# Patient Record
Sex: FEMALE | Race: WHITE | HISPANIC OR LATINO | Employment: UNEMPLOYED | URBAN - METROPOLITAN AREA
[De-identification: names, ages, dates, MRNs, and addresses within clinical notes are randomized per-mention and may not be internally consistent; named-entity substitution may affect disease eponyms.]

---
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
## 2019-01-17 ENCOUNTER — TRANSCRIBE ORDERS (OUTPATIENT)
Dept: URGENT CARE | Facility: MEDICAL CENTER | Age: 19
End: 2019-01-17

## 2019-01-17 ENCOUNTER — APPOINTMENT (OUTPATIENT)
Dept: URGENT CARE | Facility: MEDICAL CENTER | Age: 19
End: 2019-01-17
Payer: OTHER MISCELLANEOUS

## 2019-01-17 ENCOUNTER — APPOINTMENT (OUTPATIENT)
Dept: RADIOLOGY | Facility: MEDICAL CENTER | Age: 19
End: 2019-01-17
Payer: OTHER MISCELLANEOUS

## 2019-01-17 DIAGNOSIS — S69.92XA INJURY OF LEFT WRIST, INITIAL ENCOUNTER: ICD-10-CM

## 2019-01-17 DIAGNOSIS — S69.92XA INJURY OF FINGER OF LEFT HAND, INITIAL ENCOUNTER: ICD-10-CM

## 2019-01-17 DIAGNOSIS — S69.92XA INJURY OF LEFT WRIST, INITIAL ENCOUNTER: Primary | ICD-10-CM

## 2019-01-17 DIAGNOSIS — S69.92XA INJURY OF FINGER OF LEFT HAND, INITIAL ENCOUNTER: Primary | ICD-10-CM

## 2019-01-17 PROCEDURE — 99284 EMERGENCY DEPT VISIT MOD MDM: CPT | Performed by: NURSE PRACTITIONER

## 2019-01-17 PROCEDURE — 73110 X-RAY EXAM OF WRIST: CPT

## 2019-01-17 PROCEDURE — G0383 LEV 4 HOSP TYPE B ED VISIT: HCPCS | Performed by: NURSE PRACTITIONER

## 2019-01-17 PROCEDURE — 73130 X-RAY EXAM OF HAND: CPT

## 2019-01-21 ENCOUNTER — OCCMED (OUTPATIENT)
Dept: URGENT CARE | Facility: MEDICAL CENTER | Age: 19
End: 2019-01-21
Payer: OTHER MISCELLANEOUS

## 2019-01-21 DIAGNOSIS — M79.642 PAIN OF LEFT HAND: Primary | ICD-10-CM

## 2019-01-21 PROCEDURE — 99213 OFFICE O/P EST LOW 20 MIN: CPT | Performed by: PHYSICIAN ASSISTANT

## 2019-01-28 ENCOUNTER — APPOINTMENT (OUTPATIENT)
Dept: URGENT CARE | Facility: MEDICAL CENTER | Age: 19
End: 2019-01-28
Payer: OTHER MISCELLANEOUS

## 2019-01-28 PROCEDURE — 99213 OFFICE O/P EST LOW 20 MIN: CPT | Performed by: PHYSICIAN ASSISTANT

## 2019-02-02 ENCOUNTER — OFFICE VISIT (OUTPATIENT)
Dept: URGENT CARE | Facility: MEDICAL CENTER | Age: 19
End: 2019-02-02
Payer: COMMERCIAL

## 2019-02-02 VITALS
SYSTOLIC BLOOD PRESSURE: 102 MMHG | RESPIRATION RATE: 20 BRPM | TEMPERATURE: 98.5 F | HEART RATE: 108 BPM | OXYGEN SATURATION: 98 % | DIASTOLIC BLOOD PRESSURE: 64 MMHG

## 2019-02-02 DIAGNOSIS — J02.9 ACUTE PHARYNGITIS, UNSPECIFIED ETIOLOGY: ICD-10-CM

## 2019-02-02 DIAGNOSIS — J02.9 SORE THROAT: Primary | ICD-10-CM

## 2019-02-02 LAB — S PYO AG THROAT QL: NEGATIVE

## 2019-02-02 PROCEDURE — 87070 CULTURE OTHR SPECIMN AEROBIC: CPT | Performed by: FAMILY MEDICINE

## 2019-02-02 PROCEDURE — 87430 STREP A AG IA: CPT | Performed by: FAMILY MEDICINE

## 2019-02-02 PROCEDURE — G0383 LEV 4 HOSP TYPE B ED VISIT: HCPCS | Performed by: FAMILY MEDICINE

## 2019-02-02 PROCEDURE — 99214 OFFICE O/P EST MOD 30 MIN: CPT | Performed by: FAMILY MEDICINE

## 2019-02-02 PROCEDURE — 99284 EMERGENCY DEPT VISIT MOD MDM: CPT | Performed by: FAMILY MEDICINE

## 2019-02-02 RX ORDER — AZITHROMYCIN 250 MG/1
TABLET, FILM COATED ORAL
Qty: 6 TABLET | Refills: 0 | Status: SHIPPED | OUTPATIENT
Start: 2019-02-02 | End: 2019-02-06

## 2019-02-02 RX ORDER — BENZONATATE 100 MG/1
100 CAPSULE ORAL 3 TIMES DAILY PRN
Qty: 20 CAPSULE | Refills: 0 | Status: SHIPPED | OUTPATIENT
Start: 2019-02-02 | End: 2019-03-12

## 2019-02-02 NOTE — ADDENDUM NOTE
Addended by: Jon Montaño on: 2/2/2019 03:17 PM     Modules accepted: Orders Advised pt's dtr Yarely that pt has appt w/ Dr. Cristobal on 11/30/18 and Dr. Cristobal will review results w/ pt at that visit, dtr voiced understanding and to call back PRN.

## 2019-02-02 NOTE — LETTER
February 2, 2019     Patient: Krunal Greco   YOB: 2000   Date of Visit: 2/2/2019       To Whom It May Concern: It is my medical opinion that Krunal Greco may return to work on 2/4/2019  If you have any questions or concerns, please don't hesitate to call           Sincerely,        Lashon Gonzalez MD    CC: No Recipients

## 2019-02-02 NOTE — PROGRESS NOTES
[facility]        NAME: Marya Brooks is a 20 y o  female  : 2000    MRN: [id number]  DATE: 2019  TIME: 2:51 PM    Assessment and Plan   Sore throat [J02 9]  1  Sore throat  POCT rapid strepA   2  Acute pharyngitis, unspecified etiology  lidocaine viscous (XYLOCAINE) 2 % mucosal solution    azithromycin (ZITHROMAX) 250 mg tablet    benzonatate (TESSALON PERLES) 100 mg capsule         Patient Instructions       Follow up with PCP in 3-5 days  Proceed to  ER if symptoms worsen  Chief Complaint     Chief Complaint   Patient presents with    Sore Throat     sore throat for 2 days   Cough     Cough and fever since this am  States cough productive for thick yellow phlegm   Fever         History of Present Illness       Patient complaining of sore throat for the last 2 days  Describes that scratchy and burning in nature  Taking Advil with no noticeable improvement  Also describes cough which is developed today  Nonproductive and at times productive of yellowish sputum  Fever 102 today while taking Tylenol which seems to help  Denies shortness of breath although she has a history of asthma  Denies wheezing  Refers to nasal congestion and postnasal drip  Review of Systems   Review of Systems   Constitutional: Positive for fever  HENT: Positive for congestion and sore throat  Respiratory: Positive for cough            Current Medications       Current Outpatient Prescriptions:     albuterol (2 5 mg/3 mL) 0 083 % nebulizer solution, Take 1 vial (2 5 mg total) by nebulization every 6 (six) hours as needed for wheezing or shortness of breath, Disp: 75 mL, Rfl: 0    gabapentin (NEURONTIN) 100 mg capsule, TAKE 1-3 CAPSULES BY MOUTH EVERY 8 HOURS as needed for mood or migraine, Disp: 180 capsule, Rfl: 0    loratadine (CLARITIN) 10 mg tablet, Take 1 tablet (10 mg total) by mouth daily, Disp: 20 tablet, Rfl: 0    venlafaxine (EFFEXOR) 75 mg tablet, Take 1 tablet (75 mg total) by mouth daily, Disp: 30 tablet, Rfl: 1    azithromycin (ZITHROMAX) 250 mg tablet, Take 2 tablets today then 1 tablet daily x 4 days, Disp: 6 tablet, Rfl: 0    benzonatate (TESSALON PERLES) 100 mg capsule, Take 1 capsule (100 mg total) by mouth 3 (three) times a day as needed for cough, Disp: 20 capsule, Rfl: 0    diphenhydrAMINE (BENADRYL) 25 mg tablet, Take 1 tablet (25 mg total) by mouth every 6 (six) hours as needed for itching (Patient not taking: Reported on 2/2/2019 ), Disp: 30 tablet, Rfl: 0    lidocaine viscous (XYLOCAINE) 2 % mucosal solution, Swish and spit 15 mL 4 (four) times a day as needed for moderate pain, Disp: 100 mL, Rfl: 0    ondansetron (ZOFRAN-ODT) 4 mg disintegrating tablet, Take 1 tablet (4 mg total) by mouth every 6 (six) hours as needed for nausea or vomiting (Patient not taking: Reported on 12/31/2018 ), Disp: 20 tablet, Rfl: 0    ziprasidone (GEODON) 20 mg capsule, Take 20 mg by mouth 2 (two) times a day with meals, Disp: , Rfl:     Current Allergies     Allergies as of 02/02/2019 - Reviewed 02/02/2019   Allergen Reaction Noted    Penicillins  07/15/2016            The following portions of the patient's history were reviewed and updated as appropriate: allergies, current medications, past family history, past medical history, past social history, past surgical history and problem list      Past Medical History:   Diagnosis Date    Allergic rhinitis due to pollen     Last assessed 10/16/13    Asthma     Bipolar disorder with psychotic features ([address] )     Long term use of drug     Last assessed 12/20/13    Lump of skin     Last assessed 10/04/13    Lymphadenitis     Last assessed 12/30/14    Lymphadenopathy     Last assessed 10/16/13    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[address] )     Migraine     Psychiatric disorder     Schizophrenia ([address]     Urine malodor     Last assessed 09/18/14       History reviewed  No pertinent surgical history      Family History   Problem Relation Age of Onset    Migraines Mother    [facility] Other Mother         Neck pain    No Known Problems Father          Medications have been verified  Objective   /64 (BP Location: Right arm, Patient Position: Sitting, Cuff Size: Standard)   Pulse (!) 108   Temp 98 5 °F (36 9 °C) (Tympanic)   Resp 20   SpO2 98%        Physical Exam     Physical Exam   HENT:   Right Ear: External ear normal    Left Ear: External ear normal    Erythematous posterior pharynx  Cobblestoning observed  Markedly swollen  Pulmonary/Chest: Effort normal and breath sounds normal    Lymphadenopathy:     She has cervical adenopathy  Nursing note and vitals reviewed

## 2019-02-02 NOTE — PATIENT INSTRUCTIONS
Rapid strep test negative  Throat culture performed  Patient has markedly enlarged lymph nodes  Patient treated empirically with Zithromax Z-Oscar  Xylocaine viscous for sore throat and Tessalon Perles 100 mg q 8 hours  Pharyngitis   WHAT YOU NEED TO KNOW:   Pharyngitis, or sore throat, is inflammation of the tissues and structures in your pharynx (throat)  Pharyngitis is most often caused by bacteria  It may also be caused by a cold or flu virus  Other causes include smoking, allergies, or acid reflux  DISCHARGE INSTRUCTIONS:   Call 911 for any of the following:   · You have trouble breathing or swallowing because your throat is swollen or sore  Return to the emergency department if:   · You are drooling because it hurts too much to swallow  · Your fever is higher than 102? F (39?C) or lasts longer than 3 days  · You are confused  · You taste blood in your throat  Contact your healthcare provider if:   · Your throat pain gets worse  · You have a painful lump in your throat that does not go away after 5 days  · Your symptoms do not improve after 5 days  · You have questions or concerns about your condition or care  Medicines:  Viral pharyngitis will go away on its own without treatment  Your sore throat should start to feel better in 3 to 5 days for both viral and bacterial infections  You may need any of the following:  · Antibiotics  treat a bacterial infection  · NSAIDs , such as ibuprofen, help decrease swelling, pain, and fever  NSAIDs can cause stomach bleeding or kidney problems in certain people  If you take blood thinner medicine, always ask your healthcare provider if NSAIDs are safe for you  Always read the medicine label and follow directions  · Acetaminophen  decreases pain and fever  It is available without a doctor's order  Ask how much to take and how often to take it  Follow directions  Acetaminophen can cause liver damage if not taken correctly      · Take your medicine as directed  Contact your healthcare provider if you think your medicine is not helping or if you have side effects  Tell him or her if you are allergic to any medicine  Keep a list of the medicines, vitamins, and herbs you take  Include the amounts, and when and why you take them  Bring the list or the pill bottles to follow-up visits  Carry your medicine list with you in case of an emergency  Manage your symptoms:   · Gargle salt water  Mix ¼ teaspoon salt in an 8 ounce glass of warm water and gargle  This may help decrease swelling in your throat  · Drink liquids as directed  You may need to drink more liquids than usual  Liquids may help soothe your throat and prevent dehydration  Ask how much liquid to drink each day and which liquids are best for you  · Use a cool-steam humidifier  to help moisten the air in your room and calm your cough  · Soothe your throat  with cough drops, ice, soft foods, or popsicles  Prevent the spread of pharyngitis:  Cover your mouth and nose when you cough or sneeze  Do not share food or drinks  Wash your hands often  Use soap and water  If soap and water are unavailable, use an alcohol based hand   Follow up with your healthcare provider as directed:  Write down your questions so you remember to ask them during your visits  © 2017 [employer] Information is for End User's use only and may not be sold, redistributed or otherwise used for commercial purposes  All illustrations and images included in CareNotes® are the copyrighted property of [employer]  or [employer] Gusman  The above information is an  only  It is not intended as medical advice for individual conditions or treatments  Talk to your doctor, nurse or pharmacist before following any medical regimen to see if it is safe and effective for you

## 2019-02-04 LAB — BACTERIA THROAT CULT: NORMAL

## 2019-02-06 ENCOUNTER — APPOINTMENT (OUTPATIENT)
Dept: URGENT CARE | Facility: MEDICAL CENTER | Age: 19
End: 2019-02-06
Payer: OTHER MISCELLANEOUS

## 2019-02-06 PROCEDURE — 99212 OFFICE O/P EST SF 10 MIN: CPT | Performed by: PHYSICIAN ASSISTANT

## 2019-02-13 ENCOUNTER — HOSPITAL ENCOUNTER (EMERGENCY)
Facility: HOSPITAL | Age: 19
Discharge: HOME | End: 2019-02-13
Attending: EMERGENCY MEDICINE | Admitting: EMERGENCY MEDICINE
Payer: COMMERCIAL

## 2019-02-13 VITALS
OXYGEN SATURATION: 98 % | DIASTOLIC BLOOD PRESSURE: 53 MMHG | HEART RATE: 72 BPM | RESPIRATION RATE: 16 BRPM | TEMPERATURE: 98.6 F | WEIGHT: 135.3 LBS | BODY MASS INDEX: 23.97 KG/M2 | SYSTOLIC BLOOD PRESSURE: 92 MMHG

## 2019-02-13 DIAGNOSIS — R10.9 ACUTE ABDOMINAL PAIN: Primary | ICD-10-CM

## 2019-02-13 DIAGNOSIS — R11.2 NAUSEA AND VOMITING: ICD-10-CM

## 2019-02-13 DIAGNOSIS — R19.7 DIARRHEA: ICD-10-CM

## 2019-02-13 DIAGNOSIS — E86.0 DEHYDRATION: ICD-10-CM

## 2019-02-13 LAB
ALBUMIN SERPL BCP-MCNC: 3.4 G/DL (ref 3.5–5)
ALP SERPL-CCNC: 72 U/L (ref 46–116)
ALT SERPL W P-5'-P-CCNC: 19 U/L (ref 12–78)
ANION GAP SERPL CALCULATED.3IONS-SCNC: 11 MMOL/L (ref 4–13)
AST SERPL W P-5'-P-CCNC: 22 U/L (ref 5–45)
BASOPHILS # BLD AUTO: 0.06 THOUSANDS/ΜL (ref 0–0.1)
BASOPHILS NFR BLD AUTO: 1 % (ref 0–1)
BILIRUB SERPL-MCNC: 0.49 MG/DL (ref 0.2–1)
BILIRUB UR QL STRIP: NEGATIVE
BUN SERPL-MCNC: 8 MG/DL (ref 5–25)
CALCIUM SERPL-MCNC: 8 MG/DL (ref 8.3–10.1)
CHLORIDE SERPL-SCNC: 108 MMOL/L (ref 100–108)
CLARITY UR: CLEAR
CO2 SERPL-SCNC: 22 MMOL/L (ref 21–32)
COLOR UR: YELLOW
COLOR, POC: YELLOW
CREAT SERPL-MCNC: 0.63 MG/DL (ref 0.6–1.3)
EOSINOPHIL # BLD AUTO: 0.22 THOUSAND/ΜL (ref 0–0.61)
EOSINOPHIL NFR BLD AUTO: 3 % (ref 0–6)
ERYTHROCYTE [DISTWIDTH] IN BLOOD BY AUTOMATED COUNT: 13.6 % (ref 11.6–15.1)
EXT PREG TEST URINE: NEGATIVE
GFR SERPL CREATININE-BSD FRML MDRD: 130 ML/MIN/1.73SQ M
GLUCOSE SERPL-MCNC: 80 MG/DL (ref 65–140)
GLUCOSE UR STRIP-MCNC: NEGATIVE MG/DL
HCT VFR BLD AUTO: 40.8 % (ref 34.8–46.1)
HGB BLD-MCNC: 13.5 G/DL (ref 11.5–15.4)
HGB UR QL STRIP.AUTO: NEGATIVE
IMM GRANULOCYTES # BLD AUTO: 0.03 THOUSAND/UL (ref 0–0.2)
IMM GRANULOCYTES NFR BLD AUTO: 0 % (ref 0–2)
KETONES UR STRIP-MCNC: NEGATIVE MG/DL
LEUKOCYTE ESTERASE UR QL STRIP: NEGATIVE
LIPASE SERPL-CCNC: 79 U/L (ref 73–393)
LYMPHOCYTES # BLD AUTO: 2.38 THOUSANDS/ΜL (ref 0.6–4.47)
LYMPHOCYTES NFR BLD AUTO: 33 % (ref 14–44)
MCH RBC QN AUTO: 29 PG (ref 26.8–34.3)
MCHC RBC AUTO-ENTMCNC: 33.1 G/DL (ref 31.4–37.4)
MCV RBC AUTO: 88 FL (ref 82–98)
MONOCYTES # BLD AUTO: 0.51 THOUSAND/ΜL (ref 0.17–1.22)
MONOCYTES NFR BLD AUTO: 7 % (ref 4–12)
NEUTROPHILS # BLD AUTO: 4.07 THOUSANDS/ΜL (ref 1.85–7.62)
NEUTS SEG NFR BLD AUTO: 56 % (ref 43–75)
NITRITE UR QL STRIP: NEGATIVE
NRBC BLD AUTO-RTO: 0 /100 WBCS
PH UR STRIP.AUTO: 6 [PH] (ref 4.5–8)
PLATELET # BLD AUTO: 258 THOUSANDS/UL (ref 149–390)
PMV BLD AUTO: 9.5 FL (ref 8.9–12.7)
POTASSIUM SERPL-SCNC: 3.6 MMOL/L (ref 3.5–5.3)
PROT SERPL-MCNC: 6.5 G/DL (ref 6.4–8.2)
PROT UR STRIP-MCNC: NEGATIVE MG/DL
RBC # BLD AUTO: 4.65 MILLION/UL (ref 3.81–5.12)
SODIUM SERPL-SCNC: 141 MMOL/L (ref 136–145)
SP GR UR STRIP.AUTO: >=1.03 (ref 1–1.03)
UROBILINOGEN UR QL STRIP.AUTO: 1 E.U./DL
WBC # BLD AUTO: 7.27 THOUSAND/UL (ref 4.31–10.16)

## 2019-02-13 PROCEDURE — 96374 THER/PROPH/DIAG INJ IV PUSH: CPT

## 2019-02-13 PROCEDURE — 81025 URINE PREGNANCY TEST: CPT | Performed by: EMERGENCY MEDICINE

## 2019-02-13 PROCEDURE — 36415 COLL VENOUS BLD VENIPUNCTURE: CPT | Performed by: EMERGENCY MEDICINE

## 2019-02-13 PROCEDURE — 99284 EMERGENCY DEPT VISIT MOD MDM: CPT

## 2019-02-13 PROCEDURE — 85025 COMPLETE CBC W/AUTO DIFF WBC: CPT | Performed by: EMERGENCY MEDICINE

## 2019-02-13 PROCEDURE — 96361 HYDRATE IV INFUSION ADD-ON: CPT

## 2019-02-13 PROCEDURE — 81003 URINALYSIS AUTO W/O SCOPE: CPT

## 2019-02-13 PROCEDURE — 96375 TX/PRO/DX INJ NEW DRUG ADDON: CPT

## 2019-02-13 PROCEDURE — 83690 ASSAY OF LIPASE: CPT | Performed by: EMERGENCY MEDICINE

## 2019-02-13 PROCEDURE — 80053 COMPREHEN METABOLIC PANEL: CPT | Performed by: EMERGENCY MEDICINE

## 2019-02-13 RX ORDER — KETOROLAC TROMETHAMINE 30 MG/ML
15 INJECTION, SOLUTION INTRAMUSCULAR; INTRAVENOUS ONCE
Status: COMPLETED | OUTPATIENT
Start: 2019-02-13 | End: 2019-02-13

## 2019-02-13 RX ORDER — DICYCLOMINE HCL 20 MG
20 TABLET ORAL 2 TIMES DAILY
Qty: 20 TABLET | Refills: 0 | Status: SHIPPED | OUTPATIENT
Start: 2019-02-13 | End: 2019-03-12

## 2019-02-13 RX ORDER — ONDANSETRON 2 MG/ML
4 INJECTION INTRAMUSCULAR; INTRAVENOUS ONCE
Status: COMPLETED | OUTPATIENT
Start: 2019-02-13 | End: 2019-02-13

## 2019-02-13 RX ORDER — METOCLOPRAMIDE HYDROCHLORIDE 5 MG/ML
5 INJECTION INTRAMUSCULAR; INTRAVENOUS ONCE
Status: COMPLETED | OUTPATIENT
Start: 2019-02-13 | End: 2019-02-13

## 2019-02-13 RX ORDER — ONDANSETRON 4 MG/1
4 TABLET, FILM COATED ORAL EVERY 6 HOURS
Qty: 7 TABLET | Refills: 0 | Status: SHIPPED | OUTPATIENT
Start: 2019-02-13 | End: 2019-03-12

## 2019-02-13 RX ADMIN — ONDANSETRON 4 MG: 2 INJECTION INTRAMUSCULAR; INTRAVENOUS at 15:50

## 2019-02-13 RX ADMIN — SODIUM CHLORIDE 1000 ML: 0.9 INJECTION, SOLUTION INTRAVENOUS at 15:50

## 2019-02-13 RX ADMIN — KETOROLAC TROMETHAMINE 15 MG: 30 INJECTION, SOLUTION INTRAMUSCULAR at 16:44

## 2019-02-13 RX ADMIN — METOCLOPRAMIDE 5 MG: 5 INJECTION, SOLUTION INTRAMUSCULAR; INTRAVENOUS at 16:45

## 2019-02-13 NOTE — ED PROVIDER NOTES
History  Chief Complaint   Patient presents with    Abdominal Pain     patient c/o abdominal pain N/V/D for the past few days  Reports pain is LLQ  States, "I shitted 12 times today"  History provided by:  Patient  Abdominal Pain   Pain location: generalized worse in LLQ  Pain quality: sharp    Pain radiates to:  Does not radiate  Pain severity:  Moderate  Onset quality:  Gradual  Duration:  3 days  Timing:  Constant  Progression:  Worsening  Chronicity:  New  Relieved by:  Nothing  Worsened by:  Nothing  Ineffective treatments:  None tried  Associated symptoms: chills, diarrhea (x several days w/o blood/mucus), nausea and vomiting (nbnb x several days)    Associated symptoms: no anorexia, no belching, no chest pain, no constipation, no cough, no dysuria, no fatigue, no fever, no hematemesis, no hematochezia, no hematuria, no shortness of breath, no sore throat, no vaginal bleeding and no vaginal discharge        Prior to Admission Medications   Prescriptions Last Dose Informant Patient Reported? Taking?    albuterol (2 5 mg/3 mL) 0 083 % nebulizer solution   No No   Sig: Take 1 vial (2 5 mg total) by nebulization every 6 (six) hours as needed for wheezing or shortness of breath   benzonatate (TESSALON PERLES) 100 mg capsule   No No   Sig: Take 1 capsule (100 mg total) by mouth 3 (three) times a day as needed for cough   diphenhydrAMINE (BENADRYL) 25 mg tablet   No No   Sig: Take 1 tablet (25 mg total) by mouth every 6 (six) hours as needed for itching   Patient not taking: Reported on 2/2/2019    gabapentin (NEURONTIN) 100 mg capsule   No No   Sig: TAKE 1-3 CAPSULES BY MOUTH EVERY 8 HOURS as needed for mood or migraine   lidocaine viscous (XYLOCAINE) 2 % mucosal solution   No No   Sig: Swish and spit 15 mL 4 (four) times a day as needed for moderate pain   loratadine (CLARITIN) 10 mg tablet   No No   Sig: Take 1 tablet (10 mg total) by mouth daily   ondansetron (ZOFRAN-ODT) 4 mg disintegrating tablet   No No   Sig: Take 1 tablet (4 mg total) by mouth every 6 (six) hours as needed for nausea or vomiting   Patient not taking: Reported on 12/31/2018    venlafaxine (EFFEXOR) 75 mg tablet   No No   Sig: Take 1 tablet (75 mg total) by mouth daily   ziprasidone (GEODON) 20 mg capsule   Yes Yes   Sig: Take 20 mg by mouth 2 (two) times a day with meals      Facility-Administered Medications: None       Past Medical History:   Diagnosis Date    Allergic rhinitis due to pollen     Last assessed 10/16/13    Asthma     [facility] with psychotic features ([facility] )     Long term use of drug     Last assessed 12/20/13    Lump of skin     Last assessed 10/04/13    Lymphadenitis     Last assessed 12/30/14    Lymphadenopathy     Last assessed 10/16/13    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[facility] )     Migraine     Psychiatric disorder     Schizophrenia ([facility] )     Urine malodor     Last assessed 09/18/14       History reviewed  No pertinent surgical history  Family History   Problem Relation Age of Onset   [facility] Migraines Mother     Other Mother         Neck pain    No Known Problems Father      I have reviewed and agree with the history as documented  Social History     Tobacco Use    Smoking status: Current Every Day Smoker     Packs/day: 0 50     Types: Cigarettes    Smokeless tobacco: Never Used   Substance Use Topics    Alcohol use: No    Drug use: Yes     Types: Marijuana        Review of Systems   Constitutional: Positive for chills  Negative for activity change, appetite change, fatigue and fever  HENT: Negative for congestion, dental problem, ear pain, rhinorrhea and sore throat  Eyes: Negative for pain and redness  Respiratory: Negative for cough, chest tightness, shortness of breath and wheezing  Cardiovascular: Negative for chest pain and palpitations  Gastrointestinal: Positive for abdominal pain, diarrhea (x several days w/o blood/mucus), nausea and vomiting (nbnb x several days)   Negative for anorexia, blood in stool, constipation, hematemesis and hematochezia  Endocrine: Negative for cold intolerance and heat intolerance  Genitourinary: Negative for dysuria, frequency, hematuria, vaginal bleeding and vaginal discharge  Musculoskeletal: Negative for arthralgias and myalgias  Skin: Negative for color change, pallor and rash  Neurological: Negative for weakness and numbness  Hematological: Does not bruise/bleed easily  Psychiatric/Behavioral: Negative for agitation, hallucinations and suicidal ideas  Physical Exam  Physical Exam   Constitutional: She is oriented to person, place, and time  She appears well-developed and well-nourished  HENT:   Mouth/Throat: No oropharyngeal exudate  TMs normal bilaterally no pharyngeal erythema no rhinorrhea nontender palpation of sinuses, normal looking turbinates   Eyes: Conjunctivae and EOM are normal    Neck: Normal range of motion  Neck supple  No meningeal signs   Cardiovascular: Normal rate, regular rhythm, normal heart sounds and intact distal pulses  Pulmonary/Chest: Effort normal and breath sounds normal  No respiratory distress  She has no wheezes  She has no rales  She exhibits no tenderness  Abdominal: Soft  Bowel sounds are normal  She exhibits no distension and no mass  There is no tenderness  No hernia  No cvat   Musculoskeletal: Normal range of motion  She exhibits no edema  Lymphadenopathy:     She has no cervical adenopathy  Neurological: She is alert and oriented to person, place, and time  No cranial nerve deficit  Skin: No rash noted  No erythema  No edema   Psychiatric: She has a normal mood and affect  Her behavior is normal    Nursing note and vitals reviewed        Vital Signs  ED Triage Vitals [02/13/19 1443]   Temperature Pulse Respirations Blood Pressure SpO2   98 6 °F (37 °C) 100 18 107/60 99 %      Temp Source Heart Rate Source Patient Position - Orthostatic VS BP Location FiO2 (%)   Temporal Monitor Sitting Right arm --      Pain Score       8           Vitals:    02/13/19 1443 02/13/19 1550 02/13/19 1646   BP: 107/60 91/53 92/53   Pulse: 100 73 72   Patient Position - Orthostatic VS: Sitting Lying Lying       Visual Acuity      ED Medications  Medications   sodium chloride 0 9 % bolus 1,000 mL (1,000 mL Intravenous New Bag 2/13/19 1550)   ondansetron (ZOFRAN) injection 4 mg (4 mg Intravenous Given 2/13/19 1550)   metoclopramide (REGLAN) injection 5 mg (5 mg Intravenous Given 2/13/19 1645)   ketorolac (TORADOL) injection 15 mg (15 mg Intravenous Given 2/13/19 1644)       Diagnostic Studies  Results Reviewed     Procedure Component Value Units Date/Time    Comprehensive metabolic panel [[id number]]  (Abnormal) Collected:  02/13/19 1642    Lab Status:  Final result Specimen:  Blood from Arm, Right Updated:  02/13/19 1710     Sodium 141 mmol/L      Potassium 3 6 mmol/L      Chloride 108 mmol/L      CO2 22 mmol/L      ANION GAP 11 mmol/L      BUN 8 mg/dL      Creatinine 0 63 mg/dL      Glucose 80 mg/dL      Calcium 8 0 mg/dL      AST 22 U/L      ALT 19 U/L      Alkaline Phosphatase 72 U/L      Total Protein 6 5 g/dL      Albumin 3 4 g/dL      Total Bilirubin 0 49 mg/dL      eGFR 130 ml/min/1 73sq m     Narrative:       National Kidney Disease Education Program recommendations are as follows:  GFR calculation is accurate only with a steady state creatinine  Chronic Kidney disease less than 60 ml/min/1 73 sq  meters  Kidney failure less than 15 ml/min/1 73 sq  meters      Lipase [[id number]]  (Normal) Collected:  02/13/19 1550    Lab Status:  Final result Specimen:  Blood from Arm, Right Updated:  02/13/19 1642     Lipase 79 u/L     POCT pregnancy, urine [[id number]]  (Normal) Resulted:  02/13/19 1554    Lab Status:  Final result Updated:  02/13/19 1608     EXT PREG TEST UR (Ref: Negative) NEGATIVE    POCT urinalysis dipstick [[id number]]  (Normal) Resulted:  02/13/19 1554    Lab Status:  Final result Specimen:  Urine Updated:  02/13/19 1608     Color, UA yellow    CBC and differential [[id number]] Collected:  02/13/19 1550    Lab Status:  Final result Specimen:  Blood from Arm, Right Updated:  02/13/19 1600     WBC 7 27 Thousand/uL      RBC 4 65 Million/uL      Hemoglobin 13 5 g/dL      Hematocrit 40 8 %      MCV 88 fL      MCH 29 0 pg      MCHC 33 1 g/dL      RDW 13 6 %      MPV 9 5 fL      Platelets 106 Thousands/uL      nRBC 0 /100 WBCs      Neutrophils Relative 56 %      Immat GRANS % 0 %      Lymphocytes Relative 33 %      Monocytes Relative 7 %      Eosinophils Relative 3 %      Basophils Relative 1 %      Neutrophils Absolute 4 07 Thousands/µL      Immature Grans Absolute 0 03 Thousand/uL      Lymphocytes Absolute 2 38 Thousands/µL      Monocytes Absolute 0 51 Thousand/µL      Eosinophils Absolute 0 22 Thousand/µL      Basophils Absolute 0 06 Thousands/µL     ED Urine Macroscopic [[id number]] Collected:  02/13/19 1555    Lab Status:  Final result Specimen:  Urine Updated:  02/13/19 1555     Color, UA Yellow     Clarity, UA Clear     pH, UA 6 0     Leukocytes, UA Negative     Nitrite, UA Negative     Protein, UA Negative mg/dl      Glucose, UA Negative mg/dl      Ketones, UA Negative mg/dl      Urobilinogen, UA 1 0 E U /dl      Bilirubin, UA Negative     Blood, UA Negative     Specific Gravity, UA >=1 030    Narrative:       CLINITEK RESULT                 No orders to display              Procedures  Procedures       Phone Contacts  ED Phone Contact    ED Course  ED Course as of Feb 13 1728 Wed Feb 13, 2019   1710 Work up reviewed and benign  Symptoms improved  Will dc to [facility], with reassurance, counseling, pcp f/u                                  MDM  Number of Diagnoses or Management Options  Diagnosis management comments: Abdominal pain, nausea, vomiting, diarrhea with benign exam-will check abdominal labs, urine dip, urine pregnancy, treat symptoms reassess        Disposition  Final diagnoses:   Nausea and vomiting   Diarrhea   Acute abdominal pain   Dehydration     Time reflects when diagnosis was documented in both MDM as applicable and the Disposition within this note     Time User Action Codes Description Comment    2/13/2019  5:27 PM Gabriele Santos Add [R11 2] Nausea and vomiting     2/13/2019  5:27 PM Gabriele Santos Add [R19 7] Diarrhea     2/13/2019  5:27 PM Med Alpesh NAJERA Add [R10 9] Acute abdominal pain     2/13/2019  5:27 PM Gabriele Santos Add [E86 0] Dehydration     2/13/2019  5:27 PM Gabriele Santos Modify [R11 2] Nausea and vomiting     2/13/2019  5:27 PM Gabriele Santos Modify [R10 9] Acute abdominal pain       ED Disposition     ED Disposition Condition Date/Time Comment    Discharge Good Wed Feb 13, 2019  5:27 PM [address] discharge to home/self care  Follow-up Information     Follow up With Specialties Details Why Contact Info    Alexandria Coronado PA-C Family Medicine, Physician Assistant Schedule an appointment as soon as possible for a visit in 1 day  [address] [place], [place] [id number] [phone]            Patient's Medications   Discharge Prescriptions    DICYCLOMINE (BENTYL) 20 MG TABLET    Take 1 tablet (20 mg total) by mouth 2 (two) times a day for 7 days       Start Date: 2/13/2019 End Date: 2/20/2019       Order Dose: 20 mg       Quantity: 20 tablet    Refills: 0    ONDANSETRON (ZOFRAN) 4 MG TABLET    Take 1 tablet (4 mg total) by mouth every 6 (six) hours for 7 doses       Start Date: 2/13/2019 End Date: 2/15/2019       Order Dose: 4 mg       Quantity: 7 tablet    Refills: 0     No discharge procedures on file      ED Provider  Electronically Signed by           Khris Everett MD  02/13/19 6466

## 2019-03-12 ENCOUNTER — HOSPITAL ENCOUNTER (EMERGENCY)
Facility: HOSPITAL | Age: 19
Discharge: HOME | End: 2019-03-12
Attending: EMERGENCY MEDICINE
Payer: COMMERCIAL

## 2019-03-12 VITALS
TEMPERATURE: 97.2 F | HEART RATE: 74 BPM | BODY MASS INDEX: 24.21 KG/M2 | DIASTOLIC BLOOD PRESSURE: 67 MMHG | WEIGHT: 136.69 LBS | OXYGEN SATURATION: 100 % | RESPIRATION RATE: 16 BRPM | SYSTOLIC BLOOD PRESSURE: 119 MMHG

## 2019-03-12 DIAGNOSIS — K52.9 GASTROENTERITIS: Primary | ICD-10-CM

## 2019-03-12 LAB
ALBUMIN SERPL BCP-MCNC: 4.4 G/DL (ref 3–5.2)
ALP SERPL-CCNC: 76 U/L (ref 43–122)
ALT SERPL W P-5'-P-CCNC: 27 U/L (ref 9–52)
ANION GAP SERPL CALCULATED.3IONS-SCNC: 8 MMOL/L (ref 5–14)
AST SERPL W P-5'-P-CCNC: 29 U/L (ref 14–36)
BASOPHILS # BLD AUTO: 0 THOUSANDS/ΜL (ref 0–0.1)
BASOPHILS NFR BLD AUTO: 1 % (ref 0–1)
BILIRUB SERPL-MCNC: 0.4 MG/DL
BILIRUB UR QL STRIP: NEGATIVE
BUN SERPL-MCNC: 10 MG/DL (ref 5–25)
CALCIUM SERPL-MCNC: 9.3 MG/DL (ref 8.4–10.2)
CHLORIDE SERPL-SCNC: 105 MMOL/L (ref 97–108)
CLARITY UR: CLEAR
CO2 SERPL-SCNC: 23 MMOL/L (ref 22–30)
COLOR UR: YELLOW
CREAT SERPL-MCNC: 0.53 MG/DL (ref 0.6–1.2)
EOSINOPHIL # BLD AUTO: 0.3 THOUSAND/ΜL (ref 0–0.4)
EOSINOPHIL NFR BLD AUTO: 4 % (ref 0–6)
ERYTHROCYTE [DISTWIDTH] IN BLOOD BY AUTOMATED COUNT: 15 %
EXT PREG TEST URINE: NEGATIVE
GFR SERPL CREATININE-BSD FRML MDRD: 137 ML/MIN/1.73SQ M
GLUCOSE SERPL-MCNC: 82 MG/DL (ref 70–99)
GLUCOSE UR STRIP-MCNC: NEGATIVE MG/DL
HCT VFR BLD AUTO: 41.8 % (ref 36–46)
HGB BLD-MCNC: 13.7 G/DL (ref 12–16)
HGB UR QL STRIP.AUTO: NEGATIVE
KETONES UR STRIP-MCNC: NEGATIVE MG/DL
LEUKOCYTE ESTERASE UR QL STRIP: NEGATIVE
LIPASE SERPL-CCNC: 50 U/L (ref 23–300)
LYMPHOCYTES # BLD AUTO: 2.2 THOUSANDS/ΜL (ref 0.5–4)
LYMPHOCYTES NFR BLD AUTO: 26 % (ref 25–45)
MCH RBC QN AUTO: 29.2 PG (ref 26–34)
MCHC RBC AUTO-ENTMCNC: 32.7 G/DL (ref 31–36)
MCV RBC AUTO: 89 FL (ref 80–100)
MONOCYTES # BLD AUTO: 0.6 THOUSAND/ΜL (ref 0.2–0.9)
MONOCYTES NFR BLD AUTO: 7 % (ref 1–10)
NEUTROPHILS # BLD AUTO: 5.3 THOUSANDS/ΜL (ref 1.8–7.8)
NEUTS SEG NFR BLD AUTO: 63 % (ref 45–65)
NITRITE UR QL STRIP: NEGATIVE
PH UR STRIP.AUTO: 5 [PH]
PLATELET # BLD AUTO: 246 THOUSANDS/UL (ref 150–450)
PMV BLD AUTO: 7.8 FL (ref 8.9–12.7)
POTASSIUM SERPL-SCNC: 3.8 MMOL/L (ref 3.6–5)
PROT SERPL-MCNC: 7 G/DL (ref 5.9–8.4)
PROT UR STRIP-MCNC: NEGATIVE MG/DL
RBC # BLD AUTO: 4.68 MILLION/UL (ref 4–5.2)
SODIUM SERPL-SCNC: 136 MMOL/L (ref 137–147)
SP GR UR STRIP.AUTO: 1.02 (ref 1–1.04)
UROBILINOGEN UA: NEGATIVE MG/DL
WBC # BLD AUTO: 8.4 THOUSAND/UL (ref 4.5–11)

## 2019-03-12 PROCEDURE — 96375 TX/PRO/DX INJ NEW DRUG ADDON: CPT

## 2019-03-12 PROCEDURE — 81003 URINALYSIS AUTO W/O SCOPE: CPT | Performed by: PHYSICIAN ASSISTANT

## 2019-03-12 PROCEDURE — 96361 HYDRATE IV INFUSION ADD-ON: CPT

## 2019-03-12 PROCEDURE — 36415 COLL VENOUS BLD VENIPUNCTURE: CPT | Performed by: PHYSICIAN ASSISTANT

## 2019-03-12 PROCEDURE — 83690 ASSAY OF LIPASE: CPT | Performed by: PHYSICIAN ASSISTANT

## 2019-03-12 PROCEDURE — 85025 COMPLETE CBC W/AUTO DIFF WBC: CPT | Performed by: PHYSICIAN ASSISTANT

## 2019-03-12 PROCEDURE — 99283 EMERGENCY DEPT VISIT LOW MDM: CPT

## 2019-03-12 PROCEDURE — 80053 COMPREHEN METABOLIC PANEL: CPT | Performed by: PHYSICIAN ASSISTANT

## 2019-03-12 PROCEDURE — 96374 THER/PROPH/DIAG INJ IV PUSH: CPT

## 2019-03-12 PROCEDURE — 81025 URINE PREGNANCY TEST: CPT | Performed by: PHYSICIAN ASSISTANT

## 2019-03-12 RX ORDER — SUCRALFATE 1 G/1
1 TABLET ORAL 4 TIMES DAILY
Qty: 40 TABLET | Refills: 0 | Status: SHIPPED | OUTPATIENT
Start: 2019-03-12 | End: 2019-04-01

## 2019-03-12 RX ORDER — SODIUM CHLORIDE 9 MG/ML
250 INJECTION, SOLUTION INTRAVENOUS CONTINUOUS
Status: DISCONTINUED | OUTPATIENT
Start: 2019-03-12 | End: 2019-03-12 | Stop reason: HOSPADM

## 2019-03-12 RX ORDER — ONDANSETRON 2 MG/ML
4 INJECTION INTRAMUSCULAR; INTRAVENOUS ONCE
Status: COMPLETED | OUTPATIENT
Start: 2019-03-12 | End: 2019-03-12

## 2019-03-12 RX ORDER — MAGNESIUM HYDROXIDE/ALUMINUM HYDROXICE/SIMETHICONE 120; 1200; 1200 MG/30ML; MG/30ML; MG/30ML
30 SUSPENSION ORAL ONCE
Status: COMPLETED | OUTPATIENT
Start: 2019-03-12 | End: 2019-03-12

## 2019-03-12 RX ORDER — ONDANSETRON 4 MG/1
4 TABLET, FILM COATED ORAL EVERY 12 HOURS PRN
Qty: 12 TABLET | Refills: 0 | Status: SHIPPED | OUTPATIENT
Start: 2019-03-12 | End: 2019-04-01

## 2019-03-12 RX ORDER — SUCRALFATE ORAL 1 G/10ML
1000 SUSPENSION ORAL ONCE
Status: COMPLETED | OUTPATIENT
Start: 2019-03-12 | End: 2019-03-12

## 2019-03-12 RX ORDER — FAMOTIDINE 20 MG/1
20 TABLET, FILM COATED ORAL 2 TIMES DAILY
Qty: 30 TABLET | Refills: 0 | Status: SHIPPED | OUTPATIENT
Start: 2019-03-12 | End: 2019-04-01

## 2019-03-12 RX ADMIN — ALUMINUM HYDROXIDE, MAGNESIUM HYDROXIDE, AND SIMETHICONE 30 ML: 200; 200; 20 SUSPENSION ORAL at 12:02

## 2019-03-12 RX ADMIN — SODIUM CHLORIDE 250 ML/HR: 9 INJECTION, SOLUTION INTRAVENOUS at 12:02

## 2019-03-12 RX ADMIN — FAMOTIDINE 20 MG: 10 INJECTION, SOLUTION INTRAVENOUS at 12:02

## 2019-03-12 RX ADMIN — SUCRALFATE 1000 MG: 1 SUSPENSION ORAL at 12:02

## 2019-03-12 RX ADMIN — ONDANSETRON HYDROCHLORIDE 4 MG: 2 INJECTION, SOLUTION INTRAMUSCULAR; INTRAVENOUS at 12:02

## 2019-03-12 NOTE — ED PROVIDER NOTES
History  Chief Complaint   Patient presents with    Vomiting     " for like 3 or 4 days I have been vomiting some blood clots and I thought it was from straining to vomit but it is still there and I have diarrhea and fevers too and after I vomit I have some confusion"       History provided by:  Patient   used: No    Medical Problem   Location:  Pt with mid epigastric pain nausea and vomiting and diarrhea  for about 3 days  pt noticed blood streaks in vomit   Severity:  Moderate  Duration:  3 days  Timing:  Constant  Progression:  Unchanged  Chronicity:  New  Associated symptoms: abdominal pain, diarrhea, nausea and vomiting    Associated symptoms: no chest pain, no congestion, no cough, no ear pain, no fatigue, no fever, no headaches, no loss of consciousness, no myalgias, no rash, no rhinorrhea, no shortness of breath, no sore throat and no wheezing        Prior to Admission Medications   Prescriptions Last Dose Informant Patient Reported? Taking? albuterol (2 5 mg/3 mL) 0 083 % nebulizer solution   No No   Sig: Take 1 vial (2 5 mg total) by nebulization every 6 (six) hours as needed for wheezing or shortness of breath      Facility-Administered Medications: None       Past Medical History:   Diagnosis Date    Allergic rhinitis due to pollen     Last assessed 10/16/13    Asthma     [facility] with psychotic features [facility]     Long term use of drug     Last assessed 12/20/13    Lump of skin     Last assessed 10/04/13    Lymphadenitis     Last assessed 12/30/14    Lymphadenopathy     Last assessed 10/16/13    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     Migraine     Psychiatric disorder     Schizophrenia ([facility] )     Urine malodor     Last assessed 09/18/14       History reviewed  No pertinent surgical history      Family History   Problem Relation Age of Onset   [de-identified] Migraines Mother     Other Mother         Neck pain    No Known Problems Father      I have reviewed and agree with the history as documented  Social History     Tobacco Use    Smoking status: Current Every Day Smoker     Packs/day: 0 50     Types: Cigarettes    Smokeless tobacco: Never Used   Substance Use Topics    Alcohol use: No    Drug use: Yes     Types: Marijuana        Review of Systems   Constitutional: Negative  Negative for fatigue and fever  HENT: Negative  Negative for congestion, ear pain, rhinorrhea and sore throat  Eyes: Negative  Respiratory: Negative  Negative for cough, shortness of breath and wheezing  Cardiovascular: Negative  Negative for chest pain  Gastrointestinal: Positive for abdominal pain, diarrhea, nausea and vomiting  Endocrine: Negative  Genitourinary: Negative  Musculoskeletal: Negative  Negative for myalgias  Skin: Negative for rash  Allergic/Immunologic: Negative  Neurological: Negative  Negative for loss of consciousness and headaches  Hematological: Negative  Psychiatric/Behavioral: Negative  All other systems reviewed and are negative  Physical Exam  Physical Exam   Constitutional: She is oriented to person, place, and time  She appears well-developed and well-nourished  110pm pt feels much better nausea and pain free   HENT:   Head: Normocephalic and atraumatic  Right Ear: External ear normal    Left Ear: External ear normal    Nose: Nose normal    Mouth/Throat: Oropharynx is clear and moist    Eyes: Pupils are equal, round, and reactive to light  Conjunctivae and EOM are normal    Neck: Normal range of motion  Neck supple  Cardiovascular: Normal rate, regular rhythm and normal heart sounds  Pulmonary/Chest: Effort normal and breath sounds normal    Abdominal: Soft    midepigastric tender    Musculoskeletal: Normal range of motion  Neurological: She is alert and oriented to person, place, and time  Skin: Skin is warm  Psychiatric: She has a normal mood and affect  Nursing note and vitals reviewed        Vital Signs  ED Triage Vitals [03/12/19 1107]   Temperature Pulse Respirations Blood Pressure SpO2   (!) 97 2 °F (36 2 °C) 71 18 114/72 99 %      Temp Source Heart Rate Source Patient Position - Orthostatic VS BP Location FiO2 (%)   Tympanic Monitor Sitting Left arm --      Pain Score       --           Vitals:    03/12/19 1107 03/12/19 1324   BP: 114/72 119/67   Pulse: 71 74   Patient Position - Orthostatic VS: Sitting Lying       qSOFA     Row Name 03/12/19 1107                Altered mental status GCS < 15  --        Respiratory Rate > / =00  0        Systolic BP < / =226  0        Q Sofa Score  0              Visual Acuity      ED Medications  Medications   ondansetron (ZOFRAN) injection 4 mg (4 mg Intravenous Given 3/12/19 1202)   famotidine (PEPCID) injection 20 mg (20 mg Intravenous Given 3/12/19 1202)   aluminum-magnesium hydroxide-simethicone (MYLANTA) 200-200-20 mg/5 mL oral suspension 30 mL (30 mL Oral Given 3/12/19 1202)   sucralfate (CARAFATE) oral suspension 1,000 mg (1,000 mg Oral Given 3/12/19 1202)       Diagnostic Studies  Results Reviewed     Procedure Component Value Units Date/Time    Lipase [[id number]]  (Normal) Collected:  03/12/19 1148    Lab Status:  Final result Specimen:  Blood from Arm, Right Updated:  03/12/19 1210     Lipase 50 u/L     Comprehensive metabolic panel [[id number]]  (Abnormal) Collected:  03/12/19 1148    Lab Status:  Final result Specimen:  Blood from Arm, Right Updated:  03/12/19 1210     Sodium 136 mmol/L      Potassium 3 8 mmol/L      Chloride 105 mmol/L      CO2 23 mmol/L      ANION GAP 8 mmol/L      BUN 10 mg/dL      Creatinine 0 53 mg/dL      Glucose 82 mg/dL      Calcium 9 3 mg/dL      AST 29 U/L      ALT 27 U/L      Alkaline Phosphatase 76 U/L      Total Protein 7 0 g/dL      Albumin 4 4 g/dL      Total Bilirubin 0 40 mg/dL      eGFR 137 ml/min/1 73sq m     Narrative:       National Kidney Disease Education Program recommendations are as follows:  GFR calculation is accurate only with a steady state creatinine  Chronic Kidney disease less than 60 ml/min/1 73 sq  meters  Kidney failure less than 15 ml/min/1 73 sq  meters      UA w Reflex to Microscopic w Reflex to Culture [[id number]]  (Normal) Collected:  03/12/19 1149    Lab Status:  Final result Specimen:  Urine, Clean Catch Updated:  03/12/19 1203     Color, UA Yellow     Clarity, UA Clear     Specific [place], UA 1 020     pH, UA 5 0     Leukocytes, UA Negative     Nitrite, UA Negative     Protein, UA Negative mg/dl      Glucose, UA Negative mg/dl      Ketones, UA Negative mg/dl      Bilirubin, UA Negative     Blood, UA Negative     UROBILINOGEN UA Negative mg/dL     CBC and differential [[id number]]  (Abnormal) Collected:  03/12/19 1148    Lab Status:  Final result Specimen:  Blood from Arm, Right Updated:  03/12/19 1202     WBC 8 40 Thousand/uL      RBC 4 68 Million/uL      Hemoglobin 13 7 g/dL      Hematocrit 41 8 %      MCV 89 fL      MCH 29 2 pg      MCHC 32 7 g/dL      RDW 15 0 %      MPV 7 8 fL      Platelets 282 Thousands/uL      Neutrophils Relative 63 %      Lymphocytes Relative 26 %      Monocytes Relative 7 %      Eosinophils Relative 4 %      Basophils Relative 1 %      Neutrophils Absolute 5 30 Thousands/µL      Lymphocytes Absolute 2 20 Thousands/µL      Monocytes Absolute 0 60 Thousand/µL      Eosinophils Absolute 0 30 Thousand/µL      Basophils Absolute 0 00 Thousands/µL     POCT pregnancy, urine [[id number]]  (Normal) Resulted:  03/12/19 1151    Lab Status:  Final result Updated:  03/12/19 1151     EXT PREG TEST UR (Ref: Negative) negative                 No orders to display              Procedures  Procedures       Phone Contacts  ED Phone Contact    ED Course                               MDM    Disposition  Final diagnoses:   Gastroenteritis     Time reflects when diagnosis was documented in both MDM as applicable and the Disposition within this note     Time User Action Codes Description Comment    3/12/2019  1:15 PM Sheila Fail Betsy Abrams Add [K52 9] Gastroenteritis       ED Disposition     ED Disposition Condition Date/Time Comment    Discharge Stable Tue Mar 12, 2019  1:15 PM Apurva Rueda Norris discharge to home/self care  Follow-up Information    None         Discharge Medication List as of 3/12/2019  1:16 PM      START taking these medications    Details   famotidine (PEPCID) 20 mg tablet Take 1 tablet (20 mg total) by mouth 2 (two) times a day, Starting Tue 3/12/2019, Print      ondansetron (ZOFRAN) 4 mg tablet Take 1 tablet (4 mg total) by mouth every 12 (twelve) hours as needed for nausea or vomiting, Starting Tue 3/12/2019, Print      sucralfate (CARAFATE) 1 g tablet Take 1 tablet (1 g total) by mouth 4 (four) times a day, Starting Tue 3/12/2019, Print         CONTINUE these medications which have NOT CHANGED    Details   albuterol (2 5 mg/3 mL) 0 083 % nebulizer solution Take 1 vial (2 5 mg total) by nebulization every 6 (six) hours as needed for wheezing or shortness of breath, Starting Tue 10/30/2018, Print           No discharge procedures on file      ED Provider  Electronically Signed by           Gracie Bah PA-C  03/13/19 2956

## 2019-03-12 NOTE — ED NOTES
Nausea and pain are still gone   Pt also not having epigastric tenderness any longer     [employer] RACHEL Edward  03/12/19 4436

## 2019-04-01 ENCOUNTER — OFFICE VISIT (OUTPATIENT)
Dept: FAMILY MEDICINE CLINIC | Facility: CLINIC | Age: 19
End: 2019-04-01

## 2019-04-01 VITALS
HEART RATE: 96 BPM | BODY MASS INDEX: 24.27 KG/M2 | HEIGHT: 63 IN | RESPIRATION RATE: 18 BRPM | SYSTOLIC BLOOD PRESSURE: 102 MMHG | WEIGHT: 137 LBS | TEMPERATURE: 98.8 F | DIASTOLIC BLOOD PRESSURE: 60 MMHG | OXYGEN SATURATION: 99 %

## 2019-04-01 DIAGNOSIS — I88.9 LYMPHADENITIS: ICD-10-CM

## 2019-04-01 DIAGNOSIS — K21.9 GASTROESOPHAGEAL REFLUX DISEASE, ESOPHAGITIS PRESENCE NOT SPECIFIED: ICD-10-CM

## 2019-04-01 DIAGNOSIS — K92.0 HEMATEMESIS WITH NAUSEA: ICD-10-CM

## 2019-04-01 DIAGNOSIS — R19.7 DIARRHEA, UNSPECIFIED TYPE: Primary | ICD-10-CM

## 2019-04-01 DIAGNOSIS — R59.0 CERVICAL ADENOPATHY: ICD-10-CM

## 2019-04-01 DIAGNOSIS — R10.13 EPIGASTRIC PAIN: ICD-10-CM

## 2019-04-01 DIAGNOSIS — R25.1 SHAKING: ICD-10-CM

## 2019-04-01 PROCEDURE — 99214 OFFICE O/P EST MOD 30 MIN: CPT | Performed by: PHYSICIAN ASSISTANT

## 2019-04-01 RX ORDER — OMEPRAZOLE 40 MG/1
40 CAPSULE, DELAYED RELEASE ORAL DAILY
Qty: 30 CAPSULE | Refills: 1 | Status: SHIPPED | OUTPATIENT
Start: 2019-04-01 | End: 2020-03-09

## 2019-04-02 PROBLEM — R19.7 DIARRHEA: Status: ACTIVE | Noted: 2019-04-02

## 2019-04-05 ENCOUNTER — HOSPITAL ENCOUNTER (OUTPATIENT)
Dept: NEUROLOGY | Facility: HOSPITAL | Age: 19
Discharge: HOME | End: 2019-04-05
Payer: COMMERCIAL

## 2019-04-05 DIAGNOSIS — R25.1 SHAKING: ICD-10-CM

## 2019-04-05 PROCEDURE — 95819 EEG AWAKE AND ASLEEP: CPT | Performed by: PSYCHIATRY & NEUROLOGY

## 2019-04-05 PROCEDURE — 95819 EEG AWAKE AND ASLEEP: CPT

## 2019-04-30 ENCOUNTER — HOSPITAL ENCOUNTER (OUTPATIENT)
Dept: ULTRASOUND IMAGING | Facility: HOSPITAL | Age: 19
Discharge: HOME | End: 2019-04-30
Payer: COMMERCIAL

## 2019-04-30 DIAGNOSIS — R19.7 DIARRHEA, UNSPECIFIED TYPE: ICD-10-CM

## 2019-04-30 PROCEDURE — 76705 ECHO EXAM OF ABDOMEN: CPT

## 2019-05-01 ENCOUNTER — OFFICE VISIT (OUTPATIENT)
Dept: GASTROENTEROLOGY | Facility: MEDICAL CENTER | Age: 19
End: 2019-05-01
Payer: COMMERCIAL

## 2019-05-01 VITALS
HEIGHT: 63 IN | TEMPERATURE: 98.7 F | HEART RATE: 69 BPM | DIASTOLIC BLOOD PRESSURE: 60 MMHG | BODY MASS INDEX: 23.85 KG/M2 | SYSTOLIC BLOOD PRESSURE: 92 MMHG | WEIGHT: 134.6 LBS

## 2019-05-01 DIAGNOSIS — R11.2 NAUSEA AND VOMITING, INTRACTABILITY OF VOMITING NOT SPECIFIED, UNSPECIFIED VOMITING TYPE: ICD-10-CM

## 2019-05-01 DIAGNOSIS — R19.4 CHANGE IN BOWEL HABITS: ICD-10-CM

## 2019-05-01 DIAGNOSIS — R19.5 LOOSE STOOLS: Primary | ICD-10-CM

## 2019-05-01 DIAGNOSIS — R10.13 EPIGASTRIC PAIN: ICD-10-CM

## 2019-05-01 DIAGNOSIS — K92.0 HEMATEMESIS WITH NAUSEA: ICD-10-CM

## 2019-05-01 PROCEDURE — 99244 OFF/OP CNSLTJ NEW/EST MOD 40: CPT | Performed by: INTERNAL MEDICINE

## 2019-05-03 ENCOUNTER — ANESTHESIA (OUTPATIENT)
Dept: GASTROENTEROLOGY | Facility: HOSPITAL | Age: 19
End: 2019-05-03
Payer: COMMERCIAL

## 2019-05-03 ENCOUNTER — ANESTHESIA EVENT (OUTPATIENT)
Dept: GASTROENTEROLOGY | Facility: HOSPITAL | Age: 19
End: 2019-05-03
Payer: COMMERCIAL

## 2019-05-03 ENCOUNTER — HOSPITAL ENCOUNTER (OUTPATIENT)
Facility: HOSPITAL | Age: 19
Setting detail: OUTPATIENT SURGERY
Discharge: HOME | End: 2019-05-03
Attending: INTERNAL MEDICINE | Admitting: INTERNAL MEDICINE
Payer: COMMERCIAL

## 2019-05-03 VITALS
RESPIRATION RATE: 16 BRPM | OXYGEN SATURATION: 99 % | TEMPERATURE: 98.6 F | WEIGHT: 134.38 LBS | BODY MASS INDEX: 23.81 KG/M2 | HEART RATE: 75 BPM | HEIGHT: 63 IN | DIASTOLIC BLOOD PRESSURE: 58 MMHG | SYSTOLIC BLOOD PRESSURE: 95 MMHG

## 2019-05-03 DIAGNOSIS — R19.4 CHANGE IN BOWEL HABITS: ICD-10-CM

## 2019-05-03 DIAGNOSIS — R19.5 LOOSE STOOLS: ICD-10-CM

## 2019-05-03 DIAGNOSIS — R10.13 EPIGASTRIC PAIN: ICD-10-CM

## 2019-05-03 DIAGNOSIS — R11.2 NAUSEA AND VOMITING, INTRACTABILITY OF VOMITING NOT SPECIFIED, UNSPECIFIED VOMITING TYPE: ICD-10-CM

## 2019-05-03 PROCEDURE — 88305 TISSUE EXAM BY PATHOLOGIST: CPT | Performed by: PATHOLOGY

## 2019-05-03 PROCEDURE — 88342 IMHCHEM/IMCYTCHM 1ST ANTB: CPT | Performed by: PATHOLOGY

## 2019-05-03 PROCEDURE — 43239 EGD BIOPSY SINGLE/MULTIPLE: CPT | Performed by: INTERNAL MEDICINE

## 2019-05-03 PROCEDURE — 45380 COLONOSCOPY AND BIOPSY: CPT | Performed by: INTERNAL MEDICINE

## 2019-05-03 RX ORDER — PROPOFOL 10 MG/ML
INJECTION, EMULSION INTRAVENOUS CONTINUOUS PRN
Status: DISCONTINUED | OUTPATIENT
Start: 2019-05-03 | End: 2019-05-03 | Stop reason: SURG

## 2019-05-03 RX ORDER — SODIUM CHLORIDE 9 MG/ML
INJECTION, SOLUTION INTRAVENOUS CONTINUOUS PRN
Status: DISCONTINUED | OUTPATIENT
Start: 2019-05-03 | End: 2019-05-03 | Stop reason: SURG

## 2019-05-03 RX ORDER — SODIUM CHLORIDE 9 MG/ML
100 INJECTION, SOLUTION INTRAVENOUS CONTINUOUS
Status: DISCONTINUED | OUTPATIENT
Start: 2019-05-03 | End: 2019-05-03 | Stop reason: HOSPADM

## 2019-05-03 RX ORDER — ONDANSETRON 2 MG/ML
4 INJECTION INTRAMUSCULAR; INTRAVENOUS ONCE
Status: COMPLETED | OUTPATIENT
Start: 2019-05-03 | End: 2019-05-03

## 2019-05-03 RX ORDER — METOCLOPRAMIDE HYDROCHLORIDE 5 MG/ML
INJECTION INTRAMUSCULAR; INTRAVENOUS AS NEEDED
Status: DISCONTINUED | OUTPATIENT
Start: 2019-05-03 | End: 2019-05-03 | Stop reason: SURG

## 2019-05-03 RX ORDER — PROPOFOL 10 MG/ML
INJECTION, EMULSION INTRAVENOUS AS NEEDED
Status: DISCONTINUED | OUTPATIENT
Start: 2019-05-03 | End: 2019-05-03 | Stop reason: SURG

## 2019-05-03 RX ORDER — LIDOCAINE HYDROCHLORIDE 10 MG/ML
INJECTION, SOLUTION INFILTRATION; PERINEURAL AS NEEDED
Status: DISCONTINUED | OUTPATIENT
Start: 2019-05-03 | End: 2019-05-03 | Stop reason: SURG

## 2019-05-03 RX ADMIN — SODIUM CHLORIDE 100 ML/HR: 0.9 INJECTION, SOLUTION INTRAVENOUS at 12:34

## 2019-05-03 RX ADMIN — METOCLOPRAMIDE 10 MG: 5 INJECTION, SOLUTION INTRAMUSCULAR; INTRAVENOUS at 14:22

## 2019-05-03 RX ADMIN — ONDANSETRON 4 MG: 2 INJECTION INTRAMUSCULAR; INTRAVENOUS at 13:09

## 2019-05-03 RX ADMIN — PROPOFOL 200 MG: 10 INJECTION, EMULSION INTRAVENOUS at 14:26

## 2019-05-03 RX ADMIN — LIDOCAINE HYDROCHLORIDE 100 MG: 10 INJECTION, SOLUTION INFILTRATION; PERINEURAL at 14:26

## 2019-05-03 RX ADMIN — SODIUM CHLORIDE: 0.9 INJECTION, SOLUTION INTRAVENOUS at 14:14

## 2019-05-03 RX ADMIN — PROPOFOL 200 MCG/KG/MIN: 10 INJECTION, EMULSION INTRAVENOUS at 14:26

## 2019-05-14 ENCOUNTER — TELEPHONE (OUTPATIENT)
Dept: GASTROENTEROLOGY | Facility: CLINIC | Age: 19
End: 2019-05-14

## 2019-05-30 ENCOUNTER — APPOINTMENT (OUTPATIENT)
Dept: LAB | Facility: HOSPITAL | Age: 19
End: 2019-05-30
Attending: INTERNAL MEDICINE
Payer: COMMERCIAL

## 2019-05-30 ENCOUNTER — TRANSCRIBE ORDERS (OUTPATIENT)
Dept: ADMINISTRATIVE | Facility: HOSPITAL | Age: 19
End: 2019-05-30

## 2019-05-30 DIAGNOSIS — R19.4 CHANGE IN BOWEL HABITS: ICD-10-CM

## 2019-05-30 DIAGNOSIS — R10.13 EPIGASTRIC PAIN: ICD-10-CM

## 2019-05-30 DIAGNOSIS — R19.5 LOOSE STOOLS: ICD-10-CM

## 2019-05-30 LAB
CRP SERPL QL: <3 MG/L
RPR SER QL: NORMAL
TSH SERPL DL<=0.05 MIU/L-ACNC: 2.99 UIU/ML (ref 0.47–4.68)

## 2019-05-30 PROCEDURE — 86255 FLUORESCENT ANTIBODY SCREEN: CPT

## 2019-05-30 PROCEDURE — 83516 IMMUNOASSAY NONANTIBODY: CPT

## 2019-05-30 PROCEDURE — 82784 ASSAY IGA/IGD/IGG/IGM EACH: CPT

## 2019-05-30 PROCEDURE — 86140 C-REACTIVE PROTEIN: CPT

## 2019-05-30 PROCEDURE — 86592 SYPHILIS TEST NON-TREP QUAL: CPT | Performed by: NURSE PRACTITIONER

## 2019-05-30 PROCEDURE — 84443 ASSAY THYROID STIM HORMONE: CPT

## 2019-05-30 PROCEDURE — 36415 COLL VENOUS BLD VENIPUNCTURE: CPT

## 2019-05-31 LAB
ENDOMYSIUM IGA SER QL: NEGATIVE
GLIADIN PEPTIDE IGA SER-ACNC: 3 UNITS (ref 0–19)
GLIADIN PEPTIDE IGG SER-ACNC: 2 UNITS (ref 0–19)
IGA SERPL-MCNC: 229 MG/DL (ref 87–352)
TTG IGA SER-ACNC: <2 U/ML (ref 0–3)
TTG IGG SER-ACNC: <2 U/ML (ref 0–5)

## 2019-06-05 ENCOUNTER — APPOINTMENT (OUTPATIENT)
Dept: LAB | Facility: HOSPITAL | Age: 19
End: 2019-06-05
Payer: COMMERCIAL

## 2019-06-05 ENCOUNTER — OFFICE VISIT (OUTPATIENT)
Dept: FAMILY MEDICINE CLINIC | Facility: CLINIC | Age: 19
End: 2019-06-05

## 2019-06-05 VITALS
OXYGEN SATURATION: 99 % | RESPIRATION RATE: 18 BRPM | TEMPERATURE: 98.5 F | BODY MASS INDEX: 23.92 KG/M2 | HEART RATE: 86 BPM | DIASTOLIC BLOOD PRESSURE: 60 MMHG | HEIGHT: 63 IN | SYSTOLIC BLOOD PRESSURE: 100 MMHG | WEIGHT: 135 LBS

## 2019-06-05 DIAGNOSIS — R51.9 CHRONIC DAILY HEADACHE: ICD-10-CM

## 2019-06-05 DIAGNOSIS — M54.9 MILD BACK PAIN: ICD-10-CM

## 2019-06-05 DIAGNOSIS — J01.00 ACUTE NON-RECURRENT MAXILLARY SINUSITIS: Primary | ICD-10-CM

## 2019-06-05 DIAGNOSIS — I88.9 LYMPHADENITIS: ICD-10-CM

## 2019-06-05 DIAGNOSIS — M54.2 CERVICALGIA: ICD-10-CM

## 2019-06-05 DIAGNOSIS — E55.9 VITAMIN D DEFICIENCY: ICD-10-CM

## 2019-06-05 DIAGNOSIS — R59.0 CERVICAL ADENOPATHY: ICD-10-CM

## 2019-06-05 DIAGNOSIS — M54.16 LUMBAR RADICULOPATHY: ICD-10-CM

## 2019-06-05 DIAGNOSIS — R19.5 LOOSE STOOLS: ICD-10-CM

## 2019-06-05 DIAGNOSIS — A64 STD (SEXUALLY TRANSMITTED DISEASE): ICD-10-CM

## 2019-06-05 DIAGNOSIS — R56.9 SEIZURE-LIKE ACTIVITY (HCC): ICD-10-CM

## 2019-06-05 DIAGNOSIS — R10.13 EPIGASTRIC PAIN: Primary | ICD-10-CM

## 2019-06-05 DIAGNOSIS — R59.1 LYMPHADENOPATHY: ICD-10-CM

## 2019-06-05 DIAGNOSIS — Z72.51 HIGH RISK HETEROSEXUAL BEHAVIOR: ICD-10-CM

## 2019-06-05 DIAGNOSIS — R19.4 CHANGE IN BOWEL HABITS: ICD-10-CM

## 2019-06-05 DIAGNOSIS — G43.009 MIGRAINE WITHOUT AURA AND WITHOUT STATUS MIGRAINOSUS, NOT INTRACTABLE: ICD-10-CM

## 2019-06-05 DIAGNOSIS — R19.7 DIARRHEA, UNSPECIFIED TYPE: ICD-10-CM

## 2019-06-05 LAB
25(OH)D3 SERPL-MCNC: 9.8 NG/ML (ref 30–100)
RV AG STL QL: NEGATIVE
TSH SERPL DL<=0.05 MIU/L-ACNC: 2.06 UIU/ML (ref 0.47–4.68)
VIT B12 SERPL-MCNC: 590 PG/ML (ref 100–900)

## 2019-06-05 PROCEDURE — 87177 OVA AND PARASITES SMEARS: CPT

## 2019-06-05 PROCEDURE — 87425 ROTAVIRUS AG IA: CPT

## 2019-06-05 PROCEDURE — 84443 ASSAY THYROID STIM HORMONE: CPT

## 2019-06-05 PROCEDURE — 87209 SMEAR COMPLEX STAIN: CPT

## 2019-06-05 PROCEDURE — 82656 EL-1 FECAL QUAL/SEMIQ: CPT

## 2019-06-05 PROCEDURE — 86618 LYME DISEASE ANTIBODY: CPT

## 2019-06-05 PROCEDURE — 82705 FATS/LIPIDS FECES QUAL: CPT

## 2019-06-05 PROCEDURE — 36415 COLL VENOUS BLD VENIPUNCTURE: CPT

## 2019-06-05 PROCEDURE — 99214 OFFICE O/P EST MOD 30 MIN: CPT | Performed by: PHYSICIAN ASSISTANT

## 2019-06-05 PROCEDURE — 87505 NFCT AGENT DETECTION GI: CPT

## 2019-06-05 PROCEDURE — 82607 VITAMIN B-12: CPT

## 2019-06-05 PROCEDURE — 83993 ASSAY FOR CALPROTECTIN FECAL: CPT

## 2019-06-05 PROCEDURE — 80074 ACUTE HEPATITIS PANEL: CPT

## 2019-06-05 PROCEDURE — 87389 HIV-1 AG W/HIV-1&-2 AB AG IA: CPT

## 2019-06-05 PROCEDURE — 82306 VITAMIN D 25 HYDROXY: CPT

## 2019-06-05 RX ORDER — AZITHROMYCIN 250 MG/1
500 TABLET, FILM COATED ORAL EVERY 24 HOURS
Qty: 10 TABLET | Refills: 0 | Status: SHIPPED | OUTPATIENT
Start: 2019-06-05 | End: 2019-06-10

## 2019-06-06 DIAGNOSIS — E55.9 VITAMIN D DEFICIENCY: Primary | ICD-10-CM

## 2019-06-06 DIAGNOSIS — R56.9 SEIZURE-LIKE ACTIVITY (HCC): Primary | ICD-10-CM

## 2019-06-06 PROBLEM — R59.1 LYMPHADENOPATHY: Status: ACTIVE | Noted: 2018-04-26

## 2019-06-06 PROBLEM — M54.16 LUMBAR RADICULOPATHY: Status: ACTIVE | Noted: 2019-06-06

## 2019-06-06 LAB
B BURGDOR IGG SER IA-ACNC: 0.04
B BURGDOR IGM SER IA-ACNC: 0.21
C DIFF TOX GENS STL QL NAA+PROBE: NORMAL
CAMPYLOBACTER DNA SPEC NAA+PROBE: NORMAL
HAV IGM SER QL: NORMAL
HBV CORE IGM SER QL: NORMAL
HBV SURFACE AG SER QL: NORMAL
HCV AB SER QL: NORMAL
SALMONELLA DNA SPEC QL NAA+PROBE: NORMAL
SHIGA TOXIN STX GENE SPEC NAA+PROBE: NORMAL
SHIGELLA DNA SPEC QL NAA+PROBE: NORMAL

## 2019-06-06 RX ORDER — ERGOCALCIFEROL 1.25 MG/1
50000 CAPSULE ORAL WEEKLY
Qty: 12 CAPSULE | Refills: 0 | Status: SHIPPED | OUTPATIENT
Start: 2019-06-06 | End: 2019-08-26

## 2019-06-07 ENCOUNTER — TELEPHONE (OUTPATIENT)
Dept: NEUROLOGY | Facility: CLINIC | Age: 19
End: 2019-06-07

## 2019-06-07 ENCOUNTER — TELEPHONE (OUTPATIENT)
Dept: FAMILY MEDICINE CLINIC | Facility: CLINIC | Age: 19
End: 2019-06-07

## 2019-06-07 LAB
G LAMBLIA AG STL QL IA: NEGATIVE
HIV 1+2 AB+HIV1 P24 AG SERPL QL IA: NORMAL

## 2019-06-08 LAB — O+P STL CONC: NORMAL

## 2019-06-10 ENCOUNTER — HOSPITAL ENCOUNTER (OUTPATIENT)
Dept: ULTRASOUND IMAGING | Facility: HOSPITAL | Age: 19
Discharge: HOME | End: 2019-06-10
Payer: COMMERCIAL

## 2019-06-10 DIAGNOSIS — R59.1 LYMPHADENOPATHY: ICD-10-CM

## 2019-06-10 LAB — CALPROTECTIN STL-MCNT: <16 UG/G (ref 0–120)

## 2019-06-10 PROCEDURE — 76536 US EXAM OF HEAD AND NECK: CPT

## 2019-06-11 ENCOUNTER — TELEPHONE (OUTPATIENT)
Dept: FAMILY MEDICINE CLINIC | Facility: CLINIC | Age: 19
End: 2019-06-11

## 2019-06-11 LAB
ELASTASE PANC STL-MCNT: 196 UG ELAST./G
FAT STL QL: NORMAL
NEUTRAL FAT STL QL: NORMAL

## 2019-06-19 ENCOUNTER — EVALUATION (OUTPATIENT)
Dept: PHYSICAL THERAPY | Facility: CLINIC | Age: 19
End: 2019-06-19
Payer: COMMERCIAL

## 2019-06-19 DIAGNOSIS — M54.16 LUMBAR RADICULOPATHY: Primary | ICD-10-CM

## 2019-06-19 DIAGNOSIS — M54.9 MILD BACK PAIN: ICD-10-CM

## 2019-06-19 PROCEDURE — 97162 PT EVAL MOD COMPLEX 30 MIN: CPT | Performed by: PHYSICAL THERAPIST

## 2019-06-21 ENCOUNTER — HOSPITAL ENCOUNTER (OUTPATIENT)
Dept: MRI IMAGING | Facility: HOSPITAL | Age: 19
Discharge: HOME | End: 2019-06-21
Payer: COMMERCIAL

## 2019-06-21 DIAGNOSIS — R56.9 SEIZURE-LIKE ACTIVITY (HCC): ICD-10-CM

## 2019-06-21 PROCEDURE — A9585 GADOBUTROL INJECTION: HCPCS | Performed by: PHYSICIAN ASSISTANT

## 2019-06-21 PROCEDURE — 70553 MRI BRAIN STEM W/O & W/DYE: CPT

## 2019-06-21 RX ADMIN — GADOBUTROL 6 ML: 604.72 INJECTION INTRAVENOUS at 11:17

## 2019-06-24 ENCOUNTER — HOSPITAL ENCOUNTER (EMERGENCY)
Facility: HOSPITAL | Age: 19
Discharge: HOME | End: 2019-06-24
Attending: EMERGENCY MEDICINE | Admitting: EMERGENCY MEDICINE
Payer: COMMERCIAL

## 2019-06-24 VITALS
HEART RATE: 85 BPM | RESPIRATION RATE: 16 BRPM | TEMPERATURE: 97.4 F | DIASTOLIC BLOOD PRESSURE: 60 MMHG | OXYGEN SATURATION: 100 % | BODY MASS INDEX: 25.07 KG/M2 | SYSTOLIC BLOOD PRESSURE: 89 MMHG | WEIGHT: 141.54 LBS

## 2019-06-24 DIAGNOSIS — R56.9 SEIZURE-LIKE ACTIVITY (HCC): Primary | ICD-10-CM

## 2019-06-24 PROCEDURE — 99284 EMERGENCY DEPT VISIT MOD MDM: CPT | Performed by: EMERGENCY MEDICINE

## 2019-06-24 PROCEDURE — 99284 EMERGENCY DEPT VISIT MOD MDM: CPT

## 2019-06-24 RX ORDER — ACETAMINOPHEN 325 MG/1
650 TABLET ORAL ONCE
Status: COMPLETED | OUTPATIENT
Start: 2019-06-24 | End: 2019-06-24

## 2019-06-24 RX ADMIN — ACETAMINOPHEN 650 MG: 325 TABLET ORAL at 23:09

## 2019-06-25 ENCOUNTER — TELEPHONE (OUTPATIENT)
Dept: FAMILY MEDICINE CLINIC | Facility: CLINIC | Age: 19
End: 2019-06-25

## 2019-06-26 ENCOUNTER — OFFICE VISIT (OUTPATIENT)
Dept: PHYSICAL THERAPY | Facility: CLINIC | Age: 19
End: 2019-06-26
Payer: COMMERCIAL

## 2019-06-26 DIAGNOSIS — M54.9 MILD BACK PAIN: ICD-10-CM

## 2019-06-26 DIAGNOSIS — M54.16 LUMBAR RADICULOPATHY: Primary | ICD-10-CM

## 2019-06-26 PROCEDURE — 97110 THERAPEUTIC EXERCISES: CPT

## 2019-06-27 NOTE — ED PROVIDER NOTES
History  Chief Complaint   Patient presents with    Seizure - Prior Hx Of     witnessed, approx 15 seconds  pt fell and hit left face  reportedly acting postictal afterwards  occured 30 mins PTA  pt states she has an appointment with neuro for same  History provided by:  Patient  Seizure - Prior Hx Of   Seizure activity on arrival: no    Seizure type:  Grand mal  Initial focality:  None  Episode characteristics: generalized shaking    Postictal symptoms: confusion    Return to baseline: yes    Severity:  Mild  Progression:  Resolved  Context comment:  Patient leak current currently waiting a workup from neurology for seizure she had an MRI and EEG done that was essentially normal in follows up with her family doctor put on no seizure medication at this point time  Recent head injury:  No recent head injuries  PTA treatment:  None      Prior to Admission Medications   Prescriptions Last Dose Informant Patient Reported? Taking? albuterol (2 5 mg/3 mL) 0 083 % nebulizer solution  Self No No   Sig: Take 1 vial (2 5 mg total) by nebulization every 6 (six) hours as needed for wheezing or shortness of breath   ergocalciferol (VITAMIN D2) 50,000 units   No No   Sig: Take 1 capsule (50,000 Units total) by mouth once a week For 12 weeks   omeprazole (PriLOSEC) 40 MG capsule  Self No No   Sig: Take 1 capsule (40 mg total) by mouth daily      Facility-Administered Medications: None       Past Medical History:   Diagnosis Date    Anxiety     Asthma     Bipolar disorder with psychotic features ([facility] )     Lymphadenitis     Last assessed 12/30/14    Lymphadenopathy     Last assessed 10/16/13    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[facility] )     Psychiatric disorder     Schizophrenia ([facility] )     Seizures ([facility] )        Past Surgical History:   Procedure Laterality Date    COLONOSCOPY N/A 5/3/2019    Procedure: COLONOSCOPY;  Surgeon: Shilo Oneill MD;  [facility];   Service: Gastroenterology    ESOPHAGOGASTRODUODENOSCOPY N/A 5/3/2019    Procedure: ESOPHAGOGASTRODUODENOSCOPY (EGD); Surgeon: Berenice Núñez MD;  Location: [facility]; Service: Gastroenterology       Family History   Problem Relation Age of Onset   [facility]oma Carr[facility] Migraines Mother    Homa Carrillo Other Mother         Neck pain    No Known Problems Father      I have reviewed and agree with the history as documented  Social History     Tobacco Use    Smoking status: Current Every Day Smoker     Packs/day: 1 00     Types: Cigarettes    Smokeless tobacco: Never Used   Substance Use Topics    Alcohol use: Yes     Comment: rarely    Drug use: Yes     Types: Marijuana        Review of Systems   Constitutional: Negative for chills and fever  HENT: Negative for rhinorrhea, sore throat and trouble swallowing  Eyes: Negative for pain  Respiratory: Negative for cough, shortness of breath, wheezing and stridor  Cardiovascular: Negative for chest pain and leg swelling  Gastrointestinal: Negative for abdominal pain, diarrhea and nausea  Endocrine: Negative for polyuria  Genitourinary: Negative for dysuria, flank pain and urgency  Musculoskeletal: Negative for joint swelling, myalgias and neck stiffness  Skin: Negative for rash  Allergic/Immunologic: Negative for immunocompromised state  Neurological: Positive for seizures  Negative for dizziness, syncope, weakness, numbness and headaches  Psychiatric/Behavioral: Negative for confusion and suicidal ideas  All other systems reviewed and are negative  Physical Exam  Physical Exam   Constitutional: She is oriented to person, place, and time  She appears well-developed and well-nourished  HENT:   Head: Normocephalic and atraumatic  Eyes: Pupils are equal, round, and reactive to light  EOM are normal    Neck: Normal range of motion  Neck supple  Cardiovascular: Normal rate and regular rhythm  Exam reveals no friction rub  No murmur heard  Pulmonary/Chest: Breath sounds normal  No respiratory distress   She has no wheezes  She has no rales  Abdominal: Soft  Bowel sounds are normal  She exhibits no distension  There is no tenderness  Musculoskeletal: Normal range of motion  She exhibits no edema or tenderness  Neurological: She is alert and oriented to person, place, and time  GCS 15  AAOx4  No focal neuro deficits  CN II-XII intact  PERRL  EOMI  Woodward Schanz No pronator drift   strength 5/5 bilaterally  B/L UE strength 5/5 throughout  Finger to nose normal  Cerebellar function normal  Ambulates without difficulty  B/L LE strength 5/5 throughout  Gross sensation to b/l upper and lower extremities intact  Skin: Skin is warm  No rash noted  Psychiatric: She has a normal mood and affect  Nursing note and vitals reviewed  Vital Signs  ED Triage Vitals   Temperature Pulse Respirations Blood Pressure SpO2   06/24/19 2251 06/24/19 2251 06/24/19 2251 06/24/19 2251 06/24/19 2251   (!) 97 4 °F (36 3 °C) 85 16 (!) 89/60 100 %      Temp Source Heart Rate Source Patient Position - Orthostatic VS BP Location FiO2 (%)   06/24/19 2251 06/24/19 2251 06/24/19 2251 06/24/19 2251 --   Tympanic Monitor Lying Left arm       Pain Score       06/24/19 2309       7           Vitals:    06/24/19 2251   BP: (!) 89/60   Pulse: 85   Patient Position - Orthostatic VS: Lying         Visual Acuity  Visual Acuity      Most Recent Value   L Pupil Size (mm)  3   R Pupil Size (mm)  3          ED Medications  Medications   acetaminophen (TYLENOL) tablet 650 mg (650 mg Oral Given 6/24/19 2309)       Diagnostic Studies  Results Reviewed     None                 No orders to display              Procedures  Procedures       ED Course                               MDM  Number of Diagnoses or Management Options  Seizure-like activity [facility]): new and requires workup  Diagnosis management comments: 24-year-old female presents emergency department symptoms of history of possible seizures    The patient has been seen by the family doctor in the past not on any seizure medication at this point time  Had an MRI as well as a EEG which was unremarkable    Pt re-examined and evaluated after testing and treatment  Spoke with the patient and feeling improved and sxs have resolved  Will discharge home with close f/u with pcp and instructed to return to the ED if sxs worsen or continue  Pt agrees with the plan for discharge and feels comfortable to go home with proper f/u  Advised to return for worsening or additional problems  Diagnostic tests were reviewed and questions answered  Diagnosis, care plan and treatment options were discussed  The patient understand instructions and will follow up as directed  Disposition  Final diagnoses:   Seizure-like activity ([address] )     Time reflects when diagnosis was documented in both MDM as applicable and the Disposition within this note     Time User Action Codes Description Comment    6/24/2019 11:45 PM Graciela Fore Add [R56 9] Seizure-like activity [facility])       ED Disposition     ED Disposition Condition Date/Time Comment    Discharge Stable Mon Jun 24, 2019 11:45 PM [address] discharge to home/self care              Follow-up Information     Follow up With Specialties Details Why Contact Info    Alexandria Coronado PA-C Family Medicine, Physician Assistant   [address]  [address] [facility] [facility] [address]  [phone]            Discharge Medication List as of 6/24/2019 11:45 PM      CONTINUE these medications which have NOT CHANGED    Details   albuterol (2 5 mg/3 mL) 0 083 % nebulizer solution Take 1 vial (2 5 mg total) by nebulization every 6 (six) hours as needed for wheezing or shortness of breath, Starting Tue 10/30/2018, Print      ergocalciferol (VITAMIN D2) 50,000 units Take 1 capsule (50,000 Units total) by mouth once a week For 12 weeks, Starting Thu 6/6/2019, Normal      omeprazole (PriLOSEC) 40 MG capsule Take 1 capsule (40 mg total) by mouth daily, Starting Mon 4/1/2019, Normal           No discharge procedures on file     ED Provider  Electronically Signed by           Job Ramirez DO  06/27/19 0865

## 2019-06-28 ENCOUNTER — OFFICE VISIT (OUTPATIENT)
Dept: PHYSICAL THERAPY | Facility: CLINIC | Age: 19
End: 2019-06-28
Payer: COMMERCIAL

## 2019-06-28 DIAGNOSIS — M54.16 LUMBAR RADICULOPATHY: Primary | ICD-10-CM

## 2019-06-28 DIAGNOSIS — R56.9 SEIZURE-LIKE ACTIVITY (HCC): Primary | ICD-10-CM

## 2019-06-28 DIAGNOSIS — M54.9 MILD BACK PAIN: ICD-10-CM

## 2019-06-28 PROCEDURE — 97110 THERAPEUTIC EXERCISES: CPT | Performed by: PHYSICAL THERAPIST

## 2019-06-28 PROCEDURE — 97112 NEUROMUSCULAR REEDUCATION: CPT | Performed by: PHYSICAL THERAPIST

## 2019-06-28 RX ORDER — LEVETIRACETAM 500 MG/1
TABLET ORAL
Qty: 30 TABLET | Refills: 1 | Status: SHIPPED | OUTPATIENT
Start: 2019-06-28 | End: 2019-08-26

## 2019-07-07 ENCOUNTER — APPOINTMENT (EMERGENCY)
Dept: RADIOLOGY | Facility: HOSPITAL | Age: 19
End: 2019-07-07
Payer: COMMERCIAL

## 2019-07-07 ENCOUNTER — HOSPITAL ENCOUNTER (EMERGENCY)
Facility: HOSPITAL | Age: 19
Discharge: HOME | End: 2019-07-07
Attending: EMERGENCY MEDICINE | Admitting: EMERGENCY MEDICINE
Payer: COMMERCIAL

## 2019-07-07 VITALS
HEART RATE: 106 BPM | RESPIRATION RATE: 18 BRPM | BODY MASS INDEX: 23.92 KG/M2 | DIASTOLIC BLOOD PRESSURE: 55 MMHG | TEMPERATURE: 99 F | SYSTOLIC BLOOD PRESSURE: 98 MMHG | HEIGHT: 63 IN | WEIGHT: 135 LBS | OXYGEN SATURATION: 100 %

## 2019-07-07 DIAGNOSIS — S80.10XA CONTUSION OF LOWER LEG: Primary | ICD-10-CM

## 2019-07-07 PROCEDURE — 73590 X-RAY EXAM OF LOWER LEG: CPT

## 2019-07-07 PROCEDURE — 99282 EMERGENCY DEPT VISIT SF MDM: CPT | Performed by: PHYSICIAN ASSISTANT

## 2019-07-07 PROCEDURE — 99283 EMERGENCY DEPT VISIT LOW MDM: CPT

## 2019-07-07 PROCEDURE — 73630 X-RAY EXAM OF FOOT: CPT

## 2019-07-07 RX ORDER — IBUPROFEN 600 MG/1
600 TABLET ORAL EVERY 6 HOURS PRN
Qty: 30 TABLET | Refills: 0 | Status: SHIPPED | OUTPATIENT
Start: 2019-07-07 | End: 2019-08-26

## 2019-07-07 RX ORDER — ACETAMINOPHEN 325 MG/1
650 TABLET ORAL ONCE
Status: COMPLETED | OUTPATIENT
Start: 2019-07-07 | End: 2019-07-07

## 2019-07-07 RX ADMIN — ACETAMINOPHEN 650 MG: 325 TABLET ORAL at 20:01

## 2019-07-07 NOTE — ED PROVIDER NOTES
crutHistory  Chief Complaint   Patient presents with    Leg Injury     Yesterday I bumped by left leg  History provided by:  Patient   used: No    Medical Problem   Location:  Pt with hitting anterior tibia on chair  pt still with pain   Severity:  Mild  Onset quality:  Sudden  Duration:  2 days  Timing:  Constant  Progression:  Unchanged  Chronicity:  New  Associated symptoms: no abdominal pain, no chest pain, no congestion, no cough, no diarrhea, no ear pain, no fatigue, no fever, no headaches, no loss of consciousness, no myalgias, no nausea, no rash, no rhinorrhea, no shortness of breath, no sore throat, no vomiting and no wheezing        Prior to Admission Medications   Prescriptions Last Dose Informant Patient Reported? Taking? albuterol (2 5 mg/3 mL) 0 083 % nebulizer solution  Self No No   Sig: Take 1 vial (2 5 mg total) by nebulization every 6 (six) hours as needed for wheezing or shortness of breath   ergocalciferol (VITAMIN D2) 50,000 units   No No   Sig: Take 1 capsule (50,000 Units total) by mouth once a week For 12 weeks   levETIRAcetam (KEPPRA) 500 mg tablet   No No   Sig: Take 1/2 tablet at night for 1 week  Then take 1 tablet at night   omeprazole (PriLOSEC) 40 MG capsule  Self No No   Sig: Take 1 capsule (40 mg total) by mouth daily      Facility-Administered Medications: None       Past Medical History:   Diagnosis Date    Anxiety     Asthma     [facility] psychotic features [facility] )     Lymphadenitis     Last assessed 12/30/14    Lymphadenopathy     Last assessed 10/16/13    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[facility] )     Psychiatric disorder     Schizophrenia ([facility] )     Seizures ([facility] )        Past Surgical History:   Procedure Laterality Date    COLONOSCOPY N/A 5/3/2019    Procedure: COLONOSCOPY;  Surgeon: Brandon Johnson MD;  [facility];   Service: Gastroenterology    ESOPHAGOGASTRODUODENOSCOPY N/A 5/3/2019    Procedure: ESOPHAGOGASTRODUODENOSCOPY (EGD); Surgeon: Aaron Centeno MD;  [facility]; Service: Gastroenterology       Family History   Problem Relation Age of Onset   [facility] Migraines Mother    [facility] Other Mother         Neck pain    No Known Problems Father      I have reviewed and agree with the history as documented  Social History     Tobacco Use    Smoking status: Current Every Day Smoker     Packs/day: 1 00     Types: Cigarettes    Smokeless tobacco: Never Used   Substance Use Topics    Alcohol use: Yes     Comment: rarely    Drug use: Yes     Types: Marijuana        Review of Systems   Constitutional: Negative  Negative for fatigue and fever  HENT: Negative  Negative for congestion, ear pain, rhinorrhea and sore throat  Eyes: Negative  Respiratory: Negative  Negative for cough, shortness of breath and wheezing  Cardiovascular: Negative  Negative for chest pain  Gastrointestinal: Negative  Negative for abdominal pain, diarrhea, nausea and vomiting  Endocrine: Negative  Genitourinary: Negative  Musculoskeletal: Negative  Negative for myalgias  Skin: Negative  Negative for rash  Allergic/Immunologic: Negative  Neurological: Negative  Negative for loss of consciousness and headaches  Hematological: Negative  Psychiatric/Behavioral: Negative  All other systems reviewed and are negative  Physical Exam  Physical Exam   Constitutional: She is oriented to person, place, and time  She appears well-developed and well-nourished  HENT:   Head: Normocephalic and atraumatic  Right Ear: External ear normal    Left Ear: External ear normal    Nose: Nose normal    Mouth/Throat: Oropharynx is clear and moist    Eyes: Pupils are equal, round, and reactive to light  Conjunctivae and EOM are normal    Neck: Normal range of motion  Neck supple  Cardiovascular: Normal rate, regular rhythm, normal heart sounds and intact distal pulses     Pulmonary/Chest: Effort normal and breath sounds normal    Abdominal: Soft  Bowel sounds are normal    Musculoskeletal: Normal range of motion  Anterior tibia and dorsum of foot pain  dp pulses strong  Toes from with pain    Neurological: She is alert and oriented to person, place, and time  Skin: Skin is warm  Psychiatric: She has a normal mood and affect  Her behavior is normal  Thought content normal    Nursing note and vitals reviewed  Vital Signs  ED Triage Vitals [07/07/19 1946]   Temperature Pulse Respirations Blood Pressure SpO2   99 °F (37 2 °C) (!) 106 18 98/55 100 %      Temp Source Heart Rate Source Patient Position - Orthostatic VS BP Location FiO2 (%)   Tympanic Monitor Sitting Left arm --      Pain Score       9           Vitals:    07/07/19 1946   BP: 98/55   Pulse: (!) 106   Patient Position - Orthostatic VS: Sitting         Visual Acuity      ED Medications  Medications   acetaminophen (TYLENOL) tablet 650 mg (650 mg Oral Given 7/7/19 2001)       Diagnostic Studies  Results Reviewed     None                 XR tibia fibula 2 views LEFT    (Results Pending)   XR foot 3+ views LEFT    (Results Pending)              Procedures  Procedures       ED Course                               MDM    Disposition  Final diagnoses:   Contusion of lower leg     Time reflects when diagnosis was documented in both MDM as applicable and the Disposition within this note     Time User Action Codes Description Comment    7/7/2019  8:20 PM Yoko Merino  Add [S80 10XA] Contusion of lower leg       ED Disposition     ED Disposition Condition Date/Time Comment    Discharge Stable Sun Jul 7, 2019  8:20 PM Itz Thomson discharge to home/self care              Follow-up Information     Follow up With Specialties Details Why [address], MD Orthopedic Surgery   [place] [address] [address] [facility]            Patient's Medications   Discharge Prescriptions    IBUPROFEN (MOTRIN) 600 MG TABLET    Take 1 tablet (600 mg total) by mouth every 6 (six) hours as needed for mild pain       Start Date: 7/7/2019  End Date: --       Order Dose: 600 mg       Quantity: 30 tablet    Refills: 0     No discharge procedures on file      ED Provider  Electronically Signed by           Jaden Quezada PA-C  07/07/19 2025       Jaden Quezada PA-C  07/07/19 2026

## 2019-07-09 ENCOUNTER — APPOINTMENT (OUTPATIENT)
Dept: PHYSICAL THERAPY | Facility: CLINIC | Age: 19
End: 2019-07-09
Payer: COMMERCIAL

## 2019-07-11 ENCOUNTER — APPOINTMENT (OUTPATIENT)
Dept: PHYSICAL THERAPY | Facility: CLINIC | Age: 19
End: 2019-07-11
Payer: COMMERCIAL

## 2019-07-11 NOTE — PROGRESS NOTES
[facility] Consult - Follow up   PATIENT:  Kell Umanzor  MRN:  [id number]  :  2000  DATE OF SERVICE:  2019  REFERRED BY: Alexandria Coronado PA-C  PMD: Alexandria Coronado PA-C    Assessment/Plan:     Kell Umanzor is a 20 y o  female  with a past medical history includes bipolar disorder with psychotic features, schizophrenia, anxiety, depression, asthma GERD, GI symptoms, lumbar radiculopathy, lymphadenopathy, epilepsy referred here for evaluation of headache  My initial evaluation 2018  Patient was a no-show to her appointment on 2018  Follow-up 2019    Migraine without aura and without status migrainosus, not intractable  Chronic daily headache  Cervicalgia  Apurva has a history of migraines since she was 14years old  She likely has a combination of chronic migraine without aura, tension headache related to cervicalgia and medication overuse headache    - as of 2018:  she has been having headache almost every other day, over 15 days per month  - as of 19: daily headaches and stopped all her meds    Workup:  - [facility] 18: No acute intracranial abnormality    - MRI brain with without contrast 2019:  No mass effect, acute intracranial hemorrhage or evidence of recent infarction  No abnormal parenchymal or leptomeningeal enhancement identified    Left-sided choroidal fissure cyst is noted    - she was referred to physical therapy the did not follow-up, currently is seen physical therapy for back and will add on these exercises - referring    Abortive:  -discussed not taking abortive medications more than 3 days per week to prevent medication overuse headache - also discussed the possible severe consequences of the amount of ibuprofen she is taking   - As an abortive we discussed that she can take her gabapentin 100 mg that she had previously been prescribed for her back pain and see if this works for her headaches and anxiety  - past: Depakote side effect, olanzapine ineffective, she has not tried dexamethasone    Preventive:  -discussed headache hygiene and lifestyle factors that could improve her headaches  - as 11/2018 and again 7/12/19:   She has been taking her venlafaxine for her psychiatric disorder on and off as she has not had time to follow up with her psychiatrist   We discussed resuming venlafaxine 75 mg p o  Daily as a headache preventative since she did feel like that she had decreased frequency of headaches while on this  - We also discussed over-the-counter supplements she could take including magnesium, riboflavin and melatonin  - We discussed importance of keeping up with her psychiatry appointments for medication adjustments and therapy  She reports she was not able to see her psychiatrist due to work lately and was weaned herself off her medications (again as of 7/12/19) We discussed the influence of her mood on her headaches and encouraged her to discuss with her psychiatrist ways to continue treatment  - past: for her psychiatric diagnoses including Depakote, Seroquel, gabapentin, venlafaxine and topiramate  Vitamin-D deficiency  -vitamin-D was low 06/2019 at 9, status post 12315 units for 12 weeks  - daily OTC - not taking     Direct will migraine without aura  -will give Toradol 60 mg IM which she has tolerated well without side effects in the past   Discussed possible side effects and risks  She reports despite problem list listing hematemesis she has not had this in many months and as at discussed with her this is most more likely due her excess ibuprofen use  She is aware of possible risks of repeat bleeding        Patient instructions     [employer] - Download this free Madi on your phone (they will offer subscription, but you do not have to do this)  - I recommend listening to the first approximately 5 or so lectures (more if you want) that are about 10-20 minutes long on the neuroscience of pain  - They discuss how pain works in the brain and steps to try and cure chronic pain      Follow-up with physical therapy as previously recommended and referred     Headache/migraine treatment:   Abortive medications (for immediate treatment of a headache): It is ok to take ibuprofen, acetaminophen or naproxen (Advil, Tylenol,  Aleve, Excedrin) if they help your headaches you should limit these to No more than 3 times a week to avoid medication overuse/rebound headaches  - do not take more ibuprofen/Advil than recommended on the bottle  - ok to take your already prescribed gabapentin 100 mg to the more significant headaches     Over the counter preventive supplements for headaches/migraines   (to take every day to help prevent headaches - not to take at the time of headache):  [x] Magnesium 400mg daily (If any diarrhea or upset stomach, decrease dose  as tolerated)  [x] Riboflavin (Vitamin B2) 400mg daily   (FYI B2 may make your urine bright/neon yellow)     Prescription preventive medications for headaches/migraines   (to take every day to help prevent headaches - not to take at the time of headache):  [x]resume Venlafaxine 75mg PO Daily for 3 months and then increase to 150 mg if needed  *Typically these types of medications take time untill you see the benefit, although some may see improvement in days, often it may take weeks, especially if the medication is being titrated up to a beneficial level  Please contact us if there are any concerns or questions regarding the medication       Sleep:   [x] Melatonin - take 3 mg nightly for sleep  You should take this 1 hour prior to bedtime consistently every night for it to work  It works by gradually helping to adjust your sleep time over days to weeks, rather than immediately making you feel sleepy        Self-Monitoring:  [x] Headache calendar  Each day renzo a number from 0-10 indicating if there was a headache and how bad it was    This can be used to monitor gradual improvement and is helpful to make medication adjustments  You can do this on paper or there is an LUCERO for a smart phone called "Migraine e Diary"       Lifestyle Recommendations:  [x] SLEEP - Maintain a regular sleep schedule: Adults need at least 7-8 hours of uninterrupted a night  Maintain good sleep hygiene:  Going to bed and waking up at consistent times, avoiding excessive daytime naps, avoiding caffeinated beverages in the evening, avoid excessive stimulation in the evening and generally using bed primarily for sleeping  One hour before bedtime would recommend turning lights down lower, decreasing your activity (may read quietly, listen to music at a low volume)  When you get into bed, should eliminate all technology (no texting, emailing, playing with your phone, iPad or tablet in bed)  [x] HYDRATION - Maintain good hydration  Drink  2L of fluid a day (4 typical small water bottles)  [x] DIET - Maintain good nutrition  In particular don't skip meals and try and eat healthy balanced meals regularly  [x] TRIGGERS - Look for other triggers and avoid them: Limit caffeine to 1-2 cups a day or less  Avoid dietary triggers that you have noticed bring on your headaches (this could include aged cheese, peanuts, MSG, aspartame and nitrates)  [x] TOBACCO CESSATION: Tessa Carpenter was educated regarding the impact of smoking and advised to quit  Willingness to quit was assessed and she is not willing at this time  Resources provided: PA Quit [employer], [employer]     Education and Follow-up  [x] Please call with any questions or concerns  [x] Return for follow up in 3 months     Follow up with mental health team     Follow up as scheduled 8/26/19 with Dr Xiomara Cota     CC: We had the pleasure of evaluating Tessa Carpenter in neurological consultation today   she is a 20 y o    right handed female who presents today for evaluation of headaches       History of Present Illness:   Interval history as of 07/12/2019-   - she was supposed to return in 2 months in the now is returning 8 months later  - 11/16/2018:  Urgent care for sciatica right side prescribed prednisone  - 11/28/2018 called and reporting migraine not responsive to ibuprofen, gabapentin effective and therefore was represcribed as she was out  She later went into Urgent Care and had Toradol 30 mg IM  - 12/16/2018 urgent care for right upper quadrant pain  - 12/31/2018 urgent care for urticaria  - 02/02/2019 urgent care for sore throat pharyngitis  - 02/13/2019:  ED for abdominal pain, diarrhea  - 03/12/2019 ED for vomiting blood  - EEG normal  - 05/01/2019:  GI  - 05/03/2019:  EGD and colonoscopy - normal  - MRI brain 06/21/2019 - unremarkable except for incidental cyst  - ED 06/24/2019 for seizure-like activity for 15 seconds fell and hit left face  - 06/28/2019 started on Keppra      - not working anymore  - she did not follow up with physical therapy as recommended by myself, but did start following for back pain - having pressure on neck muscles    - as of 07/12/2019: headaches 2-3 times daily, lasting 30-3 hours, worse over last 8 months  - taking up to 15 ibuprofen near daily - discussed how dangerous this is     Not sleeping well, only 3-4 hours a night    - venlafaxine 75 mg - not taking and does not remember when she stopped it   - magnesium, riboflavin - not taking   - taking melatonin and her gf thinks it is helping  - gabapentin  - indomethacin - does not remember at all if she took           Headaches started at what age?  14years old, headaches are similar to what they have been   How often do the headaches occur?   - as of 11/08/2018 Almost every other days, sometimes two days between headaches  - as of 07/12/2019: headaches 2-3 times daily, lasting 30-3 hours, worse over last 8 months (also stopped all her meds)  What time of the day do the headaches start? no particular time of day  How long do the headaches last?  3 days up to a week  Are you ever headache free? Yes     Aura? without aura  - sometimes tunnel vision and black spots with the headache and not before    Describe your usual headache pain quality? Starts with stabbing pain in head or neck for 5 minutes and then goes into am intense throbbing and then tension throughout  Or starts as a sharp pain from the front of head to back   burning, pulsating, shooting, stabbing, throbbing and pressure  Where is your headache located? holocranial, location varies and bilateral shoulders  Does the pain Radiate? no radiation of pain  What is the intensity of pain? 5-9/10     Associated symptoms:    [x] Nausea       [] Vomiting        [] Diarrhea         [x] Insomnia           [x] Stiff or sore neck         [x] Problems with concentration  [x] Photophobia     [x]Phonophobia      [x] Osmophobia  [x] Blurred vision - sometimes  [x] Prefer quiet, dark room        [x] Light-headed or dizzy    [x] Tinnitus      [] Red ear      [x] Ptosis  Sometimes eyes feel heavy    [] Facial droop  [] Lacrimation  [] Nasal congestion/rhinorrhea   [] Flushing of face         [x] Change in pupil size - both pupils will get big sometimes   [] Hands or feet tingle or feel numb/paresthesias       Number of days missed per month because of headaches:  Work days: 6  Social or Family activities: 6     Things that make the headache worse? Movement, sometimes standing up      Headache triggers:  Stress, weather changes, sunlight, related to sleep   What time of the year do headaches occur more frequently?   usually in the summer due to the heat     Have you seen someone else for headaches or pain? No  Have you had trigger point injection performed and how often? No  Have you had Botox injection performed and how often? No   Have you had epidural injections or transforaminal injections performed? No  Have you used CBD or THC for your headaches and how often?  Every day smokes - helps with tension and nausea   Are you current pregnant or planning on getting pregnant? No - maybe in two years - in a same sex relationship  Have you ever had any Brain imaging? no     What medications do you take or have you taken for your headaches? ABORTIVE:      - ibuprofen/aceamin too frequently     - toradol IM, zofran in ED once a month - prior every two weeks       PREVENTIVE:        Mood meds:  Going back on venlafaxine today 07/12/2019 75 mg with plan to increase to 150 mg like she was on the past in 3 months if needed    keppra 500 mg    Past:  - venlafaxine 75 mg - not taking and does not remember when she stopped it   - depakote - 2 5 years ago 500 mg BID - gained a lot of weight  - gabapentin 100 mg makes her drowsy so only takes at night   - has also been seroquel in the past   - Tried topiramate 100 mg BIDas a preventative and took every day for a whole year - still got migraines - twice a week  - but was a decreased from current frequency  Alternative therapies used in the past for headaches? no other headache interventions have been tried   Massage from partner     Neck pain?: Yes all the time neck tightness     LIFESTYLE   Sleep - on average 4-5 hours - works two jobs, some trouble falling asleep  Typically goes to bed 3 am, wakes up 8/9 am   Would like to fall asleep 11/12      Physical activity: working     Water: 6 bottles   Diet:  Do you ever skip meals?  Yeah      Mood:   History of Schizophrenia, bipolar and depression - used to see a psychiatrist for therapy and meds has not seen in 4 weeks      Gabapentin 100-300 mg Q8H     Venlafaxine 75 mg daily - was up to 150 mg at one point, just stopped - takes twice a week   Ziprasidone (Geodon) 20 mg BID - once a day - weaning herself off     The following portions of the patient's history were reviewed and updated as appropriate: allergies, current medications, past family history, past medical history, past social history, past surgical history and problem list     Pertinent family history:  Family history of headaches:  migraine headaches in mother  Any family history of aneurysms - No      Aunt had some form of brain tumor - recently passed away   Father alive and well  Mother alive with cervical cancer and chronic migraine  Maternal grandfather alive with a lung problem and liver problems  Maternal grandmother alive with thyroid disease    Pertinent social history:  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   Coffee: 48-62oz, Soda - sprite 48-62 oz  Education: up through 11th grade  Lives with mom and step dad     Illicit Drugs: admits to cannabis  Alcohol/tobacco: Tobacco use: Smoked 1 packs per day for 1 years smoking for 3 years     Past Medical History:     Past Medical History:   Diagnosis Date    Anxiety     Asthma     Bipolar disorder with psychotic features ([facility]     Lymphadenitis     Last assessed 12/30/14    Lymphadenopathy     Last assessed 10/16/13    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[facility]     Psychiatric disorder     Schizophrenia ([facility]     Seizures ([facility] )        Patient Active Problem List   Diagnosis    Acid reflux    Allergic rhinitis    Asthma    Bipolar disorder ([facility]    Breast pain    Common migraine without aura    Cyst of right ovary    Fibrocystic breast    Hyperlipidemia    Vertigo    Lymphadenopathy    STD (sexually transmitted disease)    High risk heterosexual behavior    Diarrhea    Shaking    Epigastric pain    Loose stools    Change in bowel habits    Nausea and vomiting    Seizure-like activity (HCC)    Lumbar radiculopathy       Medications:      Current Outpatient Medications   Medication Sig Dispense Refill    albuterol (2 5 mg/3 mL) 0 083 % nebulizer solution Take 1 vial (2 5 mg total) by nebulization every 6 (six) hours as needed for wheezing or shortness of breath 75 mL 0    ergocalciferol (VITAMIN D2) 50,000 units Take 1 capsule (50,000 Units total) by mouth once a week For 12 weeks 12 capsule 0    ibuprofen (MOTRIN) 600 mg tablet Take 1 tablet (600 mg total) by mouth every 6 (six) hours as needed for mild pain 30 tablet 0    levETIRAcetam (KEPPRA) 500 mg tablet Take 1/2 tablet at night for 1 week  Then take 1 tablet at night 30 tablet 1    omeprazole (PriLOSEC) 40 MG capsule Take 1 capsule (40 mg total) by mouth daily 30 capsule 1    gabapentin (NEURONTIN) 100 mg capsule Take 1 capsule (100 mg total) by mouth 3 (three) times a day as needed (headache or aniexty) 90 capsule 3    magnesium oxide (MAG-OX) 400 mg Take 1 tablet (400 mg total) by mouth daily 90 tablet 3    venlafaxine (EFFEXOR-XR) 75 mg 24 hr capsule Take 1 capsule (75 mg total) by mouth daily For 3 months and then if ineffective increase to 150 mg 90 capsule 2     No current facility-administered medications for this visit  Allergies:       Allergies   Allergen Reactions    Penicillins        Family History:     Family History   Problem Relation Age of Onset   [facility] Migraines Mother     Other Mother         Neck pain    No Known Problems Father        Social History:     Social History     Socioeconomic History    Marital status: Single     Spouse name: Not on file    Number of children: Not on file    Years of education: Not on file    Highest education level: Not on file   Occupational History    Not on file   Social Needs    Financial resource strain: Not on file    Food insecurity:     Worry: Not on file     Inability: Not on file    Transportation needs:     Medical: Not on file     Non-medical: Not on file   Tobacco Use    Smoking status: Current Every Day Smoker     Packs/day: 1 00     Types: Cigarettes    Smokeless tobacco: Never Used   Substance and Sexual Activity    Alcohol use: Yes     Comment: rarely    Drug use: Yes     Types: Marijuana    Sexual activity: Yes     Partners: Female   Lifestyle    Physical activity:     Days per week: Not on file     Minutes per session: Not on file    Stress: Not on file   Relationships    Social connections:     Talks on phone: Not on file     Gets together: Not on file     Attends Protestant service: Not on file     Active member of club or organization: Not on file     Attends meetings of clubs or organizations: Not on file     Relationship status: Not on file    Intimate partner violence:     Fear of current or ex partner: Not on file     Emotionally abused: Not on file     Physically abused: Not on file     Forced sexual activity: Not on file   Other Topics Concern    Not on file   Social History Narrative    Not on file         Objective:     Physical Exam:                                                                 Vitals:            Constitutional:    /51 (BP Location: Right arm, Patient Position: Sitting, Cuff Size: Adult)   Pulse 98   Ht 5' 3" (1 6 m)   Wt 65 3 kg (144 lb)   LMP 06/15/2019 (Approximate)   BMI 25 51 kg/m²   BP Readings from Last 3 Encounters:   07/12/19 107/51   07/07/19 98/55   06/24/19 (!) 89/60     Pulse Readings from Last 3 Encounters:   07/12/19 98   07/07/19 (!) 106   06/24/19 85         Well developed, well nourished, well groomed  No dysmorphic features  HEENT:  Normocephalic atraumatic  No meningismus  Oropharynx is clear and moist  No oral mucosal lesions  Chest:  Respirations regular and unlabored  Cardiovascular:  Regular rate, intact distal pulses  Distal extremities warm without palpable edema or tenderness, no observed significant swelling  Musculoskeletal:  Full range of motion  (see below under neurologic exam for evaluation of motor function and gait)   Skin:  warm and dry, not diaphoretic  No apparent birthmarks or stigmata of neurocutaneous disease  Psychiatric:  Normal behavior and appropriate affect        Neurological Examination:     Mental status/cognitive function:   Orientated to time, place and person  Recent and remote memory intact   Attention span and concentration as well as fund of knowledge are appropriate for age  Normal language and spontaneous speech  Cranial Nerves:  III, IV, VI-Pupils were equal, round, and reactive to light  Extraocular movements were full and conjugate without nystagmus  VII-facial expression symmetric, intact forehead wrinkle, strong eye closure, symmetric smile    VIII-hearing grossly intact bilaterally   Motor Exam: symmetric bulk throughout  no atrophy, fasciculations or abnormal movements noted  Coordination:  no apparent dysmetria, ataxia or tremor noted  Gait: steady casual gait        Pertinent lab results:   03/12/2019 CMP significant for mildly low sodium 136  CBC unremarkable  06/05/2019 vitamin-D 9 8, B12 590, TSH 2 06, Lyme negative, HIV nonreactive   05/30/2019 RPR nonreactive, CRP <3    Imaging:  Personally reviewed and shown to the patient, agree with radiology read below  [address] [place] [address] 7/31/18: No acute intracranial abnormality      MRI brain with without contrast 06/21/2019:  No mass effect, acute intracranial hemorrhage or evidence of recent infarction  No abnormal parenchymal or leptomeningeal enhancement identified      Hippocampal formations are symmetric in size and appearance without definite signal abnormality or volume loss  Left-sided choroidal fissure cyst is noted  04/05/2019:  EEG normal    Review of Systems:   ROS Personally reviewed  Review of Systems   Constitutional: Positive for appetite change  Negative for activity change and fever  HENT: Negative  Negative for hearing loss, tinnitus, trouble swallowing and voice change  Eyes: Negative  Negative for photophobia and pain  Respiratory: Negative  Negative for shortness of breath  Cardiovascular: Negative  Negative for palpitations  Gastrointestinal: Negative  Negative for nausea and vomiting  Endocrine: Negative  Negative for cold intolerance and heat intolerance  Genitourinary: Negative  Negative for dysuria, frequency and urgency  Musculoskeletal: Positive for neck pain   Negative for myalgias  Patient states that she has neck pain for two weeks  Patient also stated that the pain comes and goes as well patient states that her right side of neck swells  Skin: Negative  Negative for rash  Neurological: Positive for headaches  Negative for dizziness, tremors, seizures, syncope, facial asymmetry, speech difficulty, weakness, light-headedness and numbness  Patient states that she has a headache today 5-10 pain  Hematological: Negative  Does not bruise/bleed easily  Psychiatric/Behavioral: Negative  Negative for confusion, hallucinations and sleep disturbance  I have spent 40 minutes with Patient and family today in which greater than 50% of this time was spent in counseling/coordination of care regarding Prognosis, Risks and benefits of tx options, Intructions for management, Patient and family education, Importance of tx compliance, Risk factor reductions and Impressions        Author:  Irina Jarvis MD 7/12/2019 3:39 PM

## 2019-07-12 ENCOUNTER — OFFICE VISIT (OUTPATIENT)
Dept: NEUROLOGY | Facility: CLINIC | Age: 19
End: 2019-07-12
Payer: COMMERCIAL

## 2019-07-12 VITALS
SYSTOLIC BLOOD PRESSURE: 107 MMHG | WEIGHT: 144 LBS | HEART RATE: 98 BPM | BODY MASS INDEX: 25.52 KG/M2 | DIASTOLIC BLOOD PRESSURE: 51 MMHG | HEIGHT: 63 IN

## 2019-07-12 DIAGNOSIS — F41.9 ANXIETY AND DEPRESSION: ICD-10-CM

## 2019-07-12 DIAGNOSIS — M54.2 CERVICALGIA: ICD-10-CM

## 2019-07-12 DIAGNOSIS — G43.019 INTRACTABLE MIGRAINE WITHOUT AURA AND WITHOUT STATUS MIGRAINOSUS: ICD-10-CM

## 2019-07-12 DIAGNOSIS — G43.009 MIGRAINE WITHOUT AURA AND WITHOUT STATUS MIGRAINOSUS, NOT INTRACTABLE: ICD-10-CM

## 2019-07-12 DIAGNOSIS — R51.9 CHRONIC DAILY HEADACHE: Primary | ICD-10-CM

## 2019-07-12 DIAGNOSIS — F32.A ANXIETY AND DEPRESSION: ICD-10-CM

## 2019-07-12 PROCEDURE — 99215 OFFICE O/P EST HI 40 MIN: CPT | Performed by: PSYCHIATRY & NEUROLOGY

## 2019-07-12 RX ORDER — GABAPENTIN 100 MG/1
100 CAPSULE ORAL 3 TIMES DAILY PRN
Qty: 90 CAPSULE | Refills: 3 | Status: SHIPPED | OUTPATIENT
Start: 2019-07-12 | End: 2020-04-17 | Stop reason: SDUPTHER

## 2019-07-12 RX ORDER — VENLAFAXINE HYDROCHLORIDE 75 MG/1
75 CAPSULE, EXTENDED RELEASE ORAL DAILY
Qty: 90 CAPSULE | Refills: 2 | Status: SHIPPED | OUTPATIENT
Start: 2019-07-12 | End: 2019-10-08 | Stop reason: SDUPTHER

## 2019-07-12 RX ORDER — KETOROLAC TROMETHAMINE 30 MG/ML
60 INJECTION, SOLUTION INTRAMUSCULAR; INTRAVENOUS ONCE
Status: COMPLETED | OUTPATIENT
Start: 2019-07-12 | End: 2019-07-12

## 2019-07-12 RX ADMIN — KETOROLAC TROMETHAMINE 60 MG: 30 INJECTION, SOLUTION INTRAMUSCULAR; INTRAVENOUS at 15:54

## 2019-07-12 NOTE — PROGRESS NOTES
Patient ID: Eleazar Hensley is a 20 y o  female  Assessment/Plan:    No problem-specific Assessment & Plan notes found for this encounter  {Assess/PlanSmartLinks:62096}       Subjective:    HPI    {[facility] HPI texts:08358}    {Common ambulatory SmartLinks:92533}         Objective:    Blood pressure 107/51, pulse 98, height 5' 3" (1 6 m), weight 65 3 kg (144 lb), last menstrual period 06/15/2019, not currently breastfeeding  Physical Exam    Neurological Exam      ROS:    Review of Systems   Constitutional: Positive for appetite change  Negative for activity change and fever  HENT: Negative  Negative for hearing loss, tinnitus, trouble swallowing and voice change  Eyes: Negative  Negative for photophobia and pain  Respiratory: Negative  Negative for shortness of breath  Cardiovascular: Negative  Negative for palpitations  Gastrointestinal: Negative  Negative for nausea and vomiting  Endocrine: Negative  Negative for cold intolerance and heat intolerance  Genitourinary: Negative  Negative for dysuria, frequency and urgency  Musculoskeletal: Positive for neck pain  Negative for myalgias  Patient states that she has neck pain for two weeks  Patient also stated that the pain comes and goes as well patient states that her right side of neck swells  Skin: Negative  Negative for rash  Neurological: Positive for headaches  Negative for dizziness, tremors, seizures, syncope, facial asymmetry, speech difficulty, weakness, light-headedness and numbness  Patient states that she has a headache today 5-10 pain  Hematological: Negative  Does not bruise/bleed easily  Psychiatric/Behavioral: Negative  Negative for confusion, hallucinations and sleep disturbance

## 2019-07-12 NOTE — PATIENT INSTRUCTIONS
[employer] - Download this free [employer] on your phone (they will offer subscription, but you do not have to do this)  - I recommend listening to the first approximately 5 or so lectures (more if you want) that are about 10-20 minutes long on the neuroscience of pain  - They discuss how pain works in the brain and steps to try and cure chronic pain      Follow-up with physical therapy as previously recommended and referred     Headache/migraine treatment:   Abortive medications (for immediate treatment of a headache): It is ok to take ibuprofen, acetaminophen or naproxen (Advil, Tylenol,  Aleve, Excedrin) if they help your headaches you should limit these to No more than 3 times a week to avoid medication overuse/rebound headaches  - do not take more ibuprofen/Advil than recommended on the bottle  - ok to take your already prescribed gabapentin 100 mg to the more significant headaches     Over the counter preventive supplements for headaches/migraines   (to take every day to help prevent headaches - not to take at the time of headache):  [x] Magnesium 400mg daily (If any diarrhea or upset stomach, decrease dose  as tolerated)  [x] Riboflavin (Vitamin B2) 400mg daily   (FYI B2 may make your urine bright/neon yellow)     Prescription preventive medications for headaches/migraines   (to take every day to help prevent headaches - not to take at the time of headache):  [x]resume Venlafaxine 75mg PO Daily for 3 months and then increase to 150 mg if needed  *Typically these types of medications take time untill you see the benefit, although some may see improvement in days, often it may take weeks, especially if the medication is being titrated up to a beneficial level  Please contact us if there are any concerns or questions regarding the medication       Sleep:   [x] Melatonin - take 3 mg nightly for sleep  You should take this 1 hour prior to bedtime consistently every night for it to work   It works by gradually helping to adjust your sleep time over days to weeks, rather than immediately making you feel sleepy        Self-Monitoring:  [x] Headache calendar  Each day renzo a number from 0-10 indicating if there was a headache and how bad it was  This can be used to monitor gradual improvement and is helpful to make medication adjustments  You can do this on paper or there is an LUCERO for a smart phone called "Migraine e Diary"       Lifestyle Recommendations:  [x] SLEEP - Maintain a regular sleep schedule: Adults need at least 7-8 hours of uninterrupted a night  Maintain good sleep hygiene:  Going to bed and waking up at consistent times, avoiding excessive daytime naps, avoiding caffeinated beverages in the evening, avoid excessive stimulation in the evening and generally using bed primarily for sleeping  One hour before bedtime would recommend turning lights down lower, decreasing your activity (may read quietly, listen to music at a low volume)  When you get into bed, should eliminate all technology (no texting, emailing, playing with your phone, iPad or tablet in bed)  [x] HYDRATION - Maintain good hydration  Drink  2L of fluid a day (4 typical small water bottles)  [x] DIET - Maintain good nutrition  In particular don't skip meals and try and eat healthy balanced meals regularly  [x] TRIGGERS - Look for other triggers and avoid them: Limit caffeine to 1-2 cups a day or less  Avoid dietary triggers that you have noticed bring on your headaches (this could include aged cheese, peanuts, MSG, aspartame and nitrates)  [x] TOBACCO CESSATION: Macho Jackson was educated regarding the impact of smoking and advised to quit  Willingness to quit was assessed and she is not willing at this time  Resources provided: PA Quit [employer], [employer]     Education and Follow-up  [x] Please call with any questions or concerns     [x] Return for follow up in 3 months     Follow up with mental health team     Follow up as scheduled 8/26/19 with Dr Jeff Gaines

## 2019-07-16 ENCOUNTER — OFFICE VISIT (OUTPATIENT)
Dept: PHYSICAL THERAPY | Facility: CLINIC | Age: 19
End: 2019-07-16
Payer: COMMERCIAL

## 2019-07-16 DIAGNOSIS — M54.9 MILD BACK PAIN: ICD-10-CM

## 2019-07-16 DIAGNOSIS — M54.16 LUMBAR RADICULOPATHY: Primary | ICD-10-CM

## 2019-07-16 PROCEDURE — 97110 THERAPEUTIC EXERCISES: CPT

## 2019-07-16 PROCEDURE — 97112 NEUROMUSCULAR REEDUCATION: CPT

## 2019-07-16 NOTE — PROGRESS NOTES
Daily Note     Today's date: 2019  Patient name: Tessa Carpenter  : 2000  MRN: [id number]  Referring provider: Gino Winters PA-C  Dx:   Encounter Diagnosis     ICD-10-CM    1  Lumbar radiculopathy M54 16    2  Mild back pain M54 9                   Subjective: Pt says that overall her back is feeling better but she has had episodes over the past two weeks since her last visit where she had increased pain when bending to pick something up and also when holding her younger sister (in front, not on one hip)  Objective: See treatment diary below     Precautions: GERD, Asthma, Bipolar disorder, h/o seizure-like activity    Daily Treatment Diary   Assessment          Cristobal/Reval MD            FOTO 55            POC Signed             HEP Issued    NV          Exercise Diary            Bike   5' Level 1  6 min L1 5'         SKTC - B  10x10" 30"x3 30"x3 ea         Piriformis Stretch - B  3x30" 30"x3 30"x3 ea         Hamstring Stretch - B  3x30" 30"x3 30"x3 ea         PPT  20x5" TA Brace  5"x15 TA 5"x10, PPT 5"x10         PPT + iso hip ADD  20x5" TA Brace + iso hip ADD  5"x15 5"x20         PPT + iso hip flex  10x5" TA Brace +  iso hip flex  5"x10 ea -         PPT + TB hip ABD  GTB 20x5" TA Brace + TB hip ABD OTB  5"x15 GTB 5"x20         PPT + low bridge  20x5" TA Brace + low bridge  5"x10 5"x20         Clamshells  20x5" 5"x10 ea 5"x20 ea         Reverse Clamshells  20x5" 5"x10 ea 5"x20 ea         Multifidus Press  nv  OTB 5"x10 ea         Multifidus Walkouts  nv           Mini squats    10x pain*         kettlebell carry    18#  3 laps center                      Modalities          MHP L-Spine  90/90 Position  Pre-Tx  deferred CP L-Spine  H/L  10' Post-10'           HEP: piriformis stretch, SKTC, HS stretch, PPT, hip add, hip abd      Assessment: Tolerated treatment well   Patient demonstrated fatigue post treatment, exhibited good technique with therapeutic exercises and would benefit from continued PT to address remaining deficits in stability and pain  Plan: Continue per plan of care  Progress treatment as tolerated      Fátima Hernandez, PTA

## 2019-07-18 ENCOUNTER — APPOINTMENT (OUTPATIENT)
Dept: PHYSICAL THERAPY | Facility: CLINIC | Age: 19
End: 2019-07-18
Payer: COMMERCIAL

## 2019-07-25 ENCOUNTER — OFFICE VISIT (OUTPATIENT)
Dept: PHYSICAL THERAPY | Facility: CLINIC | Age: 19
End: 2019-07-25
Payer: COMMERCIAL

## 2019-07-25 DIAGNOSIS — M54.9 MILD BACK PAIN: ICD-10-CM

## 2019-07-25 DIAGNOSIS — M54.16 LUMBAR RADICULOPATHY: Primary | ICD-10-CM

## 2019-07-25 PROCEDURE — 97110 THERAPEUTIC EXERCISES: CPT

## 2019-07-25 PROCEDURE — 97112 NEUROMUSCULAR REEDUCATION: CPT

## 2019-07-25 NOTE — PROGRESS NOTES
Daily Note     Today's date: 2019  Patient name: Stephanie Dear  : 2000  MRN: [id number]  Referring provider: Yumiko Manzo PA-C  Dx:   Encounter Diagnosis     ICD-10-CM    1  Lumbar radiculopathy M54 16    2  Mild back pain M54 9        Start Time: 1400  Stop Time: 5096  Total time in clinic (min): 45 minutes    Subjective: Pt says that she is in a lot of pain today, states she was in so much pain that she couldn't even put shoes on to come to therapy today  Objective: See treatment diary below     Precautions: GERD, Asthma, Bipolar disorder, h/o seizure-like activity    Daily Treatment Diary   Assessment         Cristobal/Reval MD            FOTO 55            POC Signed             HEP Issued    NV          Exercise Diary           Bike   5' Level 1  6 min L1 5' np        SKTC - B  10x10" 30"x3 30"x3 ea 3x30"        Piriformis Stretch - B  3x30" 30"x3 30"x3 ea 3x30"        Hamstring Stretch - B  3x30" 30"x3 30"x3 ea 3x30"        PPT  20x5" TA Brace  5"x15 TA 5"x10, PPT 5"x10 TA 5"x10, PPT 5"x10        PPT + iso hip ADD  20x5" TA Brace + iso hip ADD  5"x15 5"x20 5"x20        PPT + iso hip flex  10x5" TA Brace +  iso hip flex  5"x10 ea - --        PPT + TB hip ABD  GTB 20x5" TA Brace + TB hip ABD OTB  5"x15 GTB 5"x20 GTB 5"x20        PPT + low bridge  20x5" TA Brace + low bridge  5"x10 5"x20 5"x20        Clamshells  20x5" 5"x10 ea 5"x20 ea 5"x20 ea        Reverse Clamshells  20x5" 5"x10 ea 5"x20 ea 5"x20 ea        Multifidus Press  nv  OTB 5"x10 ea nv        Multifidus Walkouts  nv           Mini squats    10x pain* np        kettlebell carry    18#  3 laps center np                     Modalities         MHP L-Spine  90/90 Position  Pre-Tx  deferred CP L-Spine  H/L  10' Post-10' MHP 10' pre          HEP: piriformis stretch, SKTC, HS stretch, PPT, hip add, hip abd      Assessment: Tolerated treatment well   Patient demonstrated fatigue post treatment, exhibited good technique with therapeutic exercises and would benefit from continued PT to address remaining deficits in stability and pain  Pt shows moderate limitations due to LBP today, held on exercises as noted  Pt shows L PI, corrected by shotgun technique  Pt will benefit from further skilled PT to increase strength, flexibility and function  Continue to progress as able  Plan: Continue per plan of care  Progress treatment as tolerated      Krishan Haley

## 2019-07-30 ENCOUNTER — HOSPITAL ENCOUNTER (EMERGENCY)
Facility: HOSPITAL | Age: 19
Discharge: HOME | End: 2019-07-30
Attending: EMERGENCY MEDICINE | Admitting: EMERGENCY MEDICINE
Payer: COMMERCIAL

## 2019-07-30 VITALS
WEIGHT: 139 LBS | HEART RATE: 104 BPM | BODY MASS INDEX: 24.62 KG/M2 | RESPIRATION RATE: 16 BRPM | SYSTOLIC BLOOD PRESSURE: 132 MMHG | TEMPERATURE: 98.7 F | DIASTOLIC BLOOD PRESSURE: 64 MMHG | OXYGEN SATURATION: 99 %

## 2019-07-30 DIAGNOSIS — R45.851 SUICIDAL IDEATION: Primary | ICD-10-CM

## 2019-07-30 LAB
AMPHETAMINES SERPL QL SCN: NEGATIVE
BARBITURATES UR QL: NEGATIVE
BENZODIAZ UR QL: NEGATIVE
COCAINE UR QL: NEGATIVE
ETHANOL EXG-MCNC: 0 MG/DL
EXT PREG TEST URINE: NEGATIVE
EXT. CONTROL ED NAV: NORMAL
METHADONE UR QL: NEGATIVE
OPIATES UR QL SCN: NEGATIVE
PCP UR QL: NEGATIVE
THC UR QL: POSITIVE

## 2019-07-30 PROCEDURE — 99284 EMERGENCY DEPT VISIT MOD MDM: CPT

## 2019-07-30 PROCEDURE — 81025 URINE PREGNANCY TEST: CPT | Performed by: EMERGENCY MEDICINE

## 2019-07-30 PROCEDURE — 82075 ASSAY OF BREATH ETHANOL: CPT | Performed by: EMERGENCY MEDICINE

## 2019-07-30 PROCEDURE — 99283 EMERGENCY DEPT VISIT LOW MDM: CPT | Performed by: EMERGENCY MEDICINE

## 2019-07-30 PROCEDURE — 80307 DRUG TEST PRSMV CHEM ANLYZR: CPT | Performed by: EMERGENCY MEDICINE

## 2019-07-30 RX ORDER — NICOTINE 21 MG/24HR
14 PATCH, TRANSDERMAL 24 HOURS TRANSDERMAL ONCE
Status: DISCONTINUED | OUTPATIENT
Start: 2019-07-30 | End: 2019-07-30 | Stop reason: HOSPADM

## 2019-07-30 RX ADMIN — NICOTINE 14 MG: 14 PATCH, EXTENDED RELEASE TRANSDERMAL at 17:22

## 2019-07-30 NOTE — ED PROVIDER NOTES
History  Chief Complaint   Patient presents with    Psychiatric Evaluation     "I suffer from schizophrenia and i have been suffering this past week  I want to die and kill myself, i almost cut my throat yesterday"  [facility]  -HI/ +VH/+AH shadows and voices  79-year-old female presents for evaluation of suicidal ideations  Patient states she diagnosed with schizophrenia when she was a child  Her last inpatient admission was when she was 11years old  She has been having passive thoughts of suicide for the last few weeks  She states she hears voices that tell her to hurt herself but she has not actually done so  No specific homicidal ideations  She is currently not on psychiatric medication  She reports a past medical history of seizure disorder and is currently taking Keppra  Currently no complaints at this time  Prior to Admission Medications   Prescriptions Last Dose Informant Patient Reported? Taking? albuterol (2 5 mg/3 mL) 0 083 % nebulizer solution  Self No No   Sig: Take 1 vial (2 5 mg total) by nebulization every 6 (six) hours as needed for wheezing or shortness of breath   ergocalciferol (VITAMIN D2) 50,000 units   No No   Sig: Take 1 capsule (50,000 Units total) by mouth once a week For 12 weeks   gabapentin (NEURONTIN) 100 mg capsule   No No   Sig: Take 1 capsule (100 mg total) by mouth 3 (three) times a day as needed (headache or aniexty)   ibuprofen (MOTRIN) 600 mg tablet   No No   Sig: Take 1 tablet (600 mg total) by mouth every 6 (six) hours as needed for mild pain   levETIRAcetam (KEPPRA) 500 mg tablet   No No   Sig: Take 1/2 tablet at night for 1 week   Then take 1 tablet at night   magnesium oxide (MAG-OX) 400 mg   No No   Sig: Take 1 tablet (400 mg total) by mouth daily   omeprazole (PriLOSEC) 40 MG capsule  Self No No   Sig: Take 1 capsule (40 mg total) by mouth daily   venlafaxine (EFFEXOR-XR) 75 mg 24 hr capsule   No No   Sig: Take 1 capsule (75 mg total) by mouth daily For 3 months and then if ineffective increase to 150 mg      Facility-Administered Medications: None       Past Medical History:   Diagnosis Date    Anxiety     Asthma     Bipolar disorder with psychotic features ([place] [address] )     Lymphadenitis     Last assessed 12/30/14    Lymphadenopathy     Last assessed 10/16/13    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[place] [address] )     Psychiatric disorder     Schizophrenia ([place] [address] )     Seizures ([place] [address] )        Past Surgical History:   Procedure Laterality Date    COLONOSCOPY N/A 5/3/2019    Procedure: COLONOSCOPY;  Surgeon: Rock Belle MD;  Location:  GI LAB; Service: Gastroenterology    ESOPHAGOGASTRODUODENOSCOPY N/A 5/3/2019    Procedure: ESOPHAGOGASTRODUODENOSCOPY (EGD); Surgeon: Rock Belle MD;  Location:  GI LAB; Service: Gastroenterology       Family History   Problem Relation Age of Onset   Hanh Seals Migraines Mother    Hanh Seals Other Mother         Neck pain    No Known Problems Father      I have reviewed and agree with the history as documented  Social History     Tobacco Use    Smoking status: Current Every Day Smoker     Packs/day: 1 00     Types: Cigarettes    Smokeless tobacco: Never Used   Substance Use Topics    Alcohol use: Yes     Comment: rarely    Drug use: Yes     Types: Marijuana        Review of Systems   Constitutional: Negative for chills, diaphoresis and fever  HENT: Negative for congestion and rhinorrhea  Eyes: Negative for pain and visual disturbance  Respiratory: Negative for cough, shortness of breath and wheezing  Cardiovascular: Negative for chest pain and leg swelling  Gastrointestinal: Negative for abdominal pain, diarrhea, nausea and vomiting  Genitourinary: Negative for difficulty urinating, dysuria, frequency and urgency  Musculoskeletal: Negative for back pain and neck pain  Skin: Negative for color change and rash  Neurological: Negative for syncope, numbness and headaches     Psychiatric/Behavioral: Positive for suicidal ideas  Negative for self-injury  The patient is not nervous/anxious  All other systems reviewed and are negative  Physical Exam  Physical Exam   Constitutional: She is oriented to person, place, and time  She appears well-developed and well-nourished  HENT:   Head: Normocephalic and atraumatic  Eyes: Conjunctivae and EOM are normal    Neck: Normal range of motion  Neck supple  Cardiovascular: Normal rate and regular rhythm  Pulmonary/Chest: Effort normal and breath sounds normal  No respiratory distress  She has no wheezes  She has no rales  Abdominal: Soft  Bowel sounds are normal  There is no tenderness  There is no guarding  Musculoskeletal: Normal range of motion  She exhibits no edema or tenderness  Neurological: She is alert and oriented to person, place, and time  No cranial nerve deficit  Skin: Skin is warm  No erythema  Psychiatric: She has a normal mood and affect  Her behavior is normal    Nursing note and vitals reviewed        Vital Signs  ED Triage Vitals [07/30/19 1524]   Temperature Pulse Respirations Blood Pressure SpO2   98 7 °F (37 1 °C) 104 16 132/64 99 %      Temp Source Heart Rate Source Patient Position - Orthostatic VS BP Location FiO2 (%)   Tympanic Monitor Sitting Left arm --      Pain Score       5           Vitals:    07/30/19 1524   BP: 132/64   Pulse: 104   Patient Position - Orthostatic VS: Sitting         Visual Acuity      ED Medications  Medications   nicotine (NICODERM CQ) 14 mg/24hr TD 24 hr patch 14 mg (14 mg Transdermal Medication Applied 7/30/19 1722)       Diagnostic Studies  Results Reviewed     Procedure Component Value Units Date/Time    Rapid drug screen, urine [[id number]]  (Abnormal) Collected:  07/30/19 1609    Lab Status:  Final result Specimen:  Urine, Clean Catch Updated:  07/30/19 1700     Amph/Meth UR Negative     Barbiturate Ur Negative     Benzodiazepine Urine Negative     Cocaine Urine Negative     Methadone Urine Negative Opiate Urine Negative     PCP Ur Negative     THC Urine Positive    Narrative:       Presumptive report  If requested, specimen will be sent to reference lab for confirmation  FOR MEDICAL PURPOSES ONLY  IF CONFIRMATION NEEDED PLEASE CONTACT THE LAB WITHIN 5 DAYS  Drug Screen Cutoff Levels:  AMPHETAMINE/METHAMPHETAMINES  1000 ng/mL  BARBITURATES     200 ng/mL  BENZODIAZEPINES     200 ng/mL  COCAINE      300 ng/mL  METHADONE      300 ng/mL  OPIATES      300 ng/mL  PHENCYCLIDINE     25 ng/mL  THC       50 ng/mL      POCT pregnancy, urine [[id number]]  (Normal) Resulted:  07/30/19 1614    Lab Status:  Final result Updated:  07/30/19 1614     EXT PREG TEST UR (Ref: Negative) Negative     Control Valid    POCT alcohol breath test [[id number]]  (Normal) Resulted:  07/30/19 1609    Lab Status:  Final result Updated:  07/30/19 1609     EXTBreath Alcohol 0 00                 No orders to display              Procedures  Procedures       ED Course                               MDM  Number of Diagnoses or Management Options  Suicidal ideation:   Diagnosis management comments: 13-year-old female presenting with suicidal ideations  Will obtain UDS, bat, crisis consult  Patient would like to sign a 201  After reassessment, patient would like outpatient partial program which I believe is appropriate  Stable for discharge  Disposition  Final diagnoses:   Suicidal ideation     Time reflects when diagnosis was documented in both MDM as applicable and the Disposition within this note     Time User Action Codes Description Comment    7/30/2019  6:45 PM Baudilio Manrique Add [E70 362] Suicidal ideation       ED Disposition     ED Disposition Condition Date/Time Comment    Discharge Stable Tue Jul 30, 2019  6:45 PM [address] discharge to home/self care              Follow-up Information     Follow up With Specialties Details Why Contact Info    As provided by [facility] Department Emergency Medicine  If symptoms worsen [address] [facility] [id number] [phone]          Patient's Medications   Discharge Prescriptions    No medications on file     No discharge procedures on file      ED Provider  Electronically Signed by           Diana Morales DO  07/30/19 7836

## 2019-07-30 NOTE — ED NOTES
Mother asking "what they are waiting for " made aware that the pt  Has to speak with the crisis worker so she can locate a bed - no beds here - "where would she go " made aware that the crisis worker will evaluate her and speak to her regarding where she might be placed but that we do look in the [facility] area - Mother states that "you might as well sign yourself out" Pt  States that " I am not going to go to [facility] they are all going to kill me because of my father " Pt   Questioned as to why she thinks people would be in the hospital with her that would want to hurt her" " Because they are all crazy like myself"     Jeevan Hopkins, RACHEL  07/30/19 0884

## 2019-07-30 NOTE — ED NOTES
Patient presented to the ED with mother and grandmother  Patient reports she has a history of schizoaffective disorder with mental health issues first being diagnosed at age 1  She stated that she has been without medication for 6 months and without a therapist for 2 months  She had been attending [place] [address]  and they were reportedly unable to secure a psychiatrist for her  Patient stated she hears voices and they sometimes tell her to harm herself  She stated she has not done so and has not required hospitalization since age 11  She stated that even when more stable, she has residual voicxes that she is easily able to ignore  Patient states she needs to get back on medication but the wait for a psychiatrist has been too long  She is also interested in therapies that are more intensive than her most recent therapist was able to provide  Patient states her siblings sometimes get on her nerves and she does want to talk about current stressors in individual or in a group  Lui Farrell Her mother and grandmother are her primary supports  PAtient states she is familiar with a partial hospitalization format and is enthusiastic for a referral  to be made

## 2019-08-12 ENCOUNTER — OFFICE VISIT (OUTPATIENT)
Dept: GASTROENTEROLOGY | Facility: MEDICAL CENTER | Age: 19
End: 2019-08-12
Payer: COMMERCIAL

## 2019-08-12 VITALS
BODY MASS INDEX: 25.37 KG/M2 | HEART RATE: 98 BPM | HEIGHT: 63 IN | WEIGHT: 143.2 LBS | TEMPERATURE: 98.7 F | SYSTOLIC BLOOD PRESSURE: 108 MMHG | DIASTOLIC BLOOD PRESSURE: 72 MMHG

## 2019-08-12 DIAGNOSIS — R11.0 NAUSEA: ICD-10-CM

## 2019-08-12 DIAGNOSIS — R19.8 ABNORMAL BOWEL HABITS: ICD-10-CM

## 2019-08-12 DIAGNOSIS — R10.9 ABDOMINAL PAIN, UNSPECIFIED ABDOMINAL LOCATION: Primary | ICD-10-CM

## 2019-08-12 PROCEDURE — 99244 OFF/OP CNSLTJ NEW/EST MOD 40: CPT | Performed by: INTERNAL MEDICINE

## 2019-08-12 RX ORDER — DICYCLOMINE HCL 20 MG
20 TABLET ORAL EVERY 6 HOURS PRN
Qty: 120 TABLET | Refills: 2 | Status: SHIPPED | OUTPATIENT
Start: 2019-08-12 | End: 2021-11-11

## 2019-08-12 NOTE — PROGRESS NOTES
[facility] - Outpatient Consultation  Ed Dubon 20 y o  female MRN: [id number]  Encounter: [id number]          ASSESSMENT AND PLAN:    Ed Dubon is a 20 y o  female who presents with complaint of abnormal bowel movements with alternating diarrhea and constipation, nausea and abdominal pain  We reviewed her endoscopic procedures and biopsies as well as prior laboratory work-up  Suspect functional GI issues, such as IBS or functional dyspepsia  1  Abdominal pain, unspecified abdominal location    2  Nausea    3  Abnormal bowel habits        No orders of the defined types were placed in this encounter  Today we discussed:  -- Continue the Prilosec every day  -- Please take the Bentyl and use it as needed for when you have abdominal pain  -- Please  over the counter FDGard and take this before every meal for 1 month to see if this helps  -- For constipation, please make sure to drink plenty of water (enough so that your urine is a light yellow color), try to exercise for 15-30 minutes on most days of the week, increase fiber in your diet (with fresh fruits, vegetables and whole grains  -- Please add a fiber supplement daily (such as citrucel, metamucil, Benefiber, etc )       Follow-up in the office in 2 months, but be in touch via the [facility] portal or by phone with questions, concerns or changes  ______________________________________________________________________    HPI:    Ed Dubon is a 20 y o  female who presents with complaint of abdominal pain, abnormal bowel movements and nausea  She is getting constipated and then she gets diarrhea  Constipated once a week, for 2 days  She will go 12 times a day  On days she is constipated she pushes and nothing comes out  She has nausea and can't eat  When she eats she gets sharp pain  It occurs every day  The sharp pain is in the middle  She tried to go off marijuana and it did not help  No weight loss (has actually gained weight)  REVIEW OF SYSTEMS:  10 point ROS reviewed and negative, except as above    Historical Information   Past Medical History:   Diagnosis Date    Anxiety     Asthma     Bipolar disorder with psychotic features ([place] [id number] )     Lymphadenitis     Last assessed 12/30/14    Lymphadenopathy     Last assessed 10/16/13    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[place] [address] )     Psychiatric disorder     Psychiatric illness     Psychosis ([place] [address] )     Seizures ([place] [address] )      Past Surgical History:   Procedure Laterality Date    COLONOSCOPY N/A 5/3/2019    Procedure: COLONOSCOPY;  Surgeon: Olin Baumgarten, MD;  Location: [facility]; Service: Gastroenterology    ESOPHAGOGASTRODUODENOSCOPY N/A 5/3/2019    Procedure: ESOPHAGOGASTRODUODENOSCOPY (EGD); Surgeon: Olin Baumgarten, MD;  Location: [facility];   Service: Gastroenterology     Social History   Social History     Substance and Sexual Activity   Alcohol Use Yes    Comment: rarely     Social History     Substance and Sexual Activity   Drug Use Yes    Types: Marijuana     Social History     Tobacco Use   Smoking Status Current Every Day Smoker    Packs/day: 1 00    Types: Cigarettes   Smokeless Tobacco Never Used     Family History   Problem Relation Age of Onset    Migraines Mother     Other Mother         Neck pain    No Known Problems Father        Meds/Allergies       Current Outpatient Medications:     albuterol (2 5 mg/3 mL) 0 083 % nebulizer solution    gabapentin (NEURONTIN) 100 mg capsule    ibuprofen (MOTRIN) 600 mg tablet    levETIRAcetam (KEPPRA) 500 mg tablet    magnesium oxide (MAG-OX) 400 mg    omeprazole (PriLOSEC) 40 MG capsule    venlafaxine (EFFEXOR-XR) 75 mg 24 hr capsule    dicyclomine (BENTYL) 20 mg tablet    ergocalciferol (VITAMIN D2) 50,000 units    Allergies   Allergen Reactions    Penicillins            Objective     Blood pressure 108/72, pulse 98, temperature 98 7 °F (37 1 °C), height 5' 3" (1 6 m), weight 65 kg (143 lb 3 2 oz), not currently breastfeeding  Body mass index is 25 37 kg/m²  PHYSICAL EXAM:      General Appearance:   Alert, cooperative, no distress   HEENT:   Normocephalic, atraumatic, anicteric  Neck:  Supple, symmetrical, trachea midline   Lungs:   Clear to auscultation bilaterally; no rales, rhonchi or wheezing; respirations unlabored    Heart[de-identified]   Regular rate and rhythm; no murmur, rub, or gallop  Abdomen:   Soft, non-tender, non-distended; normal bowel sounds; no masses, no organomegaly    Genitalia:   Deferred    Rectal:   Deferred    Extremities:  No cyanosis, clubbing or edema    Pulses:  2+ and symmetric    Skin:  No jaundice, rashes, or lesions    Lymph nodes:  No palpable cervical lymphadenopathy        Lab Results:   No visits with results within 1 Day(s) from this visit  Latest known visit with results is:   Admission on 07/30/2019, Discharged on 07/30/2019   Component Date Value    EXT PREG TEST UR (Ref: N* 07/30/2019 Negative     Control 07/30/2019 Valid     Amph/Meth UR 07/30/2019 Negative     Barbiturate Ur 07/30/2019 Negative     Benzodiazepine Urine 07/30/2019 Negative     Cocaine Urine 07/30/2019 Negative     Methadone Urine 07/30/2019 Negative     Opiate Urine 07/30/2019 Negative     PCP Ur 07/30/2019 Negative     THC Urine 07/30/2019 Positive*    EXTBreath Alcohol 07/30/2019 0 00          Radiology Results:   No results found

## 2019-08-12 NOTE — PATIENT INSTRUCTIONS
Today we discussed:  -- Continue the Prilosec every day  -- Please take the Bentyl and use it as needed for when you have abdominal pain  -- Please  over the counter FDGard and take this before every meal for 1 month to see if this helps  -- For constipation, please make sure to drink plenty of water (enough so that your urine is a light yellow color), try to exercise for 15-30 minutes on most days of the week, increase fiber in your diet (with fresh fruits, vegetables and whole grains  -- Please add a fiber supplement daily (such as citrucel, metamucil, Benefiber, etc )       Follow-up in the office in 2 months, but be in touch via the [facility] portal or by phone with questions, concerns or changes

## 2019-08-26 ENCOUNTER — OFFICE VISIT (OUTPATIENT)
Dept: NEUROLOGY | Facility: CLINIC | Age: 19
End: 2019-08-26
Payer: COMMERCIAL

## 2019-08-26 VITALS
BODY MASS INDEX: 24.45 KG/M2 | HEIGHT: 63 IN | WEIGHT: 138 LBS | HEART RATE: 90 BPM | SYSTOLIC BLOOD PRESSURE: 101 MMHG | DIASTOLIC BLOOD PRESSURE: 58 MMHG

## 2019-08-26 DIAGNOSIS — G40.909 RECURRENT SEIZURES (HCC): Primary | ICD-10-CM

## 2019-08-26 DIAGNOSIS — F31.60 BIPOLAR AFFECTIVE DISORDER, CURRENT EPISODE MIXED, CURRENT EPISODE SEVERITY UNSPECIFIED (HCC): ICD-10-CM

## 2019-08-26 DIAGNOSIS — R56.9 SEIZURE-LIKE ACTIVITY (HCC): ICD-10-CM

## 2019-08-26 PROCEDURE — 99215 OFFICE O/P EST HI 40 MIN: CPT | Performed by: PSYCHIATRY & NEUROLOGY

## 2019-08-26 PROCEDURE — 99354 PR PROLONGED SVC OUTPATIENT SETTING 1ST HOUR: CPT | Performed by: PSYCHIATRY & NEUROLOGY

## 2019-08-26 RX ORDER — DIVALPROEX SODIUM 500 MG/1
TABLET, EXTENDED RELEASE ORAL
Qty: 30 TABLET | Refills: 5 | Status: SHIPPED | OUTPATIENT
Start: 2019-08-26 | End: 2020-03-09 | Stop reason: SDUPTHER
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
## 2019-08-26 NOTE — PROGRESS NOTES
Review of Systems    {LimROS-complete:79627}      Review of Systems   Constitutional: Positive for appetite change and fatigue  Negative for fever  HENT: Negative  Negative for hearing loss, tinnitus, trouble swallowing and voice change  Eyes: Negative  Negative for photophobia and pain  Respiratory: Negative  Negative for shortness of breath  Cardiovascular: Negative  Negative for palpitations  Gastrointestinal: Positive for diarrhea and nausea  Negative for vomiting  Endocrine: Negative  Negative for cold intolerance and heat intolerance  Genitourinary: Negative  Negative for dysuria, frequency and urgency  Musculoskeletal: Positive for back pain and neck pain  Negative for myalgias  Skin: Negative  Negative for rash  Neurological: Positive for seizures, syncope and headaches  Negative for dizziness, tremors, facial asymmetry, speech difficulty, weakness, light-headedness and numbness  Hematological: Negative  Does not bruise/bleed easily  Psychiatric/Behavioral: Positive for confusion (memory problems) and sleep disturbance  Negative for hallucinations  The patient is nervous/anxious (depression)

## 2019-08-26 NOTE — PATIENT INSTRUCTIONS
We discussed the uncertainty of the diagnosis of epilepsy or seizure disorder, clinically, having recurrent unprovoked seizures means that you have an underlying seizure disorder  However, the clinical features and progression of symptoms makes me question if these are focal or generalized seizures  EEG and MRI brain were normal   However, we need to evaluate your clinical seizures and periods of staring off if these are also seizures with an inpatient admission to the Epilepsy Monitoring Unit  PLAN:  1 - start Divalproex ER 500mg tab take one tab at bedtime for 7 days then one tab twice a day  2 - once you are on Divalproex 500mg twice a day, decrease Levetiracetam 500mg tab to half a tab twice a day for 7 days, then stop  3 - referral to the epilepsy monitoring unit (EMU)  4 - if you are going to EMU in 6 weeks, do not complete blood if you are not going to the EMU for 2 months, then check CBC, LFT, and valproic acid level in 4 week  5 - follow-up in 3 months      I discussed seizure safety and seizure first aid with the patient  Limits would be no unprotected heights (ladders, standing on chairs/tables), should not swim alone (need partners or ), cooking with a partner to avoid burn injuries, showers instead of baths  I reviewed that convulsive seizures typically last about 2 minutes with about 15-30 minutes of recovery  Should a seizure last more than 5 minutes or patient fails to recover after 30 minutes or there are multiple seizures in a day or if there is a suspected head injury then EMS should be called or they go to the nearest emergency room  If she only experiences altered awareness, confusion, or focal seizures, then they may call the office for guidance    Seizure first aid consists of preventing the patient from wandering or removal of dangerous objects or prevention of injury, for convulsive seizures, position the patient on the ground, may place a pillow under the head, turn the patient to his side, no objects or reaching for the mouth, no restraints but prevent injuries by removing glasses or sharp/heavy objects, and time the duration of the seizure  I recommend that she obtain a medical alert bracelet that states "Seizure disorder" or "Epilepsy" along with any medication allergies  We discussed issues related to driving  I explained to the patient that the law states that all patients who have a seizure must be reported to the [employer]  Patients cannot legally drive for 6 months after seizure in the state [place]  (6 months in the state of [place] and 3 months in the state [facility] )  She is not cleared to return to driving until she has been without a seizure for at least 6 months and cleared by the appropriate state DMV/DOT  I also informed the patient that, although exceptions can be granted for patients with provoked seizures, exclusively nocturnal seizures, prolonged auras, or exclusively simple partial seizures, these exceptions are granted by the [employer] and not by the physician  FIRST AID FOR SEIZURES    What to Do If You Witness a Seizure:     Generalized convulsive (called a generalized tonic-clonic or grand mal seizure)  During this seizure, the person may cry out, suddenly stiffen up, make jerking movements, and fall  The person may turn pale or blue from difficulty breathing  Actions:  1  Stay calm  Talk in a soothing voice and if possible keep onlookers away  2  Prevent injury  Move objects away that the person might hit while jerking uncontrollably  3  Time when the seizure starts and ends  Most seizures stop after only a few minutes  4  Turn him or her gently onto one side  This will help keep the airway clear  5  Never place anything in his/her mouth or give him/her anything by mouth during a seizure  -- Do not give the person water, pills, or food until fully alert     6  Loosen tight clothing or jewelry around his/her neck   7  Make the person as comfortable as possible  8  Place something soft under their head  9  Do not hold the person down  If the person having a seizure thrashes around there is no need for you to restrain them  They are more likely to be combative if restrained  Remember to consider your safety as well  10  Keep onlookers away  11  Be sensitive and supportive, and ask others to do the same  12  Stay with the person until he/she is fully alert  Complex partial seizure (confusional spells)  During this kind of seizure, the person may have a glassy stare; give no response or inappropriate responses when questioned; sit, stand, or walk about aimlessly; make lip smacking or chewing motions; fidget with clothes; appear to be drunk, drugged, or confused  Actions:  1  Make sure the person is safe and wont harm themselves  2  Try to remove harmful objects from around the person (tools, utensils, glasses)  3  Do NOT be aggressive or attempt to restrain the person  They are more likely to be combative if restrained  Remember to consider your safety as well  4  Help prevent the person from wandering, and direct the person to chair or safe position  5  Never place anything in his/her mouth or give him/her anything by mouth during a seizure  -- Do not give the person water, pills, or food until fully alert  6  Keep onlookers away  7  Be sensitive and supportive, and ask others to do the same  8  Stay with the person until he/she is fully alert  CALL 911 if:  1  A convulsive seizure lasts more than 5 minutes  2  The person turns blue during the seizure  3  The person does not start breathing after the seizure  Begin mouth to mouth resuscitation if this would occur  4  The person has one seizure right after the other without coming back to normal consciousness between the seizures  5  The person has not regained consciousness or is still confused after 30 minutes    6  You know the person does not have epilepsy  7  You know the person has diabetes or low blood sugar  8  The person is pregnant, ill, or injured  9  The seizure occurred in water, because the person may have inhaled water  10  The person requests an ambulance or medical help  Rescue medication  Your doctor may prescribe a rescue medication such as lorazepam (Ativan), diazepam (Valium / Diastat), or clonazepam (Klonopin) to terminate a seizure or if you have a history of cluster of seizures   Follow the instructions given by your doctor for these medications    Recognizing Common Seizures (examples)   · Simple partial seizures: Isolated twitching, numbness, sweating, dizziness, nausea/vomiting, disturbances to hearing, vision, smell or taste  No loss of consciousness occurs, and the person remains aware of his/her environment  · Complex partial seizures: Staring, motionless, picking at clothes, smacking lips, swallowing repeatedly or wandering around  The person is not aware of their surroundings and is not fully responsive  · Atonic seizures: Drop attacks or sudden, rapid fall to ground with rapid recovery  · Myoclonic seizures: Brief forceful jerks which can affect the whole body or just part of it  · Absence seizures: May appear to be daydreaming or spacing out   The person is momentarily unresponsive and unaware of what is happening around him/her  · Tonic seizures: Stiffening of part or of the entire body  · Generalized Tonic-Clonic Seizures  Grand-mal seizure   Sudden loss of consciousness with body stiffening followed by continuous jerking movements  A blue tinge around the mouth is likely but lack of oxygen is rare  Loss of bladder and/or bowel control may occur  Video Electroencephalopathy (VEEG) and the Epilepsy Monitoring Unit (EMU)    What is video EEG?  In video-EEG, you are video taped at the same time as your brain waves are recorded      This typically occurs in the hospital over a long period of time, often an average of 3-7 days   The doctor is able to review both the video and EEG at the same time to see exactly what your brain waves are doing while watching what your body is doing  Why do I need video-EEG? Typically the reasons to have a video-EEG study are to make a diagnosis, classify the type of seizures and epilepsy, and if medications do not prevent the seizures then offer an alternative therapy (pre-surgical evaluation)   Some people have events that look like an epileptic seizure (caused by electrical storm of the brain); but are in fact not due to abnormal electrical activity in the brain  Other causes include cardiac or vascular pathology, other neurological causes such as tics and movement disorders, or psychological / psychogenic nonepileptic events   There are also many different kinds of epileptic seizures  The video EEG will help classify the seizures and epilepsy syndrome to determine the best medications or treatments   Nearly a third of epilepsy patients are medically refractory (failed 2 or more appropriate anti-seizure medications) and epilepsy surgery may be an option for these patients  VEEG is used to evaluate where seizures start in the brain, determine if surgery may be possible and guide the specific surgical approach for patients who are found to be surgical candidates  What to expect in the Epilepsy Monitoring Unit (EMU)  The EMU is a specialized inpatient unit in the hospital specially designed for evaluation of seizure disorders  The unit is equipped with computer-based monitoring equipment, certified EEG technologists, trained nurses, and attending physicians trained in the management of epilepsy   In the EMU, seizures are recorded and specially reviewed so that a proper diagnosis can be made and treatment can be optimized   Each patient will have a private room and a private bathroom      Patients will be connected to video-EEG equipment continuously (24 hours a day)  o There is no video recording while in the restroom, but brain waves are recorded on the EEG at all times  o Although this can be bothersome, continuous monitoring at all times is necessary to make sure patients are safe and that the necessary information is collected   Because patients are connected to recording equipment, mobility is restricted to the patients room   Patients are not be able to take a shower or wash your hair while on EEG monitoring  o This would interfere with your EEG electrodes  o Washing with a basin or sink is permitted   Patients are asked to activate a push button when they experience an event and then state aloud what they are experiencing   Nurses will test the patient during an event to help the doctors see what is occurring   To increase the chance of capturing a seizure in a limited amount of time a variety of activation procedures are employed  o Sleep deprivation (no naps during the day and attempts to keep you awake all night as often as every other night)  o Photic stimulation and hyperventilation procedures  o Weaning or withdrawal of medications used to control seizures (the attending physician will discuss this with you)   You will need to have a peripheral IV placed in your hand or arm  This allows the doctors to give rescue medications in the event of a medical emergency while you are in the hospital  You also may be given a blood thinner in the form of daily subcutaneous injections to prevent deep venous thrombosis (DVT)   The hospital is a non-smoking facility, therefore smoking is not allowed   Chewing gum is not allowed, this creates artifact on the EEG   If you are a woman of childbearing age, you may be asked to take a urine pregnancy test    We strive to make the EMU as safe as possible    Throughout the world video EEG monitoring is the standard of care and thousands of patients have been admitted to epilepsy monitoring units and there are rare cases of complications due to severely difficult to control epilepsy  These have included seizure clusters, status epilepticus (seizures that do not stop with typical medications and advanced measures are required), injuries (fractures and head injury), and very rarely death  How long do patients stay in the EMU?  The length of time varies for each patient   Prepare to stay about 1 week (even though it may not last that long)  o Typically, patients stay between 3-7 days, but may stay more or less time in special circumstances  Things you can do to prepare for admission   Take a shower and wash your hair the night before or the morning of admission   Do not use any hair products such as gels, sprays, mousse, or hair weaves  o It is NOT necessary to cut your hair or shave your head for the test     Unless you received specific instructions from your physician, continue to take your medications as you normally do, including the morning of your admission   Bring all of your current medications in the original prescription bottles to the hospital (some specialty medications may not be available in the hospital)   Bring a complete list of your medical and surgical history   Bring your seizure calendar   Wear comfortable clothing or pajamas  o Button-down shirts and elastic-waist pants are preferred    o You can bring slippers or sneakers for when you are walking around the room   You can bring activities like computers, phones, cards, music, movies, etc  with you to keep you occupied  o Electronic devices cannot be plugged in while you are touching them (this interferes with the equipment), but can be charging on the other side of the room      If you need any medical devices (such as a CPAP for obstructive sleep apnea), please bring it to the hospital

## 2019-08-27 ENCOUNTER — HOSPITAL ENCOUNTER (EMERGENCY)
Facility: HOSPITAL | Age: 19
Discharge: HOME | End: 2019-08-27
Attending: EMERGENCY MEDICINE | Admitting: EMERGENCY MEDICINE
Payer: COMMERCIAL

## 2019-08-27 ENCOUNTER — APPOINTMENT (EMERGENCY)
Dept: ULTRASOUND IMAGING | Facility: HOSPITAL | Age: 19
End: 2019-08-27
Payer: COMMERCIAL

## 2019-08-27 VITALS
HEART RATE: 65 BPM | SYSTOLIC BLOOD PRESSURE: 96 MMHG | OXYGEN SATURATION: 100 % | TEMPERATURE: 98.4 F | DIASTOLIC BLOOD PRESSURE: 57 MMHG | RESPIRATION RATE: 16 BRPM

## 2019-08-27 DIAGNOSIS — R11.2 NAUSEA & VOMITING: ICD-10-CM

## 2019-08-27 DIAGNOSIS — R10.9 ABDOMINAL PAIN: Primary | ICD-10-CM

## 2019-08-27 LAB
ALBUMIN SERPL BCP-MCNC: 4.1 G/DL (ref 3.5–5)
ALP SERPL-CCNC: 100 U/L (ref 46–116)
ALT SERPL W P-5'-P-CCNC: 20 U/L (ref 12–78)
ANION GAP SERPL CALCULATED.3IONS-SCNC: 15 MMOL/L (ref 4–13)
AST SERPL W P-5'-P-CCNC: 22 U/L (ref 5–45)
BACTERIA UR QL AUTO: ABNORMAL /HPF
BASOPHILS # BLD AUTO: 0.05 THOUSANDS/ΜL (ref 0–0.1)
BASOPHILS NFR BLD AUTO: 1 % (ref 0–1)
BILIRUB DIRECT SERPL-MCNC: 0.14 MG/DL (ref 0–0.2)
BILIRUB SERPL-MCNC: 0.49 MG/DL (ref 0.2–1)
BILIRUB UR QL STRIP: NEGATIVE
BUN SERPL-MCNC: 10 MG/DL (ref 5–25)
CALCIUM SERPL-MCNC: 9.2 MG/DL (ref 8.3–10.1)
CHLORIDE SERPL-SCNC: 105 MMOL/L (ref 100–108)
CLARITY UR: CLEAR
CO2 SERPL-SCNC: 21 MMOL/L (ref 21–32)
COLOR UR: YELLOW
CREAT SERPL-MCNC: 0.82 MG/DL (ref 0.6–1.3)
EOSINOPHIL # BLD AUTO: 0.15 THOUSAND/ΜL (ref 0–0.61)
EOSINOPHIL NFR BLD AUTO: 2 % (ref 0–6)
ERYTHROCYTE [DISTWIDTH] IN BLOOD BY AUTOMATED COUNT: 13.8 % (ref 11.6–15.1)
EXT PREG TEST URINE: NORMAL
EXT. CONTROL ED NAV: NORMAL
GFR SERPL CREATININE-BSD FRML MDRD: 103 ML/MIN/1.73SQ M
GLUCOSE SERPL-MCNC: 95 MG/DL (ref 65–140)
GLUCOSE UR STRIP-MCNC: NEGATIVE MG/DL
HCT VFR BLD AUTO: 41.1 % (ref 34.8–46.1)
HGB BLD-MCNC: 13.8 G/DL (ref 11.5–15.4)
HGB UR QL STRIP.AUTO: ABNORMAL
IMM GRANULOCYTES # BLD AUTO: 0.03 THOUSAND/UL (ref 0–0.2)
IMM GRANULOCYTES NFR BLD AUTO: 0 % (ref 0–2)
KETONES UR STRIP-MCNC: ABNORMAL MG/DL
LEUKOCYTE ESTERASE UR QL STRIP: ABNORMAL
LIPASE SERPL-CCNC: 70 U/L (ref 73–393)
LYMPHOCYTES # BLD AUTO: 1.42 THOUSANDS/ΜL (ref 0.6–4.47)
LYMPHOCYTES NFR BLD AUTO: 14 % (ref 14–44)
MAGNESIUM SERPL-MCNC: 1.7 MG/DL (ref 1.6–2.6)
MCH RBC QN AUTO: 28 PG (ref 26.8–34.3)
MCHC RBC AUTO-ENTMCNC: 33.6 G/DL (ref 31.4–37.4)
MCV RBC AUTO: 83 FL (ref 82–98)
MONOCYTES # BLD AUTO: 0.57 THOUSAND/ΜL (ref 0.17–1.22)
MONOCYTES NFR BLD AUTO: 6 % (ref 4–12)
NEUTROPHILS # BLD AUTO: 7.76 THOUSANDS/ΜL (ref 1.85–7.62)
NEUTS SEG NFR BLD AUTO: 77 % (ref 43–75)
NITRITE UR QL STRIP: NEGATIVE
NON-SQ EPI CELLS URNS QL MICRO: ABNORMAL /HPF
NRBC BLD AUTO-RTO: 0 /100 WBCS
PH UR STRIP.AUTO: 8 [PH]
PLATELET # BLD AUTO: 291 THOUSANDS/UL (ref 149–390)
PMV BLD AUTO: 9.7 FL (ref 8.9–12.7)
POTASSIUM SERPL-SCNC: 3.5 MMOL/L (ref 3.5–5.3)
PROT SERPL-MCNC: 7.7 G/DL (ref 6.4–8.2)
PROT UR STRIP-MCNC: NEGATIVE MG/DL
RBC # BLD AUTO: 4.93 MILLION/UL (ref 3.81–5.12)
RBC #/AREA URNS AUTO: ABNORMAL /HPF
SODIUM SERPL-SCNC: 141 MMOL/L (ref 136–145)
SP GR UR STRIP.AUTO: 1.01 (ref 1–1.03)
UROBILINOGEN UR QL STRIP.AUTO: 0.2 E.U./DL
WBC # BLD AUTO: 9.98 THOUSAND/UL (ref 4.31–10.16)
WBC #/AREA URNS AUTO: ABNORMAL /HPF

## 2019-08-27 PROCEDURE — 85025 COMPLETE CBC W/AUTO DIFF WBC: CPT | Performed by: EMERGENCY MEDICINE

## 2019-08-27 PROCEDURE — 96372 THER/PROPH/DIAG INJ SC/IM: CPT

## 2019-08-27 PROCEDURE — 96361 HYDRATE IV INFUSION ADD-ON: CPT

## 2019-08-27 PROCEDURE — 96375 TX/PRO/DX INJ NEW DRUG ADDON: CPT

## 2019-08-27 PROCEDURE — 76830 TRANSVAGINAL US NON-OB: CPT

## 2019-08-27 PROCEDURE — 36415 COLL VENOUS BLD VENIPUNCTURE: CPT | Performed by: EMERGENCY MEDICINE

## 2019-08-27 PROCEDURE — 99284 EMERGENCY DEPT VISIT MOD MDM: CPT | Performed by: EMERGENCY MEDICINE

## 2019-08-27 PROCEDURE — 83735 ASSAY OF MAGNESIUM: CPT | Performed by: EMERGENCY MEDICINE

## 2019-08-27 PROCEDURE — 80076 HEPATIC FUNCTION PANEL: CPT | Performed by: EMERGENCY MEDICINE

## 2019-08-27 PROCEDURE — 81001 URINALYSIS AUTO W/SCOPE: CPT | Performed by: EMERGENCY MEDICINE

## 2019-08-27 PROCEDURE — 81025 URINE PREGNANCY TEST: CPT | Performed by: EMERGENCY MEDICINE

## 2019-08-27 PROCEDURE — 99284 EMERGENCY DEPT VISIT MOD MDM: CPT

## 2019-08-27 PROCEDURE — 80048 BASIC METABOLIC PNL TOTAL CA: CPT | Performed by: EMERGENCY MEDICINE

## 2019-08-27 PROCEDURE — 76856 US EXAM PELVIC COMPLETE: CPT

## 2019-08-27 PROCEDURE — 96374 THER/PROPH/DIAG INJ IV PUSH: CPT

## 2019-08-27 PROCEDURE — 83690 ASSAY OF LIPASE: CPT | Performed by: EMERGENCY MEDICINE

## 2019-08-27 RX ORDER — DICYCLOMINE HCL 20 MG
20 TABLET ORAL 2 TIMES DAILY
Qty: 20 TABLET | Refills: 0 | Status: SHIPPED | OUTPATIENT
Start: 2019-08-27 | End: 2019-09-20 | Stop reason: HOSPADM

## 2019-08-27 RX ORDER — ONDANSETRON 2 MG/ML
4 INJECTION INTRAMUSCULAR; INTRAVENOUS ONCE
Status: COMPLETED | OUTPATIENT
Start: 2019-08-27 | End: 2019-08-27

## 2019-08-27 RX ORDER — METOCLOPRAMIDE 10 MG/1
10 TABLET ORAL EVERY 6 HOURS
Qty: 30 TABLET | Refills: 0 | Status: SHIPPED | OUTPATIENT
Start: 2019-08-27 | End: 2021-11-11

## 2019-08-27 RX ORDER — NAPROXEN 500 MG/1
500 TABLET ORAL 2 TIMES DAILY WITH MEALS
Qty: 20 TABLET | Refills: 0 | Status: SHIPPED | OUTPATIENT
Start: 2019-08-27 | End: 2019-10-08

## 2019-08-27 RX ORDER — DICYCLOMINE HCL 20 MG
20 TABLET ORAL ONCE
Status: COMPLETED | OUTPATIENT
Start: 2019-08-27 | End: 2019-08-27

## 2019-08-27 RX ORDER — HALOPERIDOL 5 MG/ML
5 INJECTION INTRAMUSCULAR ONCE
Status: COMPLETED | OUTPATIENT
Start: 2019-08-27 | End: 2019-08-27

## 2019-08-27 RX ORDER — KETOROLAC TROMETHAMINE 30 MG/ML
15 INJECTION, SOLUTION INTRAMUSCULAR; INTRAVENOUS ONCE
Status: COMPLETED | OUTPATIENT
Start: 2019-08-27 | End: 2019-08-27

## 2019-08-27 RX ADMIN — KETOROLAC TROMETHAMINE 15 MG: 30 INJECTION, SOLUTION INTRAMUSCULAR at 16:25

## 2019-08-27 RX ADMIN — DICYCLOMINE HYDROCHLORIDE 20 MG: 20 TABLET ORAL at 15:19

## 2019-08-27 RX ADMIN — SODIUM CHLORIDE 1000 ML: 0.9 INJECTION, SOLUTION INTRAVENOUS at 14:45

## 2019-08-27 RX ADMIN — HALOPERIDOL LACTATE 5 MG: 5 INJECTION INTRAMUSCULAR at 15:47

## 2019-08-27 RX ADMIN — ONDANSETRON 4 MG: 2 INJECTION INTRAMUSCULAR; INTRAVENOUS at 14:45

## 2019-08-27 NOTE — ED PROVIDER NOTES
History  Chief Complaint   Patient presents with    Abdominal Pain     Left sided abdominal since 5 am  Pt reports 10 episodes of vomiting  pt denies fevers  21 YO female presents with LLQ abdominal pain that began ~10 hours prior to arrival  Pt states this has been sharp and constant, non-radiating, worse with movement  It is associate with nausea and ~10 episodes of emesis  Also with diarrhea throughout the day  Pt states she does usually get pain in the Right lower abdomen with ovarian cyst but that pain is usually dull  Pt denies fevers, no known sick contacts or suspicious food intake  Pt does have a Hx of recurrent constipation and diarrhea, seeing GI with suspected IBS  She states her most recent period began at midnight last night  Pt denies CP/SOB/F/C, no dysuria, burning on urination or blood in urine  History provided by:  Patient and parent   used: No    Abdominal Pain   Pain location:  LLQ  Pain quality: sharp    Pain radiates to:  Does not radiate  Pain severity:  Moderate  Onset quality:  Gradual  Duration:  10 hours  Timing:  Constant  Progression:  Unchanged  Chronicity:  New  Relieved by:  Nothing  Worsened by: Movement  Ineffective treatments:  None tried  Associated symptoms: diarrhea, nausea and vomiting    Associated symptoms: no chest pain, no chills, no constipation, no dysuria, no fatigue, no fever and no shortness of breath    Diarrhea:     Quality:  Semi-solid    Severity:  Moderate    Duration:  1 day    Progression:  Unchanged  Nausea:     Severity:  Moderate    Onset quality:  Gradual    Duration:  10 hours    Timing:  Intermittent    Progression:  Waxing and waning  Vomiting:     Quality:  Stomach contents    Number of occurrences:  10    Severity:  Moderate    Duration:  10 hours    Progression:  Unchanged      Prior to Admission Medications   Prescriptions Last Dose Informant Patient Reported? Taking?    albuterol (2 5 mg/3 mL) 0 083 % nebulizer solution  Self No Yes   Sig: Take 1 vial (2 5 mg total) by nebulization every 6 (six) hours as needed for wheezing or shortness of breath   dicyclomine (BENTYL) 20 mg tablet Past Week at Unknown time  No Yes   Sig: Take 1 tablet (20 mg total) by mouth every 6 (six) hours as needed (abdominal pain)   divalproex sodium (DEPAKOTE ER) 500 mg 24 hr tablet 8/26/2019 at Unknown time  No Yes   Sig: Take one tab at bedtime for 7 days, then twice a day   gabapentin (NEURONTIN) 100 mg capsule Past Week at Unknown time Self No Yes   Sig: Take 1 capsule (100 mg total) by mouth 3 (three) times a day as needed (headache or aniexty)   magnesium oxide (MAG-OX) 400 mg  Self No Yes   Sig: Take 1 tablet (400 mg total) by mouth daily   omeprazole (PriLOSEC) 40 MG capsule  Self No Yes   Sig: Take 1 capsule (40 mg total) by mouth daily   venlafaxine (EFFEXOR-XR) 75 mg 24 hr capsule  Self No Yes   Sig: Take 1 capsule (75 mg total) by mouth daily For 3 months and then if ineffective increase to 150 mg      Facility-Administered Medications: None       Past Medical History:   Diagnosis Date    Anxiety     Asthma     Bipolar disorder with psychotic features ([facility] )     Lymphadenitis     Last assessed 12/30/14    Lymphadenopathy     Last assessed 10/16/13    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[facility] )     Psychiatric disorder     Psychiatric illness     Psychosis ([facility] )     Seizures ([facility] )        Past Surgical History:   Procedure Laterality Date    COLONOSCOPY N/A 5/3/2019    Procedure: COLONOSCOPY;  Surgeon: Jori Jaffe MD;  Location: [facility]; Service: Gastroenterology    ESOPHAGOGASTRODUODENOSCOPY N/A 5/3/2019    Procedure: ESOPHAGOGASTRODUODENOSCOPY (EGD); Surgeon: Jori Jaffe MD;  Location: [facility];   Service: Gastroenterology       Family History   Problem Relation Age of Onset   Sally Tomlinson Migraines Mother     Other Mother         Neck pain    Drug abuse Father     Febrile seizures Maternal Uncle      I have reviewed and agree with the history as documented  Social History     Tobacco Use    Smoking status: Current Every Day Smoker     Packs/day: 1 00     Types: Cigarettes    Smokeless tobacco: Never Used   Substance Use Topics    Alcohol use: Yes     Comment: about once a year    Drug use: Yes     Types: Marijuana        Review of Systems   Constitutional: Negative for chills, fatigue and fever  HENT: Negative for dental problem  Eyes: Negative for visual disturbance  Respiratory: Negative for shortness of breath  Cardiovascular: Negative for chest pain  Gastrointestinal: Positive for abdominal pain, diarrhea, nausea and vomiting  Negative for constipation  Genitourinary: Negative for dysuria and frequency  Musculoskeletal: Negative for arthralgias  Skin: Negative for rash  Neurological: Negative for dizziness, weakness and light-headedness  Psychiatric/Behavioral: Negative for agitation, behavioral problems and confusion  All other systems reviewed and are negative  Physical Exam  Physical Exam   Constitutional: She is oriented to person, place, and time  She appears well-developed and well-nourished  HENT:   Head: Normocephalic and atraumatic  Eyes: EOM are normal    Neck: Normal range of motion  Cardiovascular: Normal rate, regular rhythm and normal heart sounds  Pulmonary/Chest: Effort normal and breath sounds normal    Abdominal: Soft  There is tenderness in the left lower quadrant  There is no rigidity, no rebound and no guarding  Musculoskeletal: Normal range of motion  Neurological: She is alert and oriented to person, place, and time  Skin: Skin is warm and dry  Psychiatric: She has a normal mood and affect  Her behavior is normal  Thought content normal    Nursing note and vitals reviewed        Vital Signs  ED Triage Vitals   Temperature Pulse Respirations Blood Pressure SpO2   08/27/19 1400 08/27/19 1400 08/27/19 1400 08/27/19 1400 08/27/19 1400   98 4 °F (36 9 °C) 93 16 101/62 100 %      Temp Source Heart Rate Source Patient Position - Orthostatic VS BP Location FiO2 (%)   08/27/19 1400 08/27/19 1400 08/27/19 1400 08/27/19 1400 --   Oral Monitor Sitting Right arm       Pain Score       08/27/19 1522       7           Vitals:    08/27/19 1400 08/27/19 1522   BP: 101/62 96/57   Pulse: 93 65   Patient Position - Orthostatic VS: Sitting Sitting         Visual Acuity      ED Medications  Medications   sodium chloride 0 9 % bolus 1,000 mL (0 mL Intravenous Stopped 8/27/19 1545)   ondansetron (ZOFRAN) injection 4 mg (4 mg Intravenous Given 8/27/19 1445)   dicyclomine (BENTYL) tablet 20 mg (20 mg Oral Given 8/27/19 1519)   haloperidol lactate (HALDOL) injection 5 mg (5 mg Intramuscular Given 8/27/19 1547)   ketorolac (TORADOL) injection 15 mg (15 mg Intravenous Given 8/27/19 1625)       Diagnostic Studies  Results Reviewed     Procedure Component Value Units Date/Time    Urine Microscopic [[id number]]  (Abnormal) Collected:  08/27/19 1519    Lab Status:  Final result Specimen:  Urine, Clean Catch Updated:  08/27/19 1623     RBC, UA 30-50 /hpf      WBC, UA 1-2 /hpf      Epithelial Cells Occasional /hpf      Bacteria, UA Occasional /hpf     UA w Reflex to Microscopic w Reflex to Culture [[id number]]  (Abnormal) Collected:  08/27/19 1519    Lab Status:  Final result Specimen:  Urine, Clean Catch Updated:  08/27/19 1541     Color, UA Yellow     Clarity, UA Clear     Specific Gravity, UA 1 015     pH, UA 8 0     Leukocytes, UA Trace     Nitrite, UA Negative     Protein, UA Negative mg/dl      Glucose, UA Negative mg/dl      Ketones, UA 15 (1+) mg/dl      Urobilinogen, UA 0 2 E U /dl      Bilirubin, UA Negative     Blood, UA Large    POCT pregnancy, urine [[id number]]  (Normal) Resulted:  08/27/19 1521    Lab Status:  Final result Updated:  08/27/19 1521     EXT PREG TEST UR (Ref: Negative) Neg (-)     Control Valid    Hepatic function panel [[id number]]  (Normal) Collected:  08/27/19 1435    Lab Status: Final result Specimen:  Blood from Arm, Right Updated:  08/27/19 1510     Total Bilirubin 0 49 mg/dL      Bilirubin, Direct 0 14 mg/dL      Alkaline Phosphatase 100 U/L      AST 22 U/L      ALT 20 U/L      Total Protein 7 7 g/dL      Albumin 4 1 g/dL     Lipase [[id number]]  (Abnormal) Collected:  08/27/19 1435    Lab Status:  Final result Specimen:  Blood from Arm, Right Updated:  08/27/19 1510     Lipase 70 u/L     Basic metabolic panel [[id number]]  (Abnormal) Collected:  08/27/19 1435    Lab Status:  Final result Specimen:  Blood from Arm, Right Updated:  08/27/19 1510     Sodium 141 mmol/L      Potassium 3 5 mmol/L      Chloride 105 mmol/L      CO2 21 mmol/L      ANION GAP 15 mmol/L      BUN 10 mg/dL      Creatinine 0 82 mg/dL      Glucose 95 mg/dL      Calcium 9 2 mg/dL      eGFR 103 ml/min/1 73sq m     Narrative:       [place] guidelines for Chronic Kidney Disease (CKD):     Stage 1 with normal or high GFR (GFR > 90 mL/min/1 73 square meters)    Stage 2 Mild CKD (GFR = 60-89 mL/min/1 73 square meters)    Stage 3A Moderate CKD (GFR = 45-59 mL/min/1 73 square meters)    Stage 3B Moderate CKD (GFR = 30-44 mL/min/1 73 square meters)    Stage 4 Severe CKD (GFR = 15-29 mL/min/1 73 square meters)    Stage 5 End Stage CKD (GFR <15 mL/min/1 73 square meters)  Note: GFR calculation is accurate only with a steady state creatinine    Magnesium [[id number]]  (Normal) Collected:  08/27/19 1435    Lab Status:  Final result Specimen:  Blood from Arm, Right Updated:  08/27/19 1510     Magnesium 1 7 mg/dL     CBC and differential [[id number]]  (Abnormal) Collected:  08/27/19 1435    Lab Status:  Final result Specimen:  Blood from Arm, Right Updated:  08/27/19 1454     WBC 9 98 Thousand/uL      RBC 4 93 Million/uL      Hemoglobin 13 8 g/dL      Hematocrit 41 1 %      MCV 83 fL      MCH 28 0 pg      MCHC 33 6 g/dL      RDW 13 8 %      MPV 9 7 fL      Platelets 855 Thousands/uL      nRBC 0 /100 WBCs Neutrophils Relative 77 %      Immat GRANS % 0 %      Lymphocytes Relative 14 %      Monocytes Relative 6 %      Eosinophils Relative 2 %      Basophils Relative 1 %      Neutrophils Absolute 7 76 Thousands/µL      Immature Grans Absolute 0 03 Thousand/uL      Lymphocytes Absolute 1 42 Thousands/µL      Monocytes Absolute 0 57 Thousand/µL      Eosinophils Absolute 0 15 Thousand/µL      Basophils Absolute 0 05 Thousands/µL                  US pelvis complete w transvaginal   Final Result by Nura Rocha MD (08/27 3800)       Unremarkable sonographic exam    No sonographic evidence for torsion  Workstation performed: [id number]                    Procedures  Procedures       ED Course                               MDM  Number of Diagnoses or Management Options  Abdominal pain: new and requires workup  Nausea & vomiting: new and requires workup  Diagnosis management comments: 1  Abdominal pain - Pt with LLQ pain for the last 10 hours, sharp, non-radiating, with diarrhea, N/V  She has no known diverticulosis  Will check CBC, metabolic panel for electrolyte abnormalities and dehydration,  LFT's to assess GB dysfunction, lipase for pancreatitis, will order pelvic ultrasound to rule out ovarian torsion  Give fluids, Zofran, Bentyl for potential colitis         Amount and/or Complexity of Data Reviewed  Clinical lab tests: ordered and reviewed  Tests in the radiology section of CPT®: ordered and reviewed  Review and summarize past medical records: yes  Independent visualization of images, tracings, or specimens: yes    Patient Progress  Patient progress: improved      Disposition  Final diagnoses:   Abdominal pain   Nausea & vomiting     Time reflects when diagnosis was documented in both MDM as applicable and the Disposition within this note     Time User Action Codes Description Comment    8/27/2019  4:12 PM Royce Adams E Add [R10 9] Abdominal pain     8/27/2019  4:14 PM Royce GAMBLE Add [R11 2] Nausea & vomiting       ED Disposition     ED Disposition Condition Date/Time Comment    Discharge Stable Tue Aug 27, 2019  4:12 PM Destiny Nadene Curling discharge to home/self care  Follow-up Information     Follow up With Specialties Details Why Contact Geeta Rockwell MD Gastroenterology Schedule an appointment as soon as possible for a visit   [address] [place] [address] Jhon Salazar [address] [facility]  [phone]            Discharge Medication List as of 8/27/2019  4:15 PM      START taking these medications    Details   !! dicyclomine (BENTYL) 20 mg tablet Take 1 tablet (20 mg total) by mouth 2 (two) times a day, Starting Tue 8/27/2019, Print      metoclopramide (REGLAN) 10 mg tablet Take 1 tablet (10 mg total) by mouth every 6 (six) hours, Starting Tue 8/27/2019, Print      naproxen (NAPROSYN) 500 mg tablet Take 1 tablet (500 mg total) by mouth 2 (two) times a day with meals for 10 days, Starting Tue 8/27/2019, Until Fri 9/6/2019, Print       !! - Potential duplicate medications found  Please discuss with provider        CONTINUE these medications which have NOT CHANGED    Details   albuterol (2 5 mg/3 mL) 0 083 % nebulizer solution Take 1 vial (2 5 mg total) by nebulization every 6 (six) hours as needed for wheezing or shortness of breath, Starting Tue 10/30/2018, Print      !! dicyclomine (BENTYL) 20 mg tablet Take 1 tablet (20 mg total) by mouth every 6 (six) hours as needed (abdominal pain), Starting Mon 8/12/2019, Normal      gabapentin (NEURONTIN) 100 mg capsule Take 1 capsule (100 mg total) by mouth 3 (three) times a day as needed (headache or aniexty), Starting Fri 7/12/2019, Normal      magnesium oxide (MAG-OX) 400 mg Take 1 tablet (400 mg total) by mouth daily, Starting Fri 7/12/2019, Normal      omeprazole (PriLOSEC) 40 MG capsule Take 1 capsule (40 mg total) by mouth daily, Starting Mon 4/1/2019, Normal      venlafaxine (EFFEXOR-XR) 75 mg 24 hr capsule Take 1 capsule (75 mg total) by mouth daily For 3 months and then if ineffective increase to 150 mg, Starting Fri 7/12/2019, Normal      divalproex sodium (DEPAKOTE ER) 500 mg 24 hr tablet Take one tab at bedtime for 7 days, then twice a day, Normal       !! - Potential duplicate medications found  Please discuss with provider  No discharge procedures on file      ED Provider  Electronically Signed by           Berenice Ontiveros MD  08/27/19 8465

## 2019-08-27 NOTE — DISCHARGE INSTRUCTIONS
Call your GI doctor in the morning, you should be seen in the office as soon as they can schedule an appointment  Take the Naprosyn twice daily for the next 5-10 days  Take the Bentyl as needed for additional pain

## 2019-08-27 NOTE — ED NOTES
Pt aware of necessary urine specimen  Unable to void at this time        Marvin Arizmendi  08/27/19 2437

## 2019-09-03 ENCOUNTER — TELEPHONE (OUTPATIENT)
Dept: NEUROLOGY | Facility: CLINIC | Age: 19
End: 2019-09-03

## 2019-09-03 DIAGNOSIS — G40.909 RECURRENT SEIZURES (HCC): Primary | ICD-10-CM

## 2019-09-03 NOTE — TELEPHONE ENCOUNTER
Spoke with patients mother, Ritika Castanon, and scheduled EMU admission for 9/18/19  Paperwork mailed home  Patient will call back if this needs to be rescheduled

## 2019-09-17 NOTE — TELEPHONE ENCOUNTER
Called [facility] provider services and spoke with Jessica to check on the authoization  Services approved for 9/18 admission  Approval# [id number]    Spoke with Apurva and confirmed EMU admission for tomorrow  Head is atraumatic. Head shape is symmetrical.

## 2019-09-18 ENCOUNTER — HOSPITAL ENCOUNTER (INPATIENT)
Facility: HOSPITAL | Age: 19
LOS: 2 days | Discharge: HOME | DRG: 756 | End: 2019-09-20
Attending: PSYCHIATRY & NEUROLOGY | Admitting: PSYCHIATRY & NEUROLOGY
Payer: COMMERCIAL

## 2019-09-18 ENCOUNTER — APPOINTMENT (INPATIENT)
Dept: NEUROLOGY | Facility: CLINIC | Age: 19
DRG: 756 | End: 2019-09-18
Payer: COMMERCIAL

## 2019-09-18 PROBLEM — Z72.0 TOBACCO ABUSE: Status: ACTIVE | Noted: 2019-09-18

## 2019-09-18 LAB
ALBUMIN SERPL BCP-MCNC: 3.5 G/DL (ref 3.5–5)
ALP SERPL-CCNC: 92 U/L (ref 46–116)
ALT SERPL W P-5'-P-CCNC: 28 U/L (ref 12–78)
ANION GAP SERPL CALCULATED.3IONS-SCNC: 5 MMOL/L (ref 4–13)
AST SERPL W P-5'-P-CCNC: 27 U/L (ref 5–45)
BASOPHILS # BLD AUTO: 0.05 THOUSANDS/ΜL (ref 0–0.1)
BASOPHILS NFR BLD AUTO: 1 % (ref 0–1)
BILIRUB SERPL-MCNC: 0.26 MG/DL (ref 0.2–1)
BUN SERPL-MCNC: 8 MG/DL (ref 5–25)
CALCIUM SERPL-MCNC: 8.7 MG/DL (ref 8.3–10.1)
CHLORIDE SERPL-SCNC: 108 MMOL/L (ref 100–108)
CO2 SERPL-SCNC: 25 MMOL/L (ref 21–32)
CREAT SERPL-MCNC: 0.59 MG/DL (ref 0.6–1.3)
EOSINOPHIL # BLD AUTO: 0.35 THOUSAND/ΜL (ref 0–0.61)
EOSINOPHIL NFR BLD AUTO: 4 % (ref 0–6)
ERYTHROCYTE [DISTWIDTH] IN BLOOD BY AUTOMATED COUNT: 14.8 % (ref 11.6–15.1)
GFR SERPL CREATININE-BSD FRML MDRD: 132 ML/MIN/1.73SQ M
GLUCOSE SERPL-MCNC: 80 MG/DL (ref 65–140)
HCT VFR BLD AUTO: 38.1 % (ref 34.8–46.1)
HGB BLD-MCNC: 12.7 G/DL (ref 11.5–15.4)
IMM GRANULOCYTES # BLD AUTO: 0.05 THOUSAND/UL (ref 0–0.2)
IMM GRANULOCYTES NFR BLD AUTO: 1 % (ref 0–2)
LYMPHOCYTES # BLD AUTO: 2.77 THOUSANDS/ΜL (ref 0.6–4.47)
LYMPHOCYTES NFR BLD AUTO: 29 % (ref 14–44)
MCH RBC QN AUTO: 28.2 PG (ref 26.8–34.3)
MCHC RBC AUTO-ENTMCNC: 33.3 G/DL (ref 31.4–37.4)
MCV RBC AUTO: 85 FL (ref 82–98)
MONOCYTES # BLD AUTO: 0.77 THOUSAND/ΜL (ref 0.17–1.22)
MONOCYTES NFR BLD AUTO: 8 % (ref 4–12)
NEUTROPHILS # BLD AUTO: 5.62 THOUSANDS/ΜL (ref 1.85–7.62)
NEUTS SEG NFR BLD AUTO: 57 % (ref 43–75)
NRBC BLD AUTO-RTO: 0 /100 WBCS
PLATELET # BLD AUTO: 260 THOUSANDS/UL (ref 149–390)
PMV BLD AUTO: 10 FL (ref 8.9–12.7)
POTASSIUM SERPL-SCNC: 3.6 MMOL/L (ref 3.5–5.3)
PROT SERPL-MCNC: 6.8 G/DL (ref 6.4–8.2)
RBC # BLD AUTO: 4.51 MILLION/UL (ref 3.81–5.12)
SODIUM SERPL-SCNC: 138 MMOL/L (ref 136–145)
VALPROATE SERPL-MCNC: 15 UG/ML (ref 50–100)
WBC # BLD AUTO: 9.61 THOUSAND/UL (ref 4.31–10.16)

## 2019-09-18 PROCEDURE — 99255 IP/OBS CONSLTJ NEW/EST HI 80: CPT | Performed by: PSYCHIATRY & NEUROLOGY

## 2019-09-18 PROCEDURE — 94760 N-INVAS EAR/PLS OXIMETRY 1: CPT

## 2019-09-18 PROCEDURE — 80053 COMPREHEN METABOLIC PANEL: CPT | Performed by: PSYCHIATRY & NEUROLOGY

## 2019-09-18 PROCEDURE — 95951 HB EEG MONITORING/VIDEORECORD: CPT

## 2019-09-18 PROCEDURE — 80164 ASSAY DIPROPYLACETIC ACD TOT: CPT | Performed by: PSYCHIATRY & NEUROLOGY

## 2019-09-18 PROCEDURE — 85025 COMPLETE CBC W/AUTO DIFF WBC: CPT | Performed by: PSYCHIATRY & NEUROLOGY

## 2019-09-18 RX ORDER — ONDANSETRON 4 MG/1
4 TABLET, ORALLY DISINTEGRATING ORAL EVERY 6 HOURS PRN
Status: DISCONTINUED | OUTPATIENT
Start: 2019-09-18 | End: 2019-09-20 | Stop reason: HOSPADM

## 2019-09-18 RX ORDER — VENLAFAXINE HYDROCHLORIDE 37.5 MG/1
75 CAPSULE, EXTENDED RELEASE ORAL EVERY 12 HOURS
Status: DISCONTINUED | OUTPATIENT
Start: 2019-09-18 | End: 2019-09-20 | Stop reason: HOSPADM

## 2019-09-18 RX ORDER — ACETAMINOPHEN 325 MG/1
650 TABLET ORAL EVERY 6 HOURS PRN
Status: DISCONTINUED | OUTPATIENT
Start: 2019-09-18 | End: 2019-09-20 | Stop reason: HOSPADM

## 2019-09-18 RX ORDER — DIPHENHYDRAMINE HCL 25 MG
25 TABLET ORAL EVERY 6 HOURS PRN
Status: DISCONTINUED | OUTPATIENT
Start: 2019-09-18 | End: 2019-09-20 | Stop reason: HOSPADM

## 2019-09-18 RX ORDER — LORAZEPAM 2 MG/ML
2 INJECTION INTRAMUSCULAR EVERY 8 HOURS PRN
Status: DISCONTINUED | OUTPATIENT
Start: 2019-09-18 | End: 2019-09-20 | Stop reason: HOSPADM

## 2019-09-18 RX ORDER — PANTOPRAZOLE SODIUM 40 MG/1
40 TABLET, DELAYED RELEASE ORAL
Status: DISCONTINUED | OUTPATIENT
Start: 2019-09-19 | End: 2019-09-20 | Stop reason: HOSPADM

## 2019-09-18 RX ORDER — NICOTINE 21 MG/24HR
14 PATCH, TRANSDERMAL 24 HOURS TRANSDERMAL DAILY
Status: DISCONTINUED | OUTPATIENT
Start: 2019-09-18 | End: 2019-09-20 | Stop reason: HOSPADM

## 2019-09-18 RX ORDER — DIVALPROEX SODIUM 250 MG/1
250 TABLET, EXTENDED RELEASE ORAL EVERY 12 HOURS
Status: DISCONTINUED | OUTPATIENT
Start: 2019-09-18 | End: 2019-09-19

## 2019-09-18 RX ORDER — ALBUTEROL SULFATE 2.5 MG/3ML
2.5 SOLUTION RESPIRATORY (INHALATION) EVERY 6 HOURS PRN
Status: DISCONTINUED | OUTPATIENT
Start: 2019-09-18 | End: 2019-09-20 | Stop reason: HOSPADM

## 2019-09-18 RX ADMIN — VENLAFAXINE HYDROCHLORIDE 75 MG: 37.5 CAPSULE, EXTENDED RELEASE ORAL at 21:02

## 2019-09-18 RX ADMIN — ACETAMINOPHEN 650 MG: 325 TABLET ORAL at 21:02

## 2019-09-18 RX ADMIN — DIVALPROEX SODIUM 250 MG: 250 TABLET, EXTENDED RELEASE ORAL at 21:02

## 2019-09-18 RX ADMIN — NICOTINE 14 MG: 14 PATCH TRANSDERMAL at 17:38

## 2019-09-18 RX ADMIN — ONDANSETRON 4 MG: 4 TABLET, ORALLY DISINTEGRATING ORAL at 17:31

## 2019-09-18 NOTE — H&P
Epilepsy Attending Admission H&P  Apurva Rosenberg 20 y o  female   : 2000  MRN: [id number]     Unit/Bed#: [facility] 718-01    Encounter: [id number]     -------------------------------------------------------------------------------------------------------------------                Outpatient Neurologist:  Dr Saray Farrar                  Primary Care Physician:  [address] [place] [address]      CC:  Spells concerning for seizures  -------------------------------------------------------------------------------------------------------------------  HPI:    Ramon White is a 20 y o  right handed female with schizoaffective disorder, bipolar disorder, hyperlipidemia, GERD, asthma and spells concerning for seizures who is admitted to the Epilepsy Monitoring Unit for evaluation of evaluation of her spells concerning for seizures  Briefly reviewing her history, she had her first event concerning for a seizure about 1 year ago  She reports that her initial events occurred out of sleep, where she would become stiff all over and shake, but she then started to have episodes during the day as well  She was continuing to have large episodes where she would lose consciousness, but also has been having episodes of staring off and looking "like a zombie" since that time  These have been very frequent at times, up multiple times a day  She recalls initially being treated with Levetiracetam for these episodes  This did not stop the events and she was also having difficulty with irritability  She was recently seen by Dr Saray Farrar, and her Levetiracetam was changed to Divalproex  She has tolerated this change, with some improvement in her mood, but she has continued to have spells thus she was admitted to the EMU for spell characterization  She has a very extensive history of psychiatric disease, reportedly going back to when she was 1years old  She has had multiple hospitalizations and reports attempting to commit suicide 6 times  She has had auditory and visual hallucinations at times  She was following with psychiatry previously, but has not followed with a mental health provider for about 1 5-2 years  Event/Seizure semiology:  1   "grand mal" events  She will occasionally get a warning of an intense pain in her face (right side) followed by loss of consciousness  Although Apurva does not know what occurs, he girlfriend states she shakes all over, "like a fish"  This will last for several minutes  At their worst, these were occurring about 3 times a day, but currently occur once every few days  2  Staring spells  She will have a change of expression, looking like a "zombie" or as if she was daydreaming  She will not respond during this time, lasting up to about 10 min  This can happen multiple times a day  3  Spells of tremors  She will occasionally have uncontrollable shaking of one of her limbs  This seems to vary from which limb is affected  This happens once a week or so  Special Features  Status epilepticus: no  Self Injury Seizures: no  Precipitating Factors: stress    Epilepsy Risk Factors:  Uncomplicated pregnancy with normal development  No learning disabilities or cognitive delay  No h/o febrile seizures, CNS infections, strokes, or CNS neoplasms  There is no family history of seizures or epilepsy  Prior AEDs:  Valproate (for headaches, weight gain), Topiramate (for headaches, ineffective for HA) Gabapentin (side effects )     Prior Evaluation:  - 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  - Routine EE2019: normal  - Video EEG: none    Psychiatric History:  Depression: yes  Anxiety: yes  Psychosis: yes  Psychiatric Admissions: yes, multiple, as above  Her history was also obtained from her girlfriend, who was present at today's visit  I reviewed prior notes including prior neurology notes, as documented in Epic/VelociData, and summarized above  -------------------------------------------------------------------------------------------------------------------  Current Medications:  No current facility-administered medications on file prior to encounter        Current Outpatient Medications on File Prior to Encounter   Medication Sig Dispense Refill    dicyclomine (BENTYL) 20 mg tablet Take 1 tablet (20 mg total) by mouth every 6 (six) hours as needed (abdominal pain) 120 tablet 2    divalproex sodium (DEPAKOTE ER) 500 mg 24 hr tablet Take one tab at bedtime for 7 days, then twice a day (Patient taking differently: every 12 (twelve) hours Take one tab at bedtime for 7 days, then twice a day) 30 tablet 5    gabapentin (NEURONTIN) 100 mg capsule Take 1 capsule (100 mg total) by mouth 3 (three) times a day as needed (headache or aniexty) 90 capsule 3    magnesium oxide (MAG-OX) 400 mg Take 1 tablet (400 mg total) by mouth daily 90 tablet 3    omeprazole (PriLOSEC) 40 MG capsule Take 1 capsule (40 mg total) by mouth daily 30 capsule 1    venlafaxine (EFFEXOR-XR) 75 mg 24 hr capsule Take 1 capsule (75 mg total) by mouth daily For 3 months and then if ineffective increase to 150 mg (Patient taking differently: Take 75 mg by mouth every 12 (twelve) hours 75mg in AM and 75mg at HS) 90 capsule 2    albuterol (2 5 mg/3 mL) 0 083 % nebulizer solution Take 1 vial (2 5 mg total) by nebulization every 6 (six) hours as needed for wheezing or shortness of breath 75 mL 0    dicyclomine (BENTYL) 20 mg tablet Take 1 tablet (20 mg total) by mouth 2 (two) times a day (Patient not taking: Reported on 9/18/2019) 20 tablet 0    metoclopramide (REGLAN) 10 mg tablet Take 1 tablet (10 mg total) by mouth every 6 (six) hours (Patient not taking: Reported on 9/18/2019) 30 tablet 0    naproxen (NAPROSYN) 500 mg tablet Take 1 tablet (500 mg total) by mouth 2 (two) times a day with meals for 10 days 20 tablet 0 ------------------------------------------------------------------------------------------------------------------  Past Medical / Surgical History/Social History/Family History:    Past Medical History:   Diagnosis Date    Anxiety     Asthma     Bipolar disorder with psychotic features ([place] [address] )     Lymphadenitis     Last assessed 12/30/14    Lymphadenopathy     Last assessed 10/16/13    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[place] [address] )     Psychiatric disorder     Psychiatric illness     Psychosis ([place] [address] )     Seizures ([place] [address] )      Past Surgical History:   Procedure Laterality Date    COLONOSCOPY N/A 5/3/2019    Procedure: COLONOSCOPY;  Surgeon: Liliana Haddad MD;  Location: [facility]; Service: Gastroenterology    ESOPHAGOGASTRODUODENOSCOPY N/A 5/3/2019    Procedure: ESOPHAGOGASTRODUODENOSCOPY (EGD); Surgeon: Liliana Haddad MD;  Location: [facility];   Service: Gastroenterology     Social History     Socioeconomic History    Marital status: Single     Spouse name: Not on file    Number of children: Not on file    Years of education: Not on file    Highest education level: Not on file   Occupational History    Not on file   Social Needs    Financial resource strain: Not on file    Food insecurity:     Worry: Not on file     Inability: Not on file    Transportation needs:     Medical: Not on file     Non-medical: Not on file   Tobacco Use    Smoking status: Current Every Day Smoker     Packs/day: 1 00     Types: Cigarettes    Smokeless tobacco: Never Used   Substance and Sexual Activity    Alcohol use: Yes     Comment: about once a year    Drug use: Yes     Types: Marijuana    Sexual activity: Yes     Partners: Female   Lifestyle    Physical activity:     Days per week: Not on file     Minutes per session: Not on file    Stress: Not on file   Relationships    Social connections:     Talks on phone: Not on file     Gets together: Not on file     Attends Yazidism service: Not on file     Active member of club or organization: Not on file     Attends meetings of clubs or organizations: Not on file     Relationship status: Not on file    Intimate partner violence:     Fear of current or ex partner: Not on file     Emotionally abused: Not on file     Physically abused: Not on file     Forced sexual activity: Not on file   Other Topics Concern    Not on file   Social History Narrative    L[facility] with her girlfriend, mother, siblings, step-father     Family History   Problem Relation Age of Onset    Migraines Mother     Other Mother         Neck pain    Drug abuse Father     Febrile seizures Maternal Uncle        Allergies: Allergies   Allergen Reactions    Penicillins           ------------------------------------------------------------------------------------------------------------------  ROS:  A 12 system review of system was obtained and otherwise negative except as per HPI     ------------------------------------------------------------------------------------------------------------------  VITALS:  Blood pressure 106/60, pulse 74, temperature 98 °F (36 7 °C), resp  rate 16, height 5' 3" (1 6 m), weight 67 4 kg (148 lb 9 4 oz), last menstrual period 08/27/2019, SpO2 98 %, not currently breastfeeding  GENERAL EXAMINATION:   In general patient is well appearing and in no distress  There is no peripheral edema or rash  NEUROLOGIC EXAMINATION:     Alert and oriented to person, date, location  Fund of knowledge is full with good understanding of medical situation  Mood and affect are appropriate  Attention is intact  Language function including fluency, naming, and comprehension intact  Cranial nerves: Pupils are equal round reactive to light and accommodation  Visual Fields are full to confrontation bilaterally  Extraocular movements are intact without nystagmus  Facial sensation is intact to light touch  No facial droop, face activates symmetrically  There is no dysarthria  Hearing was intact to finger rub  Tongue and uvula are midline and palate elevates symmetrically  Shoulder shrug  5/5  Motor Exam:  No pronator drift  Bulk and tone are normal  Strength is 5/5 throughout  Deep tendon reflexes: Biceps 2+, triceps 2+, brachioradialis 2+, patellar 2+, Achilles 2+ bilaterally  Kingsley's     Sensation: Intact light touch    Coordination: Finger nose finger and heel to shin testing are without dysmetria  Gait: Negative romberg  Normal casual gait       ------------------------------------------------------------------------------------------------------------------  Labs:  Recent Labs     09/18/19  1519   WBC 9 61   HGB 12 7   HCT 38 1        Recent Labs     09/18/19  1519   K 3 6      CO2 25   BUN 8   CREATININE 0 59*   CALCIUM 8 7     Recent Labs     09/18/19  1519   ALB 3 5   ALKPHOS 92   ALT 28   AST 27       VALPROIC ACID TOTAL   Date Value Ref Range Status   11/11/2015 <10 (L) 50 - 100 mcg/mL Final     Comment:     The above 1 analytes were performed by [address] [address]       Valproic Acid, Total   Date Value Ref Range Status   09/18/2019 15 (L) 50 - 100 ug/mL Final   08/04/2016 59 50 - 100 ug/mL Final                        -------------------------------------------------------------------------------------------------------------------  Impression and Plan:  Thaddeus De Dios is a 20 y o  woman with schizoaffective disorder, bipolar disorder, hyperlipidemia, GERD, asthma and spells concerning for seizures who is admitted to the Epilepsy Monitoring Unit for evaluation of evaluation of her spells concerning for seizures  Neurologic examination was normal      Overall, based on he extensive psychologic history and the description of her episodes that she is shaking "like a fish" with poor response to seizures medications, 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   That being said, it is impossible to fully exclude epileptic seizures without capturing an event on EEG  I discussed the risks and rationale for EMU admission, including risks of inducing events, and safety measures in the EMU  1)  Spells/Seizures:   1) Will start continuous video EEG monitoring to capture and characterize events  2) Medications: Will decrease Depakote to 250 mg twice a day starting tonight  3) Additional diagnostic tests:  None today, will consider sleep deprivation  4) Seizure/fall/status epilepticus precautions  5) Will order Ativan 2 mg as needed for more than two complex partial seizures or 1 Generalized tonic clonic seizure in a 24 hour period  6) Check labs with AED drug levels, CBC and CMP    2)  Co morbidities:   1) Bipolar disorder and schizoaffective disorder  She will continue her home aldrich of Gabapenin  Will need to establish care with a psychiatrist as an outpatient  2) Asthma  Will continue albuterol PRN  3) GERD   Continue PPI daily  4) Tobacco abuse: will give nicoderm patch since she cannot smoke while in the hospital      3) DVT prophylaxis: SCDs while in bed      Code status: Full code    -------------------------------------------------------------------------------------------------------------------    Katherine Weiner MD             Date: 9/18/2019     Time: 5:54 PM  [address] [facility]

## 2019-09-18 NOTE — RESPIRATORY THERAPY NOTE
RT Protocol Note  Eleazar Hensley 20 y o  female MRN: [id number]  Unit/Bed#: [facility] 718-01 Encounter: [id number]    Assessment    Principal Problem:    Recurrent seizures ([place] [address] )  Active Problems:    Acid reflux    Bipolar disorder ([place] [address] )      Home Pulmonary Medications:  Albuterol 2 5mg PRN        Past Medical History:   Diagnosis Date    Anxiety     Asthma     Bipolar disorder with psychotic features ([place] [address] )     Lymphadenitis     Last assessed 12/30/14    Lymphadenopathy     Last assessed 10/16/13    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[place] [address] )     Psychiatric disorder     Psychiatric illness     Psychosis ([place] [address] )     Seizures ([place] [address] )      Social History     Socioeconomic History    Marital status: Single     Spouse name: None    Number of children: None    Years of education: None    Highest education level: None   Occupational History    None   Social Needs    Financial resource strain: None    Food insecurity:     Worry: None     Inability: None    Transportation needs:     Medical: None     Non-medical: None   Tobacco Use    Smoking status: Current Every Day Smoker     Packs/day: 1 00     Types: Cigarettes    Smokeless tobacco: Never Used   Substance and Sexual Activity    Alcohol use: Yes     Comment: about once a year    Drug use: Yes     Types: Marijuana    Sexual activity: Yes     Partners: Female   Lifestyle    Physical activity:     Days per week: None     Minutes per session: None    Stress: None   Relationships    Social connections:     Talks on phone: None     Gets together: None     Attends Mandaeism service: None     Active member of club or organization: None     Attends meetings of clubs or organizations: None     Relationship status: None    Intimate partner violence:     Fear of current or ex partner: None     Emotionally abused: None     Physically abused: None     Forced sexual activity: None   Other Topics Concern    None   Social History Narrative    Lives in [place] with her girlfriend, mother, siblings, step-father       Subjective         Objective    Physical Exam:   Assessment Type: Assess only  General Appearance: Awake, Alert  Respiratory Pattern: Normal  Chest Assessment: Chest expansion symmetrical  Bilateral Breath Sounds: Diminished, Clear  Cough: None  O2 Device: RA    Vitals:  Blood pressure 114/70, pulse 86, temperature 97 9 °F (36 6 °C), resp  rate 16, height 5' 3" (1 6 m), weight 67 4 kg (148 lb 9 4 oz), last menstrual period 08/27/2019, SpO2 99 %, not currently breastfeeding  Imaging and other studies: I have personally reviewed pertinent reports  O2 Device: RA     Plan    Respiratory Plan: Home Bronchodilator Patient pathway        Resp Comments: 19 y/o female admited s/p seizures, has a PMHx of Asthma and takes albuterol 2 5mg udn prn at home  At this time BS are clear bilateral and pt denies dyspnea  Will continue with home therapy

## 2019-09-18 NOTE — SOCIAL WORK
Cm met with pt to review the role of CM, Pt mother Serafin Cruz [phone], grandmother and girlfriend was at bed side  Pt lives with mother in 3 story home, 9 steps leading to the house, and 20 steps to bedroom and bathroom  Pt reported being independent with ADLS PTA  Pt reported inpatient at [facility] at age 11  Pt denied any inpatient PT/OT, substance abuse or [place] [address] services  Pt PCP is affiliated with [facility] and preferred pharmacy is [facility]  Pt has no DME  Pt reported unemployed and don't drives  CM reviewed d/c planning process including the following: identifying help at home, patient preference for d/c planning needs, Discharge Lounge, [facility] Meds to Bed program, availability of treatment team to discuss questions or concerns patient and/or family may have regarding understanding medications and recognizing signs and symptoms once discharged  CM also encouraged patient to follow up with all recommended appointments after discharge  Patient advised of importance for patient and family to participate in managing patients medical well being

## 2019-09-18 NOTE — PLAN OF CARE
Problem: PAIN - ADULT  Goal: Verbalizes/displays adequate comfort level or baseline comfort level  Description  Interventions:  - Encourage patient to monitor pain and request assistance  - Assess pain using appropriate pain scale  - Administer analgesics based on type and severity of pain and evaluate response  - Implement non-pharmacological measures as appropriate and evaluate response  - Consider cultural and social influences on pain and pain management  - Notify physician/advanced practitioner if interventions unsuccessful or patient reports new pain  Outcome: Progressing     Problem: SAFETY ADULT  Goal: Patient will remain free of falls  Description  INTERVENTIONS:  - Assess patient frequently for physical needs  -  Identify cognitive and physical deficits and behaviors that affect risk of falls    -  [place] fall precautions as indicated by assessment   - Educate patient/family on patient safety including physical limitations  - Instruct patient to call for assistance with activity based on assessment  - Modify environment to reduce risk of injury  - Consider OT/PT consult to assist with strengthening/mobility  Outcome: Progressing  Goal: Maintain or return to baseline ADL function  Description  INTERVENTIONS:  -  Assess patient's ability to carry out ADLs; assess patient's baseline for ADL function and identify physical deficits which impact ability to perform ADLs (bathing, care of mouth/teeth, toileting, grooming, dressing, etc )  - Assess/evaluate cause of self-care deficits   - Assess range of motion  - Assess patient's mobility; develop plan if impaired  - Assess patient's need for assistive devices and provide as appropriate  - Encourage maximum independence but intervene and supervise when necessary  - Involve family in performance of ADLs  - Assess for home care needs following discharge   - Consider OT consult to assist with ADL evaluation and planning for discharge  - Provide patient education as appropriate  Outcome: Progressing  Goal: Maintain or return mobility status to optimal level  Description  INTERVENTIONS:  - Assess patient's baseline mobility status (ambulation, transfers, stairs, etc )    - Identify cognitive and physical deficits and behaviors that affect mobility  - Identify mobility aids required to assist with transfers and/or ambulation (gait belt, sit-to-stand, lift, walker, cane, etc )  - [place] fall precautions as indicated by assessment  - Record patient progress and toleration of activity level on Mobility SBAR; progress patient to next Phase/Stage  - Instruct patient to call for assistance with activity based on assessment  - Consider rehabilitation consult to assist with strengthening/weightbearing, etc   Outcome: Progressing     Problem: DISCHARGE PLANNING  Goal: Discharge to home or other facility with appropriate resources  Description  INTERVENTIONS:  - Identify barriers to discharge w/patient and caregiver  - Arrange for needed discharge resources and transportation as appropriate  - Identify discharge learning needs (meds, wound care, etc )  - Arrange for interpretive services to assist at discharge as needed  - Refer to Case Management Department for coordinating discharge planning if the patient needs post-hospital services based on physician/advanced practitioner order or complex needs related to functional status, cognitive ability, or social support system  Outcome: Progressing     Problem: Knowledge Deficit  Goal: Patient/family/caregiver demonstrates understanding of disease process, treatment plan, medications, and discharge instructions  Description  Complete learning assessment and assess knowledge base    Interventions:  - Provide teaching at level of understanding  - Provide teaching via preferred learning methods  Outcome: Progressing     Problem: NEUROSENSORY - ADULT  Goal: Achieves stable or improved neurological status  Description  INTERVENTIONS  - Monitor and report changes in neurological status  - Monitor vital signs such as temperature, blood pressure, glucose, and any other labs ordered   - Initiate measures to prevent increased intracranial pressure  - Monitor for seizure activity and implement precautions if appropriate      Outcome: Progressing  Goal: Remains free of injury related to seizures activity  Description  INTERVENTIONS  - Maintain airway, patient safety  and administer oxygen as ordered  - Monitor patient for seizure activity, document and report duration and description of seizure to physician/advanced practitioner  - If seizure occurs,  ensure patient safety during seizure  - Reorient patient post seizure  - Seizure pads on all 4 side rails  - Instruct patient/family to notify RN of any seizure activity including if an aura is experienced  - Instruct patient/family to call for assistance with activity based on nursing assessment  - Administer anti-seizure medications if ordered    Outcome: Progressing  Goal: Achieves maximal functionality and self care  Description  INTERVENTIONS  - Monitor swallowing and airway patency with patient fatigue and changes in neurological status  - Encourage and assist patient to increase activity and self care     - Encourage visually impaired, hearing impaired and aphasic patients to use assistive/communication devices  Outcome: Progressing

## 2019-09-19 ENCOUNTER — APPOINTMENT (INPATIENT)
Dept: NEUROLOGY | Facility: CLINIC | Age: 19
DRG: 756 | End: 2019-09-19
Payer: COMMERCIAL

## 2019-09-19 VITALS
DIASTOLIC BLOOD PRESSURE: 67 MMHG | OXYGEN SATURATION: 99 % | HEIGHT: 63 IN | HEART RATE: 67 BPM | RESPIRATION RATE: 16 BRPM | TEMPERATURE: 98 F | SYSTOLIC BLOOD PRESSURE: 112 MMHG | BODY MASS INDEX: 26.33 KG/M2 | WEIGHT: 148.59 LBS

## 2019-09-19 PROCEDURE — 99232 SBSQ HOSP IP/OBS MODERATE 35: CPT | Performed by: PSYCHIATRY & NEUROLOGY

## 2019-09-19 PROCEDURE — 95951 HB EEG MONITORING/VIDEORECORD: CPT

## 2019-09-19 PROCEDURE — 95951 PR EEG MONITORING/VIDEORECORD: CPT | Performed by: PSYCHIATRY & NEUROLOGY

## 2019-09-19 RX ORDER — DIVALPROEX SODIUM 500 MG/1
500 TABLET, EXTENDED RELEASE ORAL EVERY 12 HOURS
Status: DISCONTINUED | OUTPATIENT
Start: 2019-09-19 | End: 2019-09-20 | Stop reason: HOSPADM

## 2019-09-19 RX ADMIN — VENLAFAXINE HYDROCHLORIDE 75 MG: 37.5 CAPSULE, EXTENDED RELEASE ORAL at 07:56

## 2019-09-19 RX ADMIN — DIVALPROEX SODIUM 500 MG: 500 TABLET, FILM COATED, EXTENDED RELEASE ORAL at 21:58

## 2019-09-19 RX ADMIN — NICOTINE 14 MG: 14 PATCH TRANSDERMAL at 17:22

## 2019-09-19 RX ADMIN — Medication 400 MG: at 07:56

## 2019-09-19 RX ADMIN — DIVALPROEX SODIUM 250 MG: 250 TABLET, EXTENDED RELEASE ORAL at 07:56

## 2019-09-19 RX ADMIN — PANTOPRAZOLE SODIUM 40 MG: 40 TABLET, DELAYED RELEASE ORAL at 06:15

## 2019-09-19 RX ADMIN — VENLAFAXINE HYDROCHLORIDE 75 MG: 37.5 CAPSULE, EXTENDED RELEASE ORAL at 21:58

## 2019-09-19 NOTE — UTILIZATION REVIEW
Initial Clinical Review    Admission: Date/Time/Statement: Inpatient Admission Orders (From admission, onward)     Ordered        09/18/19 1338  Inpatient Admission  Once                   Orders Placed This Encounter   Procedures    Inpatient Admission     Standing Status:   Standing     Number of Occurrences:   1     Order Specific Question:   Admitting Physician     Answer:   Leonid Cordova [[id number]]     Order Specific Question:   Level of Care     Answer:   Med Surg [16]     Order Specific Question:   Bed Type     Answer:   EMU [8]     Order Specific Question:   Estimated length of stay     Answer:   More than 2 Midnights     Order Specific Question:   Certification     Answer:   I certify that inpatient services are medically necessary for this patient for a duration of greater than two midnights  See H&P and MD Progress Notes for additional information about the patient's course of treatment  Assessment/Plan:     Elective admission for 24 hour continuous video eeg monitoring     20 y o  woman with schizoaffective disorder, bipolar disorder, hyperlipidemia, GERD, asthma and spells concerning for seizures who is admitted to the Epilepsy Monitoring Unit for evaluation of evaluation of her spells concerning for seizures  Neurologic examination was normal     Overall, based on he extensive psychologic history and the description of her episodes that she is shaking "like a fish" with poor response to seizures medications, 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  That being said, it is impossible to fully exclude epileptic seizures without capturing an event on EEG      Spells/Seizures:   1) Will start continuous video EEG monitoring to capture and characterize events  2) Medications: Will decrease Depakote to 250 mg twice a day starting tonight  3) Additional diagnostic tests:  None today, will consider sleep deprivation     4) Seizure/fall/status epilepticus precautions  5) Will order Ativan 2 mg as needed for more than two complex partial seizures or 1 Generalized tonic clonic seizure in a 24 hour period     6) Check labs with AED drug levels, CBC and CMP    Arrival    09/18/19 0918 09/18/19 0918 09/18/19 0918 09/18/19 0918 09/18/19 0918   97 9 °F (36 6 °C) 90 16 114/70 99 %      No Pain       09/18/19 67 4 kg (148 lb 9 4 oz)     Additional Vital Signs:     Date/Time  Temp  Pulse  Resp  BP  MAP (mmHg)  SpO2  O2 Device   09/19/19 15:15:10  98 4 °F (36 9 °C)  79  20  93/62  72  99 %  --   09/19/19 08:12:09  98 1 °F (36 7 °C)  94  16  107/68  81  100 %  --   09/18/19 22:28:59  98 2 °F (36 8 °C)  84  22  99/66  77  99 %  --   09/18/19 16:45:16  --  74  --  106/60  75  98 %  --   09/18/19 15:16:10  98 °F (36 7 °C)  80  --  105/61  76  99 %  --   09/18/19 1430  --  --  --  --  --  100 %  None (Room air)         Pertinent Labs/Diagnostic Test Results:     Valproic acid level [[id number]] (Abnormal)    Collected: 09/18/19 1519    Updated: 09/18/19 1557    Lab Status: Final result    Specimen Type: Blood    Specimen Source: Arm, Right     Valproic Acid, Total 15Low             Results from last 7 days   Lab Units 09/18/19  1519   WBC Thousand/uL 9 61   HEMOGLOBIN g/dL 12 7   HEMATOCRIT % 38 1   PLATELETS Thousands/uL 260   NEUTROS ABS Thousands/µL 5 62         Results from last 7 days   Lab Units 09/18/19  1519   SODIUM mmol/L 138   POTASSIUM mmol/L 3 6   CHLORIDE mmol/L 108   CO2 mmol/L 25   ANION GAP mmol/L 5   BUN mg/dL 8   CREATININE mg/dL 0 59*   EGFR ml/min/1 73sq m 132   CALCIUM mg/dL 8 7     Results from last 7 days   Lab Units 09/18/19  1519   AST U/L 27   ALT U/L 28   ALK PHOS U/L 92   TOTAL PROTEIN g/dL 6 8   ALBUMIN g/dL 3 5   TOTAL BILIRUBIN mg/dL 0 26         Results from last 7 days   Lab Units 09/18/19  1519   GLUCOSE RANDOM mg/dL 80         Past Medical History:   Diagnosis    Anxiety    Asthma    Bipolar disorder with psychotic features [facility] [address] [facility]    Lymphadenitis    Last assessed 12/30/14    Lymphadenopathy    Last assessed 10/16/13    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[facility] )    Psychiatric disorder    Psychiatric illness    Psychosis ([facility] )    Seizures ([facility] )     Present on Admission:   Recurrent seizures ([facility] )   Bipolar disorder ([facility] )   Acid reflux      Admitting Diagnosis: Recurrent seizures ([facility] ) [G40 909]     Age/Sex: 20 y o  female     Admission Orders:    Neuro checks q4 hr  Sequential compression device  Seizure precautions  24 hour video eeg monitoring  Valproic acid level     acetaminophen 650 mg Oral Q6H PRN   albuterol 2 5 mg Nebulization Q6H PRN   diphenhydrAMINE 25 mg Oral Q6H PRN   divalproex sodium 250 mg Oral Q12H   LORazepam 2 mg Intravenous Q8H PRN   magnesium oxide 400 mg Oral Daily   nicotine 14 mg Transdermal Daily   ondansetron 4 mg Oral Q6H PRN   pantoprazole 40 mg Oral Early Morning   venlafaxine 75 mg Oral Q12H       None    Network Utilization Review Department  Phone: [phone]; Fax [phone]  [email or web address]  ATTENTION: Please call with any questions or concerns to [phone]  and carefully listen to the prompts so that you are directed to the right person  Send all requests for admission clinical reviews, approved or denied determinations and any other requests to fax [phone]   All voicemails are confidential

## 2019-09-19 NOTE — PROGRESS NOTES
U Daily Progress Note   Apurva Walden 20 y o  female   : 2000  MRN: [id number]     Unit/Bed#: [facility] 718-01    Encounter: [id number]  -------------------------------------------------------------------------------------------------------------------  Interval History:  She had two episodes of whole body shaking and unresponsiveness yesterday  She felt rather nauseous after the first event  She reports having staring spell yesterday, but did not push the button and this was not able to be identified  She otherwise feels good this morning and denies any complaints today      -------------------------------------------------------------------------------------------------------------------  Past Medical history, surgical history, family history, and social history are unchanged from admission H&P     -------------------------------------------------------------------------------------------------------------------  Allergies: Allergies   Allergen Reactions    Penicillins       Scheduled Meds:   Current Facility-Administered Medications:  acetaminophen 650 mg Oral Q6H PRN Reyes Lee MD   albuterol 2 5 mg Nebulization Q6H PRN Reyes Castorenaosta, MD   diphenhydrAMINE 25 mg Oral Q6H PRN Reyes Castorenaosta, MD   divalproex sodium 250 mg Oral Q12H Reyes Rosa, MD   LORazepam 2 mg Intravenous Q8H PRN Reyes Castorenaosta, MD   magnesium oxide 400 mg Oral Daily Reyes Rosa, MD   nicotine 14 mg Transdermal Daily Reyes Castorenaosta, MD   ondansetron 4 mg Oral Q6H PRN Reyes Castorenaosta, MD   pantoprazole 40 mg Oral Early Morning Reyes Castorenaosta, MD   venlafaxine 75 mg Oral Q12H Reyes Lee, MD     PRN Meds:   acetaminophen    albuterol    diphenhydrAMINE    LORazepam    ondansetron  -------------------------------------------------------------------------------------------------------------------  Physical Exam:  Vitals: Blood pressure 93/62, pulse 79, temperature 98 4 °F (36 9 °C), resp   rate 20, height 5' 3" (1 6 m), weight 67 4 kg (148 lb 9 4 oz), last menstrual period 08/27/2019, SpO2 99 %, not currently breastfeeding  Deferred today      -------------------------------------------------------------------------------------------------------------------  LAB & TEST RESULTS:  See EEG report    -------------------------------------------------------------------------------------------------------------------  Assessment/Plan:  Nira Skiff is a 20 y o  woman with schizoaffective disorder, bipolar disorder, hyperlipidemia, GERD, asthma and spells concerning for seizures who is admitted to the Epilepsy Monitoring Unit for evaluation of evaluation of her spells concerning for seizures  Neurologic examination was normal       She did have two non-epileptic psychogenic spells yesterday on EEG of whole body shaking and unresponsiveness  Although she reported an episode of staring, she didn't push the event button, and this was not able to be identified while reviewing video around the time she reported the event  Although her episodes of unresponsiveness and whole body shaking are non-epileptic psychogenic spells, we have not yet captured a typical staring spell, so I cannot fully characterize her spells of staring  We will continue to monitor today to capture additional spells       1)  Spells/Seizures:   1) Will continue video EEG monitoring to capture and characterize events  2) Medications: Will continue lowered dose of Depakote, 250 mg twice a day   3) Additional diagnostic tests:  None today  4) Seizure/fall/status epilepticus precautions  5) Will order Ativan 2 mg as needed for more than two complex partial seizures or 1 Generalized tonic clonic seizure in a 24 hour period  2)  Co morbidities:   1) Bipolar disorder and schizoaffective disorder  She will continue her home aldrich of Gab[place]enin  Will need to establish care with a psychiatrist as an outpatient  2) Asthma  Will continue albuterol PRN  3) GERD   Continue PPI daily  4) Tobacco abuse: will give nicoderm patch since she cannot smoke while in the hospital        3) DVT prophylaxis: SCDs while in bed    I spent 25 minutes with the patient and coordinating her care, specifically discussing her events and plan with the patient and the remainder of the EMU team      -------------------------------------------------------------------------------------------------------------------  Baljinder Levy MD        Date: 9/19/2019     Time: 3:26 PM   [address] [facility] [place]

## 2019-09-19 NOTE — PLAN OF CARE
Problem: PAIN - ADULT  Goal: Verbalizes/displays adequate comfort level or baseline comfort level  Description  Interventions:  - Encourage patient to monitor pain and request assistance  - Assess pain using appropriate pain scale  - Administer analgesics based on type and severity of pain and evaluate response  - Implement non-pharmacological measures as appropriate and evaluate response  - Consider cultural and social influences on pain and pain management  - Notify physician/advanced practitioner if interventions unsuccessful or patient reports new pain  Outcome: Progressing     Problem: SAFETY ADULT  Goal: Patient will remain free of falls  Description  INTERVENTIONS:  - Assess patient frequently for physical needs  -  Identify cognitive and physical deficits and behaviors that affect risk of falls    -  [place] fall precautions as indicated by assessment   - Educate patient/family on patient safety including physical limitations  - Instruct patient to call for assistance with activity based on assessment  - Modify environment to reduce risk of injury  - Consider OT/PT consult to assist with strengthening/mobility  Outcome: Progressing  Goal: Maintain or return to baseline ADL function  Description  INTERVENTIONS:  -  Assess patient's ability to carry out ADLs; assess patient's baseline for ADL function and identify physical deficits which impact ability to perform ADLs (bathing, care of mouth/teeth, toileting, grooming, dressing, etc )  - Assess/evaluate cause of self-care deficits   - Assess range of motion  - Assess patient's mobility; develop plan if impaired  - Assess patient's need for assistive devices and provide as appropriate  - Encourage maximum independence but intervene and supervise when necessary  - Involve family in performance of ADLs  - Assess for home care needs following discharge   - Consider OT consult to assist with ADL evaluation and planning for discharge  - Provide patient education as appropriate  Outcome: Progressing  Goal: Maintain or return mobility status to optimal level  Description  INTERVENTIONS:  - Assess patient's baseline mobility status (ambulation, transfers, stairs, etc )    - Identify cognitive and physical deficits and behaviors that affect mobility  - Identify mobility aids required to assist with transfers and/or ambulation (gait belt, sit-to-stand, lift, walker, cane, etc )  - [place] fall precautions as indicated by assessment  - Record patient progress and toleration of activity level on Mobility SBAR; progress patient to next Phase/Stage  - Instruct patient to call for assistance with activity based on assessment  - Consider rehabilitation consult to assist with strengthening/weightbearing, etc   Outcome: Progressing     Problem: DISCHARGE PLANNING  Goal: Discharge to home or other facility with appropriate resources  Description  INTERVENTIONS:  - Identify barriers to discharge w/patient and caregiver  - Arrange for needed discharge resources and transportation as appropriate  - Identify discharge learning needs (meds, wound care, etc )  - Arrange for interpretive services to assist at discharge as needed  - Refer to Case Management Department for coordinating discharge planning if the patient needs post-hospital services based on physician/advanced practitioner order or complex needs related to functional status, cognitive ability, or social support system  Outcome: Progressing     Problem: Knowledge Deficit  Goal: Patient/family/caregiver demonstrates understanding of disease process, treatment plan, medications, and discharge instructions  Description  Complete learning assessment and assess knowledge base    Interventions:  - Provide teaching at level of understanding  - Provide teaching via preferred learning methods  Outcome: Progressing     Problem: NEUROSENSORY - ADULT  Goal: Achieves stable or improved neurological status  Description  INTERVENTIONS  - Monitor and report changes in neurological status  - Monitor vital signs such as temperature, blood pressure, glucose, and any other labs ordered   - Initiate measures to prevent increased intracranial pressure  - Monitor for seizure activity and implement precautions if appropriate      Outcome: Progressing  Goal: Remains free of injury related to seizures activity  Description  INTERVENTIONS  - Maintain airway, patient safety  and administer oxygen as ordered  - Monitor patient for seizure activity, document and report duration and description of seizure to physician/advanced practitioner  - If seizure occurs,  ensure patient safety during seizure  - Reorient patient post seizure  - Seizure pads on all 4 side rails  - Instruct patient/family to notify RN of any seizure activity including if an aura is experienced  - Instruct patient/family to call for assistance with activity based on nursing assessment  - Administer anti-seizure medications if ordered    Outcome: Progressing  Goal: Achieves maximal functionality and self care  Description  INTERVENTIONS  - Monitor swallowing and airway patency with patient fatigue and changes in neurological status  - Encourage and assist patient to increase activity and self care     - Encourage visually impaired, hearing impaired and aphasic patients to use assistive/communication devices  Outcome: Progressing

## 2019-09-20 PROBLEM — F44.5 PSYCHOGENIC NONEPILEPTIC SEIZURE: Status: ACTIVE | Noted: 2019-06-06

## 2019-09-20 PROCEDURE — 95951 PR EEG MONITORING/VIDEORECORD: CPT | Performed by: PSYCHIATRY & NEUROLOGY

## 2019-09-20 PROCEDURE — 99239 HOSP IP/OBS DSCHRG MGMT >30: CPT | Performed by: PSYCHIATRY & NEUROLOGY

## 2019-09-20 RX ADMIN — DIVALPROEX SODIUM 500 MG: 500 TABLET, FILM COATED, EXTENDED RELEASE ORAL at 08:14

## 2019-09-20 RX ADMIN — ACETAMINOPHEN 650 MG: 325 TABLET ORAL at 06:15

## 2019-09-20 RX ADMIN — VENLAFAXINE HYDROCHLORIDE 75 MG: 37.5 CAPSULE, EXTENDED RELEASE ORAL at 08:14

## 2019-09-20 RX ADMIN — PANTOPRAZOLE SODIUM 40 MG: 40 TABLET, DELAYED RELEASE ORAL at 06:13

## 2019-09-20 RX ADMIN — Medication 400 MG: at 08:14

## 2019-09-20 NOTE — DISCHARGE INSTRUCTIONS
CONCLUSION  During your admission to the Epilepsy monitoring unit, you were diagnosed with non-epileptic psychogenic spells (also called stress spells)  Following your admission to the epilepsy monitoring unit, your medications were not changed  Please continue Divalproex (Depakote) 500 mg twice a day    -------------------------------------------------------------------------------------------------------------------    For more information about Non-epileptic psychogenic spells, you can visit:  [email or web address]  [email or web address]    -------------------------------------------------------------------------------------------------------------------  FOLLOW UP  You will be following up with your Neurologist (Dr Joselo Gross) at [address] on 11/22/2019 at 4:30pm  If you have any problems with your medications, please call (available during the day Monday through Friday):  [phone]

## 2019-09-20 NOTE — PLAN OF CARE
Problem: PAIN - ADULT  Goal: Verbalizes/displays adequate comfort level or baseline comfort level  Description  Interventions:  - Encourage patient to monitor pain and request assistance  - Assess pain using appropriate pain scale  - Administer analgesics based on type and severity of pain and evaluate response  - Implement non-pharmacological measures as appropriate and evaluate response  - Consider cultural and social influences on pain and pain management  - Notify physician/advanced practitioner if interventions unsuccessful or patient reports new pain  Outcome: Progressing     Problem: SAFETY ADULT  Goal: Patient will remain free of falls  Description  INTERVENTIONS:  - Assess patient frequently for physical needs  -  Identify cognitive and physical deficits and behaviors that affect risk of falls    -  [place] fall precautions as indicated by assessment   - Educate patient/family on patient safety including physical limitations  - Instruct patient to call for assistance with activity based on assessment  - Modify environment to reduce risk of injury  - Consider OT/PT consult to assist with strengthening/mobility  Outcome: Progressing  Goal: Maintain or return to baseline ADL function  Description  INTERVENTIONS:  -  Assess patient's ability to carry out ADLs; assess patient's baseline for ADL function and identify physical deficits which impact ability to perform ADLs (bathing, care of mouth/teeth, toileting, grooming, dressing, etc )  - Assess/evaluate cause of self-care deficits   - Assess range of motion  - Assess patient's mobility; develop plan if impaired  - Assess patient's need for assistive devices and provide as appropriate  - Encourage maximum independence but intervene and supervise when necessary  - Involve family in performance of ADLs  - Assess for home care needs following discharge   - Consider OT consult to assist with ADL evaluation and planning for discharge  - Provide patient education as appropriate  Outcome: Progressing  Goal: Maintain or return mobility status to optimal level  Description  INTERVENTIONS:  - Assess patient's baseline mobility status (ambulation, transfers, stairs, etc )    - Identify cognitive and physical deficits and behaviors that affect mobility  - Identify mobility aids required to assist with transfers and/or ambulation (gait belt, sit-to-stand, lift, walker, cane, etc )  - [place] fall precautions as indicated by assessment  - Record patient progress and toleration of activity level on Mobility SBAR; progress patient to next Phase/Stage  - Instruct patient to call for assistance with activity based on assessment  - Consider rehabilitation consult to assist with strengthening/weightbearing, etc   Outcome: Progressing     Problem: DISCHARGE PLANNING  Goal: Discharge to home or other facility with appropriate resources  Description  INTERVENTIONS:  - Identify barriers to discharge w/patient and caregiver  - Arrange for needed discharge resources and transportation as appropriate  - Identify discharge learning needs (meds, wound care, etc )  - Arrange for interpretive services to assist at discharge as needed  - Refer to Case Management Department for coordinating discharge planning if the patient needs post-hospital services based on physician/advanced practitioner order or complex needs related to functional status, cognitive ability, or social support system  Outcome: Progressing     Problem: Knowledge Deficit  Goal: Patient/family/caregiver demonstrates understanding of disease process, treatment plan, medications, and discharge instructions  Description  Complete learning assessment and assess knowledge base    Interventions:  - Provide teaching at level of understanding  - Provide teaching via preferred learning methods  Outcome: Progressing     Problem: NEUROSENSORY - ADULT  Goal: Achieves stable or improved neurological status  Description  INTERVENTIONS  - Monitor and report changes in neurological status  - Monitor vital signs such as temperature, blood pressure, glucose, and any other labs ordered   - Initiate measures to prevent increased intracranial pressure  - Monitor for seizure activity and implement precautions if appropriate      Outcome: Progressing  Goal: Remains free of injury related to seizures activity  Description  INTERVENTIONS  - Maintain airway, patient safety  and administer oxygen as ordered  - Monitor patient for seizure activity, document and report duration and description of seizure to physician/advanced practitioner  - If seizure occurs,  ensure patient safety during seizure  - Reorient patient post seizure  - Seizure pads on all 4 side rails  - Instruct patient/family to notify RN of any seizure activity including if an aura is experienced  - Instruct patient/family to call for assistance with activity based on nursing assessment  - Administer anti-seizure medications if ordered    Outcome: Progressing  Goal: Achieves maximal functionality and self care  Description  INTERVENTIONS  - Monitor swallowing and airway patency with patient fatigue and changes in neurological status  - Encourage and assist patient to increase activity and self care     - Encourage visually impaired, hearing impaired and aphasic patients to use assistive/communication devices  Outcome: Progressing

## 2019-09-22 NOTE — DISCHARGE SUMMARY
Epilepsy Attending Discharge Summary  Kell Umanzor 20 y o  female   : 2000  MRN: [id number]     Unit/Bed#: [facility] 718-01    Encounter: [id number]    Date of Admission:   2019  Date of Discharge: 2019  Attending on Admission: Dr Adali Arnold  Attending on Discharge: Dr Adali Arnold    Admission Diagnosis:    1  Recurrent seizures ([address] ) [Y45 158]    Discharge Diagnosis:  1  Conversion disorder F44 9    Secondary Diagnoses:  1  Bipolar disorder  2  Schizoaffective disorder  3  Asthma  4  GERD  5  Tobacco abuse    Consultations:  None    Procedures:  1  Continuous Video EEG    Disposition:  Discharge to Home    Follow-up Appointments/Referrals:  1  Follow-up with [facility] with Dr Margarita Herring as scheduled  2  Follow up with PCP and establish care with Psychiatry  Medication Changes:  None    Discharge Medications:  See after visit summary for reconciled discharge medications provided to patient and family  Recommended follow-up testing:  None    Brief History:  Per admission H&P:     Kell Umanzor is a 20 y o  right handed female with schizoaffective disorder, bipolar disorder, hyperlipidemia, GERD, asthma and spells concerning for seizures who is admitted to the Epilepsy Monitoring Unit for evaluation of evaluation of her spells concerning for seizures       Briefly reviewing her history, she had her first event concerning for a seizure about 1 year ago  She reports that her initial events occurred out of sleep, where she would become stiff all over and shake, but she then started to have episodes during the day as well  She was continuing to have large episodes where she would lose consciousness, but also has been having episodes of staring off and looking "like a zombie" since that time  These have been very frequent at times, up multiple times a day       She recalls initially being treated with Levetiracetam for these episodes   This did not stop the events and she was also having difficulty with irritability  She was recently seen by Dr Zeus Paz, and her Levetiracetam was changed to Divalproex  She has tolerated this change, with some improvement in her mood, but she has continued to have spells thus she was admitted to the EMU for spell characterization       She has a very extensive history of psychiatric disease, reportedly going back to when she was 1years old  She has had multiple hospitalizations and reports attempting to commit suicide 6 times  She has had auditory and visual hallucinations at times  She was following with psychiatry previously, but has not followed with a mental health provider for about 1 5-2 years  Hospital Course:  Nira Skiff was admitted to the Epilepsy Monitoring Unit and monitored on continuous video EEG  During her admission, her Depakote was decreased to 250 mg twice a day  She had two typical episodes of unresponsiveness with whole body shaking, pelvic thrusting with some side to side movements, which both had normal alpha rhythm up until onset of shaking, and immediate return of alpha rhythm when shaking stopped, including during a post event period of time when patient was still unresponsive  She also had one event of staring lasting around 4 minutes, during which time, she simply stared toward the wall  This event was also with out any EEG changes  Her EEG was normal through the duration of her hospitalization  The night prior to discharge, her medications were restarted and she was able to be discharged  Activation procedures:  1  Medication tapering    EMU Conclusion  Summary interictal findings: Normal activities of wakefulness and sleep    Summary event findings: 2 spells of unresponsiveness with whole body shaking, pelvic thrusting and side to side movements, and one spell of staring, all without EEG changes      Event Classification: Non-epileptic psychogenic spells    Epilepsy Classification: not applicable     EMU findings and discussion:  Over her admission, she had 2 of her events of loss of consciousness with shaking and one with just staring, which were typical events for her  All of these events appear to be non-epileptic psychogenic spells, and her EEG through the duration of her admission was normal  I extensively discussed that she does not have any evidence of epilepsy and that her events are non-epileptic psychogenic spells  I am concerned that she does have a very extensive psychologic history, and is not currently following with a psychiatrist or psychologist  She is planning to see her PCP soon to help arrange and establish care with a mental health provider  Currently, she is likely getting some mood stabilization from Divalproex (Depakote), so I will have her continue 500 mg twice a day  Although this is NOT being used for seizures, it is being used to help with her mood stabilization  When she establishes care with a psychiatrist, it will be at the discretion of her psychiatrist if this medication would be appropriate to continue or change to a different medication for mood stabilization  Diet:  regular    Activity:  As tolerated    Restrictions:   no driving until event free    Discharge Statement   I spent 35 minutes discharging the patient  This time was spent on the day of discharge  I had direct contact with the patient on the day of discharge   Time was spent counseling the patient about the above diagnosis and plan, as well as discussing the case with the remainder of the EMU team       Claudia Rodriguez MD   Date: 9/22/2019

## 2019-09-23 NOTE — UTILIZATION REVIEW
Notification of Discharge  This is a Notification of Discharge from our facility [address]  Please be advised that this patient has been discharge from our facility  Below you will find the admission and discharge date and time including the patients disposition  PRESENTATION DATE: 9/18/2019  9:01 AM  OBS ADMISSION DATE:   IP ADMISSION DATE: 9/18/19 0901   DISCHARGE DATE: 9/20/2019 12:15 PM  DISPOSITION: Home/Self Care Home/Self Care   Admission Orders listed below:  Admission Orders (From admission, onward)     Ordered        09/18/19 1338  Inpatient Admission  Once                   Please contact the UR Department if additional information is required to close this patient's authorization/case  [address]  Utilization Review Department  Phone: [phone]; Fax [phone]  [email or web address]  ATTENTION: Please call with any questions or concerns to [phone]  and carefully listen to the prompts so that you are directed to the right person  Send all requests for admission clinical reviews, approved or denied determinations and any other requests to fax [phone]   All voicemails are confidential

## 2019-09-25 NOTE — UTILIZATION REVIEW
Notification of Discharge  This is a Notification of Discharge from our facility [address]  Please be advised that this patient has been discharge from our facility  Below you will find the admission and discharge date and time including the patients disposition  PRESENTATION DATE: 9/18/2019  9:01 AM  OBS ADMISSION DATE:   IP ADMISSION DATE: 9/18/19 0901   DISCHARGE DATE: 9/20/2019 12:15 PM  DISPOSITION: Home/Self Care Home/Self Care   Admission Orders listed below:  Admission Orders (From admission, onward)     Ordered        09/18/19 1338  Inpatient Admission  Once                   Please contact the UR Department if additional information is required to close this patient's authorization/case  [address]  Utilization Review Department  Phone: [phone]; Fax [phone]  [email or web address]  ATTENTION: Please call with any questions or concerns to [phone]  and carefully listen to the prompts so that you are directed to the right person  Send all requests for admission clinical reviews, approved or denied determinations and any other requests to fax [phone]   All voicemails are confidential

## 2019-10-08 ENCOUNTER — OFFICE VISIT (OUTPATIENT)
Dept: FAMILY MEDICINE CLINIC | Facility: CLINIC | Age: 19
End: 2019-10-08

## 2019-10-08 VITALS
RESPIRATION RATE: 18 BRPM | BODY MASS INDEX: 26.93 KG/M2 | WEIGHT: 152 LBS | OXYGEN SATURATION: 98 % | TEMPERATURE: 97.5 F | DIASTOLIC BLOOD PRESSURE: 60 MMHG | SYSTOLIC BLOOD PRESSURE: 100 MMHG | HEART RATE: 105 BPM | HEIGHT: 63 IN

## 2019-10-08 DIAGNOSIS — R51.9 CHRONIC DAILY HEADACHE: ICD-10-CM

## 2019-10-08 DIAGNOSIS — R20.0 BILATERAL NUMBNESS AND TINGLING OF ARMS AND LEGS: ICD-10-CM

## 2019-10-08 DIAGNOSIS — F32.A ANXIETY AND DEPRESSION: ICD-10-CM

## 2019-10-08 DIAGNOSIS — F31.60 BIPOLAR AFFECTIVE DISORDER, CURRENT EPISODE MIXED, CURRENT EPISODE SEVERITY UNSPECIFIED (HCC): ICD-10-CM

## 2019-10-08 DIAGNOSIS — F41.9 ANXIETY AND DEPRESSION: ICD-10-CM

## 2019-10-08 DIAGNOSIS — G43.009 MIGRAINE WITHOUT AURA AND WITHOUT STATUS MIGRAINOSUS, NOT INTRACTABLE: ICD-10-CM

## 2019-10-08 DIAGNOSIS — Z23 NEEDS FLU SHOT: ICD-10-CM

## 2019-10-08 DIAGNOSIS — M54.16 LUMBAR RADICULOPATHY: Primary | ICD-10-CM

## 2019-10-08 DIAGNOSIS — R20.2 BILATERAL NUMBNESS AND TINGLING OF ARMS AND LEGS: ICD-10-CM

## 2019-10-08 PROCEDURE — 90471 IMMUNIZATION ADMIN: CPT | Performed by: FAMILY MEDICINE

## 2019-10-08 PROCEDURE — 90686 IIV4 VACC NO PRSV 0.5 ML IM: CPT | Performed by: FAMILY MEDICINE

## 2019-10-08 PROCEDURE — 99214 OFFICE O/P EST MOD 30 MIN: CPT | Performed by: PHYSICIAN ASSISTANT

## 2019-10-08 PROCEDURE — 1111F DSCHRG MED/CURRENT MED MERGE: CPT | Performed by: PHYSICIAN ASSISTANT

## 2019-10-08 RX ORDER — MELOXICAM 15 MG/1
15 TABLET ORAL DAILY
Qty: 30 TABLET | Refills: 1 | Status: SHIPPED | OUTPATIENT
Start: 2019-10-08 | End: 2021-11-11

## 2019-10-08 RX ORDER — VENLAFAXINE HYDROCHLORIDE 75 MG/1
75 CAPSULE, EXTENDED RELEASE ORAL 3 TIMES DAILY
Qty: 90 CAPSULE | Refills: 2 | Status: SHIPPED | OUTPATIENT
Start: 2019-10-08 | End: 2019-10-24

## 2019-10-08 NOTE — PROGRESS NOTES
Assessment/Plan:    Lumbar radiculopathy  Patient had been treated with physical therapy and had no improvement  She has been doing exercises at home also without any improvement in pain level or activity  Will proceed with MRI of lumbar spine due to continued pain and disability despite more than 6 weeks of treatment  Continue on gabapentin also  Bipolar disorder ([facility] )    Continue with Depakote 500 b i d  She will also continue taking venlafaxine  It was increased to 3 times a day  She states that most of her anxiety symptoms are typically in the middle of the day between her a m  And p m  Dose  She was also referred to Psychiatry and she will call to set up an appointment herself         Problem List Items Addressed This Visit        Cardiovascular and Mediastinum    Common migraine without aura    Relevant Medications    venlafaxine (EFFEXOR-XR) 75 mg 24 hr capsule    meloxicam (MOBIC) 15 mg tablet       Nervous and Auditory    Lumbar radiculopathy - Primary     Patient had been treated with physical therapy and had no improvement  She has been doing exercises at home also without any improvement in pain level or activity  Will proceed with MRI of lumbar spine due to continued pain and disability despite more than 6 weeks of treatment  Continue on gabapentin also  Relevant Medications    meloxicam (MOBIC) 15 mg tablet    Other Relevant Orders    MRI lumbar spine wo contrast       Other    Bipolar disorder ([facility] )       Continue with Depakote 500 b i d  She will also continue taking venlafaxine  It was increased to 3 times a day  She states that most of her anxiety symptoms are typically in the middle of the day between her a m  And p m  Dose    She was also referred to Psychiatry and she will call to set up an appointment herself         Relevant Medications    venlafaxine (EFFEXOR-XR) 75 mg 24 hr capsule      Other Visit Diagnoses     Bilateral numbness and tingling of arms and legs Relevant Orders    MRI lumbar spine wo contrast    Needs flu shot        Relevant Orders    influenza vaccine, [id number], quadrivalent, 0 5 mL, preservative-free, for adult and pediatric patients 6 mos+ (AFLURIA, FLUARIX, FLULAVAL, FLUZONE) (Completed)    Chronic daily headache        Relevant Medications    venlafaxine (EFFEXOR-XR) 75 mg 24 hr capsule    meloxicam (MOBIC) 15 mg tablet    Anxiety and depression        Relevant Medications    venlafaxine (EFFEXOR-XR) 75 mg 24 hr capsule            Subjective:      Patient ID: Nick Cunningham is a 20 y o  female  HPI    59-year-old female here for anxiety today  On September 20th she was recently admitted to the epilepsy monitoring unit for evaluation of possible seizures  Initially she was having seizure-like events when she was sleeping and would start to stiffen up and shake  She then started to progress to have episodes while awake where she was staring off into space and not responding and also have episodes shaking  She was started on Depakote by Neurology which did improve her mood but she did have some seizure-like episodes still  The EEG did come back normal despite her having a seizure-like event and also a staring episode  Neurology has recommended that she continue with Depakote 500 twice a day to help stabilize her mood  She thinks effexor has helped her anxiety  Lately she has bene sleeping better  She is doing home PT exercises  She went to PT from 6/19 to 7/25 until she was discharged  She is getting pain in lower back into both her hips  She is having tingling in her legs  She is getting pain daily and it waxes and wanes in intensity but is often about 7/10          The following portions of the patient's history were reviewed and updated as appropriate:   She  has a past medical history of Anxiety, Asthma, Lymphadenitis, Lymphadenopathy, 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[address] ), Psychiatric disorder, Psychiatric illness, Psychosis ([address] ), and Seizures ([facility] )  She   Patient Active Problem List    Diagnosis Date Noted    Tobacco abuse 09/18/2019    Psychogenic nonepileptic seizure 06/06/2019    Lumbar radiculopathy 06/06/2019    Epigastric pain 05/01/2019    Loose stools 05/01/2019    Change in bowel habits 05/01/2019    Nausea and vomiting 05/01/2019    Shaking     Diarrhea 04/02/2019    STD (sexually transmitted disease) 10/08/2018    High risk heterosexual behavior 10/08/2018    Lymphadenopathy 04/26/2018    Common migraine without aura 05/16/2017    Cyst of right ovary 05/03/2017    Allergic rhinitis 06/18/2015    Fibrocystic breast 03/05/2015    Vertigo 02/23/2015    Acid reflux 11/18/2014    Breast pain 06/30/2014    [facility] ([facility] ) 12/20/2013    Hyperlipidemia 06/20/2013    Asthma 05/18/2012     She  has a past surgical history that includes Colonoscopy (N/A, 5/3/2019) and Esophagogastroduodenoscopy (N/A, 5/3/2019)  Her family history includes Drug abuse in her father; Febrile seizures in her maternal uncle; Migraines in her mother; Other in her mother  She  reports that she has been smoking cigarettes  She has been smoking about 1 00 pack per day  She has never used smokeless tobacco  She reports that she drinks alcohol  She reports that she has current or past drug history  Drug: Marijuana    Current Outpatient Medications   Medication Sig Dispense Refill    albuterol (2 5 mg/3 mL) 0 083 % nebulizer solution Take 1 vial (2 5 mg total) by nebulization every 6 (six) hours as needed for wheezing or shortness of breath 75 mL 0    dicyclomine (BENTYL) 20 mg tablet Take 1 tablet (20 mg total) by mouth every 6 (six) hours as needed (abdominal pain) 120 tablet 2    divalproex sodium (DEPAKOTE ER) 500 mg 24 hr tablet Take one tab at bedtime for 7 days, then twice a day (Patient taking differently: every 12 (twelve) hours Take one tab at bedtime for 7 days, then twice a day) 30 tablet 5    gabapentin (NEURONTIN) 100 mg capsule Take 1 capsule (100 mg total) by mouth 3 (three) times a day as needed (headache or aniexty) 90 capsule 3    magnesium oxide (MAG-OX) 400 mg Take 1 tablet (400 mg total) by mouth daily 90 tablet 3    omeprazole (PriLOSEC) 40 MG capsule Take 1 capsule (40 mg total) by mouth daily 30 capsule 1    venlafaxine (EFFEXOR-XR) 75 mg 24 hr capsule Take 1 capsule (75 mg total) by mouth 3 (three) times a day For 3 months and then if ineffective increase to 150 mg 90 capsule 2    meloxicam (MOBIC) 15 mg tablet Take 1 tablet (15 mg total) by mouth daily 30 tablet 1    metoclopramide (REGLAN) 10 mg tablet Take 1 tablet (10 mg total) by mouth every 6 (six) hours (Patient not taking: Reported on 9/18/2019) 30 tablet 0     No current facility-administered medications for this visit        Current Outpatient Medications on File Prior to Visit   Medication Sig    albuterol (2 5 mg/3 mL) 0 083 % nebulizer solution Take 1 vial (2 5 mg total) by nebulization every 6 (six) hours as needed for wheezing or shortness of breath    dicyclomine (BENTYL) 20 mg tablet Take 1 tablet (20 mg total) by mouth every 6 (six) hours as needed (abdominal pain)    divalproex sodium (DEPAKOTE ER) 500 mg 24 hr tablet Take one tab at bedtime for 7 days, then twice a day (Patient taking differently: every 12 (twelve) hours Take one tab at bedtime for 7 days, then twice a day)    gabapentin (NEURONTIN) 100 mg capsule Take 1 capsule (100 mg total) by mouth 3 (three) times a day as needed (headache or aniexty)    magnesium oxide (MAG-OX) 400 mg Take 1 tablet (400 mg total) by mouth daily    omeprazole (PriLOSEC) 40 MG capsule Take 1 capsule (40 mg total) by mouth daily    [DISCONTINUED] venlafaxine (EFFEXOR-XR) 75 mg 24 hr capsule Take 1 capsule (75 mg total) by mouth daily For 3 months and then if ineffective increase to 150 mg (Patient taking differently: Take 75 mg by mouth every 12 (twelve) hours 75mg in AM and 75mg at HS)    metoclopramide (REGLAN) 10 mg tablet Take 1 tablet (10 mg total) by mouth every 6 (six) hours (Patient not taking: Reported on 9/18/2019)    [DISCONTINUED] naproxen (NAPROSYN) 500 mg tablet Take 1 tablet (500 mg total) by mouth 2 (two) times a day with meals for 10 days     No current facility-administered medications on file prior to visit  She is allergic to penicillins       Review of Systems   Constitutional: Negative for activity change, appetite change, chills, fatigue and unexpected weight change  HENT: Negative for ear pain, hearing loss and sore throat  Eyes: Negative for visual disturbance  Respiratory: Negative for cough and wheezing  Cardiovascular: Negative for chest pain and leg swelling  Genitourinary: Negative for difficulty urinating and dysuria  Musculoskeletal: Positive for back pain  Negative for arthralgias and myalgias  Skin: Negative for rash  Neurological: Positive for seizures (like activity), weakness, numbness and headaches  Negative for dizziness  Psychiatric/Behavioral: Positive for decreased concentration and dysphoric mood  Negative for behavioral problems  The patient is nervous/anxious  Objective:      /60 (BP Location: Left arm, Patient Position: Sitting, Cuff Size: Standard)   Pulse 105   Temp 97 5 °F (36 4 °C) (Temporal)   Resp 18   Ht 5' 3" (1 6 m)   Wt 68 9 kg (152 lb)   LMP 09/23/2019   SpO2 98%   Breastfeeding? No   BMI 26 93 kg/m²          Physical Exam   Constitutional: She is oriented to person, place, and time  She appears well-developed and well-nourished  No distress  HENT:   Head: Normocephalic and atraumatic  Right Ear: External ear normal    Left Ear: External ear normal    Eyes: Conjunctivae are normal    Neck: Normal range of motion  Neck supple  No thyromegaly present  Cardiovascular: Normal rate, regular rhythm and normal heart sounds  No murmur heard    Pulmonary/Chest: Effort normal and breath sounds normal  No respiratory distress  She has no wheezes  Musculoskeletal: She exhibits tenderness (l2-4)  She exhibits no edema or deformity  Lymphadenopathy:     She has no cervical adenopathy  Neurological: She is alert and oriented to person, place, and time  Psychiatric: She has a normal mood and affect  Her behavior is normal    Nursing note and vitals reviewed

## 2019-10-08 NOTE — ASSESSMENT & PLAN NOTE
Continue with Depakote 500 b i d  She will also continue taking venlafaxine  It was increased to 3 times a day  She states that most of her anxiety symptoms are typically in the middle of the day between her a m  And p m  Dose    She was also referred to Psychiatry and she will call to set up an appointment herself

## 2019-10-08 NOTE — PATIENT INSTRUCTIONS
Can try [facility] for psychiatry or [place]     [facility]  [phone] or [facility] [phone]     [address]

## 2019-10-08 NOTE — ASSESSMENT & PLAN NOTE
Patient had been treated with physical therapy and had no improvement  She has been doing exercises at home also without any improvement in pain level or activity  Will proceed with MRI of lumbar spine due to continued pain and disability despite more than 6 weeks of treatment  Continue on gabapentin also

## 2019-10-10 ENCOUNTER — TELEPHONE (OUTPATIENT)
Dept: FAMILY MEDICINE CLINIC | Facility: CLINIC | Age: 19
End: 2019-10-10

## 2019-10-10 NOTE — TELEPHONE ENCOUNTER
LM on VM     MRI of Lspine apt([phone]) is on 10/24/2019 at 2 pm at [address] [facility]

## 2019-10-21 ENCOUNTER — TELEPHONE (OUTPATIENT)
Dept: FAMILY MEDICINE CLINIC | Facility: CLINIC | Age: 19
End: 2019-10-21

## 2019-10-24 ENCOUNTER — HOSPITAL ENCOUNTER (OUTPATIENT)
Dept: MRI IMAGING | Facility: HOSPITAL | Age: 19
Discharge: HOME | End: 2019-10-24
Payer: COMMERCIAL

## 2019-10-24 DIAGNOSIS — F32.A ANXIETY AND DEPRESSION: ICD-10-CM

## 2019-10-24 DIAGNOSIS — R20.0 BILATERAL NUMBNESS AND TINGLING OF ARMS AND LEGS: ICD-10-CM

## 2019-10-24 DIAGNOSIS — R20.2 BILATERAL NUMBNESS AND TINGLING OF ARMS AND LEGS: ICD-10-CM

## 2019-10-24 DIAGNOSIS — F31.60 BIPOLAR AFFECTIVE DISORDER, CURRENT EPISODE MIXED, CURRENT EPISODE SEVERITY UNSPECIFIED (HCC): Primary | ICD-10-CM

## 2019-10-24 DIAGNOSIS — M54.16 LUMBAR RADICULOPATHY: ICD-10-CM

## 2019-10-24 DIAGNOSIS — F41.9 ANXIETY AND DEPRESSION: ICD-10-CM

## 2019-10-24 PROCEDURE — 72148 MRI LUMBAR SPINE W/O DYE: CPT

## 2019-10-24 RX ORDER — VENLAFAXINE HYDROCHLORIDE 150 MG/1
150 CAPSULE, EXTENDED RELEASE ORAL DAILY
Qty: 30 CAPSULE | Refills: 2 | Status: SHIPPED | OUTPATIENT
Start: 2019-10-24 | End: 2020-04-17 | Stop reason: SDUPTHER

## 2019-10-24 RX ORDER — VENLAFAXINE HYDROCHLORIDE 75 MG/1
75 CAPSULE, EXTENDED RELEASE ORAL DAILY
Qty: 30 CAPSULE | Refills: 2 | Status: SHIPPED | OUTPATIENT
Start: 2019-10-24 | End: 2020-04-17 | Stop reason: SDUPTHER

## 2019-10-25 ENCOUNTER — OFFICE VISIT (OUTPATIENT)
Dept: GASTROENTEROLOGY | Facility: MEDICAL CENTER | Age: 19
End: 2019-10-25
Payer: COMMERCIAL

## 2019-10-25 VITALS
HEART RATE: 85 BPM | HEIGHT: 63 IN | TEMPERATURE: 97.3 F | WEIGHT: 156 LBS | DIASTOLIC BLOOD PRESSURE: 72 MMHG | BODY MASS INDEX: 27.64 KG/M2 | SYSTOLIC BLOOD PRESSURE: 116 MMHG

## 2019-10-25 DIAGNOSIS — K59.00 CONSTIPATION, UNSPECIFIED CONSTIPATION TYPE: Primary | ICD-10-CM

## 2019-10-25 DIAGNOSIS — K21.9 GASTROESOPHAGEAL REFLUX DISEASE, ESOPHAGITIS PRESENCE NOT SPECIFIED: ICD-10-CM

## 2019-10-25 PROCEDURE — 99214 OFFICE O/P EST MOD 30 MIN: CPT | Performed by: PHYSICIAN ASSISTANT

## 2019-10-25 RX ORDER — FAMOTIDINE 20 MG/1
20 TABLET, FILM COATED ORAL DAILY
Qty: 30 TABLET | Refills: 3 | Status: SHIPPED | OUTPATIENT
Start: 2019-10-25 | End: 2021-11-11

## 2019-10-25 NOTE — PROGRESS NOTES
[facility] - Outpatient Follow-up Note  Apurva Travis 20 y o  female MRN: [id number]  Encounter: [id number]          ASSESSMENT AND PLAN:      1  Constipation, unspecified constipation type: admits to 1 bm weekly with straining and small amount of bright red blood  She has tried multiple over-the-counter medications including MiraLax and stool softeners without relief  - linaCLOtide (LINZESS) 145 MCG CAPS; Take 1 capsule (145 mcg total) by mouth daily  Dispense: 30 capsule; Refill: 3  -side effects discussed  -increase water intake  -encourage increased fiber intake and exercise    2  Gastroesophageal reflux disease, esophagitis presence not specified:  Symptoms are well controlled with diet control as she is avoiding her trigger foods  She no longer takes Prilosec  She may use Pepcid on an as-needed basis  Symptoms may also be worsened by constipation  -avoid meloxicam if able  - famotidine (PEPCID) 20 mg tablet; Take 1 tablet (20 mg total) by mouth daily  Dispense: 30 tablet; Refill: 3  -continue GERD diet    ______________________________________________________________________    SUBJECTIVE:  65-year-old female here for follow-up of GERD, constipation and EGD and colonoscopy results  She underwent EGD and colonoscopy in May of 2019  Both were normal with negative biopsies  She was on Prilosec for heartburn but states that avoiding her trigger foods she is able to control her symptoms well  She no longer takes Prilosec  She would be interested in the medication to use as needed  She continues to experience constipation and is having a bowel movement about once per week with straining and hard stool  She admits to occasional bright red blood per rectum on the tissue paper after straining  Her abdominal pain seems to be relieved after bowel movement as well as her nausea is also resolved  REVIEW OF SYSTEMS IS OTHERWISE NEGATIVE        Historical Information   Past Medical History:   Diagnosis Date    Anxiety     Asthma     Lymphadenitis     Last assessed 12/30/14    Lymphadenopathy     Last assessed 10/16/13    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     Psychiatric disorder     Psychiatric illness     Psychosis ([facility] )     Seizures ([facility] )      Past Surgical History:   Procedure Laterality Date    COLONOSCOPY N/A 5/3/2019    Procedure: COLONOSCOPY;  Surgeon: Shabbir Oquendo MD;  Location: [facility]; Service: Gastroenterology    ESOPHAGOGASTRODUODENOSCOPY N/A 5/3/2019    Procedure: ESOPHAGOGASTRODUODENOSCOPY (EGD); Surgeon: Shabbir Oquendo MD;  Location: [facility];   Service: Gastroenterology     Social History   Social History     Substance and Sexual Activity   Alcohol Use Yes    Comment: about once a year     Social History     Substance and Sexual Activity   Drug Use Yes    Types: Marijuana     Social History     Tobacco Use   Smoking Status Current Every Day Smoker    Packs/day: 1 00    Types: Cigarettes   Smokeless Tobacco Never Used     Family History   Problem Relation Age of Onset   Nely Bernal Migraines Mother     Other Mother         Neck pain    Drug abuse Father     Febrile seizures Maternal Uncle        Meds/Allergies       Current Outpatient Medications:     albuterol (2 5 mg/3 mL) 0 083 % nebulizer solution    dicyclomine (BENTYL) 20 mg tablet    divalproex sodium (DEPAKOTE ER) 500 mg 24 hr tablet    gabapentin (NEURONTIN) 100 mg capsule    magnesium oxide (MAG-OX) 400 mg    meloxicam (MOBIC) 15 mg tablet    omeprazole (PriLOSEC) 40 MG capsule    venlafaxine (EFFEXOR-XR) 150 mg 24 hr capsule    venlafaxine (EFFEXOR-XR) 75 mg 24 hr capsule    famotidine (PEPCID) 20 mg tablet    linaCLOtide (LINZESS) 145 MCG CAPS    metoclopramide (REGLAN) 10 mg tablet    Allergies   Allergen Reactions    Penicillins            Objective     Blood pressure 116/72, pulse 85, temperature (!) 97 3 °F (36 3 °C), temperature source Tympanic, height 5' 3" (1 6 m), weight 70 8 kg (156 lb), not currently breastfeeding  Body mass index is 27 63 kg/m²  PHYSICAL EXAM:      General Appearance:   Alert, cooperative, no distress   HEENT:   Normocephalic, atraumatic, anicteric  Right eye exhibits no discharge  Left eye exhibits no discharge  No scleral icterus   Neck:  Supple, symmetrical, trachea midline, no stridor    Lungs:   Clear to auscultation bilaterally; no rales, rhonchi or wheezing; respirations unlabored    Heart[de-identified]   Regular rate and rhythm; no murmur, rub, or gallop  Abdomen:   Soft, non-tender, non-distended; normal bowel sounds; no masses, no organomegaly    Genitalia:   Deferred    Rectal:   Deferred    Extremities:  No cyanosis, clubbing or edema        Skin:  No jaundice, rashes, or lesions          Lab Results:   No visits with results within 1 Day(s) from this visit     Latest known visit with results is:   Admission on 09/18/2019, Discharged on 09/20/2019   Component Date Value    WBC 09/18/2019 9 61     RBC 09/18/2019 4 51     Hemoglobin 09/18/2019 12 7     Hematocrit 09/18/2019 38 1     MCV 09/18/2019 85     MCH 09/18/2019 28 2     MCHC 09/18/2019 33 3     RDW 09/18/2019 14 8     MPV 09/18/2019 10 0     Platelets 43/88/2363 260     nRBC 09/18/2019 0     Neutrophils Relative 09/18/2019 57     Immat GRANS % 09/18/2019 1     Lymphocytes Relative 09/18/2019 29     Monocytes Relative 09/18/2019 8     Eosinophils Relative 09/18/2019 4     Basophils Relative 09/18/2019 1     Neutrophils Absolute 09/18/2019 5 62     Immature Grans Absolute 09/18/2019 0 05     Lymphocytes Absolute 09/18/2019 2 77     Monocytes Absolute 09/18/2019 0 77     Eosinophils Absolute 09/18/2019 0 35     Basophils Absolute 09/18/2019 0 05     Sodium 09/18/2019 138     Potassium 09/18/2019 3 6     Chloride 09/18/2019 108     CO2 09/18/2019 25     ANION GAP 09/18/2019 5     BUN 09/18/2019 8     Creatinine 09/18/2019 0 59*    Glucose 09/18/2019 80     Calcium 09/18/2019 8 7     AST 09/18/2019 27     ALT 09/18/2019 28     Alkaline Phosphatase 09/18/2019 92     Total Protein 09/18/2019 6 8     Albumin 09/18/2019 3 5     Total Bilirubin 09/18/2019 0 26     eGFR 09/18/2019 132     Valproic Acid, Total 09/18/2019 15*         Radiology Results:   No results found

## 2019-10-29 ENCOUNTER — TELEPHONE (OUTPATIENT)
Dept: FAMILY MEDICINE CLINIC | Facility: CLINIC | Age: 19
End: 2019-10-29

## 2019-10-29 ENCOUNTER — TELEPHONE (OUTPATIENT)
Dept: NEUROLOGY | Facility: CLINIC | Age: 19
End: 2019-10-29

## 2019-10-29 NOTE — TELEPHONE ENCOUNTER
Dr Britta Thorpe office called stating to please place a referral to Neurology using the following codes   R51  M54 2  O32 603  G430 019  F41 9  F32 09

## 2019-10-30 ENCOUNTER — TELEPHONE (OUTPATIENT)
Dept: FAMILY MEDICINE CLINIC | Facility: CLINIC | Age: 19
End: 2019-10-30

## 2019-10-30 NOTE — TELEPHONE ENCOUNTER
[address] [facility] neuro calling for a request for Amb ref to Neurology       Phone [phone]

## 2019-10-30 NOTE — TELEPHONE ENCOUNTER
10/30/19 WOLFGANG KAY/KARTHIK AMB REF TO [facility] TO BE PLACED IN Epic BY PCP- REINALDOT    [facility] PHYS ORD RYDER UNTL 10/31/2020

## 2019-10-31 DIAGNOSIS — R20.2 BILATERAL NUMBNESS AND TINGLING OF ARMS AND LEGS: ICD-10-CM

## 2019-10-31 DIAGNOSIS — R42 VERTIGO: ICD-10-CM

## 2019-10-31 DIAGNOSIS — R56.9 SEIZURE-LIKE ACTIVITY (HCC): ICD-10-CM

## 2019-10-31 DIAGNOSIS — G43.809 OTHER MIGRAINE WITHOUT STATUS MIGRAINOSUS, NOT INTRACTABLE: ICD-10-CM

## 2019-10-31 DIAGNOSIS — F31.60 BIPOLAR AFFECTIVE DISORDER, CURRENT EPISODE MIXED, CURRENT EPISODE SEVERITY UNSPECIFIED (HCC): Primary | ICD-10-CM

## 2019-10-31 DIAGNOSIS — F41.9 ANXIETY AND DEPRESSION: ICD-10-CM

## 2019-10-31 DIAGNOSIS — R51.9 CHRONIC DAILY HEADACHE: ICD-10-CM

## 2019-10-31 DIAGNOSIS — F32.A ANXIETY AND DEPRESSION: ICD-10-CM

## 2019-10-31 DIAGNOSIS — R20.0 BILATERAL NUMBNESS AND TINGLING OF ARMS AND LEGS: ICD-10-CM

## 2019-10-31 DIAGNOSIS — G43.009 MIGRAINE WITHOUT AURA AND WITHOUT STATUS MIGRAINOSUS, NOT INTRACTABLE: ICD-10-CM

## 2019-10-31 DIAGNOSIS — R25.1 SHAKING: ICD-10-CM

## 2019-10-31 DIAGNOSIS — M54.16 LUMBAR RADICULOPATHY: ICD-10-CM

## 2019-12-11 ENCOUNTER — HOSPITAL ENCOUNTER (EMERGENCY)
Facility: HOSPITAL | Age: 19
Discharge: HOME | End: 2019-12-11
Attending: EMERGENCY MEDICINE
Payer: COMMERCIAL

## 2019-12-11 VITALS
BODY MASS INDEX: 27.81 KG/M2 | WEIGHT: 156.97 LBS | HEART RATE: 81 BPM | OXYGEN SATURATION: 98 % | SYSTOLIC BLOOD PRESSURE: 101 MMHG | DIASTOLIC BLOOD PRESSURE: 72 MMHG | RESPIRATION RATE: 16 BRPM | TEMPERATURE: 96 F

## 2019-12-11 DIAGNOSIS — J32.9 SINUSITIS: Primary | ICD-10-CM

## 2019-12-11 PROCEDURE — 99283 EMERGENCY DEPT VISIT LOW MDM: CPT

## 2019-12-11 PROCEDURE — 99282 EMERGENCY DEPT VISIT SF MDM: CPT | Performed by: EMERGENCY MEDICINE

## 2019-12-11 RX ORDER — FLUTICASONE PROPIONATE 50 MCG
1 SPRAY, SUSPENSION (ML) NASAL DAILY
Qty: 16 G | Refills: 0 | Status: SHIPPED | OUTPATIENT
Start: 2019-12-11 | End: 2021-11-11

## 2019-12-11 RX ORDER — PSEUDOEPHEDRINE HYDROCHLORIDE 30 MG/1
30 TABLET ORAL EVERY 8 HOURS PRN
Qty: 20 TABLET | Refills: 0 | Status: SHIPPED | OUTPATIENT
Start: 2019-12-11 | End: 2020-03-09

## 2019-12-11 NOTE — ED PROVIDER NOTES
History  Chief Complaint   Patient presents with    Cold Like Symptoms     a week of sinsus congestion; coughing up phelm; PCP says it viral     Patient is a 79-year-old female with multiple medical problems who presents with a one-week history of sinus pressure and congestion  Has been taking Advil without relief  Complaining of a frontal achy headache  Nonradiating  Nothing else makes it worse or better  Denies any previous symptoms  States she called her doctor who told her it was just a viral   No other medicine taken  Patient does admit to smoking  No recent sick contacts  No other antibiotic use  No fevers sweats or chills  Prior to Admission Medications   Prescriptions Last Dose Informant Patient Reported? Taking?    albuterol (2 5 mg/3 mL) 0 083 % nebulizer solution  Self No No   Sig: Take 1 vial (2 5 mg total) by nebulization every 6 (six) hours as needed for wheezing or shortness of breath   dicyclomine (BENTYL) 20 mg tablet   No No   Sig: Take 1 tablet (20 mg total) by mouth every 6 (six) hours as needed (abdominal pain)   divalproex sodium (DEPAKOTE ER) 500 mg 24 hr tablet   No No   Sig: Take one tab at bedtime for 7 days, then twice a day   Patient taking differently: every 12 (twelve) hours Take one tab at bedtime for 7 days, then twice a day   famotidine (PEPCID) 20 mg tablet   No No   Sig: Take 1 tablet (20 mg total) by mouth daily   gabapentin (NEURONTIN) 100 mg capsule  Self No No   Sig: Take 1 capsule (100 mg total) by mouth 3 (three) times a day as needed (headache or aniexty)   linaCLOtide (LINZESS) 145 MCG CAPS   No No   Sig: Take 1 capsule (145 mcg total) by mouth daily   magnesium oxide (MAG-OX) 400 mg  Self No No   Sig: Take 1 tablet (400 mg total) by mouth daily   meloxicam (MOBIC) 15 mg tablet   No No   Sig: Take 1 tablet (15 mg total) by mouth daily   metoclopramide (REGLAN) 10 mg tablet   No No   Sig: Take 1 tablet (10 mg total) by mouth every 6 (six) hours   Patient not taking: Reported on 9/18/2019   omeprazole (PriLOSEC) 40 MG capsule  Self No No   Sig: Take 1 capsule (40 mg total) by mouth daily   venlafaxine (EFFEXOR-XR) 150 mg 24 hr capsule   No No   Sig: Take 1 capsule (150 mg total) by mouth daily   venlafaxine (EFFEXOR-XR) 75 mg 24 hr capsule   No No   Sig: Take 1 capsule (75 mg total) by mouth daily      Facility-Administered Medications: None       Past Medical History:   Diagnosis Date    Anxiety     Asthma     Lymphadenitis     Last assessed 12/30/14    Lymphadenopathy     Last assessed 10/16/13    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     Psychiatric disorder     Psychiatric illness     Psychosis ([facility] )     Seizures ([facility] )        Past Surgical History:   Procedure Laterality Date    COLONOSCOPY N/A 5/3/2019    Procedure: COLONOSCOPY;  Surgeon: Sarah Lopez MD;  [facility]; Service: Gastroenterology    ESOPHAGOGASTRODUODENOSCOPY N/A 5/3/2019    Procedure: ESOPHAGOGASTRODUODENOSCOPY (EGD); Surgeon: Sarah Lopez MD;  [facility]; Service: Gastroenterology       Family History   Problem Relation Age of Onset   Valaria Reil Migraines Mother     Other Mother         Neck pain    Drug abuse Father     Febrile seizures Maternal Uncle      I have reviewed and agree with the history as documented  Social History     Tobacco Use    Smoking status: Current Every Day Smoker     Packs/day: 1 00     Types: Cigarettes    Smokeless tobacco: Never Used   Substance Use Topics    Alcohol use: Yes     Comment: about once a year    Drug use: Yes     Types: Marijuana        Review of Systems   Constitutional: Negative  HENT: Positive for congestion and postnasal drip  Eyes: Negative  Respiratory: Negative  Negative for choking and shortness of breath  Cardiovascular: Negative  Gastrointestinal: Negative  Endocrine: Negative  Genitourinary: Negative  Musculoskeletal: Negative  Skin: Negative  Allergic/Immunologic: Negative  Neurological: Negative  Hematological: Negative  Psychiatric/Behavioral: Negative  All other systems reviewed and are negative  Physical Exam  Physical Exam   Constitutional: She appears well-developed and well-nourished  HENT:   Head: Normocephalic  Right Ear: Hearing and tympanic membrane normal    Left Ear: Hearing and tympanic membrane normal    Nose: Mucosal edema and rhinorrhea present  Eyes: Pupils are equal, round, and reactive to light  Conjunctivae are normal    Neck: Normal range of motion  Neck supple  Cardiovascular: Normal rate, regular rhythm, normal heart sounds and intact distal pulses  Pulmonary/Chest: Effort normal and breath sounds normal    Abdominal: Soft  Bowel sounds are normal    Musculoskeletal: Normal range of motion  Neurological: She is alert  Skin: Skin is warm and dry  Capillary refill takes less than 2 seconds  Psychiatric: She has a normal mood and affect  Her behavior is normal    Nursing note and vitals reviewed        Vital Signs  ED Triage Vitals [12/11/19 1725]   Temperature Pulse Respirations Blood Pressure SpO2   (!) 96 °F (35 6 °C) 81 16 101/72 98 %      Temp Source Heart Rate Source Patient Position - Orthostatic VS BP Location FiO2 (%)   Tympanic Monitor Sitting Left arm --      Pain Score       7           Vitals:    12/11/19 1725   BP: 101/72   Pulse: 81   Patient Position - Orthostatic VS: Sitting         Visual Acuity      ED Medications  Medications - No data to display    Diagnostic Studies  Results Reviewed     None                 No orders to display              Procedures  Procedures         ED Course                               MDM  Number of Diagnoses or Management Options  Sinusitis:      Amount and/or Complexity of Data Reviewed  Review and summarize past medical records: yes          Disposition  Final diagnoses:   Sinusitis     Time reflects when diagnosis was documented in both MDM as applicable and the Disposition within this note     Time User Action Codes Description Comment    12/11/2019  5:31 PM Leighton Rose Add [J32 9] Sinusitis       ED Disposition     ED Disposition Condition Date/Time Comment    Discharge Stable Wed Dec 11, 2019  5:31 PM [address] discharge to home/self care              Follow-up Information    None         Discharge Medication List as of 12/11/2019  5:31 PM      START taking these medications    Details   fluticasone (FLONASE) 50 mcg/act nasal spray 1 spray into each nostril daily, Starting Wed 12/11/2019, Print      pseudoephedrine (SUDAFED) 30 mg tablet Take 1 tablet (30 mg total) by mouth every 8 (eight) hours as needed for congestion, Starting Wed 12/11/2019, Print         CONTINUE these medications which have NOT CHANGED    Details   albuterol (2 5 mg/3 mL) 0 083 % nebulizer solution Take 1 vial (2 5 mg total) by nebulization every 6 (six) hours as needed for wheezing or shortness of breath, Starting Tue 10/30/2018, Print      dicyclomine (BENTYL) 20 mg tablet Take 1 tablet (20 mg total) by mouth every 6 (six) hours as needed (abdominal pain), Starting Mon 8/12/2019, Normal      divalproex sodium (DEPAKOTE ER) 500 mg 24 hr tablet Take one tab at bedtime for 7 days, then twice a day, Normal      famotidine (PEPCID) 20 mg tablet Take 1 tablet (20 mg total) by mouth daily, Starting Fri 10/25/2019, Normal      gabapentin (NEURONTIN) 100 mg capsule Take 1 capsule (100 mg total) by mouth 3 (three) times a day as needed (headache or aniexty), Starting Fri 7/12/2019, Normal      linaCLOtide (LINZESS) 145 MCG CAPS Take 1 capsule (145 mcg total) by mouth daily, Starting Fri 10/25/2019, Normal      magnesium oxide (MAG-OX) 400 mg Take 1 tablet (400 mg total) by mouth daily, Starting Fri 7/12/2019, Normal      meloxicam (MOBIC) 15 mg tablet Take 1 tablet (15 mg total) by mouth daily, Starting Tue 10/8/2019, Normal      metoclopramide (REGLAN) 10 mg tablet Take 1 tablet (10 mg total) by mouth every 6 (six) hours, Starting Tue 8/27/2019, Print      omeprazole (PriLOSEC) 40 MG capsule Take 1 capsule (40 mg total) by mouth daily, Starting Mon 4/1/2019, Normal      !! venlafaxine (EFFEXOR-XR) 150 mg 24 hr capsule Take 1 capsule (150 mg total) by mouth daily, Starting Thu 10/24/2019, Normal      !! venlafaxine (EFFEXOR-XR) 75 mg 24 hr capsule Take 1 capsule (75 mg total) by mouth daily, Starting u 10/24/2019, Normal       !! - Potential duplicate medications found  Please discuss with provider  No discharge procedures on file      ED Provider  Electronically Signed by           Hilda Blakely MD  12/11/19 3050

## 2020-01-17 ENCOUNTER — OFFICE VISIT (OUTPATIENT)
Dept: FAMILY MEDICINE CLINIC | Facility: CLINIC | Age: 20
End: 2020-01-17

## 2020-01-17 VITALS
WEIGHT: 153 LBS | TEMPERATURE: 96.9 F | RESPIRATION RATE: 18 BRPM | SYSTOLIC BLOOD PRESSURE: 120 MMHG | BODY MASS INDEX: 27.11 KG/M2 | DIASTOLIC BLOOD PRESSURE: 60 MMHG | HEIGHT: 63 IN | OXYGEN SATURATION: 99 % | HEART RATE: 90 BPM

## 2020-01-17 DIAGNOSIS — F31.60 BIPOLAR AFFECTIVE DISORDER, CURRENT EPISODE MIXED, CURRENT EPISODE SEVERITY UNSPECIFIED (HCC): Primary | ICD-10-CM

## 2020-01-17 DIAGNOSIS — E66.3 OVERWEIGHT: ICD-10-CM

## 2020-01-17 DIAGNOSIS — F41.9 ANXIETY AND DEPRESSION: ICD-10-CM

## 2020-01-17 DIAGNOSIS — F32.A ANXIETY AND DEPRESSION: ICD-10-CM

## 2020-01-17 PROCEDURE — 3008F BODY MASS INDEX DOCD: CPT | Performed by: PHYSICIAN ASSISTANT

## 2020-01-17 PROCEDURE — 99213 OFFICE O/P EST LOW 20 MIN: CPT | Performed by: PHYSICIAN ASSISTANT

## 2020-01-17 RX ORDER — HYDROXYZINE HYDROCHLORIDE 25 MG/1
25 TABLET, FILM COATED ORAL EVERY 6 HOURS PRN
Qty: 90 TABLET | Refills: 0 | Status: SHIPPED | OUTPATIENT
Start: 2020-01-17 | End: 2020-04-17 | Stop reason: SDUPTHER

## 2020-01-17 NOTE — LETTER
January 17, 2020     Patient: Brianda Souza   YOB: 2000   Date of Visit: 1/17/2020       To Whom it May Concern:    Brianda Souza is under my professional care  She was seen in my office on 1/17/2020  She may return to work on 1/17/2020  She is currently under medical treatment and may need to attend up to 1 office visit a week  If you have any questions or concerns, please don't hesitate to call           Sincerely,          Alexandria Coronado PA-C        CC: No Recipients

## 2020-01-17 NOTE — PROGRESS NOTES
Assessment/Plan:    Bipolar disorder [facility])  She was given paper referral for psych  She will continue depakote and wellbutrin and start on hydroxyzine for anxiety    Overweight  BMI Counseling: Body mass index is 27 1 kg/m²  The BMI is above normal  Nutrition recommendations include reducing portion sizes, reducing fast food intake and consuming healthier snacks  Problem List Items Addressed This Visit        Other    Bipolar disorder ([facility] ) - Primary     She was given paper referral for psych  She will continue depakote and wellbutrin and start on hydroxyzine for anxiety         Relevant Medications    hydrOXYzine HCL (ATARAX) 25 mg tablet    Other Relevant Orders    Ambulatory referral to Psychiatry    Overweight     BMI Counseling: Body mass index is 27 1 kg/m²  The BMI is above normal  Nutrition recommendations include reducing portion sizes, reducing fast food intake and consuming healthier snacks  Other Visit Diagnoses     Anxiety and depression        Relevant Medications    hydrOXYzine HCL (ATARAX) 25 mg tablet    Other Relevant Orders    Ambulatory referral to Psychiatry            Subjective:      Patient ID: Margy Zuniga is a 23 y o  female  HPI    21-year-old female with history manic depression anxiety here for follow-up  She is currently taking Depakote and Effexor  She is also was steps follow-up with Psychiatry  She tried [employer] but they told her she needs a doctor referral   Her mood is about the same  It is better now on the medication than without it  She is still getting anxiety nd feeling panic  Seizure like activity has been better  The following portions of the patient's history were reviewed and updated as appropriate:   She  has a past medical history of Anxiety, Asthma, Lymphadenitis, Lymphadenopathy, 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[facility] ), Psychiatric disorder, Psychiatric illness, Psychosis ([facility] ), and Seizures ([facility] )    She   Patient Active Problem List    Diagnosis Date Noted    Tobacco abuse 09/18/2019    Psychogenic nonepileptic seizure 06/06/2019    Lumbar radiculopathy 06/06/2019    Epigastric pain 05/01/2019    Loose stools 05/01/2019    Change in bowel habits 05/01/2019    Nausea and vomiting 05/01/2019    Shaking     Diarrhea 04/02/2019    STD (sexually transmitted disease) 10/08/2018    High risk heterosexual behavior 10/08/2018    Lymphadenopathy 04/26/2018    Common migraine without aura 05/16/2017    Cyst of right ovary 05/03/2017    Allergic rhinitis 06/18/2015    Fibrocystic breast 03/05/2015    Vertigo 02/23/2015    Acid reflux 11/18/2014    Breast pain 06/30/2014    B[facility] disorder ([facility] ) 12/20/2013    Hyperlipidemia 06/20/2013    Overweight 03/12/2013    Asthma 05/18/2012     She  has a past surgical history that includes Colonoscopy (N/A, 5/3/2019) and Esophagogastroduodenoscopy (N/A, 5/3/2019)  Her family history includes Drug abuse in her father; Febrile seizures in her maternal uncle; Migraines in her mother; Other in her mother  She  reports that she has been smoking cigarettes  She has been smoking about 1 00 pack per day  She has never used smokeless tobacco  She reports that she drinks alcohol  She reports that she has current or past drug history  Drug: Marijuana    Current Outpatient Medications   Medication Sig Dispense Refill    albuterol (2 5 mg/3 mL) 0 083 % nebulizer solution Take 1 vial (2 5 mg total) by nebulization every 6 (six) hours as needed for wheezing or shortness of breath 75 mL 0    dicyclomine (BENTYL) 20 mg tablet Take 1 tablet (20 mg total) by mouth every 6 (six) hours as needed (abdominal pain) 120 tablet 2    divalproex sodium (DEPAKOTE ER) 500 mg 24 hr tablet Take one tab at bedtime for 7 days, then twice a day (Patient taking differently: every 12 (twelve) hours Take one tab at bedtime for 7 days, then twice a day) 30 tablet 5    famotidine (PEPCID) 20 mg tablet Take 1 tablet (20 mg total) by mouth daily 30 tablet 3    fluticasone (FLONASE) 50 mcg/act nasal spray 1 spray into each nostril daily 16 g 0    gabapentin (NEURONTIN) 100 mg capsule Take 1 capsule (100 mg total) by mouth 3 (three) times a day as needed (headache or aniexty) 90 capsule 3    linaCLOtide (LINZESS) 145 MCG CAPS Take 1 capsule (145 mcg total) by mouth daily 30 capsule 3    magnesium oxide (MAG-OX) 400 mg Take 1 tablet (400 mg total) by mouth daily 90 tablet 3    meloxicam (MOBIC) 15 mg tablet Take 1 tablet (15 mg total) by mouth daily 30 tablet 1    omeprazole (PriLOSEC) 40 MG capsule Take 1 capsule (40 mg total) by mouth daily 30 capsule 1    venlafaxine (EFFEXOR-XR) 150 mg 24 hr capsule Take 1 capsule (150 mg total) by mouth daily 30 capsule 2    venlafaxine (EFFEXOR-XR) 75 mg 24 hr capsule Take 1 capsule (75 mg total) by mouth daily 30 capsule 2    hydrOXYzine HCL (ATARAX) 25 mg tablet Take 1 tablet (25 mg total) by mouth every 6 (six) hours as needed for anxiety 90 tablet 0    metoclopramide (REGLAN) 10 mg tablet Take 1 tablet (10 mg total) by mouth every 6 (six) hours (Patient not taking: Reported on 9/18/2019) 30 tablet 0    pseudoephedrine (SUDAFED) 30 mg tablet Take 1 tablet (30 mg total) by mouth every 8 (eight) hours as needed for congestion (Patient not taking: Reported on 1/17/2020) 20 tablet 0     No current facility-administered medications for this visit        Current Outpatient Medications on File Prior to Visit   Medication Sig    albuterol (2 5 mg/3 mL) 0 083 % nebulizer solution Take 1 vial (2 5 mg total) by nebulization every 6 (six) hours as needed for wheezing or shortness of breath    dicyclomine (BENTYL) 20 mg tablet Take 1 tablet (20 mg total) by mouth every 6 (six) hours as needed (abdominal pain)    divalproex sodium (DEPAKOTE ER) 500 mg 24 hr tablet Take one tab at bedtime for 7 days, then twice a day (Patient taking differently: every 12 (twelve) hours Take one tab at bedtime for 7 days, then twice a day)    famotidine (PEPCID) 20 mg tablet Take 1 tablet (20 mg total) by mouth daily    fluticasone (FLONASE) 50 mcg/act nasal spray 1 spray into each nostril daily    gabapentin (NEURONTIN) 100 mg capsule Take 1 capsule (100 mg total) by mouth 3 (three) times a day as needed (headache or aniexty)    linaCLOtide (LINZESS) 145 MCG CAPS Take 1 capsule (145 mcg total) by mouth daily    magnesium oxide (MAG-OX) 400 mg Take 1 tablet (400 mg total) by mouth daily    meloxicam (MOBIC) 15 mg tablet Take 1 tablet (15 mg total) by mouth daily    omeprazole (PriLOSEC) 40 MG capsule Take 1 capsule (40 mg total) by mouth daily    venlafaxine (EFFEXOR-XR) 150 mg 24 hr capsule Take 1 capsule (150 mg total) by mouth daily    venlafaxine (EFFEXOR-XR) 75 mg 24 hr capsule Take 1 capsule (75 mg total) by mouth daily    metoclopramide (REGLAN) 10 mg tablet Take 1 tablet (10 mg total) by mouth every 6 (six) hours (Patient not taking: Reported on 9/18/2019)    pseudoephedrine (SUDAFED) 30 mg tablet Take 1 tablet (30 mg total) by mouth every 8 (eight) hours as needed for congestion (Patient not taking: Reported on 1/17/2020)     No current facility-administered medications on file prior to visit  She is allergic to penicillins       Review of Systems   Constitutional: Negative for activity change, appetite change, chills, fatigue and unexpected weight change  HENT: Negative for dental problem, ear pain, hearing loss and sore throat  Eyes: Negative for visual disturbance  Respiratory: Negative for cough and wheezing  Cardiovascular: Negative for chest pain  Gastrointestinal: Negative for abdominal pain, constipation, diarrhea and vomiting  Genitourinary: Negative for difficulty urinating and dysuria  Musculoskeletal: Positive for back pain  Negative for arthralgias and myalgias  Skin: Negative for rash  Neurological: Positive for seizures  Negative for dizziness and headaches  Psychiatric/Behavioral: Positive for dysphoric mood  Negative for behavioral problems  The patient is nervous/anxious  Objective:      /60 (BP Location: Left arm, Patient Position: Sitting, Cuff Size: Standard)   Pulse 90   Temp (!) 96 9 °F (36 1 °C) (Temporal)   Resp 18   Ht 5' 3" (1 6 m)   Wt 69 4 kg (153 lb)   LMP 01/17/2020   SpO2 99%   Breastfeeding? No   BMI 27 10 kg/m²          Physical Exam   Constitutional: She is oriented to person, place, and time  She appears well-developed and well-nourished  No distress  HENT:   Head: Normocephalic and atraumatic  Right Ear: External ear normal    Left Ear: External ear normal    Eyes: Conjunctivae are normal    Neck: Normal range of motion  Neck supple  No thyromegaly present  Cardiovascular: Normal rate, regular rhythm and normal heart sounds  No murmur heard  Pulmonary/Chest: Effort normal and breath sounds normal  No respiratory distress  She has no wheezes  Lymphadenopathy:     She has no cervical adenopathy  Neurological: She is alert and oriented to person, place, and time  Psychiatric: She has a normal mood and affect  Her behavior is normal    Nursing note and vitals reviewed

## 2020-01-19 NOTE — ASSESSMENT & PLAN NOTE
She was given paper referral for psych    She will continue depakote and wellbutrin and start on hydroxyzine for anxiety

## 2020-01-19 NOTE — ASSESSMENT & PLAN NOTE
BMI Counseling: Body mass index is 27 1 kg/m²  The BMI is above normal  Nutrition recommendations include reducing portion sizes, reducing fast food intake and consuming healthier snacks

## 2020-01-23 ENCOUNTER — TELEPHONE (OUTPATIENT)
Dept: NEUROLOGY | Facility: CLINIC | Age: 20
End: 2020-01-23

## 2020-01-23 NOTE — TELEPHONE ENCOUNTER
Called patient from wait list to offer apt tomorrow in [facility] at either 11/11:30 patient unable to come to [facility]

## 2020-02-03 ENCOUNTER — ANNUAL EXAM (OUTPATIENT)
Dept: OBGYN CLINIC | Facility: CLINIC | Age: 20
End: 2020-02-03

## 2020-02-03 VITALS
BODY MASS INDEX: 27.95 KG/M2 | DIASTOLIC BLOOD PRESSURE: 66 MMHG | HEART RATE: 85 BPM | WEIGHT: 157.8 LBS | SYSTOLIC BLOOD PRESSURE: 94 MMHG

## 2020-02-03 DIAGNOSIS — Z12.4 SCREENING FOR CERVICAL CANCER: ICD-10-CM

## 2020-02-03 DIAGNOSIS — Z01.419 ENCOUNTER FOR ANNUAL ROUTINE GYNECOLOGICAL EXAMINATION: Primary | ICD-10-CM

## 2020-02-03 PROCEDURE — G0145 SCR C/V CYTO,THINLAYER,RESCR: HCPCS | Performed by: NURSE PRACTITIONER

## 2020-02-03 PROCEDURE — 99395 PREV VISIT EST AGE 18-39: CPT | Performed by: NURSE PRACTITIONER

## 2020-02-03 PROCEDURE — T1015 CLINIC SERVICE: HCPCS | Performed by: NURSE PRACTITIONER

## 2020-02-03 NOTE — LETTER
2/10/2020    To Yvonne NOWAK: 2000      This letter is to advise you that your recent PAP SMEAR results were reviewed by me and are NORMAL    We will see you in 1 year for your annual exam     Larry Orourke

## 2020-02-03 NOTE — PROGRESS NOTES
Annual Exam    Assessment   1  Encounter for annual routine gynecological examination  Liquid-based pap, screening   2  Screening for cervical cancer       well woman       Plan       All questions answered  Breast self exam technique reviewed and patient encouraged to perform self-exam monthly  Contraception: none  Discussed healthy lifestyle modifications  Follow up in 1 year  Thin prep Pap smear  Patient Instructions   PAP results will be available in 2 weeks  Call with needs or concerns   Return in 1 year  Pt verbalized understanding of all discussed  Subjective      Apurva Chelsea Harley is a 23 y o  No obstetric history on file  female who presents for annual well woman exam  Periods are regular every 28-30 days, lasting 5 days  No intermenstrual bleeding, spotting, or discharge  1 partner, female x 2 years, denies domestic violence  States STD testing was negative at the beginning of the relationship  First PAP today      Tobacco Cessation Counseling: Tobacco cessation counseling and education was provided  The patient is sincerely urged to quit consumption of tobacco  She is not ready to quit tobacco  The numerous health risks of tobacco consumption were discussed  pt states she has decreased the amount of cigarettes per day from 1 pack to 1/2 a pack  Current contraception: none  History of abnormal Pap smear: no  Family history of uterine or ovarian cancer: no  Regular self breast exam: yes  History of abnormal mammogram: N/A  Family history of breast cancer: no  History of abnormal lipids: unknown  Menstrual History:     Menarche age: 14  Patient's last menstrual period was 2020              The following portions of the patient's history were reviewed and updated as appropriate: allergies, current medications, past family history, past medical history, past social history, past surgical history and problem list     OB History        0    Para   0    Term   0       0    AB 0    Living   0       SAB   0    TAB   0    Ectopic   0    Multiple   0    Live Births   0           Obstetric Comments   Irregular menstrual cycle -Last assessed 09/18/14         1 period per month lasting 5 days, assessed 2/3/2020 last period 1/17/2020    Review of Systems  Pertinent items are noted in HPI        Objective      BP 94/66   Pulse 85   Wt 71 6 kg (157 lb 12 8 oz)   LMP 01/17/2020   BMI 27 95 kg/m²     General: alert and oriented, in no acute distress, alert, appears stated age and cooperative   Heart: regular rate and rhythm, S1, S2 normal, no murmur, click, rub or gallop   Lungs: clear to auscultation bilaterally   Thyroid: Negative masses   Abdomen: soft, non-tender, without masses or organomegaly   Vulva: normal   Vagina: normal mucosa   Cervix: no bleeding following Pap, no cervical motion tenderness and no lesions   Uterus: normal size, non-tender, normal shape and consistency   Adnexa: normal adnexa   Urethra: normal   Breasts: NT,negative masses, discharge, or dimpling

## 2020-02-07 ENCOUNTER — TELEPHONE (OUTPATIENT)
Dept: NEUROLOGY | Facility: CLINIC | Age: 20
End: 2020-02-07

## 2020-02-07 NOTE — TELEPHONE ENCOUNTER
Called patient and left message offering appointment for today at 4 pm in [facility] office  Advised to call back if interested

## 2020-02-08 LAB
LAB AP GYN PRIMARY INTERPRETATION: NORMAL
LAB AP LMP: NORMAL
Lab: NORMAL

## 2020-02-13 ENCOUNTER — HOSPITAL ENCOUNTER (EMERGENCY)
Facility: HOSPITAL | Age: 20
Discharge: HOME | End: 2020-02-13
Attending: EMERGENCY MEDICINE
Payer: COMMERCIAL

## 2020-02-13 VITALS
OXYGEN SATURATION: 100 % | HEIGHT: 63 IN | TEMPERATURE: 97.3 F | DIASTOLIC BLOOD PRESSURE: 67 MMHG | BODY MASS INDEX: 27.82 KG/M2 | RESPIRATION RATE: 18 BRPM | HEART RATE: 72 BPM | WEIGHT: 157 LBS | SYSTOLIC BLOOD PRESSURE: 117 MMHG

## 2020-02-13 DIAGNOSIS — N92.0 MENORRHAGIA WITH REGULAR CYCLE: Primary | ICD-10-CM

## 2020-02-13 LAB
BASOPHILS # BLD AUTO: 0 THOUSANDS/ΜL (ref 0–0.1)
BASOPHILS NFR BLD AUTO: 0 % (ref 0–1)
EOSINOPHIL # BLD AUTO: 0.2 THOUSAND/ΜL (ref 0–0.4)
EOSINOPHIL NFR BLD AUTO: 2 % (ref 0–6)
ERYTHROCYTE [DISTWIDTH] IN BLOOD BY AUTOMATED COUNT: 15.3 %
EXT PREG TEST URINE: NEGATIVE
EXT. CONTROL ED NAV: NORMAL
HCT VFR BLD AUTO: 42.9 % (ref 36–46)
HGB BLD-MCNC: 14.6 G/DL (ref 12–16)
LYMPHOCYTES # BLD AUTO: 1.3 THOUSANDS/ΜL (ref 0.5–4)
LYMPHOCYTES NFR BLD AUTO: 13 % (ref 25–45)
MCH RBC QN AUTO: 28.7 PG (ref 26–34)
MCHC RBC AUTO-ENTMCNC: 34 G/DL (ref 31–36)
MCV RBC AUTO: 85 FL (ref 80–100)
MONOCYTES # BLD AUTO: 0.6 THOUSAND/ΜL (ref 0.2–0.9)
MONOCYTES NFR BLD AUTO: 6 % (ref 1–10)
NEUTROPHILS # BLD AUTO: 8 THOUSANDS/ΜL (ref 1.8–7.8)
NEUTS SEG NFR BLD AUTO: 79 % (ref 45–65)
PLATELET # BLD AUTO: 309 THOUSANDS/UL (ref 150–450)
PMV BLD AUTO: 7.9 FL (ref 8.9–12.7)
RBC # BLD AUTO: 5.07 MILLION/UL (ref 4–5.2)
WBC # BLD AUTO: 10.1 THOUSAND/UL (ref 4.5–11)

## 2020-02-13 PROCEDURE — 85025 COMPLETE CBC W/AUTO DIFF WBC: CPT | Performed by: EMERGENCY MEDICINE

## 2020-02-13 PROCEDURE — 96361 HYDRATE IV INFUSION ADD-ON: CPT

## 2020-02-13 PROCEDURE — 36415 COLL VENOUS BLD VENIPUNCTURE: CPT | Performed by: EMERGENCY MEDICINE

## 2020-02-13 PROCEDURE — 99284 EMERGENCY DEPT VISIT MOD MDM: CPT | Performed by: EMERGENCY MEDICINE

## 2020-02-13 PROCEDURE — 81025 URINE PREGNANCY TEST: CPT | Performed by: EMERGENCY MEDICINE

## 2020-02-13 PROCEDURE — 96374 THER/PROPH/DIAG INJ IV PUSH: CPT

## 2020-02-13 PROCEDURE — 99284 EMERGENCY DEPT VISIT MOD MDM: CPT

## 2020-02-13 PROCEDURE — 96375 TX/PRO/DX INJ NEW DRUG ADDON: CPT

## 2020-02-13 RX ORDER — KETOROLAC TROMETHAMINE 30 MG/ML
30 INJECTION, SOLUTION INTRAMUSCULAR; INTRAVENOUS ONCE
Status: COMPLETED | OUTPATIENT
Start: 2020-02-13 | End: 2020-02-13

## 2020-02-13 RX ORDER — ONDANSETRON 2 MG/ML
4 INJECTION INTRAMUSCULAR; INTRAVENOUS ONCE
Status: COMPLETED | OUTPATIENT
Start: 2020-02-13 | End: 2020-02-13

## 2020-02-13 RX ORDER — ONDANSETRON 4 MG/1
4 TABLET, ORALLY DISINTEGRATING ORAL EVERY 6 HOURS PRN
Qty: 16 TABLET | Refills: 0 | Status: SHIPPED | OUTPATIENT
Start: 2020-02-13 | End: 2021-11-11

## 2020-02-13 RX ORDER — ACETAMINOPHEN AND CODEINE PHOSPHATE 300; 30 MG/1; MG/1
1-2 TABLET ORAL EVERY 6 HOURS PRN
Qty: 15 TABLET | Refills: 0 | Status: SHIPPED | OUTPATIENT
Start: 2020-02-13 | End: 2020-02-23

## 2020-02-13 RX ADMIN — SODIUM CHLORIDE 1000 ML: 0.9 INJECTION, SOLUTION INTRAVENOUS at 11:41

## 2020-02-13 RX ADMIN — KETOROLAC TROMETHAMINE 30 MG: 30 INJECTION, SOLUTION INTRAMUSCULAR at 12:22

## 2020-02-13 RX ADMIN — ONDANSETRON 4 MG: 2 INJECTION INTRAMUSCULAR; INTRAVENOUS at 11:41

## 2020-02-13 NOTE — ED PROVIDER NOTES
History  Chief Complaint   Patient presents with    Abdominal Pain     Pt C/o abdominal pain/ cramping with period starting yesterday  Pt states period is heavier then normal and has already saturated 12 medium sized pads already     Patient is a 69-year-old female presenting with 2 day history progressively worsening menstrual cramps vaginal bleeding and nausea vomiting  Started yesterday but breathing increased today  States use 12 pads today  Vomited several times and able tolerate a p o  No medicine taken due to nausea vomiting  States normal time of period for her but heavier than normal   Has an OBGYN but did not call  Denies any lightheadedness or dizziness  Prior to Admission Medications   Prescriptions Last Dose Informant Patient Reported? Taking?    albuterol (2 5 mg/3 mL) 0 083 % nebulizer solution  Self No No   Sig: Take 1 vial (2 5 mg total) by nebulization every 6 (six) hours as needed for wheezing or shortness of breath   dicyclomine (BENTYL) 20 mg tablet   No No   Sig: Take 1 tablet (20 mg total) by mouth every 6 (six) hours as needed (abdominal pain)   divalproex sodium (DEPAKOTE ER) 500 mg 24 hr tablet   No No   Sig: Take one tab at bedtime for 7 days, then twice a day   Patient taking differently: every 12 (twelve) hours Take one tab at bedtime for 7 days, then twice a day   famotidine (PEPCID) 20 mg tablet   No No   Sig: Take 1 tablet (20 mg total) by mouth daily   fluticasone (FLONASE) 50 mcg/act nasal spray   No No   Si spray into each nostril daily   gabapentin (NEURONTIN) 100 mg capsule  Self No No   Sig: Take 1 capsule (100 mg total) by mouth 3 (three) times a day as needed (headache or aniexty)   hydrOXYzine HCL (ATARAX) 25 mg tablet   No No   Sig: Take 1 tablet (25 mg total) by mouth every 6 (six) hours as needed for anxiety   linaCLOtide (LINZESS) 145 MCG CAPS   No No   Sig: Take 1 capsule (145 mcg total) by mouth daily   magnesium oxide (MAG-OX) 400 mg  Self No No Sig: Take 1 tablet (400 mg total) by mouth daily   meloxicam (MOBIC) 15 mg tablet   No No   Sig: Take 1 tablet (15 mg total) by mouth daily   metoclopramide (REGLAN) 10 mg tablet   No No   Sig: Take 1 tablet (10 mg total) by mouth every 6 (six) hours   Patient not taking: Reported on 9/18/2019   omeprazole (PriLOSEC) 40 MG capsule  Self No No   Sig: Take 1 capsule (40 mg total) by mouth daily   pseudoephedrine (SUDAFED) 30 mg tablet   No No   Sig: Take 1 tablet (30 mg total) by mouth every 8 (eight) hours as needed for congestion   Patient not taking: Reported on 1/17/2020   venlafaxine (EFFEXOR-XR) 150 mg 24 hr capsule   No No   Sig: Take 1 capsule (150 mg total) by mouth daily   venlafaxine (EFFEXOR-XR) 75 mg 24 hr capsule   No No   Sig: Take 1 capsule (75 mg total) by mouth daily      Facility-Administered Medications: None       Past Medical History:   Diagnosis Date    Anxiety     Asthma     Lymphadenitis     Last assessed 12/30/14    Lymphadenopathy     Last assessed 10/16/13    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[facility] )     Psychiatric disorder     Psychiatric illness     Psychosis ([facility] )     Seizures ([facility] )        Past Surgical History:   Procedure Laterality Date    COLONOSCOPY N/A 5/3/2019    Procedure: COLONOSCOPY;  Surgeon: Alex Sanders MD;  [facility]: [facility]; Service: Gastroenterology    ESOPHAGOGASTRODUODENOSCOPY N/A 5/3/2019    Procedure: ESOPHAGOGASTRODUODENOSCOPY (EGD); Surgeon: Alex Sanders MD;  [facility]: [facility]; Service: Gastroenterology       Family History   Problem Relation Age of Onset   [facility] Migraines Mother     Other Mother         Neck pain    Drug abuse Father     Febrile seizures Maternal Uncle      I have reviewed and agree with the history as documented      Social History     Tobacco Use    Smoking status: Current Every Day Smoker     Packs/day: 1 00     Types: Cigarettes    Smokeless tobacco: Never Used   Substance Use Topics    Alcohol use: Yes     Comment: about once a year    Drug use: Yes     Types: Marijuana       Review of Systems   Constitutional: Negative  HENT: Negative  Eyes: Negative  Respiratory: Negative  Cardiovascular: Negative  Gastrointestinal: Positive for abdominal pain  Endocrine: Negative  Genitourinary: Positive for vaginal bleeding  Musculoskeletal: Negative  Skin: Negative  Allergic/Immunologic: Negative  Neurological: Negative  Hematological: Negative  Psychiatric/Behavioral: Negative  All other systems reviewed and are negative  Physical Exam  Physical Exam   Constitutional: She is oriented to person, place, and time  She appears well-developed and well-nourished  HENT:   Head: Normocephalic  Mouth/Throat: Oropharynx is clear and moist    Eyes: Pupils are equal, round, and reactive to light  Cardiovascular: Normal rate, regular rhythm and normal heart sounds  Pulmonary/Chest: Effort normal and breath sounds normal    Abdominal: Soft  Normal appearance and bowel sounds are normal  There is tenderness in the left lower quadrant  There is no rigidity, no rebound, no guarding, no CVA tenderness, no tenderness at McBurney's point and negative Mcrae's sign  Neurological: She is alert and oriented to person, place, and time  Skin: Skin is warm and dry  Capillary refill takes less than 2 seconds  No pallor  Psychiatric: She has a normal mood and affect  Her behavior is normal    Nursing note and vitals reviewed        Vital Signs  ED Triage Vitals [02/13/20 1130]   Temperature Pulse Respirations Blood Pressure SpO2   (!) 97 3 °F (36 3 °C) 80 20 119/53 98 %      Temp Source Heart Rate Source Patient Position - Orthostatic VS BP Location FiO2 (%)   Oral Monitor Sitting Left arm --      Pain Score       Worst Possible Pain           Vitals:    02/13/20 1130   BP: 119/53   Pulse: 80   Patient Position - Orthostatic VS: Sitting         Visual Acuity      ED Medications  Medications   sodium chloride 0 9 % bolus 1,000 mL (1,000 mL Intravenous New Bag 2/13/20 1141)   ondansetron (ZOFRAN) injection 4 mg (4 mg Intravenous Given 2/13/20 1141)   ketorolac (TORADOL) injection 30 mg (30 mg Intravenous Given 2/13/20 1222)       Diagnostic Studies  Results Reviewed     Procedure Component Value Units Date/Time    POCT pregnancy, urine [[id number]]  (Normal) Resulted:  02/13/20 1223    Lab Status:  Final result Updated:  02/13/20 1223     EXT PREG TEST UR (Ref: Negative) negative     Control valid    CBC and differential [[id number]]  (Abnormal) Collected:  02/13/20 1144    Lab Status:  Final result Specimen:  Blood from Arm, Left Updated:  02/13/20 1157     WBC 10 10 Thousand/uL      RBC 5 07 Million/uL      Hemoglobin 14 6 g/dL      Hematocrit 42 9 %      MCV 85 fL      MCH 28 7 pg      MCHC 34 0 g/dL      RDW 15 3 %      MPV 7 9 fL      Platelets 564 Thousands/uL      Neutrophils Relative 79 %      Lymphocytes Relative 13 %      Monocytes Relative 6 %      Eosinophils Relative 2 %      Basophils Relative 0 %      Neutrophils Absolute 8 00 Thousands/µL      Lymphocytes Absolute 1 30 Thousands/µL      Monocytes Absolute 0 60 Thousand/µL      Eosinophils Absolute 0 20 Thousand/µL      Basophils Absolute 0 00 Thousands/µL                  No orders to display              Procedures  Procedures         ED Course  ED Course as of Feb 13 1242   Thu Feb 13, 2020   1238 Went over blood work and urine results with patient  Will follow up with OB/GYN, consider OCPs if continued heavy periods  Will dc with pain and nausea meds ([facility] [address] )  return for any concerns                                     MDM  Number of Diagnoses or Management Options  Menorrhagia with regular cycle:      Amount and/or Complexity of Data Reviewed  Clinical lab tests: ordered and reviewed  Tests in the medicine section of CPT®: reviewed  Obtain history from someone other than the patient: yes  Review and summarize past medical records: yes  Independent visualization of images, tracings, or specimens: yes          Disposition  Final diagnoses:   Menorrhagia with regular cycle     Time reflects when diagnosis was documented in both MDM as applicable and the Disposition within this note     Time User Action Codes Description Comment    2/13/2020 12:40 PM Chantal Leiva Add [N92 0] Menorrhagia with regular cycle       ED Disposition     ED Disposition Condition Date/Time Comment    Discharge Stable u Feb 13, 2020 12:39 PM Low Ortiz discharge to home/self care  Follow-up Information     Follow up With Specialties Details Why Contact Info    Alexandria Coronado PA-C Family Medicine, Physician Assistant   [address]  [address] [facility]  [address]  [facility] [address]             Patient's Medications   Discharge Prescriptions    ACETAMINOPHEN-CODEINE (TYLENOL #3) 300-30 MG PER TABLET    Take 1-2 tablets by mouth every 6 (six) hours as needed for moderate pain for up to 10 days       Start Date: 2/13/2020 End Date: 2/23/2020       Order Dose: 1-2 tablets       Quantity: 15 tablet    Refills: 0    ONDANSETRON (ZOFRAN-ODT) 4 MG DISINTEGRATING TABLET    Take 1 tablet (4 mg total) by mouth every 6 (six) hours as needed for nausea or vomiting       Start Date: 2/13/2020 End Date: --       Order Dose: 4 mg       Quantity: 16 tablet    Refills: 0     No discharge procedures on file      PDMP Review     None          ED Provider  Electronically Signed by           Valerie Cali MD  02/13/20 3446

## 2020-03-03 ENCOUNTER — TELEPHONE (OUTPATIENT)
Dept: NEUROLOGY | Facility: CLINIC | Age: 20
End: 2020-03-03

## 2020-03-03 NOTE — TELEPHONE ENCOUNTER
Called patient offering appointment with Josué Francois but she prefers her original appointment with Dr Leila Turner

## 2020-03-09 ENCOUNTER — OFFICE VISIT (OUTPATIENT)
Dept: NEUROLOGY | Facility: CLINIC | Age: 20
End: 2020-03-09
Payer: COMMERCIAL

## 2020-03-09 VITALS
SYSTOLIC BLOOD PRESSURE: 105 MMHG | WEIGHT: 148 LBS | DIASTOLIC BLOOD PRESSURE: 52 MMHG | HEART RATE: 90 BPM | BODY MASS INDEX: 26.22 KG/M2 | HEIGHT: 63 IN

## 2020-03-09 DIAGNOSIS — R51.9 CHRONIC DAILY HEADACHE: ICD-10-CM

## 2020-03-09 DIAGNOSIS — G43.009 MIGRAINE WITHOUT AURA AND WITHOUT STATUS MIGRAINOSUS, NOT INTRACTABLE: ICD-10-CM

## 2020-03-09 DIAGNOSIS — F44.5 PSYCHOGENIC NONEPILEPTIC SEIZURE: ICD-10-CM

## 2020-03-09 DIAGNOSIS — F31.60 BIPOLAR AFFECTIVE DISORDER, CURRENT EPISODE MIXED, CURRENT EPISODE SEVERITY UNSPECIFIED (HCC): ICD-10-CM

## 2020-03-09 DIAGNOSIS — R25.1 SHAKING: ICD-10-CM

## 2020-03-09 DIAGNOSIS — G40.909 RECURRENT SEIZURES (HCC): ICD-10-CM

## 2020-03-09 DIAGNOSIS — F44.5 FUNCTIONAL NEUROLOGICAL SYMPTOM DISORDER WITH ATTACKS OR SEIZURES: Primary | ICD-10-CM

## 2020-03-09 PROCEDURE — 99215 OFFICE O/P EST HI 40 MIN: CPT | Performed by: PSYCHIATRY & NEUROLOGY

## 2020-03-09 PROCEDURE — 4004F PT TOBACCO SCREEN RCVD TLK: CPT | Performed by: PSYCHIATRY & NEUROLOGY

## 2020-03-09 RX ORDER — DIVALPROEX SODIUM 500 MG/1
500 TABLET, EXTENDED RELEASE ORAL
Qty: 30 TABLET | Refills: 5 | Status: SHIPPED | OUTPATIENT
Start: 2020-03-09 | End: 2020-04-17 | Stop reason: SDUPTHER

## 2020-03-09 NOTE — PATIENT INSTRUCTIONS
Plan:   1 - Decrease Divalproex ER to 500mg at bedtime  2 - call the office if you have worsening headaches or mood swings after dose reduction of Divalproex, will increase Divalproex back to 500mg twice a day  3 - referral to psychologist and psychiatrist for the management of nonepileptic psychogenic events (pseudoseizures) for cognitive behavioral therapy and management of bipolar disorder and severe anxiety disorder  4 - follow-up with Dr Alex Moreno regarding your migraine headaches  5 - follow-up in 3 months with advanced practitioner and Dr Cinthia Nava in 6 month  6 - if you continue to have nonepileptic spells, no driving or operation of heavy machinery  7 - call the office if you are having a difficult time getting in to see behavioral health specialist, can ask for  to assist with resources  Non-Epileptic Psychogenic Spells (NEPS)  What are non-epileptic psychogenic spells?  Non-epileptic psychogenic spells (NEPS), sometimes called non-epileptic attack, nervous spells, pseudo-seizures, or stress spells, are behavioral events that look like epileptic seizures to an observer, but are not actually epileptic seizures  What is the difference between epileptic seizures and NEPS?  Epileptic seizures can be described as an electrical storm in the brain  The electrical storm causes the symptoms of the seizure (shaking, unresponsiveness, etc) and shows up as abnormal changes in brain waves on EEG monitoring   Non-epileptic psychogenic spells (NEPS) are not due to an electrical storm in the brain and brain waves on EEG monitoring remain normal during NEPS  They are still real, uncontrollable events that can cause a person to be unresponsive and sometimes shake, but they are not caused by abnormal electrical signals in the brain  How is the diagnosis of NEPS made?     In most cases, the events must be captured in the act on EEG while patients are monitored on video-EEG, like in an Epilepsy Monitoring Unit   This allows doctors to see both what the patient is doing during the event and monitor the brain waves to look for the presence or absence of abnormal brain waves (the electrical storm)  Why is it important to diagnose NEPS?  The treatment for epileptic seizures and non-epileptic psychogenic spells are very different   Seizure medications are designed to treat electrical storms of the brain, and dont work in [employer] because an electrical storm is not the problem   By confirming the diagnosis of NEPS, patients can avoid unnecessary medications or procedures and focus on treatments specific for NEPS with a mental health provider  How common are NEPS?  The exact prevalence of NEPS in the population is not known, but a third to a half of patients seen in Epilepsy monitoring units are diagnosed with NEPS  What causes NEPS?  Sometimes the exact cause of NEPS is difficult to determine   Common causes are depression, anxiety, post-traumatic stress disorder, family conflict, or a history of abuse   Talking with a mental health provider is the best way to try to identify the cause  What if an underlying cause cant be found?  Counseling from a certified counselor, psychologist or psychiatrist is helpful even if a specific cause of NEPS cannot be found  Mental health professionals can teach patients techniques for dealing with the spells  What are the treatment options for NEPS?  Treatment begins with evaluation by a mental health specialist, including psychologists, licensed counselors, and psychiatrists  They will work to identify a cause if possible, and then focus treatments for that cause   Remember, often, no clear cause can be found  Working with a mental health provider is still important   Treatments include counseling, medications, and strategies to cope/deal with the spells     o Some strategies to cope/deal with the spells include: relaxation training, visualization, biofeedback, and active problem solving skills  Are they faked or done purposely?  No  NEPS are not faked   What happens during a non-epileptic psychogenic spell is out of the control of the patient   Working with a mental health provider can help get these events back under a persons control  Can the brain be damaged from NEPS?    No  The brain has normal electrical activity during a NEPS   People can be injured if they fall during an event, so diagnosis and correct treatment are very important  Can NEPS affect a persons activities of daily living?  Even though non-epileptic psychogenic spells are not epileptic seizures, the spells can affect peoples daily lives   When a spell occurs, patients should stop what you are doing until the spell passes   After the spell stops, he/she can then resume their regular activities as soon as they are able  Can people with epilepsy also have NEPS?  Yes  It is possible for people with epilepsy to also have NEPS  EEG monitoring can determine which spells are epileptic seizures and which are NEPS  Where can I find a psychologist?    Often, your primary care doctor or the doctors in the Epilepsy monitoring unit can help direct you to a mental health provider   The American Psychological Association (APA) has a Psychologist  (http://  apa org/)  Enter your zip code and dissociative disorders in the area of specialization field to find the names and contact information of some mental health professionals in your area  What should people do when I have a nonepileptic attack?  It is often difficult to distinguish between an epileptic seizure and nonepileptic attack, unless your family has been instructed on recognizing seizures and nonepileptic attacks     It is recommended that you have your family member, friends, or colleagues available at your doctor's appointment to review what are seizures and what are your nonepileptic attacks (this may include a review of a video of your nonepileptic attack)   First thing is to stay calm and not panic  Make sure that the person having the seizure or attack is safe and remove dangerous objects  You may even place a pillow of soft clothing under their head   Speak calmly to the person having a seizure or attack  Non-epileptic attacks may stop if the person is reassured that they are safe and the attack will end   Remember that non-epileptic attacks do not cause damage to the brain and may go on for several minutes  Sometimes nonepileptic attacks last longer than epileptic seizures   If you are not sure if the person is having a nonepileptic attack or epileptic seizure for more than 5 minutes, you may call for an ambulance but inform the emergency medical technician that of the person's medical diagnosis of epilepsy and / or nonepileptic attacks   Do not hold the person down during the seizure or nonepileptic attack   Do not give the person medication or food or place any object into their mouth during the seizure or nonepileptic attack  For more information about NEPS, you can visit the following website:  [employer]  com  IndividualReport [place]  org  [facility]  info  Book with information and tools for management of NEPS:   Ileana Ibarra  (2014) Psychogenic Non-epileptic Seizures: A Guide   [address], [address]
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
## 2020-03-09 NOTE — PROGRESS NOTES
Review of Systems    {LimROS-complete:15695}      Review of Systems   Constitutional: Positive for appetite change and fatigue  Negative for fever  HENT: Negative  Negative for hearing loss, tinnitus, trouble swallowing and voice change  Eyes: Negative  Negative for photophobia and pain  Respiratory: Negative  Negative for shortness of breath  Cardiovascular: Negative  Negative for palpitations  Gastrointestinal: Positive for diarrhea and nausea  Negative for vomiting  Endocrine: Negative  Negative for cold intolerance and heat intolerance  Genitourinary: Negative  Negative for dysuria, frequency and urgency  Musculoskeletal: Positive for back pain and neck pain  Negative for myalgias  Skin: Positive for rash  Neurological: Positive for headaches  Negative for dizziness, tremors, seizures, syncope, facial asymmetry, speech difficulty, weakness, light-headedness and numbness  Clumsiness, twitching   Hematological: Negative  Does not bruise/bleed easily  Psychiatric/Behavioral: Positive for dysphoric mood and sleep disturbance  Negative for confusion (memory problems) and hallucinations  The patient is nervous/anxious (depression)

## 2020-03-26 ENCOUNTER — TELEPHONE (OUTPATIENT)
Dept: PSYCHIATRY | Facility: CLINIC | Age: 20
End: 2020-03-26

## 2020-04-17 ENCOUNTER — TELEMEDICINE (OUTPATIENT)
Dept: FAMILY MEDICINE CLINIC | Facility: CLINIC | Age: 20
End: 2020-04-17

## 2020-04-17 VITALS — HEIGHT: 63 IN | WEIGHT: 146 LBS | BODY MASS INDEX: 25.87 KG/M2

## 2020-04-17 DIAGNOSIS — F31.60 BIPOLAR AFFECTIVE DISORDER, CURRENT EPISODE MIXED, CURRENT EPISODE SEVERITY UNSPECIFIED (HCC): ICD-10-CM

## 2020-04-17 DIAGNOSIS — G43.009 MIGRAINE WITHOUT AURA AND WITHOUT STATUS MIGRAINOSUS, NOT INTRACTABLE: ICD-10-CM

## 2020-04-17 DIAGNOSIS — F41.9 ANXIETY AND DEPRESSION: ICD-10-CM

## 2020-04-17 DIAGNOSIS — R51.9 CHRONIC DAILY HEADACHE: ICD-10-CM

## 2020-04-17 DIAGNOSIS — F32.A ANXIETY AND DEPRESSION: ICD-10-CM

## 2020-04-17 PROCEDURE — 3008F BODY MASS INDEX DOCD: CPT | Performed by: NURSE PRACTITIONER

## 2020-04-17 PROCEDURE — T1015 CLINIC SERVICE: HCPCS | Performed by: PHYSICIAN ASSISTANT

## 2020-04-17 PROCEDURE — G2012 BRIEF CHECK IN BY MD/QHP: HCPCS | Performed by: PHYSICIAN ASSISTANT

## 2020-04-17 PROCEDURE — 3008F BODY MASS INDEX DOCD: CPT | Performed by: PSYCHIATRY & NEUROLOGY

## 2020-04-17 RX ORDER — DIVALPROEX SODIUM 500 MG/1
500 TABLET, EXTENDED RELEASE ORAL
Qty: 30 TABLET | Refills: 5 | Status: SHIPPED | OUTPATIENT
Start: 2020-04-17 | End: 2021-11-11

## 2020-04-17 RX ORDER — VENLAFAXINE HYDROCHLORIDE 150 MG/1
150 CAPSULE, EXTENDED RELEASE ORAL DAILY
Qty: 30 CAPSULE | Refills: 2 | Status: SHIPPED | OUTPATIENT
Start: 2020-04-17 | End: 2020-07-10

## 2020-04-17 RX ORDER — HYDROXYZINE HYDROCHLORIDE 25 MG/1
25 TABLET, FILM COATED ORAL EVERY 6 HOURS PRN
Qty: 90 TABLET | Refills: 2 | Status: SHIPPED | OUTPATIENT
Start: 2020-04-17 | End: 2021-11-11

## 2020-04-17 RX ORDER — VENLAFAXINE HYDROCHLORIDE 75 MG/1
75 CAPSULE, EXTENDED RELEASE ORAL DAILY
Qty: 30 CAPSULE | Refills: 2 | Status: SHIPPED | OUTPATIENT
Start: 2020-04-17 | End: 2020-07-10

## 2020-04-17 RX ORDER — GABAPENTIN 100 MG/1
100 CAPSULE ORAL 3 TIMES DAILY PRN
Qty: 90 CAPSULE | Refills: 3 | Status: SHIPPED | OUTPATIENT
Start: 2020-04-17 | End: 2021-11-11

## 2020-04-28 ENCOUNTER — PATIENT OUTREACH (OUTPATIENT)
Dept: FAMILY MEDICINE CLINIC | Facility: CLINIC | Age: 20
End: 2020-04-28

## 2020-07-10 DIAGNOSIS — F41.9 ANXIETY AND DEPRESSION: ICD-10-CM

## 2020-07-10 DIAGNOSIS — F32.A ANXIETY AND DEPRESSION: ICD-10-CM

## 2020-07-10 RX ORDER — VENLAFAXINE HYDROCHLORIDE 75 MG/1
CAPSULE, EXTENDED RELEASE ORAL
Qty: 30 CAPSULE | Refills: 2 | Status: SHIPPED | OUTPATIENT
Start: 2020-07-10 | End: 2021-11-11

## 2020-07-10 RX ORDER — VENLAFAXINE HYDROCHLORIDE 150 MG/1
CAPSULE, EXTENDED RELEASE ORAL
Qty: 30 CAPSULE | Refills: 2 | Status: SHIPPED | OUTPATIENT
Start: 2020-07-10 | End: 2021-11-11

## 2020-07-24 ENCOUNTER — TELEPHONE (OUTPATIENT)
Dept: FAMILY MEDICINE CLINIC | Facility: CLINIC | Age: 20
End: 2020-07-24

## 2020-07-31 ENCOUNTER — TELEPHONE (OUTPATIENT)
Dept: FAMILY MEDICINE CLINIC | Facility: CLINIC | Age: 20
End: 2020-07-31

## 2020-07-31 NOTE — TELEPHONE ENCOUNTER
Pt called in regards to her msg earlier this week about her problem list  I informed her it was printed and it was ready for    Pt states her mom will be here to pick it up

## 2020-09-16 ENCOUNTER — TELEPHONE (OUTPATIENT)
Dept: NEUROLOGY | Facility: CLINIC | Age: 20
End: 2020-09-16

## 2020-09-16 NOTE — TELEPHONE ENCOUNTER
lmom need to confirm and complete reg for [id number] [address] please transfer to [facility]

## 2021-01-19 ENCOUNTER — TELEPHONE (OUTPATIENT)
Dept: NEUROLOGY | Facility: CLINIC | Age: 21
End: 2021-01-19

## 2021-01-19 NOTE — TELEPHONE ENCOUNTER
The patient called and said that she is going to see a new neurologist and would like to have all of her records faxed to them  I told her that we will need a Release of Information form completed and then she can mail or fax it back to us   Emailed TABITHA to [email or web address]

## 2021-11-11 ENCOUNTER — HOSPITAL ENCOUNTER (INPATIENT)
Facility: HOSPITAL | Age: 21
LOS: 2 days | Discharge: HOME | DRG: 137 | End: 2021-11-15
Attending: EMERGENCY MEDICINE | Admitting: INTERNAL MEDICINE
Payer: COMMERCIAL

## 2021-11-11 DIAGNOSIS — F29 PSYCHOSIS (HCC): ICD-10-CM

## 2021-11-11 DIAGNOSIS — R11.10 VOMITING: ICD-10-CM

## 2021-11-11 DIAGNOSIS — R19.7 DIARRHEA: ICD-10-CM

## 2021-11-11 DIAGNOSIS — R45.851 SUICIDAL IDEATION: Primary | ICD-10-CM

## 2021-11-11 DIAGNOSIS — F31.60 BIPOLAR AFFECTIVE DISORDER, CURRENT EPISODE MIXED, CURRENT EPISODE SEVERITY UNSPECIFIED (HCC): ICD-10-CM

## 2021-11-11 DIAGNOSIS — U07.1 COVID-19: ICD-10-CM

## 2021-11-11 LAB
AMPHETAMINES SERPL QL SCN: NEGATIVE
BARBITURATES UR QL: NEGATIVE
BENZODIAZ UR QL: NEGATIVE
COCAINE UR QL: NEGATIVE
ETHANOL EXG-MCNC: 0 MG/DL
EXT PREG TEST URINE: NEGATIVE
EXT. CONTROL ED NAV: NORMAL
FLUAV RNA RESP QL NAA+PROBE: NEGATIVE
FLUBV RNA RESP QL NAA+PROBE: NEGATIVE
METHADONE UR QL: NEGATIVE
OPIATES UR QL SCN: NEGATIVE
OXYCODONE+OXYMORPHONE UR QL SCN: NEGATIVE
PCP UR QL: NEGATIVE
RSV RNA RESP QL NAA+PROBE: NEGATIVE
SARS-COV-2 RNA RESP QL NAA+PROBE: POSITIVE
THC UR QL: POSITIVE

## 2021-11-11 PROCEDURE — 80307 DRUG TEST PRSMV CHEM ANLYZR: CPT | Performed by: PHYSICIAN ASSISTANT

## 2021-11-11 PROCEDURE — 82075 ASSAY OF BREATH ETHANOL: CPT | Performed by: PHYSICIAN ASSISTANT

## 2021-11-11 PROCEDURE — 99285 EMERGENCY DEPT VISIT HI MDM: CPT | Performed by: PHYSICIAN ASSISTANT

## 2021-11-11 PROCEDURE — 0241U HB NFCT DS VIR RESP RNA 4 TRGT: CPT | Performed by: PHYSICIAN ASSISTANT

## 2021-11-11 PROCEDURE — 99285 EMERGENCY DEPT VISIT HI MDM: CPT

## 2021-11-11 PROCEDURE — 81025 URINE PREGNANCY TEST: CPT | Performed by: PHYSICIAN ASSISTANT

## 2021-11-11 RX ORDER — LORAZEPAM 0.5 MG/1
0.5 TABLET ORAL ONCE
Status: COMPLETED | OUTPATIENT
Start: 2021-11-11 | End: 2021-11-11

## 2021-11-11 RX ORDER — ONDANSETRON 4 MG/1
4 TABLET, ORALLY DISINTEGRATING ORAL ONCE
Status: COMPLETED | OUTPATIENT
Start: 2021-11-11 | End: 2021-11-11

## 2021-11-11 RX ORDER — PROMETHAZINE HYDROCHLORIDE 25 MG/1
25 TABLET ORAL ONCE
Status: COMPLETED | OUTPATIENT
Start: 2021-11-11 | End: 2021-11-11

## 2021-11-11 RX ADMIN — LORAZEPAM 0.5 MG: 0.5 TABLET ORAL at 23:10

## 2021-11-11 RX ADMIN — ONDANSETRON 4 MG: 4 TABLET, ORALLY DISINTEGRATING ORAL at 14:53

## 2021-11-11 RX ADMIN — PROMETHAZINE HYDROCHLORIDE 25 MG: 25 TABLET ORAL at 23:10

## 2021-11-11 RX ADMIN — ONDANSETRON 4 MG: 4 TABLET, ORALLY DISINTEGRATING ORAL at 20:41

## 2021-11-11 RX ADMIN — ONDANSETRON 4 MG: 4 TABLET, ORALLY DISINTEGRATING ORAL at 19:13

## 2021-11-12 PROCEDURE — 99243 OFF/OP CNSLTJ NEW/EST LOW 30: CPT | Performed by: PSYCHIATRY & NEUROLOGY

## 2021-11-12 RX ORDER — ONDANSETRON 4 MG/1
4 TABLET, ORALLY DISINTEGRATING ORAL ONCE
Status: COMPLETED | OUTPATIENT
Start: 2021-11-12 | End: 2021-11-12

## 2021-11-12 RX ORDER — LORAZEPAM 0.5 MG/1
0.5 TABLET ORAL ONCE
Status: COMPLETED | OUTPATIENT
Start: 2021-11-12 | End: 2021-11-12

## 2021-11-12 RX ORDER — DIVALPROEX SODIUM 250 MG/1
250 TABLET, DELAYED RELEASE ORAL EVERY 8 HOURS SCHEDULED
Status: DISCONTINUED | OUTPATIENT
Start: 2021-11-12 | End: 2021-11-15 | Stop reason: HOSPADM

## 2021-11-12 RX ADMIN — DIVALPROEX SODIUM 250 MG: 250 TABLET, DELAYED RELEASE ORAL at 10:18

## 2021-11-12 RX ADMIN — ONDANSETRON 4 MG: 4 TABLET, ORALLY DISINTEGRATING ORAL at 20:32

## 2021-11-12 RX ADMIN — DIVALPROEX SODIUM 250 MG: 250 TABLET, DELAYED RELEASE ORAL at 14:22

## 2021-11-12 RX ADMIN — DIVALPROEX SODIUM 250 MG: 250 TABLET, DELAYED RELEASE ORAL at 21:15

## 2021-11-12 RX ADMIN — LORAZEPAM 0.5 MG: 0.5 TABLET ORAL at 00:29

## 2021-11-12 RX ADMIN — ONDANSETRON 4 MG: 4 TABLET, ORALLY DISINTEGRATING ORAL at 14:37

## 2021-11-12 RX ADMIN — NICOTINE 7 MG/24 HR DAILY TRANSDERMAL PATCH 7 MG: at 08:15

## 2021-11-12 RX ADMIN — ARIPIPRAZOLE 15 MG: 5 TABLET ORAL at 11:50

## 2021-11-13 ENCOUNTER — APPOINTMENT (INPATIENT)
Dept: RADIOLOGY | Facility: HOSPITAL | Age: 21
DRG: 137 | End: 2021-11-13
Payer: COMMERCIAL

## 2021-11-13 PROBLEM — U07.1 COVID-19 VIRUS INFECTION: Status: ACTIVE | Noted: 2021-11-13

## 2021-11-13 LAB
ALBUMIN SERPL BCP-MCNC: 4 G/DL (ref 3.5–5)
ALP SERPL-CCNC: 121 U/L (ref 46–116)
ALT SERPL W P-5'-P-CCNC: 27 U/L (ref 12–78)
ANION GAP SERPL CALCULATED.3IONS-SCNC: 14 MMOL/L (ref 4–13)
AST SERPL W P-5'-P-CCNC: 20 U/L (ref 5–45)
ATRIAL RATE: 61 BPM
BACTERIA UR QL AUTO: ABNORMAL /HPF
BASOPHILS # BLD AUTO: 0.02 THOUSANDS/ΜL (ref 0–0.1)
BASOPHILS NFR BLD AUTO: 0 % (ref 0–1)
BILIRUB SERPL-MCNC: 0.69 MG/DL (ref 0.2–1)
BILIRUB UR QL STRIP: NEGATIVE
BUN SERPL-MCNC: 12 MG/DL (ref 5–25)
CALCIUM SERPL-MCNC: 9 MG/DL (ref 8.3–10.1)
CHLORIDE SERPL-SCNC: 102 MMOL/L (ref 100–108)
CLARITY UR: CLEAR
CO2 SERPL-SCNC: 21 MMOL/L (ref 21–32)
COLOR UR: YELLOW
CREAT SERPL-MCNC: 0.8 MG/DL (ref 0.6–1.3)
EOSINOPHIL # BLD AUTO: 0.01 THOUSAND/ΜL (ref 0–0.61)
EOSINOPHIL NFR BLD AUTO: 0 % (ref 0–6)
ERYTHROCYTE [DISTWIDTH] IN BLOOD BY AUTOMATED COUNT: 13.2 % (ref 11.6–15.1)
EXT PREG TEST URINE: NORMAL
EXT. CONTROL ED NAV: NORMAL
GFR SERPL CREATININE-BSD FRML MDRD: 105 ML/MIN/1.73SQ M
GLUCOSE SERPL-MCNC: 94 MG/DL (ref 65–140)
GLUCOSE UR STRIP-MCNC: NEGATIVE MG/DL
HCT VFR BLD AUTO: 46.4 % (ref 34.8–46.1)
HGB BLD-MCNC: 15.8 G/DL (ref 11.5–15.4)
HGB UR QL STRIP.AUTO: ABNORMAL
IMM GRANULOCYTES # BLD AUTO: 0.03 THOUSAND/UL (ref 0–0.2)
IMM GRANULOCYTES NFR BLD AUTO: 0 % (ref 0–2)
KETONES UR STRIP-MCNC: ABNORMAL MG/DL
LEUKOCYTE ESTERASE UR QL STRIP: ABNORMAL
LIPASE SERPL-CCNC: 57 U/L (ref 73–393)
LYMPHOCYTES # BLD AUTO: 1.42 THOUSANDS/ΜL (ref 0.6–4.47)
LYMPHOCYTES NFR BLD AUTO: 18 % (ref 14–44)
MCH RBC QN AUTO: 29.2 PG (ref 26.8–34.3)
MCHC RBC AUTO-ENTMCNC: 34.1 G/DL (ref 31.4–37.4)
MCV RBC AUTO: 86 FL (ref 82–98)
MONOCYTES # BLD AUTO: 0.39 THOUSAND/ΜL (ref 0.17–1.22)
MONOCYTES NFR BLD AUTO: 5 % (ref 4–12)
NEUTROPHILS # BLD AUTO: 6.22 THOUSANDS/ΜL (ref 1.85–7.62)
NEUTS SEG NFR BLD AUTO: 77 % (ref 43–75)
NITRITE UR QL STRIP: NEGATIVE
NON-SQ EPI CELLS URNS QL MICRO: ABNORMAL /HPF
NRBC BLD AUTO-RTO: 0 /100 WBCS
P AXIS: 55 DEGREES
PH UR STRIP.AUTO: 6.5 [PH] (ref 4.5–8)
PLATELET # BLD AUTO: 303 THOUSANDS/UL (ref 149–390)
PMV BLD AUTO: 9.4 FL (ref 8.9–12.7)
POTASSIUM SERPL-SCNC: 4.1 MMOL/L (ref 3.5–5.3)
PR INTERVAL: 130 MS
PROT SERPL-MCNC: 8.3 G/DL (ref 6.4–8.2)
PROT UR STRIP-MCNC: NEGATIVE MG/DL
QRS AXIS: 88 DEGREES
QRSD INTERVAL: 96 MS
QT INTERVAL: 424 MS
QTC INTERVAL: 426 MS
RBC # BLD AUTO: 5.42 MILLION/UL (ref 3.81–5.12)
RBC #/AREA URNS AUTO: ABNORMAL /HPF
SODIUM SERPL-SCNC: 137 MMOL/L (ref 136–145)
SP GR UR STRIP.AUTO: 1.02 (ref 1–1.03)
T WAVE AXIS: 24 DEGREES
UROBILINOGEN UR QL STRIP.AUTO: 1 E.U./DL
VENTRICULAR RATE: 61 BPM
WBC # BLD AUTO: 8.09 THOUSAND/UL (ref 4.31–10.16)
WBC #/AREA URNS AUTO: ABNORMAL /HPF

## 2021-11-13 PROCEDURE — 85025 COMPLETE CBC W/AUTO DIFF WBC: CPT | Performed by: PHYSICIAN ASSISTANT

## 2021-11-13 PROCEDURE — 80053 COMPREHEN METABOLIC PANEL: CPT | Performed by: PHYSICIAN ASSISTANT

## 2021-11-13 PROCEDURE — 96374 THER/PROPH/DIAG INJ IV PUSH: CPT

## 2021-11-13 PROCEDURE — 81001 URINALYSIS AUTO W/SCOPE: CPT

## 2021-11-13 PROCEDURE — 99223 1ST HOSP IP/OBS HIGH 75: CPT | Performed by: INTERNAL MEDICINE

## 2021-11-13 PROCEDURE — 81025 URINE PREGNANCY TEST: CPT | Performed by: PHYSICIAN ASSISTANT

## 2021-11-13 PROCEDURE — 36415 COLL VENOUS BLD VENIPUNCTURE: CPT | Performed by: PHYSICIAN ASSISTANT

## 2021-11-13 PROCEDURE — 93005 ELECTROCARDIOGRAM TRACING: CPT

## 2021-11-13 PROCEDURE — 93010 ELECTROCARDIOGRAM REPORT: CPT | Performed by: INTERNAL MEDICINE

## 2021-11-13 PROCEDURE — 83690 ASSAY OF LIPASE: CPT | Performed by: PHYSICIAN ASSISTANT

## 2021-11-13 PROCEDURE — 71045 X-RAY EXAM CHEST 1 VIEW: CPT

## 2021-11-13 PROCEDURE — 96376 TX/PRO/DX INJ SAME DRUG ADON: CPT

## 2021-11-13 PROCEDURE — 96361 HYDRATE IV INFUSION ADD-ON: CPT

## 2021-11-13 RX ORDER — ONDANSETRON 2 MG/ML
4 INJECTION INTRAMUSCULAR; INTRAVENOUS ONCE
Status: COMPLETED | OUTPATIENT
Start: 2021-11-13 | End: 2021-11-13

## 2021-11-13 RX ORDER — ONDANSETRON 4 MG/1
4 TABLET, ORALLY DISINTEGRATING ORAL ONCE
Status: COMPLETED | OUTPATIENT
Start: 2021-11-13 | End: 2021-11-13

## 2021-11-13 RX ORDER — PROMETHAZINE HYDROCHLORIDE 25 MG/1
25 TABLET ORAL ONCE
Status: COMPLETED | OUTPATIENT
Start: 2021-11-13 | End: 2021-11-13

## 2021-11-13 RX ORDER — ACETAMINOPHEN 325 MG/1
650 TABLET ORAL EVERY 6 HOURS PRN
Status: DISCONTINUED | OUTPATIENT
Start: 2021-11-13 | End: 2021-11-15 | Stop reason: HOSPADM

## 2021-11-13 RX ORDER — SODIUM CHLORIDE, SODIUM GLUCONATE, SODIUM ACETATE, POTASSIUM CHLORIDE, MAGNESIUM CHLORIDE, SODIUM PHOSPHATE, DIBASIC, AND POTASSIUM PHOSPHATE .53; .5; .37; .037; .03; .012; .00082 G/100ML; G/100ML; G/100ML; G/100ML; G/100ML; G/100ML; G/100ML
75 INJECTION, SOLUTION INTRAVENOUS CONTINUOUS
Status: DISPENSED | OUTPATIENT
Start: 2021-11-13 | End: 2021-11-14

## 2021-11-13 RX ORDER — LORAZEPAM 0.5 MG/1
0.5 TABLET ORAL ONCE
Status: COMPLETED | OUTPATIENT
Start: 2021-11-13 | End: 2021-11-13

## 2021-11-13 RX ORDER — ONDANSETRON 2 MG/ML
4 INJECTION INTRAMUSCULAR; INTRAVENOUS EVERY 6 HOURS PRN
Status: DISCONTINUED | OUTPATIENT
Start: 2021-11-13 | End: 2021-11-15 | Stop reason: HOSPADM

## 2021-11-13 RX ORDER — DICYCLOMINE HCL 20 MG
20 TABLET ORAL ONCE
Status: COMPLETED | OUTPATIENT
Start: 2021-11-13 | End: 2021-11-13

## 2021-11-13 RX ADMIN — ENOXAPARIN SODIUM 30 MG: 30 INJECTION SUBCUTANEOUS at 21:05

## 2021-11-13 RX ADMIN — DIVALPROEX SODIUM 250 MG: 250 TABLET, DELAYED RELEASE ORAL at 14:15

## 2021-11-13 RX ADMIN — ARIPIPRAZOLE 15 MG: 5 TABLET ORAL at 09:38

## 2021-11-13 RX ADMIN — SODIUM CHLORIDE, SODIUM GLUCONATE, SODIUM ACETATE, POTASSIUM CHLORIDE, MAGNESIUM CHLORIDE, SODIUM PHOSPHATE, DIBASIC, AND POTASSIUM PHOSPHATE 75 ML/HR: .53; .5; .37; .037; .03; .012; .00082 INJECTION, SOLUTION INTRAVENOUS at 16:20

## 2021-11-13 RX ADMIN — ONDANSETRON 4 MG: 4 TABLET, ORALLY DISINTEGRATING ORAL at 07:18

## 2021-11-13 RX ADMIN — SODIUM CHLORIDE 1000 ML: 0.9 INJECTION, SOLUTION INTRAVENOUS at 09:33

## 2021-11-13 RX ADMIN — PROMETHAZINE HYDROCHLORIDE 25 MG: 25 TABLET ORAL at 05:44

## 2021-11-13 RX ADMIN — DIVALPROEX SODIUM 250 MG: 250 TABLET, DELAYED RELEASE ORAL at 23:17

## 2021-11-13 RX ADMIN — LORAZEPAM 0.5 MG: 0.5 TABLET ORAL at 07:34

## 2021-11-13 RX ADMIN — ONDANSETRON 4 MG: 2 INJECTION INTRAMUSCULAR; INTRAVENOUS at 13:57

## 2021-11-13 RX ADMIN — ONDANSETRON 4 MG: 2 INJECTION INTRAMUSCULAR; INTRAVENOUS at 21:05

## 2021-11-13 RX ADMIN — DIVALPROEX SODIUM 250 MG: 250 TABLET, DELAYED RELEASE ORAL at 05:44

## 2021-11-13 RX ADMIN — ONDANSETRON 4 MG: 2 INJECTION INTRAMUSCULAR; INTRAVENOUS at 09:34

## 2021-11-13 RX ADMIN — DICYCLOMINE HYDROCHLORIDE 20 MG: 20 TABLET ORAL at 13:57

## 2021-11-13 RX ADMIN — NICOTINE 7 MG/24 HR DAILY TRANSDERMAL PATCH 7 MG: at 09:35

## 2021-11-14 LAB
ALBUMIN SERPL BCP-MCNC: 4 G/DL (ref 3.5–5)
ALP SERPL-CCNC: 114 U/L (ref 46–116)
ALT SERPL W P-5'-P-CCNC: 23 U/L (ref 12–78)
ANION GAP SERPL CALCULATED.3IONS-SCNC: 16 MMOL/L (ref 4–13)
AST SERPL W P-5'-P-CCNC: 24 U/L (ref 5–45)
BASOPHILS # BLD AUTO: 0.02 THOUSANDS/ΜL (ref 0–0.1)
BASOPHILS NFR BLD AUTO: 0 % (ref 0–1)
BILIRUB SERPL-MCNC: 0.98 MG/DL (ref 0.2–1)
BUN SERPL-MCNC: 11 MG/DL (ref 5–25)
CALCIUM SERPL-MCNC: 9 MG/DL (ref 8.3–10.1)
CHLORIDE SERPL-SCNC: 100 MMOL/L (ref 100–108)
CO2 SERPL-SCNC: 22 MMOL/L (ref 21–32)
CREAT SERPL-MCNC: 0.8 MG/DL (ref 0.6–1.3)
EOSINOPHIL # BLD AUTO: 0.03 THOUSAND/ΜL (ref 0–0.61)
EOSINOPHIL NFR BLD AUTO: 0 % (ref 0–6)
ERYTHROCYTE [DISTWIDTH] IN BLOOD BY AUTOMATED COUNT: 13.1 % (ref 11.6–15.1)
GFR SERPL CREATININE-BSD FRML MDRD: 105 ML/MIN/1.73SQ M
GLUCOSE SERPL-MCNC: 72 MG/DL (ref 65–140)
HCT VFR BLD AUTO: 46.6 % (ref 34.8–46.1)
HGB BLD-MCNC: 15.7 G/DL (ref 11.5–15.4)
IMM GRANULOCYTES # BLD AUTO: 0.04 THOUSAND/UL (ref 0–0.2)
IMM GRANULOCYTES NFR BLD AUTO: 1 % (ref 0–2)
LYMPHOCYTES # BLD AUTO: 2.12 THOUSANDS/ΜL (ref 0.6–4.47)
LYMPHOCYTES NFR BLD AUTO: 31 % (ref 14–44)
MCH RBC QN AUTO: 28.4 PG (ref 26.8–34.3)
MCHC RBC AUTO-ENTMCNC: 33.7 G/DL (ref 31.4–37.4)
MCV RBC AUTO: 84 FL (ref 82–98)
MONOCYTES # BLD AUTO: 0.54 THOUSAND/ΜL (ref 0.17–1.22)
MONOCYTES NFR BLD AUTO: 8 % (ref 4–12)
NEUTROPHILS # BLD AUTO: 4.21 THOUSANDS/ΜL (ref 1.85–7.62)
NEUTS SEG NFR BLD AUTO: 60 % (ref 43–75)
NRBC BLD AUTO-RTO: 0 /100 WBCS
PLATELET # BLD AUTO: 300 THOUSANDS/UL (ref 149–390)
PMV BLD AUTO: 9.8 FL (ref 8.9–12.7)
POTASSIUM SERPL-SCNC: 3.6 MMOL/L (ref 3.5–5.3)
PROT SERPL-MCNC: 8 G/DL (ref 6.4–8.2)
RBC # BLD AUTO: 5.53 MILLION/UL (ref 3.81–5.12)
SODIUM SERPL-SCNC: 138 MMOL/L (ref 136–145)
WBC # BLD AUTO: 6.96 THOUSAND/UL (ref 4.31–10.16)

## 2021-11-14 PROCEDURE — 99232 SBSQ HOSP IP/OBS MODERATE 35: CPT | Performed by: INTERNAL MEDICINE

## 2021-11-14 PROCEDURE — 85025 COMPLETE CBC W/AUTO DIFF WBC: CPT | Performed by: INTERNAL MEDICINE

## 2021-11-14 PROCEDURE — 80053 COMPREHEN METABOLIC PANEL: CPT | Performed by: INTERNAL MEDICINE

## 2021-11-14 RX ADMIN — ENOXAPARIN SODIUM 30 MG: 30 INJECTION SUBCUTANEOUS at 09:22

## 2021-11-14 RX ADMIN — ARIPIPRAZOLE 15 MG: 5 TABLET ORAL at 09:20

## 2021-11-14 RX ADMIN — DIVALPROEX SODIUM 250 MG: 250 TABLET, DELAYED RELEASE ORAL at 09:22

## 2021-11-14 RX ADMIN — DIVALPROEX SODIUM 250 MG: 250 TABLET, DELAYED RELEASE ORAL at 23:41

## 2021-11-14 RX ADMIN — ENOXAPARIN SODIUM 30 MG: 30 INJECTION SUBCUTANEOUS at 21:22

## 2021-11-14 RX ADMIN — NICOTINE 7 MG/24 HR DAILY TRANSDERMAL PATCH 7 MG: at 09:22

## 2021-11-14 RX ADMIN — DIVALPROEX SODIUM 250 MG: 250 TABLET, DELAYED RELEASE ORAL at 16:09

## 2021-11-14 RX ADMIN — NICOTINE POLACRILEX 2 MG: 2 GUM, CHEWING BUCCAL at 12:05

## 2021-11-15 VITALS
OXYGEN SATURATION: 97 % | WEIGHT: 188.27 LBS | HEART RATE: 72 BPM | DIASTOLIC BLOOD PRESSURE: 75 MMHG | RESPIRATION RATE: 18 BRPM | BODY MASS INDEX: 33.35 KG/M2 | SYSTOLIC BLOOD PRESSURE: 121 MMHG | TEMPERATURE: 98.9 F

## 2021-11-15 PROCEDURE — RECHECK: Performed by: NURSE PRACTITIONER

## 2021-11-15 PROCEDURE — 99239 HOSP IP/OBS DSCHRG MGMT >30: CPT | Performed by: NURSE PRACTITIONER

## 2021-11-15 PROCEDURE — 99254 IP/OBS CNSLTJ NEW/EST MOD 60: CPT | Performed by: PSYCHIATRY & NEUROLOGY

## 2021-11-15 RX ORDER — METOCLOPRAMIDE HYDROCHLORIDE 5 MG/ML
10 INJECTION INTRAMUSCULAR; INTRAVENOUS EVERY 6 HOURS PRN
Status: DISCONTINUED | OUTPATIENT
Start: 2021-11-15 | End: 2021-11-15 | Stop reason: HOSPADM

## 2021-11-15 RX ORDER — ARIPIPRAZOLE 15 MG/1
15 TABLET ORAL DAILY
Qty: 30 TABLET | Refills: 1 | Status: SHIPPED | OUTPATIENT
Start: 2021-11-16 | End: 2021-12-06 | Stop reason: DRUGHIGH

## 2021-11-15 RX ORDER — ONDANSETRON 8 MG/1
8 TABLET, ORALLY DISINTEGRATING ORAL EVERY 8 HOURS PRN
Qty: 20 TABLET | Refills: 0 | Status: SHIPPED | OUTPATIENT
Start: 2021-11-15 | End: 2021-12-22 | Stop reason: SDUPTHER

## 2021-11-15 RX ORDER — DIPHENHYDRAMINE HCL 25 MG
50 TABLET ORAL
Status: DISCONTINUED | OUTPATIENT
Start: 2021-11-15 | End: 2021-11-15 | Stop reason: HOSPADM

## 2021-11-15 RX ORDER — FAMOTIDINE 20 MG/1
20 TABLET, FILM COATED ORAL 2 TIMES DAILY
Status: DISCONTINUED | OUTPATIENT
Start: 2021-11-15 | End: 2021-11-15 | Stop reason: HOSPADM

## 2021-11-15 RX ORDER — DIVALPROEX SODIUM 250 MG/1
250 TABLET, DELAYED RELEASE ORAL EVERY 8 HOURS SCHEDULED
Qty: 90 TABLET | Refills: 1 | Status: SHIPPED | OUTPATIENT
Start: 2021-11-15 | End: 2021-12-06 | Stop reason: ALTCHOICE

## 2021-11-15 RX ADMIN — NICOTINE 7 MG/24 HR DAILY TRANSDERMAL PATCH 7 MG: at 08:06

## 2021-11-15 RX ADMIN — FAMOTIDINE 20 MG: 20 TABLET ORAL at 10:03

## 2021-11-15 RX ADMIN — DIVALPROEX SODIUM 250 MG: 250 TABLET, DELAYED RELEASE ORAL at 17:02

## 2021-11-15 RX ADMIN — METOCLOPRAMIDE 10 MG: 5 INJECTION, SOLUTION INTRAMUSCULAR; INTRAVENOUS at 13:14

## 2021-11-15 RX ADMIN — ONDANSETRON 4 MG: 2 INJECTION INTRAMUSCULAR; INTRAVENOUS at 08:09

## 2021-11-15 RX ADMIN — ARIPIPRAZOLE 15 MG: 5 TABLET ORAL at 08:07

## 2021-11-15 RX ADMIN — DIPHENHYDRAMINE HCL 50 MG: 25 TABLET, COATED ORAL at 01:57

## 2021-11-15 RX ADMIN — DIVALPROEX SODIUM 250 MG: 250 TABLET, DELAYED RELEASE ORAL at 08:08

## 2021-11-15 RX ADMIN — ENOXAPARIN SODIUM 30 MG: 30 INJECTION SUBCUTANEOUS at 08:07

## 2021-11-16 ENCOUNTER — TELEPHONE (OUTPATIENT)
Dept: PSYCHOLOGY | Facility: CLINIC | Age: 21
End: 2021-11-16

## 2021-11-16 ENCOUNTER — TRANSITIONAL CARE MANAGEMENT (OUTPATIENT)
Dept: FAMILY MEDICINE CLINIC | Facility: CLINIC | Age: 21
End: 2021-11-16

## 2021-11-23 ENCOUNTER — TELEPHONE (OUTPATIENT)
Dept: PSYCHOLOGY | Facility: CLINIC | Age: 21
End: 2021-11-23

## 2021-11-23 ENCOUNTER — TELEMEDICINE (OUTPATIENT)
Dept: FAMILY MEDICINE CLINIC | Facility: CLINIC | Age: 21
End: 2021-11-23

## 2021-11-23 DIAGNOSIS — F44.5 PSYCHOGENIC NONEPILEPTIC SEIZURE: ICD-10-CM

## 2021-11-23 DIAGNOSIS — U07.1 COVID-19 VIRUS INFECTION: ICD-10-CM

## 2021-11-23 DIAGNOSIS — F31.60 BIPOLAR AFFECTIVE DISORDER, CURRENT EPISODE MIXED, CURRENT EPISODE SEVERITY UNSPECIFIED (HCC): ICD-10-CM

## 2021-11-23 DIAGNOSIS — Z91.018 FOOD ALLERGY: ICD-10-CM

## 2021-11-23 DIAGNOSIS — F51.01 PRIMARY INSOMNIA: Primary | ICD-10-CM

## 2021-11-23 PROCEDURE — 99214 OFFICE O/P EST MOD 30 MIN: CPT | Performed by: PHYSICIAN ASSISTANT

## 2021-11-23 RX ORDER — TRAZODONE HYDROCHLORIDE 50 MG/1
50 TABLET ORAL
Qty: 30 TABLET | Refills: 0 | Status: SHIPPED | OUTPATIENT
Start: 2021-11-23 | End: 2021-12-02 | Stop reason: SDUPTHER

## 2021-11-24 ENCOUNTER — TELEMEDICINE (OUTPATIENT)
Dept: PSYCHIATRY | Facility: CLINIC | Age: 21
End: 2021-11-24
Payer: COMMERCIAL

## 2021-11-24 ENCOUNTER — OFFICE VISIT (OUTPATIENT)
Dept: PSYCHOLOGY | Facility: CLINIC | Age: 21
End: 2021-11-24
Payer: COMMERCIAL

## 2021-11-24 DIAGNOSIS — F31.62 BIPOLAR DISORDER, CURRENT EPISODE MIXED, MODERATE (HCC): Primary | ICD-10-CM

## 2021-11-24 DIAGNOSIS — F31.64 BIPOLAR I DISORDER, MOST RECENT EPISODE MIXED, SEVERE WITH PSYCHOTIC FEATURES (HCC): Primary | ICD-10-CM

## 2021-11-24 PROCEDURE — 90792 PSYCH DIAG EVAL W/MED SRVCS: CPT | Performed by: PSYCHIATRY & NEUROLOGY

## 2021-11-26 ENCOUNTER — OFFICE VISIT (OUTPATIENT)
Dept: PSYCHOLOGY | Facility: CLINIC | Age: 21
End: 2021-11-26
Payer: COMMERCIAL

## 2021-11-26 DIAGNOSIS — F31.64 BIPOLAR I DISORDER, MOST RECENT EPISODE MIXED, SEVERE WITH PSYCHOTIC FEATURES (HCC): Primary | ICD-10-CM

## 2021-11-26 PROCEDURE — H0035 MH PARTIAL HOSP TX UNDER 24H: HCPCS

## 2021-11-29 ENCOUNTER — OFFICE VISIT (OUTPATIENT)
Dept: PSYCHOLOGY | Facility: CLINIC | Age: 21
End: 2021-11-29
Payer: COMMERCIAL

## 2021-11-29 DIAGNOSIS — F31.64 BIPOLAR I DISORDER, MOST RECENT EPISODE MIXED, SEVERE WITH PSYCHOTIC FEATURES (HCC): Primary | ICD-10-CM

## 2021-11-29 PROCEDURE — H0035 MH PARTIAL HOSP TX UNDER 24H: HCPCS

## 2021-11-30 ENCOUNTER — OFFICE VISIT (OUTPATIENT)
Dept: PSYCHOLOGY | Facility: CLINIC | Age: 21
End: 2021-11-30
Payer: COMMERCIAL

## 2021-11-30 DIAGNOSIS — F31.64 BIPOLAR I DISORDER, MOST RECENT EPISODE MIXED, SEVERE WITH PSYCHOTIC FEATURES (HCC): Primary | ICD-10-CM

## 2021-11-30 PROCEDURE — H0035 MH PARTIAL HOSP TX UNDER 24H: HCPCS

## 2021-12-01 ENCOUNTER — APPOINTMENT (OUTPATIENT)
Dept: LAB | Facility: HOSPITAL | Age: 21
End: 2021-12-01
Payer: COMMERCIAL

## 2021-12-01 ENCOUNTER — OFFICE VISIT (OUTPATIENT)
Dept: PSYCHOLOGY | Facility: CLINIC | Age: 21
End: 2021-12-01
Payer: COMMERCIAL

## 2021-12-01 DIAGNOSIS — F31.64 BIPOLAR I DISORDER, MOST RECENT EPISODE MIXED, SEVERE WITH PSYCHOTIC FEATURES (HCC): Primary | ICD-10-CM

## 2021-12-01 DIAGNOSIS — Z91.018 FOOD ALLERGY: ICD-10-CM

## 2021-12-01 DIAGNOSIS — F31.62 BIPOLAR DISORDER, CURRENT EPISODE MIXED, MODERATE (HCC): ICD-10-CM

## 2021-12-01 LAB
ALBUMIN SERPL BCP-MCNC: 4.1 G/DL (ref 3–5.2)
ALP SERPL-CCNC: 117 U/L (ref 43–122)
ALT SERPL W P-5'-P-CCNC: 94 U/L
ANION GAP SERPL CALCULATED.3IONS-SCNC: 6 MMOL/L (ref 5–14)
AST SERPL W P-5'-P-CCNC: 75 U/L (ref 14–36)
BASOPHILS # BLD AUTO: 0.1 THOUSANDS/ΜL (ref 0–0.1)
BASOPHILS NFR BLD AUTO: 1 % (ref 0–1)
BILIRUB SERPL-MCNC: 0.53 MG/DL
BUN SERPL-MCNC: 10 MG/DL (ref 5–25)
CALCIUM SERPL-MCNC: 8.8 MG/DL (ref 8.4–10.2)
CHLORIDE SERPL-SCNC: 108 MMOL/L (ref 97–108)
CO2 SERPL-SCNC: 25 MMOL/L (ref 22–30)
CREAT SERPL-MCNC: 0.68 MG/DL (ref 0.6–1.2)
EOSINOPHIL # BLD AUTO: 0.3 THOUSAND/ΜL (ref 0–0.4)
EOSINOPHIL NFR BLD AUTO: 4 % (ref 0–6)
ERYTHROCYTE [DISTWIDTH] IN BLOOD BY AUTOMATED COUNT: 15 %
GFR SERPL CREATININE-BSD FRML MDRD: 125 ML/MIN/1.73SQ M
GLUCOSE P FAST SERPL-MCNC: 102 MG/DL (ref 70–99)
HCT VFR BLD AUTO: 40.9 % (ref 36–46)
HGB BLD-MCNC: 13.7 G/DL (ref 12–16)
LYMPHOCYTES # BLD AUTO: 1.9 THOUSANDS/ΜL (ref 0.5–4)
LYMPHOCYTES NFR BLD AUTO: 22 % (ref 25–45)
MCH RBC QN AUTO: 29.2 PG (ref 26–34)
MCHC RBC AUTO-ENTMCNC: 33.5 G/DL (ref 31–36)
MCV RBC AUTO: 87 FL (ref 80–100)
MONOCYTES # BLD AUTO: 0.4 THOUSAND/ΜL (ref 0.2–0.9)
MONOCYTES NFR BLD AUTO: 5 % (ref 1–10)
NEUTROPHILS # BLD AUTO: 5.9 THOUSANDS/ΜL (ref 1.8–7.8)
NEUTS SEG NFR BLD AUTO: 69 % (ref 45–65)
PLATELET # BLD AUTO: 383 THOUSANDS/UL (ref 150–450)
PMV BLD AUTO: 7.9 FL (ref 8.9–12.7)
POTASSIUM SERPL-SCNC: 3.9 MMOL/L (ref 3.6–5)
PROT SERPL-MCNC: 7.6 G/DL (ref 5.9–8.4)
RBC # BLD AUTO: 4.71 MILLION/UL (ref 4–5.2)
SODIUM SERPL-SCNC: 139 MMOL/L (ref 137–147)
VALPROATE SERPL-MCNC: 13.2 UG/ML (ref 50–120)
WBC # BLD AUTO: 8.5 THOUSAND/UL (ref 4.5–11)

## 2021-12-01 PROCEDURE — 86003 ALLG SPEC IGE CRUDE XTRC EA: CPT

## 2021-12-01 PROCEDURE — H0035 MH PARTIAL HOSP TX UNDER 24H: HCPCS

## 2021-12-01 PROCEDURE — 36415 COLL VENOUS BLD VENIPUNCTURE: CPT

## 2021-12-01 PROCEDURE — 80053 COMPREHEN METABOLIC PANEL: CPT

## 2021-12-01 PROCEDURE — 80164 ASSAY DIPROPYLACETIC ACD TOT: CPT

## 2021-12-01 PROCEDURE — 82785 ASSAY OF IGE: CPT

## 2021-12-01 PROCEDURE — 85025 COMPLETE CBC W/AUTO DIFF WBC: CPT

## 2021-12-02 ENCOUNTER — APPOINTMENT (OUTPATIENT)
Dept: PSYCHOLOGY | Facility: CLINIC | Age: 21
End: 2021-12-02
Payer: COMMERCIAL

## 2021-12-02 ENCOUNTER — OFFICE VISIT (OUTPATIENT)
Dept: FAMILY MEDICINE CLINIC | Facility: CLINIC | Age: 21
End: 2021-12-02

## 2021-12-02 ENCOUNTER — TELEMEDICINE (OUTPATIENT)
Dept: PSYCHIATRY | Facility: CLINIC | Age: 21
End: 2021-12-02
Payer: COMMERCIAL

## 2021-12-02 ENCOUNTER — DOCUMENTATION (OUTPATIENT)
Dept: PSYCHOLOGY | Facility: CLINIC | Age: 21
End: 2021-12-02

## 2021-12-02 VITALS
BODY MASS INDEX: 34.02 KG/M2 | RESPIRATION RATE: 16 BRPM | DIASTOLIC BLOOD PRESSURE: 70 MMHG | SYSTOLIC BLOOD PRESSURE: 120 MMHG | HEIGHT: 63 IN | WEIGHT: 192 LBS | TEMPERATURE: 98 F | HEART RATE: 97 BPM | OXYGEN SATURATION: 96 %

## 2021-12-02 DIAGNOSIS — F44.5 FUNCTIONAL NEUROLOGICAL SYMPTOM DISORDER WITH ATTACKS OR SEIZURES: ICD-10-CM

## 2021-12-02 DIAGNOSIS — F31.64 BIPOLAR I DISORDER, MOST RECENT EPISODE MIXED, SEVERE WITH PSYCHOTIC FEATURES (HCC): Primary | ICD-10-CM

## 2021-12-02 DIAGNOSIS — R19.7 DIARRHEA, UNSPECIFIED TYPE: Primary | ICD-10-CM

## 2021-12-02 DIAGNOSIS — R11.10 VOMITING, INTRACTABILITY OF VOMITING NOT SPECIFIED, PRESENCE OF NAUSEA NOT SPECIFIED, UNSPECIFIED VOMITING TYPE: ICD-10-CM

## 2021-12-02 DIAGNOSIS — F51.01 PRIMARY INSOMNIA: ICD-10-CM

## 2021-12-02 LAB
ALLERGEN COMMENT: NORMAL
ALMOND IGE QN: <0.1 KUA/I
CASHEW NUT IGE QN: <0.1 KUA/I
CODFISH IGE QN: <0.1 KUA/I
EGG WHITE IGE QN: <0.1 KUA/I
GLUTEN IGE QN: <0.1 KUA/I
HAZELNUT IGE QN: <0.1 KUA/L
MILK IGE QN: <0.1 KUA/I
PEANUT IGE QN: <0.1 KUA/I
SALMON IGE QN: <0.1 KUA/I
SCALLOP IGE QN: <0.1 KUA/L
SESAME SEED IGE QN: <0.1 KUA/I
SHRIMP IGE QN: <0.1 KUA/L
SOYBEAN IGE QN: <0.1 KUA/I
TOTAL IGE SMQN RAST: 100 KU/L (ref 0–113)
TUNA IGE QN: <0.1 KUA/I
WALNUT IGE QN: <0.1 KUA/I
WHEAT IGE QN: <0.1 KUA/I

## 2021-12-02 PROCEDURE — 99212 OFFICE O/P EST SF 10 MIN: CPT | Performed by: PHYSICIAN ASSISTANT

## 2021-12-02 PROCEDURE — 99213 OFFICE O/P EST LOW 20 MIN: CPT | Performed by: FAMILY MEDICINE

## 2021-12-02 RX ORDER — TRAZODONE HYDROCHLORIDE 50 MG/1
100 TABLET ORAL
COMMUNITY
Start: 2021-12-02 | End: 2022-01-03 | Stop reason: SINTOL

## 2021-12-03 ENCOUNTER — DOCUMENTATION (OUTPATIENT)
Dept: PSYCHIATRY | Facility: CLINIC | Age: 21
End: 2021-12-03

## 2021-12-03 ENCOUNTER — OFFICE VISIT (OUTPATIENT)
Dept: PSYCHOLOGY | Facility: CLINIC | Age: 21
End: 2021-12-03
Payer: COMMERCIAL

## 2021-12-03 DIAGNOSIS — F31.64 BIPOLAR I DISORDER, MOST RECENT EPISODE MIXED, SEVERE WITH PSYCHOTIC FEATURES (HCC): Primary | ICD-10-CM

## 2021-12-03 PROCEDURE — H0035 MH PARTIAL HOSP TX UNDER 24H: HCPCS

## 2021-12-04 LAB — BLUEBERRY IGE QN: <0.1 KU/L

## 2021-12-06 ENCOUNTER — OFFICE VISIT (OUTPATIENT)
Dept: PSYCHOLOGY | Facility: CLINIC | Age: 21
End: 2021-12-06
Payer: COMMERCIAL

## 2021-12-06 DIAGNOSIS — F31.60 BIPOLAR AFFECTIVE DISORDER, CURRENT EPISODE MIXED, CURRENT EPISODE SEVERITY UNSPECIFIED (HCC): Primary | ICD-10-CM

## 2021-12-06 DIAGNOSIS — R45.851 SUICIDAL IDEATION: ICD-10-CM

## 2021-12-06 DIAGNOSIS — F31.64 BIPOLAR I DISORDER, MOST RECENT EPISODE MIXED, SEVERE WITH PSYCHOTIC FEATURES (HCC): Primary | ICD-10-CM

## 2021-12-06 PROCEDURE — H0035 MH PARTIAL HOSP TX UNDER 24H: HCPCS

## 2021-12-06 RX ORDER — ARIPIPRAZOLE 20 MG/1
20 TABLET ORAL DAILY
Qty: 30 TABLET | Refills: 0 | Status: SHIPPED | OUTPATIENT
Start: 2021-12-06 | End: 2021-12-21 | Stop reason: SDUPTHER

## 2021-12-07 ENCOUNTER — OFFICE VISIT (OUTPATIENT)
Dept: PSYCHOLOGY | Facility: CLINIC | Age: 21
End: 2021-12-07
Payer: COMMERCIAL

## 2021-12-07 DIAGNOSIS — F31.64 BIPOLAR I DISORDER, MOST RECENT EPISODE MIXED, SEVERE WITH PSYCHOTIC FEATURES (HCC): Primary | ICD-10-CM

## 2021-12-07 PROCEDURE — H0035 MH PARTIAL HOSP TX UNDER 24H: HCPCS

## 2021-12-08 ENCOUNTER — OFFICE VISIT (OUTPATIENT)
Dept: PSYCHOLOGY | Facility: CLINIC | Age: 21
End: 2021-12-08
Payer: COMMERCIAL

## 2021-12-08 DIAGNOSIS — F31.64 BIPOLAR I DISORDER, MOST RECENT EPISODE MIXED, SEVERE WITH PSYCHOTIC FEATURES (HCC): Primary | ICD-10-CM

## 2021-12-08 PROCEDURE — H0035 MH PARTIAL HOSP TX UNDER 24H: HCPCS

## 2021-12-09 ENCOUNTER — TELEMEDICINE (OUTPATIENT)
Dept: PSYCHIATRY | Facility: CLINIC | Age: 21
End: 2021-12-09
Payer: COMMERCIAL

## 2021-12-09 ENCOUNTER — OFFICE VISIT (OUTPATIENT)
Dept: PSYCHOLOGY | Facility: CLINIC | Age: 21
End: 2021-12-09
Payer: COMMERCIAL

## 2021-12-09 ENCOUNTER — APPOINTMENT (OUTPATIENT)
Dept: LAB | Facility: HOSPITAL | Age: 21
End: 2021-12-09
Attending: FAMILY MEDICINE
Payer: COMMERCIAL

## 2021-12-09 DIAGNOSIS — R19.7 DIARRHEA, UNSPECIFIED TYPE: ICD-10-CM

## 2021-12-09 DIAGNOSIS — R11.10 VOMITING, INTRACTABILITY OF VOMITING NOT SPECIFIED, PRESENCE OF NAUSEA NOT SPECIFIED, UNSPECIFIED VOMITING TYPE: ICD-10-CM

## 2021-12-09 DIAGNOSIS — F31.64 BIPOLAR I DISORDER, MOST RECENT EPISODE MIXED, SEVERE WITH PSYCHOTIC FEATURES (HCC): Primary | ICD-10-CM

## 2021-12-09 LAB — B-HCG SERPL-ACNC: <3 MIU/ML

## 2021-12-09 PROCEDURE — 87505 NFCT AGENT DETECTION GI: CPT

## 2021-12-09 PROCEDURE — 36415 COLL VENOUS BLD VENIPUNCTURE: CPT

## 2021-12-09 PROCEDURE — 84702 CHORIONIC GONADOTROPIN TEST: CPT

## 2021-12-09 PROCEDURE — 99212 OFFICE O/P EST SF 10 MIN: CPT | Performed by: PHYSICIAN ASSISTANT

## 2021-12-09 PROCEDURE — 89055 LEUKOCYTE ASSESSMENT FECAL: CPT

## 2021-12-09 PROCEDURE — H0035 MH PARTIAL HOSP TX UNDER 24H: HCPCS

## 2021-12-10 ENCOUNTER — OFFICE VISIT (OUTPATIENT)
Dept: PSYCHOLOGY | Facility: CLINIC | Age: 21
End: 2021-12-10
Payer: COMMERCIAL

## 2021-12-10 DIAGNOSIS — F31.64 BIPOLAR I DISORDER, MOST RECENT EPISODE MIXED, SEVERE WITH PSYCHOTIC FEATURES (HCC): Primary | ICD-10-CM

## 2021-12-10 LAB
CAMPYLOBACTER DNA SPEC NAA+PROBE: NORMAL
SALMONELLA DNA SPEC QL NAA+PROBE: NORMAL
SHIGA TOXIN STX GENE SPEC NAA+PROBE: NORMAL
SHIGELLA DNA SPEC QL NAA+PROBE: NORMAL
WBC SPEC QL GRAM STN: NORMAL

## 2021-12-10 PROCEDURE — H0035 MH PARTIAL HOSP TX UNDER 24H: HCPCS

## 2021-12-13 ENCOUNTER — OFFICE VISIT (OUTPATIENT)
Dept: PSYCHOLOGY | Facility: CLINIC | Age: 21
End: 2021-12-13
Payer: COMMERCIAL

## 2021-12-13 ENCOUNTER — TELEPHONE (OUTPATIENT)
Dept: FAMILY MEDICINE CLINIC | Facility: CLINIC | Age: 21
End: 2021-12-13

## 2021-12-13 DIAGNOSIS — F31.64 BIPOLAR I DISORDER, MOST RECENT EPISODE MIXED, SEVERE WITH PSYCHOTIC FEATURES (HCC): Primary | ICD-10-CM

## 2021-12-13 PROCEDURE — H0035 MH PARTIAL HOSP TX UNDER 24H: HCPCS

## 2021-12-14 ENCOUNTER — APPOINTMENT (OUTPATIENT)
Dept: PSYCHOLOGY | Facility: CLINIC | Age: 21
End: 2021-12-14
Payer: COMMERCIAL

## 2021-12-14 ENCOUNTER — TELEMEDICINE (OUTPATIENT)
Dept: PSYCHIATRY | Facility: CLINIC | Age: 21
End: 2021-12-14
Payer: COMMERCIAL

## 2021-12-14 ENCOUNTER — DOCUMENTATION (OUTPATIENT)
Dept: PSYCHOLOGY | Facility: CLINIC | Age: 21
End: 2021-12-14

## 2021-12-14 DIAGNOSIS — F31.64 BIPOLAR I DISORDER, MOST RECENT EPISODE MIXED, SEVERE WITH PSYCHOTIC FEATURES (HCC): Primary | ICD-10-CM

## 2021-12-14 PROCEDURE — 99212 OFFICE O/P EST SF 10 MIN: CPT | Performed by: PHYSICIAN ASSISTANT

## 2021-12-15 ENCOUNTER — OFFICE VISIT (OUTPATIENT)
Dept: PSYCHOLOGY | Facility: CLINIC | Age: 21
End: 2021-12-15
Payer: COMMERCIAL

## 2021-12-15 DIAGNOSIS — F31.64 BIPOLAR I DISORDER, MOST RECENT EPISODE MIXED, SEVERE WITH PSYCHOTIC FEATURES (HCC): Primary | ICD-10-CM

## 2021-12-15 PROCEDURE — H0035 MH PARTIAL HOSP TX UNDER 24H: HCPCS

## 2021-12-16 ENCOUNTER — OFFICE VISIT (OUTPATIENT)
Dept: PSYCHOLOGY | Facility: CLINIC | Age: 21
End: 2021-12-16
Payer: COMMERCIAL

## 2021-12-16 DIAGNOSIS — F31.64 BIPOLAR I DISORDER, MOST RECENT EPISODE MIXED, SEVERE WITH PSYCHOTIC FEATURES (HCC): Primary | ICD-10-CM

## 2021-12-16 PROCEDURE — H0035 MH PARTIAL HOSP TX UNDER 24H: HCPCS

## 2021-12-17 ENCOUNTER — DOCUMENTATION (OUTPATIENT)
Dept: PSYCHOLOGY | Facility: CLINIC | Age: 21
End: 2021-12-17

## 2021-12-17 ENCOUNTER — APPOINTMENT (EMERGENCY)
Dept: CT IMAGING | Facility: HOSPITAL | Age: 21
End: 2021-12-17
Payer: COMMERCIAL

## 2021-12-17 ENCOUNTER — APPOINTMENT (OUTPATIENT)
Dept: PSYCHOLOGY | Facility: CLINIC | Age: 21
End: 2021-12-17
Payer: COMMERCIAL

## 2021-12-17 ENCOUNTER — HOSPITAL ENCOUNTER (EMERGENCY)
Facility: HOSPITAL | Age: 21
Discharge: HOME | End: 2021-12-18
Attending: EMERGENCY MEDICINE | Admitting: EMERGENCY MEDICINE
Payer: COMMERCIAL

## 2021-12-17 DIAGNOSIS — N12 PYELONEPHRITIS: Primary | ICD-10-CM

## 2021-12-17 LAB
ALBUMIN SERPL BCP-MCNC: 4.6 G/DL (ref 3–5.2)
ALP SERPL-CCNC: 136 U/L (ref 43–122)
ALT SERPL W P-5'-P-CCNC: 22 U/L
ANION GAP SERPL CALCULATED.3IONS-SCNC: 10 MMOL/L (ref 5–14)
AST SERPL W P-5'-P-CCNC: 24 U/L (ref 14–36)
BACTERIA UR QL AUTO: ABNORMAL /HPF
BILIRUB DIRECT SERPL-MCNC: 0.11 MG/DL
BILIRUB SERPL-MCNC: 0.64 MG/DL
BILIRUB UR QL STRIP: NEGATIVE
BUN SERPL-MCNC: 10 MG/DL (ref 5–25)
CALCIUM SERPL-MCNC: 9.6 MG/DL (ref 8.4–10.2)
CHLORIDE SERPL-SCNC: 106 MMOL/L (ref 97–108)
CLARITY UR: ABNORMAL
CO2 SERPL-SCNC: 24 MMOL/L (ref 22–30)
COLOR UR: ABNORMAL
CREAT SERPL-MCNC: 0.68 MG/DL (ref 0.6–1.2)
ERYTHROCYTE [DISTWIDTH] IN BLOOD BY AUTOMATED COUNT: 14.4 %
EXT PREG TEST URINE: NEGATIVE
EXT. CONTROL ED NAV: NORMAL
GFR SERPL CREATININE-BSD FRML MDRD: 124 ML/MIN/1.73SQ M
GLUCOSE SERPL-MCNC: 140 MG/DL (ref 70–99)
GLUCOSE UR STRIP-MCNC: NEGATIVE MG/DL
HCT VFR BLD AUTO: 42.7 % (ref 36–46)
HGB BLD-MCNC: 14.3 G/DL (ref 12–16)
HGB UR QL STRIP.AUTO: 250
KETONES UR STRIP-MCNC: ABNORMAL MG/DL
LEUKOCYTE ESTERASE UR QL STRIP: 100
LIPASE SERPL-CCNC: 56 U/L (ref 23–300)
LYMPHOCYTES # BLD AUTO: 0.98 THOUSAND/UL (ref 0.5–4)
LYMPHOCYTES # BLD AUTO: 5 % (ref 25–45)
MCH RBC QN AUTO: 28.6 PG (ref 26–34)
MCHC RBC AUTO-ENTMCNC: 33.6 G/DL (ref 31–36)
MCV RBC AUTO: 85 FL (ref 80–100)
MONOCYTES # BLD AUTO: 0.2 THOUSAND/UL (ref 0.2–0.9)
MONOCYTES NFR BLD AUTO: 1 % (ref 1–10)
MUCOUS THREADS UR QL AUTO: ABNORMAL
NEUTS BAND NFR BLD MANUAL: 3 % (ref 0–8)
NEUTS SEG # BLD: 18.33 THOUSAND/UL (ref 1.8–7.8)
NEUTS SEG NFR BLD AUTO: 91 %
NITRITE UR QL STRIP: NEGATIVE
NON-SQ EPI CELLS URNS QL MICRO: ABNORMAL /HPF
PH UR STRIP.AUTO: 8 [PH]
PLATELET # BLD AUTO: 406 THOUSANDS/UL (ref 150–450)
PLATELET BLD QL SMEAR: ABNORMAL
PMV BLD AUTO: 8.1 FL (ref 8.9–12.7)
POTASSIUM SERPL-SCNC: 3.4 MMOL/L (ref 3.6–5)
PROT SERPL-MCNC: 8.5 G/DL (ref 5.9–8.4)
PROT UR STRIP-MCNC: ABNORMAL MG/DL
RBC # BLD AUTO: 5.01 MILLION/UL (ref 4–5.2)
RBC #/AREA URNS AUTO: ABNORMAL /HPF
RBC MORPH BLD: NORMAL
SODIUM SERPL-SCNC: 140 MMOL/L (ref 137–147)
SP GR UR STRIP.AUTO: 1.01 (ref 1–1.04)
TOTAL CELLS COUNTED SPEC: 100
UROBILINOGEN UA: 1 MG/DL
WBC # BLD AUTO: 19.5 THOUSAND/UL (ref 4.5–11)
WBC #/AREA URNS AUTO: ABNORMAL /HPF

## 2021-12-17 PROCEDURE — 85027 COMPLETE CBC AUTOMATED: CPT | Performed by: PHYSICIAN ASSISTANT

## 2021-12-17 PROCEDURE — 96365 THER/PROPH/DIAG IV INF INIT: CPT

## 2021-12-17 PROCEDURE — 96361 HYDRATE IV INFUSION ADD-ON: CPT

## 2021-12-17 PROCEDURE — 80053 COMPREHEN METABOLIC PANEL: CPT | Performed by: PHYSICIAN ASSISTANT

## 2021-12-17 PROCEDURE — 85007 BL SMEAR W/DIFF WBC COUNT: CPT | Performed by: PHYSICIAN ASSISTANT

## 2021-12-17 PROCEDURE — 96375 TX/PRO/DX INJ NEW DRUG ADDON: CPT

## 2021-12-17 PROCEDURE — 83690 ASSAY OF LIPASE: CPT | Performed by: PHYSICIAN ASSISTANT

## 2021-12-17 PROCEDURE — 82248 BILIRUBIN DIRECT: CPT | Performed by: PHYSICIAN ASSISTANT

## 2021-12-17 PROCEDURE — 81001 URINALYSIS AUTO W/SCOPE: CPT | Performed by: PHYSICIAN ASSISTANT

## 2021-12-17 PROCEDURE — 36415 COLL VENOUS BLD VENIPUNCTURE: CPT | Performed by: PHYSICIAN ASSISTANT

## 2021-12-17 PROCEDURE — 74177 CT ABD & PELVIS W/CONTRAST: CPT

## 2021-12-17 PROCEDURE — 81025 URINE PREGNANCY TEST: CPT | Performed by: PHYSICIAN ASSISTANT

## 2021-12-17 PROCEDURE — 99284 EMERGENCY DEPT VISIT MOD MDM: CPT

## 2021-12-17 RX ORDER — ONDANSETRON 2 MG/ML
4 INJECTION INTRAMUSCULAR; INTRAVENOUS ONCE
Status: COMPLETED | OUTPATIENT
Start: 2021-12-17 | End: 2021-12-17

## 2021-12-17 RX ORDER — METRONIDAZOLE 500 MG/1
500 TABLET ORAL ONCE
Status: COMPLETED | OUTPATIENT
Start: 2021-12-17 | End: 2021-12-17

## 2021-12-17 RX ORDER — CEFEPIME HYDROCHLORIDE 2 G/50ML
2000 INJECTION, SOLUTION INTRAVENOUS ONCE
Status: COMPLETED | OUTPATIENT
Start: 2021-12-17 | End: 2021-12-18

## 2021-12-17 RX ORDER — MORPHINE SULFATE 4 MG/ML
4 INJECTION, SOLUTION INTRAMUSCULAR; INTRAVENOUS ONCE
Status: COMPLETED | OUTPATIENT
Start: 2021-12-17 | End: 2021-12-17

## 2021-12-17 RX ADMIN — CEFEPIME HYDROCHLORIDE 2000 MG: 2 INJECTION, SOLUTION INTRAVENOUS at 23:54

## 2021-12-17 RX ADMIN — ONDANSETRON 4 MG: 2 INJECTION INTRAMUSCULAR; INTRAVENOUS at 22:33

## 2021-12-17 RX ADMIN — IOHEXOL 100 ML: 350 INJECTION, SOLUTION INTRAVENOUS at 23:03

## 2021-12-17 RX ADMIN — METRONIDAZOLE 500 MG: 500 TABLET, FILM COATED ORAL at 23:54

## 2021-12-17 RX ADMIN — MORPHINE SULFATE 4 MG: 4 INJECTION INTRAVENOUS at 22:30

## 2021-12-17 RX ADMIN — FAMOTIDINE 20 MG: 10 INJECTION INTRAVENOUS at 22:37

## 2021-12-17 RX ADMIN — SODIUM CHLORIDE 1000 ML: 0.9 INJECTION, SOLUTION INTRAVENOUS at 22:39

## 2021-12-18 VITALS
OXYGEN SATURATION: 99 % | HEART RATE: 78 BPM | DIASTOLIC BLOOD PRESSURE: 64 MMHG | SYSTOLIC BLOOD PRESSURE: 120 MMHG | BODY MASS INDEX: 32.82 KG/M2 | WEIGHT: 185.3 LBS | TEMPERATURE: 98.1 F | RESPIRATION RATE: 18 BRPM

## 2021-12-18 PROCEDURE — 99285 EMERGENCY DEPT VISIT HI MDM: CPT | Performed by: PHYSICIAN ASSISTANT

## 2021-12-18 RX ORDER — ONDANSETRON 4 MG/1
4 TABLET, ORALLY DISINTEGRATING ORAL EVERY 6 HOURS PRN
Qty: 20 TABLET | Refills: 0 | Status: SHIPPED | OUTPATIENT
Start: 2021-12-18 | End: 2022-07-22

## 2021-12-18 RX ORDER — CEPHALEXIN 500 MG/1
500 CAPSULE ORAL EVERY 8 HOURS SCHEDULED
Qty: 30 CAPSULE | Refills: 0 | Status: SHIPPED | OUTPATIENT
Start: 2021-12-18 | End: 2021-12-28

## 2021-12-20 ENCOUNTER — APPOINTMENT (OUTPATIENT)
Dept: LAB | Facility: HOSPITAL | Age: 21
End: 2021-12-20
Payer: COMMERCIAL

## 2021-12-20 ENCOUNTER — OFFICE VISIT (OUTPATIENT)
Dept: PSYCHOLOGY | Facility: CLINIC | Age: 21
End: 2021-12-20
Payer: COMMERCIAL

## 2021-12-20 ENCOUNTER — HOSPITAL ENCOUNTER (EMERGENCY)
Facility: HOSPITAL | Age: 21
Discharge: HOME | End: 2021-12-20
Attending: EMERGENCY MEDICINE | Admitting: EMERGENCY MEDICINE
Payer: COMMERCIAL

## 2021-12-20 VITALS
SYSTOLIC BLOOD PRESSURE: 123 MMHG | OXYGEN SATURATION: 98 % | RESPIRATION RATE: 18 BRPM | WEIGHT: 190.1 LBS | BODY MASS INDEX: 33.67 KG/M2 | HEART RATE: 80 BPM | DIASTOLIC BLOOD PRESSURE: 75 MMHG | TEMPERATURE: 98.6 F

## 2021-12-20 DIAGNOSIS — N39.0 UTI (URINARY TRACT INFECTION): ICD-10-CM

## 2021-12-20 DIAGNOSIS — B34.9 VIRAL SYNDROME: Primary | ICD-10-CM

## 2021-12-20 DIAGNOSIS — R50.9 FEVER: ICD-10-CM

## 2021-12-20 DIAGNOSIS — R79.89 ELEVATED LFTS: ICD-10-CM

## 2021-12-20 DIAGNOSIS — F31.64 BIPOLAR I DISORDER, MOST RECENT EPISODE MIXED, SEVERE WITH PSYCHOTIC FEATURES (HCC): Primary | ICD-10-CM

## 2021-12-20 DIAGNOSIS — R11.2 NAUSEA AND VOMITING: ICD-10-CM

## 2021-12-20 LAB
ALBUMIN SERPL BCP-MCNC: 4.3 G/DL (ref 3–5.2)
ALBUMIN SERPL BCP-MCNC: 4.6 G/DL (ref 3–5.2)
ALP SERPL-CCNC: 111 U/L (ref 43–122)
ALP SERPL-CCNC: 123 U/L (ref 43–122)
ALT SERPL W P-5'-P-CCNC: 15 U/L
ALT SERPL W P-5'-P-CCNC: 17 U/L
AMPHETAMINES SERPL QL SCN: NEGATIVE
ANION GAP SERPL CALCULATED.3IONS-SCNC: 10 MMOL/L (ref 5–14)
ANION GAP SERPL CALCULATED.3IONS-SCNC: 6 MMOL/L (ref 5–14)
AST SERPL W P-5'-P-CCNC: 25 U/L (ref 14–36)
AST SERPL W P-5'-P-CCNC: 28 U/L (ref 14–36)
BACTERIA UR QL AUTO: ABNORMAL /HPF
BARBITURATES UR QL: NEGATIVE
BASOPHILS # BLD AUTO: 0 THOUSANDS/ΜL (ref 0–0.1)
BASOPHILS NFR BLD AUTO: 0 % (ref 0–1)
BENZODIAZ UR QL: NEGATIVE
BILIRUB SERPL-MCNC: 0.53 MG/DL
BILIRUB SERPL-MCNC: 0.59 MG/DL
BILIRUB UR QL STRIP: ABNORMAL
BUN SERPL-MCNC: 8 MG/DL (ref 5–25)
BUN SERPL-MCNC: 9 MG/DL (ref 5–25)
CALCIUM SERPL-MCNC: 9 MG/DL (ref 8.4–10.2)
CALCIUM SERPL-MCNC: 9.1 MG/DL (ref 8.4–10.2)
CHLORIDE SERPL-SCNC: 101 MMOL/L (ref 97–108)
CHLORIDE SERPL-SCNC: 103 MMOL/L (ref 97–108)
CLARITY UR: CLEAR
CO2 SERPL-SCNC: 25 MMOL/L (ref 22–30)
CO2 SERPL-SCNC: 27 MMOL/L (ref 22–30)
COCAINE UR QL: NEGATIVE
COLOR UR: ABNORMAL
CREAT SERPL-MCNC: 0.74 MG/DL (ref 0.6–1.2)
CREAT SERPL-MCNC: 0.79 MG/DL (ref 0.6–1.2)
EOSINOPHIL # BLD AUTO: 0 THOUSAND/ΜL (ref 0–0.4)
EOSINOPHIL NFR BLD AUTO: 0 % (ref 0–6)
ERYTHROCYTE [DISTWIDTH] IN BLOOD BY AUTOMATED COUNT: 14.7 %
EXT PREG TEST URINE: NEGATIVE
EXT. CONTROL ED NAV: NORMAL
GFR SERPL CREATININE-BSD FRML MDRD: 106 ML/MIN/1.73SQ M
GFR SERPL CREATININE-BSD FRML MDRD: 115 ML/MIN/1.73SQ M
GLUCOSE P FAST SERPL-MCNC: 106 MG/DL (ref 70–99)
GLUCOSE SERPL-MCNC: 101 MG/DL (ref 70–99)
GLUCOSE UR STRIP-MCNC: NEGATIVE MG/DL
HCT VFR BLD AUTO: 41.7 % (ref 36–46)
HGB BLD-MCNC: 14.2 G/DL (ref 12–16)
HGB UR QL STRIP.AUTO: 250
KETONES UR STRIP-MCNC: ABNORMAL MG/DL
LACTATE SERPL-SCNC: 0.8 MMOL/L (ref 0.7–2)
LEUKOCYTE ESTERASE UR QL STRIP: 25
LYMPHOCYTES # BLD AUTO: 0.7 THOUSANDS/ΜL (ref 0.5–4)
LYMPHOCYTES NFR BLD AUTO: 7 % (ref 25–45)
MAGNESIUM SERPL-MCNC: 1.8 MG/DL (ref 1.6–2.3)
MCH RBC QN AUTO: 28.7 PG (ref 26–34)
MCHC RBC AUTO-ENTMCNC: 34 G/DL (ref 31–36)
MCV RBC AUTO: 85 FL (ref 80–100)
METHADONE UR QL: NEGATIVE
MONOCYTES # BLD AUTO: 0.8 THOUSAND/ΜL (ref 0.2–0.9)
MONOCYTES NFR BLD AUTO: 9 % (ref 1–10)
MUCOUS THREADS UR QL AUTO: ABNORMAL
NEUTROPHILS # BLD AUTO: 7.3 THOUSANDS/ΜL (ref 1.8–7.8)
NEUTS SEG NFR BLD AUTO: 83 % (ref 45–65)
NITRITE UR QL STRIP: NEGATIVE
NON-SQ EPI CELLS URNS QL MICRO: ABNORMAL /HPF
OPIATES UR QL SCN: NEGATIVE
OXYCODONE+OXYMORPHONE UR QL SCN: NEGATIVE
PCP UR QL: NEGATIVE
PH UR STRIP.AUTO: 8 [PH]
PLATELET # BLD AUTO: 332 THOUSANDS/UL (ref 150–450)
PMV BLD AUTO: 7.9 FL (ref 8.9–12.7)
POTASSIUM SERPL-SCNC: 3.3 MMOL/L (ref 3.6–5)
POTASSIUM SERPL-SCNC: 3.3 MMOL/L (ref 3.6–5)
PROT SERPL-MCNC: 8.1 G/DL (ref 5.9–8.4)
PROT SERPL-MCNC: 8.2 G/DL (ref 5.9–8.4)
PROT UR STRIP-MCNC: ABNORMAL MG/DL
RBC # BLD AUTO: 4.94 MILLION/UL (ref 4–5.2)
RBC #/AREA URNS AUTO: ABNORMAL /HPF
SODIUM SERPL-SCNC: 136 MMOL/L (ref 137–147)
SODIUM SERPL-SCNC: 136 MMOL/L (ref 137–147)
SP GR UR STRIP.AUTO: 1.01 (ref 1–1.04)
THC UR QL: POSITIVE
UROBILINOGEN UA: 12 MG/DL
WBC # BLD AUTO: 8.9 THOUSAND/UL (ref 4.5–11)
WBC #/AREA URNS AUTO: ABNORMAL /HPF

## 2021-12-20 PROCEDURE — 99285 EMERGENCY DEPT VISIT HI MDM: CPT | Performed by: EMERGENCY MEDICINE

## 2021-12-20 PROCEDURE — 81025 URINE PREGNANCY TEST: CPT | Performed by: EMERGENCY MEDICINE

## 2021-12-20 PROCEDURE — 83605 ASSAY OF LACTIC ACID: CPT | Performed by: EMERGENCY MEDICINE

## 2021-12-20 PROCEDURE — 80307 DRUG TEST PRSMV CHEM ANLYZR: CPT | Performed by: EMERGENCY MEDICINE

## 2021-12-20 PROCEDURE — 83735 ASSAY OF MAGNESIUM: CPT | Performed by: EMERGENCY MEDICINE

## 2021-12-20 PROCEDURE — 96375 TX/PRO/DX INJ NEW DRUG ADDON: CPT

## 2021-12-20 PROCEDURE — 80053 COMPREHEN METABOLIC PANEL: CPT

## 2021-12-20 PROCEDURE — H0035 MH PARTIAL HOSP TX UNDER 24H: HCPCS

## 2021-12-20 PROCEDURE — 36415 COLL VENOUS BLD VENIPUNCTURE: CPT

## 2021-12-20 PROCEDURE — 81001 URINALYSIS AUTO W/SCOPE: CPT | Performed by: EMERGENCY MEDICINE

## 2021-12-20 PROCEDURE — 96374 THER/PROPH/DIAG INJ IV PUSH: CPT

## 2021-12-20 PROCEDURE — 81003 URINALYSIS AUTO W/O SCOPE: CPT | Performed by: EMERGENCY MEDICINE

## 2021-12-20 PROCEDURE — 99283 EMERGENCY DEPT VISIT LOW MDM: CPT

## 2021-12-20 PROCEDURE — 96361 HYDRATE IV INFUSION ADD-ON: CPT

## 2021-12-20 PROCEDURE — 85025 COMPLETE CBC W/AUTO DIFF WBC: CPT | Performed by: EMERGENCY MEDICINE

## 2021-12-20 RX ORDER — KETOROLAC TROMETHAMINE 30 MG/ML
30 INJECTION, SOLUTION INTRAMUSCULAR; INTRAVENOUS ONCE
Status: COMPLETED | OUTPATIENT
Start: 2021-12-20 | End: 2021-12-20

## 2021-12-20 RX ORDER — LIDOCAINE 50 MG/G
1 PATCH TOPICAL ONCE
Status: DISCONTINUED | OUTPATIENT
Start: 2021-12-20 | End: 2021-12-20 | Stop reason: HOSPADM

## 2021-12-20 RX ORDER — ONDANSETRON 2 MG/ML
4 INJECTION INTRAMUSCULAR; INTRAVENOUS ONCE
Status: COMPLETED | OUTPATIENT
Start: 2021-12-20 | End: 2021-12-20

## 2021-12-20 RX ORDER — HALOPERIDOL 0.5 MG/1
2 TABLET ORAL ONCE
Status: COMPLETED | OUTPATIENT
Start: 2021-12-20 | End: 2021-12-20

## 2021-12-20 RX ADMIN — SODIUM CHLORIDE 1000 ML: 0.9 INJECTION, SOLUTION INTRAVENOUS at 17:14

## 2021-12-20 RX ADMIN — SODIUM CHLORIDE 1000 ML: 0.9 INJECTION, SOLUTION INTRAVENOUS at 18:36

## 2021-12-20 RX ADMIN — KETOROLAC TROMETHAMINE 30 MG: 30 INJECTION, SOLUTION INTRAMUSCULAR at 17:11

## 2021-12-20 RX ADMIN — ONDANSETRON 4 MG: 2 INJECTION INTRAMUSCULAR; INTRAVENOUS at 17:03

## 2021-12-20 RX ADMIN — LIDOCAINE 1 PATCH: 50 PATCH TOPICAL at 17:06

## 2021-12-20 RX ADMIN — HALOPERIDOL 2 MG: 0.5 TABLET ORAL at 18:36

## 2021-12-21 ENCOUNTER — TELEMEDICINE (OUTPATIENT)
Dept: PSYCHIATRY | Facility: CLINIC | Age: 21
End: 2021-12-21
Payer: COMMERCIAL

## 2021-12-21 ENCOUNTER — OFFICE VISIT (OUTPATIENT)
Dept: PSYCHOLOGY | Facility: CLINIC | Age: 21
End: 2021-12-21
Payer: COMMERCIAL

## 2021-12-21 DIAGNOSIS — F31.64 BIPOLAR I DISORDER, MOST RECENT EPISODE MIXED, SEVERE WITH PSYCHOTIC FEATURES (HCC): Primary | ICD-10-CM

## 2021-12-21 DIAGNOSIS — F31.60 BIPOLAR AFFECTIVE DISORDER, CURRENT EPISODE MIXED, CURRENT EPISODE SEVERITY UNSPECIFIED (HCC): ICD-10-CM

## 2021-12-21 PROCEDURE — 99212 OFFICE O/P EST SF 10 MIN: CPT | Performed by: PHYSICIAN ASSISTANT

## 2021-12-21 PROCEDURE — H0035 MH PARTIAL HOSP TX UNDER 24H: HCPCS

## 2021-12-21 RX ORDER — ARIPIPRAZOLE 20 MG/1
20 TABLET ORAL DAILY
Qty: 30 TABLET | Refills: 0 | Status: SHIPPED | OUTPATIENT
Start: 2021-12-21 | End: 2022-01-03 | Stop reason: SDUPTHER

## 2021-12-22 ENCOUNTER — APPOINTMENT (OUTPATIENT)
Dept: PSYCHOLOGY | Facility: CLINIC | Age: 21
End: 2021-12-22
Payer: COMMERCIAL

## 2021-12-22 ENCOUNTER — DOCUMENTATION (OUTPATIENT)
Dept: PSYCHOLOGY | Facility: CLINIC | Age: 21
End: 2021-12-22

## 2021-12-22 ENCOUNTER — OFFICE VISIT (OUTPATIENT)
Dept: FAMILY MEDICINE CLINIC | Facility: CLINIC | Age: 21
End: 2021-12-22

## 2021-12-22 DIAGNOSIS — J01.90 ACUTE NON-RECURRENT SINUSITIS, UNSPECIFIED LOCATION: Primary | ICD-10-CM

## 2021-12-22 DIAGNOSIS — R11.10 VOMITING: ICD-10-CM

## 2021-12-22 PROCEDURE — 99213 OFFICE O/P EST LOW 20 MIN: CPT | Performed by: PHYSICIAN ASSISTANT

## 2021-12-22 RX ORDER — AZITHROMYCIN 250 MG/1
TABLET, FILM COATED ORAL
Qty: 6 TABLET | Refills: 0 | Status: SHIPPED | OUTPATIENT
Start: 2021-12-22 | End: 2021-12-27

## 2021-12-22 RX ORDER — ONDANSETRON 8 MG/1
8 TABLET, ORALLY DISINTEGRATING ORAL EVERY 8 HOURS PRN
Qty: 30 TABLET | Refills: 0 | Status: SHIPPED | OUTPATIENT
Start: 2021-12-22 | End: 2022-07-22

## 2021-12-23 ENCOUNTER — OFFICE VISIT (OUTPATIENT)
Dept: PSYCHOLOGY | Facility: CLINIC | Age: 21
End: 2021-12-23
Payer: COMMERCIAL

## 2021-12-23 DIAGNOSIS — F31.64 BIPOLAR I DISORDER, MOST RECENT EPISODE MIXED, SEVERE WITH PSYCHOTIC FEATURES (HCC): Primary | ICD-10-CM

## 2021-12-23 PROCEDURE — H0035 MH PARTIAL HOSP TX UNDER 24H: HCPCS

## 2021-12-27 ENCOUNTER — OFFICE VISIT (OUTPATIENT)
Dept: PSYCHOLOGY | Facility: CLINIC | Age: 21
End: 2021-12-27
Payer: COMMERCIAL

## 2021-12-27 DIAGNOSIS — F31.64 BIPOLAR I DISORDER, MOST RECENT EPISODE MIXED, SEVERE WITH PSYCHOTIC FEATURES (HCC): Primary | ICD-10-CM

## 2021-12-27 PROCEDURE — H0035 MH PARTIAL HOSP TX UNDER 24H: HCPCS

## 2021-12-28 ENCOUNTER — TELEPHONE (OUTPATIENT)
Dept: FAMILY MEDICINE CLINIC | Facility: CLINIC | Age: 21
End: 2021-12-28

## 2021-12-28 ENCOUNTER — OFFICE VISIT (OUTPATIENT)
Dept: PSYCHOLOGY | Facility: CLINIC | Age: 21
End: 2021-12-28
Payer: COMMERCIAL

## 2021-12-28 DIAGNOSIS — F31.64 BIPOLAR I DISORDER, MOST RECENT EPISODE MIXED, SEVERE WITH PSYCHOTIC FEATURES (HCC): Primary | ICD-10-CM

## 2021-12-28 PROCEDURE — H0035 MH PARTIAL HOSP TX UNDER 24H: HCPCS

## 2021-12-28 NOTE — TELEPHONE ENCOUNTER
----- [id number] at 12/22/2021  5:01 PM EST -----  Please set up f/u from ed vomiting/uti/low potassium   Not covid

## 2021-12-29 ENCOUNTER — OFFICE VISIT (OUTPATIENT)
Dept: PSYCHOLOGY | Facility: CLINIC | Age: 21
End: 2021-12-29
Payer: COMMERCIAL

## 2021-12-29 DIAGNOSIS — F31.64 BIPOLAR I DISORDER, MOST RECENT EPISODE MIXED, SEVERE WITH PSYCHOTIC FEATURES (HCC): Primary | ICD-10-CM

## 2021-12-29 PROCEDURE — H0035 MH PARTIAL HOSP TX UNDER 24H: HCPCS

## 2021-12-30 ENCOUNTER — DOCUMENTATION (OUTPATIENT)
Dept: PSYCHOLOGY | Facility: CLINIC | Age: 21
End: 2021-12-30

## 2021-12-30 ENCOUNTER — APPOINTMENT (OUTPATIENT)
Dept: PSYCHOLOGY | Facility: CLINIC | Age: 21
End: 2021-12-30
Payer: COMMERCIAL

## 2022-01-03 ENCOUNTER — TELEMEDICINE (OUTPATIENT)
Dept: PSYCHIATRY | Facility: CLINIC | Age: 22
End: 2022-01-03
Payer: COMMERCIAL

## 2022-01-03 ENCOUNTER — OFFICE VISIT (OUTPATIENT)
Dept: PSYCHOLOGY | Facility: CLINIC | Age: 22
End: 2022-01-03
Payer: COMMERCIAL

## 2022-01-03 DIAGNOSIS — F31.60 BIPOLAR AFFECTIVE DISORDER, CURRENT EPISODE MIXED, CURRENT EPISODE SEVERITY UNSPECIFIED (HCC): ICD-10-CM

## 2022-01-03 DIAGNOSIS — F31.64 BIPOLAR I DISORDER, MOST RECENT EPISODE MIXED, SEVERE WITH PSYCHOTIC FEATURES (HCC): Primary | ICD-10-CM

## 2022-01-03 PROCEDURE — 99212 OFFICE O/P EST SF 10 MIN: CPT | Performed by: PHYSICIAN ASSISTANT

## 2022-01-03 PROCEDURE — H0035 MH PARTIAL HOSP TX UNDER 24H: HCPCS

## 2022-01-03 RX ORDER — HYDROXYZINE HYDROCHLORIDE 10 MG/1
TABLET, FILM COATED ORAL
Qty: 50 TABLET | Refills: 1 | Status: SHIPPED | OUTPATIENT
Start: 2022-01-03 | End: 2022-07-22

## 2022-01-03 RX ORDER — ARIPIPRAZOLE 20 MG/1
20 TABLET ORAL DAILY
Qty: 30 TABLET | Refills: 0 | Status: SHIPPED | OUTPATIENT
Start: 2022-01-03 | End: 2022-03-16

## 2022-01-03 NOTE — PSYCH
Subjective:     Patient ID: Archana Foote is a 24 y o  female  [facility] Clinical Progress Notes      Specialized Services Documentation  Therapist must complete separate progress note for each specific clinical activity in which the individual participated during the day  Education Therapy   [id number] Archana Foote actively shared in morning assessment and goal review  Presented as Receptive related to readiness to learn  Archana Foote did complete goal from last treatment day identifying gaining hope and advocacy  did not present with any barriers to learning         Tx Plan Objective: 1 1,1 2,1 4 Therapist:  ANTOLIN Harper

## 2022-01-03 NOTE — PSYCH
Subjective:     Patient ID: Glo Gaviria is a 24 y o  female  [facility] Clinical Progress Notes      Specialized Services Documentation  Therapist must complete separate progress note for each specific clinical activity in which the individual participated during the day  Education Therapy   [id number] Glo Gaviria engaged throughout the treatment day  Was engaged in learning related to Illness, Medication, Aftercare and Wellness Tools  Staff utilized Verbal, Written, A/V and Demonstration teaching methods  Glo Gaviria shared area of learning and set a goal for outside of program to keep learning new ways to accept herself        Tx Plan Objective: 1 1,1 2,1 4, Therapist:  ANTOLIN Lewis; MATTHEW Calderon

## 2022-01-03 NOTE — PSYCH
Subjective:     Patient ID: Audrey Ann is a 24 y o  female  [facility] Clinical Progress Notes      Specialized Services Documentation  Therapist must complete separate progress note for each specific clinical activity in which the individual participated during the day  Allied Therapy   This group was facilitated virtually in a private office using HIPAA Compliant and Approved [employer] Teams  Audrey Ann consented to the use of tele-video modality of treatment  [id number] Audrey Ann  actively shared in [facility] group focused on DBT skill humphries mind  Apurva engaged in tasks exploring differences between reasonable and emotion mind and ways to get to wise mind  Group explored the benefits of mindfulness and practiced ways to slow down to begin to develop wise mind  She shared her mantra for the new year as "inner peace"  She was able to share ways she is implementing mindfulness  Group reinforced role of participating with wise mind in order to prevent getting stuck in the past or fears of the future  Positive effort toward treatment goal noted  Discharge at the end of the treatment day  Tx Plan Objective: 1 1 1,1 5, Therapist:  Crystal JONES        Case Management Note    Crystal JONES    Current suicide risk : Low     INDIVIDUAL PSYCHOTHERAPY  This case management session was facilitated virtually in a private office using HIPAA Compliant and Approved [employer] Teams  Audrey Ann consented to the use of tele-video modality of treatment  [id number] Met with Audrey Ann  Reviewed relapse prevention plan, aftercare plan, and medication list (copies provided)  Audrey Ann shared improvement through being open to working on self, eating better, isolating less, and desire to do things  PHQ-9 was a 5 (16 upon admission)  Denied SI, HI, and psychosis  Aftercare providers to receive summary  Medications changes/added/denied? Yes - see ANNMARIE Ghosh, SYLVIA note    Treatment session number: 20    Individual Case Management Visit provided today?  Yes     [facility] follow up physician's orders: Discharge today - Dr Bonilla Painter MD

## 2022-01-03 NOTE — PSYCH
Virtual Regular Visit    Verification of patient location:    Patient is located in the following state in which I hold an active license [place]      Assessment/Plan:    Problem List Items Addressed This Visit        Other    Bipolar I disorder, most recent episode mixed, severe with psychotic features ([address] ) - Primary          Goals addressed in session: Goal 1          Reason for visit is virtual PHP due to covid  Encounter provider [address] [facility] PARTIAL PSYCH PROGRAM    Provider located at [address] [place] [address]  [id number] [facility] [phone]      Recent Visits  Date Type Provider Dept   12/29/21 Office Visit [address] PARTIAL PSYCH GROUP THERAPY [facility]   12/28/21 Office Visit [address] PARTIAL PSYCH GROUP THERAPY [facility]   12/27/21 Office Visit [address] PARTIAL PSYCH GROUP THERAPY [facility]   Showing recent visits within past 7 days and meeting all other requirements  Today's Visits  Date Type Provider Dept   01/03/22 Office Visit [address] PARTIAL PSYCH GROUP THERAPY [facility]   Showing today's visits and meeting all other requirements  Future Appointments  No visits were found meeting these conditions  Showing future appointments within next 150 days and meeting all other requirements       The patient was identified by name and date of birth  Maria Eugenia Woody was informed that this is a telemedicine visit and that the visit is being conducted throughMicrosoft Teams and patient was informed that this is a secure, HIPAA-compliant platform  She agrees to proceed     My office door was closed  No one else was in the room  She acknowledged consent and understanding of privacy and security of the video platform  The patient has agreed to participate and understands they can discontinue the visit at any time  Patient is aware this is a billable service  Subjective  Maria Eugenia Woody is a 24 y o  female        HPI Past Medical History:   Diagnosis Date    Anxiety     Asthma     Lymphadenitis     Last assessed 12/30/14    Lymphadenopathy     Last assessed 10/16/13    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[facility] [address] )     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[facility] [address] )     Psychiatric disorder     Psychiatric illness     Psychosis [facility])        Past Surgical History:   Procedure Laterality Date    COLONOSCOPY N/A 5/3/2019    Procedure: COLONOSCOPY;  Surgeon: John Cordova MD;  Location:  GI LAB; Service: Gastroenterology    ESOPHAGOGASTRODUODENOSCOPY N/A 5/3/2019    Procedure: ESOPHAGOGASTRODUODENOSCOPY (EGD); Surgeon: John Cordova MD;  Location:  GI LAB; Service: Gastroenterology       Current Outpatient Medications   Medication Sig Dispense Refill    albuterol (2 5 mg/3 mL) 0 083 % nebulizer solution Take 1 vial (2 5 mg total) by nebulization every 6 (six) hours as needed for wheezing or shortness of breath 75 mL 0    ARIPiprazole (ABILIFY) 20 MG tablet Take 1 tablet (20 mg total) by mouth daily 30 tablet 0    hydrOXYzine HCL (ATARAX) 10 mg tablet 1-2 po q 6 hrs prn anxiety, sleep 50 tablet 1    ondansetron (Zofran ODT) 4 mg disintegrating tablet Take 1 tablet (4 mg total) by mouth every 6 (six) hours as needed for nausea or vomiting 20 tablet 0    ondansetron (Zofran ODT) 8 mg disintegrating tablet Take 1 tablet (8 mg total) by mouth every 8 (eight) hours as needed for nausea or vomiting 30 tablet 0     No current facility-administered medications for this visit  Allergies   Allergen Reactions    Penicillins Hives       Review of Systems    Video Exam    There were no vitals filed for this visit  Physical Exam     I spent 5 hours + case managment minutes with patient today in which greater than 50% of the time was spent in counseling/coordination of care regarding see notes  VIRTUAL VISIT DISCLAIMER    Apurva Cavazos verbally agrees to participate in [employer]   Pt is aware that Virtual Care Services could be limited without vital signs or the ability to perform a full hands-on physical exam  Apurva Meehan understands she or the provider may request at any time to terminate the video visit and request the patient to seek care or treatment in person

## 2022-01-03 NOTE — PSYCH
Subjective:     Patient ID: Adam Mitchell is a 24 y o  female  [facility] Clinical Progress Notes      Specialized Services Documentation  Therapist must complete separate progress note for each specific clinical activity in which the individual participated during the day  Group Psychotherapy (12:30-1:30) This group was facilitated virtually in a private office using HIPAA Compliant and Approved Microsoft Teams  Adam Mitchell  consented to the use of tele-video modality of treatment  This group was on making changes  The group read the poem, An Autobiography in [address] [place] [address] by  Adam Sinha and discussed how it pertains to their lives and what chapter they find themselves in currently  Participants discussed the different themes in the poem such as blaming others for problems, continuing in bad habits and the simple choice of taking a new path  Participants went on to read and discuss different thought patterns that contribute to resistance to change; delay, denial, rationalizing, avoidance, chosing not to counter negative thoughts, lacking necessary skills and payoffs for unhealthy behavior  Apurva was not on camera but participated in group discussion surrounding making changes in life  She discussed how she has been learning to understand the role her mother has played in her journey as well as the environment she was in previously going to a private therapeutic school  She states she is learning she did not have an outlet to communicate her feeling and how her mom projected so much on her  Apurva expressed some fear regarding addiction and her family and her use of marijuana but was encouraged that her recognition of the diease of addiction and her desire to make changes in her own life are a great start and should allow her to feel confident she is able to work towards wellness     Tx Goal Objective: 1 1,1 2  Therapist: ESTHER Aranda

## 2022-01-03 NOTE — PSYCH
Virtual Regular Visit    Verification of patient location:    Patient is located in the following state in which I hold an active license [place]             Reason for visit is   Chief Complaint   Patient presents with    Virtual Regular Visit        Encounter provider Artis Pichardo PA-C    Provider located at [address]  [phone]      Recent Visits  No visits were found meeting these conditions  Showing recent visits within past 7 days and meeting all other requirements  Future Appointments  No visits were found meeting these conditions  Showing future appointments within next 150 days and meeting all other requirements       The patient was identified by name and date of birth  Mark Gudino was informed that this is a telemedicine visit and that the visit is being conducted throughMicrosoft Teams and patient was informed that this is a secure, HIPAA-compliant platform  She agrees to proceed     My office door was closed  No one else was in the room  She acknowledged consent and understanding of privacy and security of the video platform  The patient has agreed to participate and understands they can discontinue the visit at any time  Patient is aware this is a billable service  Apurva signed on for virtual visit but said she could not get her camera to work  Visit done with audio only  VIRTUAL VISIT DISCLAIMER    Apurva Cavazos verbally agrees to participate in [employer]  Pt is aware that [employer] could be limited without vital signs or the ability to perform a full hands-on physical exam  Apurva Cavazos understands she or the provider may request at any time to terminate the video visit and request the patient to seek care or treatment in person  Progress Note - Behavioral Health   [facility], [facility]  Apurva Cavazos 24 y o  female MRN: [id number]     Progress at partial program: some improvement  Apurva seen by SYLVIA 12/21 at which time meds unchanged  Apurva says she is recovering from URI and UTI - remains on antibiotics  Overall mood stable with depression "here and there" and no descriptions of rupesh/hypomani  Had significant anxiety this past weekend secondary to "family emergency"  Still anxious  Having difficulty falling asleep and cannot tolerate any dose of trazodone secondary to daytime grogginess  Denies use of OTC cough/cold med    Denies SI         ROS:   As above otherwise negative    Active Ambulatory Problems     Diagnosis Date Noted    Acid reflux 11/18/2014    Allergic rhinitis 06/18/2015    Asthma 05/18/2012    Bipolar disorder ([facility] [address] ) 12/20/2013    Breast pain 06/30/2014    Common migraine without aura 05/16/2017    Cyst of right ovary 05/03/2017    Fibrocystic breast 03/05/2015    Hyperlipidemia 06/20/2013    Overweight 03/12/2013    Vertigo 02/23/2015    Lymphadenopathy 04/26/2018    STD (sexually transmitted disease) 10/08/2018    Diarrhea 04/02/2019    Functional neurological symptom disorder with attacks or seizures     Epigastric pain 05/01/2019    Loose stools 05/01/2019    Change in bowel habits 05/01/2019    Nausea and vomiting 05/01/2019    Psychogenic nonepileptic seizure 06/06/2019    Lumbar radiculopathy 06/06/2019    Tobacco abuse 09/18/2019    COVID-19 virus infection 11/13/2021    Bipolar I disorder, most recent episode mixed, severe with psychotic features ([facility] [address] ) 11/24/2021     Resolved Ambulatory Problems     Diagnosis Date Noted    No Resolved Ambulatory Problems     Past Medical History:   Diagnosis Date    Anxiety     Lymphadenitis     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[address] )     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[address] )     Psychiatric disorder     Psychiatric illness     Psychosis ([address] )      Allergies   Allergen Reactions    Penicillins Hives          Mental Status Evaluation:     Audio only: Speech mildly hoarse but clear and coherent  Thought process and content intact  Insight and judgment intact  No SI/HI  Laboratory results: I have personally reviewed all pertinent laboratory/tests results  Assessment   Diagnoses and all orders for this visit:    Bipolar I disorder, most recent episode mixed, severe with psychotic features ([facility] [address] )    Bipolar affective disorder, current episode mixed, current episode severity unspecified ([facility])  -     ARIPiprazole (ABILIFY) 20 MG tablet; Take 1 tablet (20 mg total) by mouth daily  -     hydrOXYzine HCL (ATARAX) 10 mg tablet; 1-2 po q 6 hrs prn anxiety, sleep       Current Outpatient Medications   Medication Sig Dispense Refill    albuterol (2 5 mg/3 mL) 0 083 % nebulizer solution Take 1 vial (2 5 mg total) by nebulization every 6 (six) hours as needed for wheezing or shortness of breath 75 mL 0    ARIPiprazole (ABILIFY) 20 MG tablet Take 1 tablet (20 mg total) by mouth daily 30 tablet 0    hydrOXYzine HCL (ATARAX) 10 mg tablet 1-2 po q 6 hrs prn anxiety, sleep 50 tablet 1    ondansetron (Zofran ODT) 4 mg disintegrating tablet Take 1 tablet (4 mg total) by mouth every 6 (six) hours as needed for nausea or vomiting 20 tablet 0    ondansetron (Zofran ODT) 8 mg disintegrating tablet Take 1 tablet (8 mg total) by mouth every 8 (eight) hours as needed for nausea or vomiting 30 tablet 0     No current facility-administered medications for this visit  Plan    Planned medication and treatment changes:  Stop trazodone secondary to intolerance  Trial atarax 10-20 mg q 6 hrs prn anxiety, sleep  Cont abilify 20 mg qam   Ok for discharge per treatment team   Will f/u [facility]  Psychiatric meds reconciled  All current active medications have been reviewed    Continue treatment with group therapy and partial program  Discharge planning      Risks / Benefits of Treatment:    Risks, benefits, and possible side effects of medications explained to patient and patient verbalizes understanding and agrees to medications  Counseling / Coordination of Care:    Patient's progress discussed with staff in treatment team meeting  Medications, treatment progress and treatment plan reviewed with patient      Lety Mcnair PA-C 01/03/22

## 2022-01-03 NOTE — PSYCH
[facility] Discharge Instructions:   Disposition: home  Address: [address]  [address]   Diagnosis:  Encounter Diagnosis   Name Primary?  Bipolar I disorder, most recent episode mixed, severe with psychotic features ([address] ) Yes      Allergies (Drug/Food): Allergies   Allergen Reactions    Penicillins Hives     Activity: no activity changes  Diet:no recommendations  Smoking Cessation:not a smoker   Diagnostic/Laboratory Orders: completed and in EPIC  Vaccines: If you received a vaccine, please notify your family physician on your next visit  For more information, please call [phone]  Follow-up appointments/Referrals:   [facility]  [address]  [phone]  Apurva Dunn Zoran advised to walk in 1/3/2022 to schedule follow up     [facility] 85 [id number] 32  Crisis Intervention (Emergency) [place]  Service: [facility]: [phone]  National Crisis Intervention Hotline: 2-503.428.8667  National Suicide Crisis Hotline: 3-113.425.7697  I, the undersigned, have received and understand the above instructions        Patient/Rep Signature: __________________________________     Date/Time: ______________     Physician Signature: ____________________________________    Date/Time: ______________           Signature: ________________________________     Date/Time: ______________

## 2022-01-03 NOTE — PSYCH
Subjective:     Patient ID: Glo Gaviria is a 24 y o  female  [facility] Clinical Progress Notes      Specialized Services Documentation  Therapist must complete separate progress note for each specific clinical activity in which the individual participated during the day  Group Psychotherapy  This group was facilitated virtually in a private office using HIPAA Compliant and Approved [employer] Teams  Glo Gaviria  consented to the use of tele-video modality of treatment  ([id number]) Glo Gaviria actively shared in psychoeducational group which focused on nutrition to improve physical and mental wellbeing  Topics included appropriate serving sizes for all food groups as well as benefits to eating for health  The group then completed a nutrition themed jeopardy to reinforce teaching  They then discussed ideas on how to improve on their nutrition  Good progress toward goal noted  Continue psychoeducation to educate on the importance of making nutrition a priority    Tx Plan Objectives: 1 1,1 2    Therapist: Georgette LOWE RN

## 2022-01-03 NOTE — PSYCH
Subjective:     Patient ID: Justine Vasques is a 24 y o  female  [facility] Discharge Summary:   Admission Date: 11/24/2021  Patient was referred by Michelle Ramirez Dr  Discharge Date: 01/03/22   Was this a routine discharge? yes   Diagnosis: Axis I:   1  Bipolar I disorder, most recent episode mixed, severe with psychotic features [facility])        Treating Physician: Dr Sriram Schroeder MD  Treatment Complications: initially no insurance; physical illness; elevated LFTs  Presenting Need:   Per Dr Lolita Lemos:   Rob Dumont reports she has been feeling better and "more stable " Prior to coming to the hospital, she was off her meds for 3 months  She stopped due to recently moving from [place] and did not have insurance or a prescriber to give her meds  She was having voices before but now they are much quieter now  They were commanding her to kill self and also self-derogatory but much improved now  Not feeling suicidal anymore  Before she came to hospital she was seeing more "shadows" but not so much anymore  Her mood is "less sad" but improved  Depression symptoms include trouble falling and staying asleep, Poor appetite and when she tries to eat she throws up, she reports high motivation to start things but then loses interest penitentiary, low energy, some decreased interest, poor concentration,  No hopeless/helpless feelings, no SI, HI, but does have passive death wishes  Anxiety symptoms include panic attacks which she gets couple times a week, 10-20 minutes, feels like she is very tachycardic, crying uncontrollable, sometimes bangs head against the wall, things feel like they are in slow motion, nauseous, eyes feel like they "black out " Otherwise she is anxious most of the day, everyday, and she bites her nails; gets muscle tension in her neck and has excessive worrying     Past manic episodes - scattered thoughts, very high energy, smoke way more cigarettes than usual, repeats certain phrases over and over again, hypersexual, less sleep, flight of ideas, some grandiose delusions  Last manic episode was just before going to the hospital - possible mixed episode  +Ah - quiet voices now, cannot make out what they are saying; no VH anymore;  No delusions; no IOR  PTSD symptoms - +nightmares of trauma - once every 2-3 months;  +flashbacks - once a week now, before less frequently; +avoidance; +hypervigilance     Per this writer: Bessie Duran referred following IP medical admission precipitated by auditory and visual hallucinations with command nature to harm self  She was placed on an IP medical unit as she was positive for COVID-19  She reports she recently (3 months ago) ran out of medications following relocation to [facility] from [place]  She reports she did not have insurance and was decompensating  She reports scattered thoughts, inability to sleep, inability to eat, forgetfulness, and hallucinations  She has a history of self harm and multiple IP stays starting as a teen  She reports that she does better on Depakote and Abilify      Per Bessie Duran: "I've been depressed and bipolar all my life" "I want to learn alternative ways to cope with my anxiety other than smoke"     Strengths: family support, wants to improve    Course of treatment includes:    group counseling, medication management, individual case management, allied therapy, psychoeducation and psychiatric evaluation    Treatment Progress: Bessie Duran attended 20 treatment sessions in which objectives encouraged development of healthy wellness strategies, medication management, and connection with community resources  Apurva was engaged and reported use of skills outside of program  Medication was challenged by doing well on Depakote in the past, however she developed stomach upset and elevated LFTs -thus Depakote was discontinued and Abilify increased to 20mg which she reported tolerating   Throughout her stay, Apurva had several physical health issues and her home environment appears chaotic which also creates a barrier  Apurva did follow through with obtaining medical assistance and referrals to OP mental health follow up  Upon discharge, reviewed relapse prevention plan, aftercare plan, and medication list (copies provided)  Alycia Westfall shared improvement through being open to working on self, eating better, isolating less, and desire to do things  PHQ-9 was a 5 (16 upon admission)  Denied SI, HI, and psychosis  Aftercare providers to receive summary        Aftercare recommendations include:   [facility]  [employer] [address] [place] [address] advised to walk in 1/3/2022 to schedule follow up    Discharge Medications include:  Current Outpatient Medications:     albuterol (2 5 mg/3 mL) 0 083 % nebulizer solution, Take 1 vial (2 5 mg total) by nebulization every 6 (six) hours as needed for wheezing or shortness of breath, Disp: 75 mL, Rfl: 0    ARIPiprazole (ABILIFY) 20 MG tablet, Take 1 tablet (20 mg total) by mouth daily, Disp: 30 tablet, Rfl: 0    hydrOXYzine HCL (ATARAX) 10 mg tablet, 1-2 po q 6 hrs prn anxiety, sleep, Disp: 50 tablet, Rfl: 1    ondansetron (Zofran ODT) 4 mg disintegrating tablet, Take 1 tablet (4 mg total) by mouth every 6 (six) hours as needed for nausea or vomiting, Disp: 20 tablet, Rfl: 0    ondansetron (Zofran ODT) 8 mg disintegrating tablet, Take 1 tablet (8 mg total) by mouth every 8 (eight) hours as needed for nausea or vomiting, Disp: 30 tablet, Rfl: 0

## 2022-01-03 NOTE — PSYCH
Assessment/Plan:       Diagnoses and all orders for this visit:     Bipolar I disorder, most recent episode mixed, severe with psychotic features ([facility] [address] )            Subjective:      Patient ID: Apurva Dumont is a 24 y  o  female      [facility] Plan   AREAS OF NEED: Mood instability as evidenced by anxiety, auditory and visual hallucinations, scattered thoughts, decrease in appetite and sleep, poor concentration, and forgetfulness related to relocation, loss of insurance, and not having medication  Date Initiated: 11/24/2021     Strengths: family support, wants to improve               LONG TERM GOAL:   Date Initiated: 11/24/2021  1 0 I will identify 3 signs that my depression and rupesh are more balanced and I am more productive in my day to day life    Target Date: 12/22/2021  Completion Date: 01/03/22         SHORT TERM OBJECTIVES:      Date Initiated: 11/24/2021  1 1 I will identify 3 mindfulness and/or distress tolerance skills I am practicing to manage my anxiety  Revision Date: 12/7/2021  Target Date: 12/7/2021  Completion Date: 01/03/22      Date Initiated: 11/24/2021  1 2 I will practice 3 mantras I can use when my thoughts begin to become distressing  Revision Date:   Target Date: 12/7/2021  Completion Date: 12/7/2021 - using daily "I am here", "I am present", "I am going to be okay"; "     Date Initiated: 11/24/2021  1 3 I will take medications as prescribed and share questions and concerns if arise     Revision Date: ongoing 12/7/2021  Target Date: 12/7/2021  Completion Date: 01/03/22      Date Initiated: 11/24/2021  1 4 I will identify 3 ways my supports can assist in my recovery and agree to staff/support contact as indicated     Revision Date: ongoing 12/7/2021  Target Date: 12/7/2021  Completion Date: 01/03/22              7 DAY REVISION:     Date Initiated: 12/7/2021  1 5 I will actively practice 2 positive copings skills daily and share what I used to hold myself accountable to myself daily  Revision Date:   Target Date: 12/17/2021  Completion Date:01/03/22      Date Initiated: 12/17/2021  1 6 I will complete my lab work  Revision Date:   Target Date: 12/30/2021  Completion Date: 12/28/2021    Date Initiated: 12/28/2021   1 7 I will complete my relapse prevention plan and will establish OP care  Revision Date:   Target Date: 1/10/2022  Completion Date: 01/03/22         PSYCHIATRY:  Date Initiated:  11/24/2021  Medication Management and Education       Revision Date: 12/7/2021; 12/17/2021;  12/28/2021         Continue medication management   The person(s) responsible for carrying out the plan Petey MD Justin; Kathy Byrne PA-C     NURSING/SYMPTOM EDUCATION:  Date Initiated:  11/24/2021  1 1, 1 2  1 3, 1 4 This RN will provide wellness/symptoms and skill education groups three to five days weekly to educate Apurva Dumont on signs and symptoms of diagnoses, skills to manage, and medication questions that will be addressed by the treatment team     Revision date: 12/7/2021; 12/17/2021;  12/28/2021   1 1,1 2,1 3,1 4,1 5 Continue to encourage Apurva Dumont to participate in wellness groups daily to learn about symptoms, coping strategies and warning signs to promote relapse prevention  The person(s) responsible for carrying out the plan is Al Baron RN     PSYCHOLOGY:   Date Initiated:  11/24/2021       1 1, 1 2, 1 4 Provide psychotherapy group 5 times per week to allow opportunity for [employer] explore stressors and ways of coping  Revision Date: 12/7/2021; 12/17/2021;  12/28/2021   1 1,1 2,1 4,1 5 Continue to provide psychotherapy group daily to Apurva Dumontand encourage sharing of stressors, skills and positive change    The person(s) responsible for carrying out the plan is ESTHER Zambrano     ALLIED THERAPY:   Date Initiated:  11/24/2021  1 1,1 2 Engage Apurva Dumont in AT group 5 times daily to encourage development and use of wellness tools to decrease symptoms and promote recovery through meaningful activity  Revision Date: 12/7/2021; 12/17/2021;  12/28/2021   1 1,1 2,1 5 Continue to engage Apurva Dumont to participate in AT group to practice wellness tools within program and transfer to home sharing successes and barriers through healthy task involvement  The person(s) responsible for carrying out the plan is KAREN Mclaughlin      CASE MANAGEMENT:   Date Initiated:  11/24/2021      1 0 This  will meet with Apurva Dumont  3-4 times weekly to assess treatment progress, discharge planning, connection to community supports and UR as indicated  Revision Date: 12/7/2021; 12/17/2021;  12/28/2021   1 0 Continue to meet with Apurva Dumont 3-4 times weekly to assess growth, work toward goals, continued treatment needs, dc planning and use of supports    The person(s) responsible for carrying out the plan is Jon JONES           TREATMENT REVIEW/COMMENTS:      DISCHARGE CRITERIA: Identify 3 signs of progress and complete relapse prevention plan     DISCHARGE PLAN: OP care  Estimated Length of Stay:10 treatment days     Diagnosis and Treatment Plan explained to Apurva Mixon relates understanding diagnosis and is agreeable to Treatment Plan       CLIENT COMMENTS / Please share your thoughts, feelings, need and/or experiences regarding your treatment plan: _____________________________________________________________________________________________________________________________________________________________________________________________________________________________________________________________________________________________________________________ Date/Time: ______________      Patient Signature: __Reviewed with Apurva Corin [place] copy on My Chart________________      Date/Time: __12/28/2021 1040____________       Signature: ____[facility]_____________________________      Date/Time: ___12/28/2021 1040____________

## 2022-01-04 ENCOUNTER — APPOINTMENT (OUTPATIENT)
Dept: PSYCHOLOGY | Facility: CLINIC | Age: 22
End: 2022-01-04
Payer: COMMERCIAL

## 2022-01-05 ENCOUNTER — APPOINTMENT (OUTPATIENT)
Dept: PSYCHOLOGY | Facility: CLINIC | Age: 22
End: 2022-01-05
Payer: COMMERCIAL

## 2022-01-06 ENCOUNTER — APPOINTMENT (OUTPATIENT)
Dept: PSYCHOLOGY | Facility: CLINIC | Age: 22
End: 2022-01-06
Payer: COMMERCIAL

## 2022-01-07 ENCOUNTER — APPOINTMENT (OUTPATIENT)
Dept: PSYCHOLOGY | Facility: CLINIC | Age: 22
End: 2022-01-07
Payer: COMMERCIAL

## 2022-01-14 ENCOUNTER — OFFICE VISIT (OUTPATIENT)
Dept: FAMILY MEDICINE CLINIC | Facility: CLINIC | Age: 22
End: 2022-01-14

## 2022-01-14 VITALS
RESPIRATION RATE: 18 BRPM | DIASTOLIC BLOOD PRESSURE: 70 MMHG | TEMPERATURE: 97.8 F | HEIGHT: 63 IN | OXYGEN SATURATION: 97 % | WEIGHT: 191 LBS | HEART RATE: 109 BPM | SYSTOLIC BLOOD PRESSURE: 110 MMHG | BODY MASS INDEX: 33.84 KG/M2

## 2022-01-14 DIAGNOSIS — R19.5 LOOSE STOOLS: ICD-10-CM

## 2022-01-14 DIAGNOSIS — R06.83 SNORING: Primary | ICD-10-CM

## 2022-01-14 DIAGNOSIS — M54.16 LUMBAR RADICULOPATHY: ICD-10-CM

## 2022-01-14 DIAGNOSIS — R59.1 LYMPHADENOPATHY: ICD-10-CM

## 2022-01-14 DIAGNOSIS — F44.5 PSYCHOGENIC NONEPILEPTIC SEIZURE: ICD-10-CM

## 2022-01-14 DIAGNOSIS — Z23 NEEDS FLU SHOT: ICD-10-CM

## 2022-01-14 DIAGNOSIS — E87.6 HYPOKALEMIA: ICD-10-CM

## 2022-01-14 PROCEDURE — 90682 RIV4 VACC RECOMBINANT DNA IM: CPT | Performed by: PHYSICIAN ASSISTANT

## 2022-01-14 PROCEDURE — 90471 IMMUNIZATION ADMIN: CPT | Performed by: PHYSICIAN ASSISTANT

## 2022-01-14 PROCEDURE — 99214 OFFICE O/P EST MOD 30 MIN: CPT | Performed by: PHYSICIAN ASSISTANT

## 2022-01-14 NOTE — PROGRESS NOTES
Assessment/Plan:    Lymphadenopathy  Will start with chiropractor         Problem List Items Addressed This Visit        Nervous and Auditory    Psychogenic nonepileptic seizure    Relevant Orders    Ambulatory Referral to Neurology    Lumbar radiculopathy    Relevant Medications    Diclofenac Sodium (VOLTAREN) 1 %       Immune and Lymphatic    Lymphadenopathy     Will start with chiropractor            Other    Loose stools    Relevant Orders    CBC and differential    Ambulatory Referral to Neurology      Other Visit Diagnoses     Snoring    -  Primary    Relevant Orders    Ambulatory Referral to Sleep Medicine    Hypokalemia        Relevant Orders    Comprehensive metabolic panel    Needs flu shot        Relevant Orders    influenza vaccine, quadrivalent, recombinant, PF, 0 5 mL, for patients 18 yr+ (FLUBLOK) (Completed)            Subjective:      Patient ID: Yesenia Segundo is a 20 y o  female  HPI  Here for follow up from the ED for vomiting  She was dx with uti and treated and is feeling better  She has hx of gi problems but now is feeling well  She was doing partial hospitalzation and recommend to see ADDISON  currently mood is stable  She was recommended to  Apply for disability due to multiple psych problems and seizure d/o  She is doing well  She does sleep a lot >12 hours at night and wakes up tired still  + snoring  The following portions of the patient's history were reviewed and updated as appropriate:   She  has a past medical history of Anxiety, Asthma, Lymphadenitis, Lymphadenopathy, 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[address] ), 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[address] ), Psychiatric disorder, Psychiatric illness, and Psychosis ([address] )    She   Patient Active Problem List    Diagnosis Date Noted    Bipolar I disorder, most recent episode mixed, severe with psychotic features ([address] ) 11/24/2021    COVID-19 virus infection 11/13/2021    Tobacco abuse 09/18/2019    Psychogenic nonepileptic seizure 06/06/2019    Lumbar radiculopathy 06/06/2019    Epigastric pain 05/01/2019    Loose stools 05/01/2019    Change in bowel habits 05/01/2019    Nausea and vomiting 05/01/2019    Functional neurological symptom disorder with attacks or seizures     Diarrhea 04/02/2019    STD (sexually transmitted disease) 10/08/2018    Lymphadenopathy 04/26/2018    Common migraine without aura 05/16/2017    Cyst of right ovary 05/03/2017    Allergic rhinitis 06/18/2015    Fibrocystic breast 03/05/2015    Vertigo 02/23/2015    Acid reflux 11/18/2014    Breast pain 06/30/2014    Bipolar disorder ([facility] [address] ) 12/20/2013    Hyperlipidemia 06/20/2013    Overweight 03/12/2013    Asthma 05/18/2012     She  has a past surgical history that includes Colonoscopy (N/A, 5/3/2019) and Esophagogastroduodenoscopy (N/A, 5/3/2019)  Her family history includes Depression in her mother; Drug abuse in her father and mother; Febrile seizures in her maternal uncle; Migraines in her mother; Other in her mother; Schizophrenia in her father  She  reports that she has been smoking cigarettes  She has been smoking about 0 50 packs per day  She has never used smokeless tobacco  She reports previous alcohol use  She reports current drug use  Drug: Marijuana    Current Outpatient Medications   Medication Sig Dispense Refill    albuterol (2 5 mg/3 mL) 0 083 % nebulizer solution Take 1 vial (2 5 mg total) by nebulization every 6 (six) hours as needed for wheezing or shortness of breath 75 mL 0    ARIPiprazole (ABILIFY) 20 MG tablet Take 1 tablet (20 mg total) by mouth daily 30 tablet 0    Diclofenac Sodium (VOLTAREN) 1 % Apply 2 g topically 4 (four) times a day 100 g 1    hydrOXYzine HCL (ATARAX) 10 mg tablet 1-2 po q 6 hrs prn anxiety, sleep 50 tablet 1    ondansetron (Zofran ODT) 4 mg disintegrating tablet Take 1 tablet (4 mg total) by mouth every 6 (six) hours as needed for nausea or vomiting 20 tablet 0    ondansetron (Zofran ODT) 8 mg disintegrating tablet Take 1 tablet (8 mg total) by mouth every 8 (eight) hours as needed for nausea or vomiting 30 tablet 0     No current facility-administered medications for this visit  Current Outpatient Medications on File Prior to Visit   Medication Sig    albuterol (2 5 mg/3 mL) 0 083 % nebulizer solution Take 1 vial (2 5 mg total) by nebulization every 6 (six) hours as needed for wheezing or shortness of breath    ARIPiprazole (ABILIFY) 20 MG tablet Take 1 tablet (20 mg total) by mouth daily    hydrOXYzine HCL (ATARAX) 10 mg tablet 1-2 po q 6 hrs prn anxiety, sleep    ondansetron (Zofran ODT) 4 mg disintegrating tablet Take 1 tablet (4 mg total) by mouth every 6 (six) hours as needed for nausea or vomiting    ondansetron (Zofran ODT) 8 mg disintegrating tablet Take 1 tablet (8 mg total) by mouth every 8 (eight) hours as needed for nausea or vomiting     No current facility-administered medications on file prior to visit  She is allergic to penicillins       Review of Systems   Constitutional: Negative for chills and fever  HENT: Negative for ear pain and sore throat  Eyes: Negative for pain and visual disturbance  Respiratory: Negative for cough and shortness of breath  Cardiovascular: Negative for chest pain and palpitations  Gastrointestinal: Negative for abdominal pain and vomiting  Genitourinary: Negative for dysuria and hematuria  Musculoskeletal: Positive for back pain  Negative for arthralgias  Skin: Negative for color change and rash  Neurological: Negative for seizures and syncope  All other systems reviewed and are negative  Objective:      /70 (BP Location: Left arm, Patient Position: Sitting, Cuff Size: Adult)   Pulse (!) 109   Temp 97 8 °F (36 6 °C) (Temporal)   Resp 18   Ht 5' 3" (1 6 m)   Wt 86 6 kg (191 lb)   LMP 12/15/2021   SpO2 97%   BMI 33 83 kg/m²          Physical Exam  Vitals and nursing note reviewed  Constitutional:       General: She is not in acute distress  Appearance: She is well-developed  HENT:      Head: Normocephalic and atraumatic  Right Ear: External ear normal       Left Ear: External ear normal    Eyes:      Conjunctiva/sclera: Conjunctivae normal    Neck:      Thyroid: No thyromegaly  Cardiovascular:      Rate and Rhythm: Normal rate and regular rhythm  Heart sounds: Normal heart sounds  No murmur heard  Pulmonary:      Effort: Pulmonary effort is normal  No respiratory distress  Breath sounds: Normal breath sounds  No wheezing  Musculoskeletal:         General: Tenderness (lower lumbar) present  Cervical back: Normal range of motion and neck supple  Lymphadenopathy:      Cervical: No cervical adenopathy  Neurological:      Mental Status: She is alert and oriented to person, place, and time     Psychiatric:         Behavior: Behavior normal

## 2022-01-31 ENCOUNTER — TELEPHONE (OUTPATIENT)
Dept: NEUROLOGY | Facility: CLINIC | Age: 22
End: 2022-01-31

## 2022-03-16 ENCOUNTER — ANNUAL EXAM (OUTPATIENT)
Dept: OBGYN CLINIC | Facility: CLINIC | Age: 22
End: 2022-03-16

## 2022-03-16 VITALS
HEART RATE: 81 BPM | WEIGHT: 190 LBS | SYSTOLIC BLOOD PRESSURE: 104 MMHG | BODY MASS INDEX: 33.66 KG/M2 | DIASTOLIC BLOOD PRESSURE: 70 MMHG

## 2022-03-16 DIAGNOSIS — N92.0 MENORRHAGIA WITH REGULAR CYCLE: ICD-10-CM

## 2022-03-16 DIAGNOSIS — Z01.419 ENCOUNTER FOR ANNUAL ROUTINE GYNECOLOGICAL EXAMINATION: Primary | ICD-10-CM

## 2022-03-16 DIAGNOSIS — F44.5 PSYCHOGENIC NONEPILEPTIC SEIZURE: ICD-10-CM

## 2022-03-16 PROCEDURE — 99395 PREV VISIT EST AGE 18-39: CPT | Performed by: NURSE PRACTITIONER

## 2022-03-16 RX ORDER — CLONIDINE HYDROCHLORIDE 0.2 MG/1
TABLET ORAL
COMMUNITY
Start: 2022-03-14

## 2022-03-16 RX ORDER — HYDROXYZINE PAMOATE 50 MG/1
CAPSULE ORAL
COMMUNITY
Start: 2022-03-14

## 2022-03-16 RX ORDER — BUSPIRONE HYDROCHLORIDE 10 MG/1
TABLET ORAL
COMMUNITY
Start: 2022-03-14

## 2022-03-16 RX ORDER — BUPROPION HYDROCHLORIDE 75 MG/1
TABLET ORAL
COMMUNITY
Start: 2022-03-14

## 2022-03-16 RX ORDER — ARIPIPRAZOLE 10 MG/1
10 TABLET ORAL
COMMUNITY
Start: 2022-03-10

## 2022-03-16 NOTE — PROGRESS NOTES
Annual Exam    Assessment   1  Encounter for annual routine gynecological examination     2  Psychogenic nonepileptic seizure  Ambulatory Referral to [facility]   3  Menorrhagia with regular cycle       well woman       Plan       All questions answered  Breast self exam technique reviewed and patient encouraged to perform self-exam monthly  Contraception: none  Discussed healthy lifestyle modifications  Follow up in 1 year  Patient Instructions   Make Family Practice appointment related to seizures  Take Motrin 600 mg every 6 hours with food for menstrual pain  Call with needs or concerns  Return in 1 year  Pt verbalized understanding of all discussed  Subjective      Sharon Espinosa is a 20 y o  Abundio Becky female who presents for annual well woman exam  Periods are regular every 28-30 days, lasting 5 days  No intermenstrual bleeding, spotting, or discharge  Last WNL PAP was 2/3/2020  1 female partner x 4 years  Pt notes heavy painful periods, discussed safe and effective use of Motrin, pt declined hormonal use for painful heavy periods    Depression Screening Follow-up Plan: Patient's depression screening was negative with a PHQ-2 score of 1  Their PHQ-9 score was 3   Clinically patient does not have depression  No treatment is required  Current contraception: none  History of abnormal Pap smear: no  Family history of uterine or ovarian cancer: no  Regular self breast exam: yes  History of abnormal mammogram: N/A  Family history of breast cancer: no  History of abnormal lipids: unknown  Menstrual History:  OB History        0    Para   0    Term   0       0    AB   0    Living   0       SAB   0    IAB   0    Ectopic   0    Multiple   0    Live Births   0           Obstetric Comments   Irregular menstrual cycle -Last assessed 14            Menarche age: 14  Patient's last menstrual period was 2022 (exact date)    Period Duration (Days): 5  Period Pattern: Regular  Menstrual Flow: Heavy  Menstrual Control: Maxi pad  Dysmenorrhea: (!) Severe  Dysmenorrhea Symptoms: Nausea,Diarrhea,Throbbing    The following portions of the patient's history were reviewed and updated as appropriate: allergies, current medications, past family history, past medical history, past social history, past surgical history and problem list     Review of Systems  Pertinent items are noted in HPI        Objective      /70   Pulse 81   Wt 86 2 kg (190 lb)   LMP 03/09/2022 (Exact Date)   BMI 33 66 kg/m²     General: alert and oriented, in no acute distress, alert, appears stated age and cooperative   Heart: regular rate and rhythm, S1, S2 normal, no murmur, click, rub or gallop   Lungs: clear to auscultation bilaterally, WNL respiratory effort, negative cough or SOB   Thyroid: Negative masses   Abdomen: soft, non-tender, without masses or organomegaly   Vulva: normal   Vagina: normal mucosa   Cervix: no cervical motion tenderness and no lesions   Uterus: normal size, non-tender, normal shape and consistency   Adnexa: normal adnexa   Urethra: normal   Breasts: NT,negative masses, discharge, or dimpling

## 2022-03-16 NOTE — PATIENT INSTRUCTIONS
Make Family Practice appointment related to seizures  Take Motrin 600 mg every 6 hours with food for menstrual pain  Call with needs or concerns  Return in 1 year    COVID-19 Instructions    If you are having any of the following:  Cough   Shortness of breath   Fever  If traveled within past 2 weeks internationally or to high risk [place] states  Or been in contact with someone that has     Please call either:   Your PCP office  [phone], option [phone]    They will screen you over the phone and direct you to the nearest appropriate testing location  DO NOT go to your PCP or OB office without calling first     Thank you for your confidence in our team    We appreciate you and welcome your feedback  If you receive a survey from us, please take a few moments to let us know how we are doing     Sincerely,  KAHLIL Garcia

## 2022-05-04 ENCOUNTER — TELEPHONE (OUTPATIENT)
Dept: NEUROLOGY | Facility: CLINIC | Age: 22
End: 2022-05-04

## 2022-05-16 ENCOUNTER — CONSULT (OUTPATIENT)
Dept: NEUROLOGY | Facility: CLINIC | Age: 22
End: 2022-05-16
Payer: COMMERCIAL

## 2022-05-16 VITALS
HEIGHT: 63 IN | WEIGHT: 192 LBS | HEART RATE: 82 BPM | SYSTOLIC BLOOD PRESSURE: 103 MMHG | BODY MASS INDEX: 34.02 KG/M2 | DIASTOLIC BLOOD PRESSURE: 64 MMHG

## 2022-05-16 DIAGNOSIS — F44.5 PSYCHOGENIC NONEPILEPTIC SEIZURE: ICD-10-CM

## 2022-05-16 DIAGNOSIS — R19.5 LOOSE STOOLS: ICD-10-CM

## 2022-05-16 PROBLEM — F20.9 SCHIZOPHRENIA (HCC): Status: ACTIVE | Noted: 2022-05-16

## 2022-05-16 PROCEDURE — 99214 OFFICE O/P EST MOD 30 MIN: CPT | Performed by: PSYCHIATRY & NEUROLOGY

## 2022-05-16 RX ORDER — DIVALPROEX SODIUM 250 MG/1
250 TABLET, EXTENDED RELEASE ORAL 2 TIMES DAILY
COMMUNITY
Start: 2022-05-11

## 2022-05-16 NOTE — PROGRESS NOTES
Patient ID: Kingston Cota is a 20 y o  female with nonepileptic psychogenic spells, who is presenting to Neurology office for evaluation of her spells  Assessment/Plan:    Psychogenic nonepileptic seizure  We discussed that the spells she is experiencing appear to be consistent with her previous spells, which were captured in the EMU and found to be nonepileptic psychogenic spells  We discussed to be important for her to continue follow with her mental health provider for ongoing management of her spells  She should continue taking Depakote as prescribed by her psychiatrist   I would defer any medication changes to her psychiatrist     To try to have better reaffirm her diagnosis, I will have her get a routine EEG before next appointment  If her spells would become more frequent or change, she may benefit from being admitted again to the epilepsy monitoring unit to characterize her events again  She will Return in about 3 months (around 8/16/2022)  Subjective:    HPI  Current seizure medications:  1  Divalproex  mg twice a day  Other medications as per Epic    Briefly reviewing her history, her events started around when she was graduating high school  She was very stressed, working 2  Jobs and one night, she just passed out and woke up on the floor  She was told by others that she was shaking all over and unresponsive  She also ws having episodes of staring and unresponsiveness  Since that time,s he has continued to have episodes  She will have a few days without them, or she will have multiple in a day  She was initially started on Levetiracetam, but continued to have episodes (max Levetiracetam dose was reportedly 1000 mg/day)  She was in the EMU in 2019, with two typical spells being captured, and found to be nonepileptic psychogenic spells       In the past, the spells were preceded by a headache, but now they are more often associated with a more severe "migraine"    Most recently, She was living in [place] and was following with a Neurologist there  She had multiple EEGs while there including ambulatory EEGs  They did capture events on EEG, but she never was given the results of the EEG because she moved back to [place]  She has been back in the area for about a year  There was a period of time when she was off of Depakote, and her spells were more often and more severe  This was restarted by her psychiatrist for mood and they feel it has been helpful    Event/Seizure semiology:  1  They will sometimes begin with a severe migraine, but often without any warning, she will just fall to the ground and start shaking all over  She may vomit or urinate on herself  Her mother describes that her body will bounce up and down  Sometimes her head will turn side to side  These typically last less than 30 seconds, but have lasted long, up to 36 minutes  She may go a few days without an event, but they happen about once a day on average  Special Features  Status epilepticus: none  Self Injury Seizures: bumps to head, dislocated finger  Precipitating Factors: if she is overheated or too cold   Being sick, migraine, depression    Epilepsy Risk Factors:  Abnormal pregnancy:    no  Abnormal birth/:   no  Abnormal Development:   no  Febrile seizures, simple:   no  Febrile seizures, complex:   no  CNS infection:    no  Intellectual disability:    no  Cerebral palsy:    no  Head injury (moderate/severe):  no  CNS neoplasm:    no  CNS malformation:    no  Neurosurgical procedure:   no  Stroke:     no  Alcohol abuse:    no  Drug abuse:     no  Family history Sz/epilepsy:   no    Prior AEDs:  Valproate (for headaches, weight gain), Topiramate (for headaches, ineffective for HA) Gabapentin (side effects ), Levetiracetam     Prior Evaluation:  - 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  - Routine EE2019: normal  - Ambulatory EEG: yes in [place], records not available today  - Video EE2019 - 2019:  2 days of continuous video EEG monitoring, during which time 2 spells of unresponsiveness with whole body shaking, pelvic thrusting and side to side movements, and one spell of staring, all without EEG changes  Clinical features of these events with persistent alpha rhythm suggests these events are not seizures  Normal activities of wakefulness and sleep were seen  - PET scan brain : none  - Neuropsychologic testing: none  - Labs: none    Psychiatric History:  Depression: yes  Anxiety: yes  Psychosis: yes  Psychiatric Admissions: multiple prior admissions    Her history was also obtained from her mother, who was present at today's visit  The following portions of the patient's history were reviewed and updated as appropriate: allergies, current medications, past family history, past medical history, past social history, past surgical history and problem list      Objective:    Blood pressure 103/64, pulse 82, height 5' 3" (1 6 m), weight 87 1 kg (192 lb), not currently breastfeeding  Physical Exam    Neurological Exam  GENERAL EXAMINATION:   In general patient is well appearing and in no distress  There is no peripheral edema  NEUROLOGIC EXAMINATION:     Alert and oriented to person, date, location  Fund of knowledge is full with good understanding of medical situation  Recent and remote memory were intact    Mood and affect are appropriate  Attention is intact  Language function including fluency, naming, and comprehension intact  Cranial nerves: Pupils are equal round reactive to light and accommodation  Visual Fields are full to confrontation bilaterally  Extraocular movements are intact without nystagmus  Facial sensation is intact to light touch  No facial droop, face activates symmetrically  There is no dysarthria  Hearing was intact to finger rub  Tongue and uvula are midline and palate elevates symmetrically  Shoulder shrug  5/5      Motor Exam:  No pronator drift  Bulk and tone are normal  Strength is 5/5 throughout  Deep tendon reflexes: Biceps 2+, brachioradialis 2+, patellar 2+, Achilles 2+ bilaterally  Sensation: Intact light touch    Coordination: Finger nose finger and heel to shin testing are without dysmetria  Gait: Negative romberg  Normal casual gait  ROS:    Review of Systems   Constitutional: Negative  Negative for appetite change and fever  HENT: Negative  Negative for hearing loss, tinnitus, trouble swallowing and voice change  Eyes: Negative  Negative for photophobia and pain  Respiratory: Negative  Negative for shortness of breath  Cardiovascular: Negative  Negative for palpitations  Gastrointestinal: Negative  Negative for nausea and vomiting  Endocrine: Negative  Negative for cold intolerance  Genitourinary: Negative  Negative for dysuria, frequency and urgency  Musculoskeletal: Negative  Negative for myalgias and neck pain  Skin: Negative  Negative for rash  Neurological: Positive for seizures (daily, lasting 30 seconds)  Negative for dizziness, tremors, syncope, facial asymmetry, speech difficulty, weakness, light-headedness, numbness and headaches  Hematological: Negative  Does not bruise/bleed easily  Psychiatric/Behavioral: Negative  Negative for confusion, hallucinations and sleep disturbance  All other systems reviewed and are negative  I personally reviewed the ROS that was entered by the medical assistant    Voice recognition software was used in the generation of this note  There may be unintentional errors including grammatical errors, spelling errors, or pronoun errors

## 2022-05-16 NOTE — PATIENT INSTRUCTIONS
-- I will order a routine EEG before your next appointment       -- Please continue to work with your Psychiatrist to help work on your mood

## 2022-05-25 ENCOUNTER — OFFICE VISIT (OUTPATIENT)
Dept: SLEEP CENTER | Facility: CLINIC | Age: 22
End: 2022-05-25
Payer: COMMERCIAL

## 2022-05-25 VITALS
SYSTOLIC BLOOD PRESSURE: 90 MMHG | WEIGHT: 196.4 LBS | HEIGHT: 63 IN | OXYGEN SATURATION: 97 % | HEART RATE: 72 BPM | BODY MASS INDEX: 34.8 KG/M2 | DIASTOLIC BLOOD PRESSURE: 58 MMHG

## 2022-05-25 DIAGNOSIS — R06.83 SNORING: ICD-10-CM

## 2022-05-25 DIAGNOSIS — G47.33 OSA (OBSTRUCTIVE SLEEP APNEA): Primary | ICD-10-CM

## 2022-05-25 PROCEDURE — 99244 OFF/OP CNSLTJ NEW/EST MOD 40: CPT | Performed by: INTERNAL MEDICINE

## 2022-05-25 NOTE — PROGRESS NOTES
Consultation - Froilan : 2000  MRN: [id number]      Assessment:  The patient has symptoms consistent with obstructive sleep apnea including loud snoring and excessive daytime sleepiness  She has been evaluated for seizure disorder, which seems to be unrelated to CHANDANA  She does complain of restless legs  Plan:  Nocturnal polysomnography    Follow up: After testing    History of Present Illness:   20 y  o female with a longstanding history of seizure disorder is here for sleep evaluation  She has with sound like slow a parasomnias, but no awareness of her nocturnal activity  She does have vivid dreaming in the third person  She denies cataplexy or sleep paralysis  She has had a sleep-deprived EEG, but no sleep study        Review of Systems      Genitourinary need to urinate more than twice a night   Cardiology none   Gastrointestinal none   Neurology frequent headaches, awaken with headache, need to move extremities, forgetfulness, poor concentration or confusion, , difficulty with memory and balance problems   Constitutional fatigue   Integumentary none   Psychiatry anxiety, depression and aggressiveness or irritability   Musculoskeletal muscle aches and back pain   Pulmonary snoring and difficulty breathing when lying flat    ENT none   Endocrine excessive thirst   Hematological none       I have reviewed and updated the review of systems as necessary    Historical Information    Past Medical History:  GERD, asthma, migraine, seizure (functional), schizophrenia, bipolar disorder    Family History: non-contributory    Social History     Socioeconomic History    Marital status: Single     Spouse name: Not on file    Number of children: 0    Years of education: Not on file    Highest education level: High school graduate   Occupational History    Occupation: unemployed   Tobacco Use    Smoking status: Former Smoker     Packs/day: 0 00     Years: 0 00     Pack years: 0 00 Types: Cigarettes, Cigarettes    Smokeless tobacco: Never Used    Tobacco comment: I stopped smoking cigarettes but started vaping   Vaping Use    Vaping Use: Every day   Substance and Sexual Activity    Alcohol use: Yes     Alcohol/week: 2 0 standard drinks     Types: 2 Shots of liquor per week     Comment: Occasionally    Drug use: Yes     Frequency: 3 0 times per week     Types: Marijuana    Sexual activity: Yes     Partners: Female     Birth control/protection: None   Other Topics Concern    Not on file   Social History Narrative    L[place] in [facility] with her girlfriend, mother, siblings, step-father    Pt has Dog - Not allowed in bedroom     Social Determinants of Health     Financial Resource Strain: Medium Risk    Difficulty of Paying Living Expenses: Somewhat hard   Food Insecurity: Food Insecurity Present    Worried About Running Out of Food in the Last Year: Sometimes true    Rosa of Food in the Last Year: Sometimes true   Transportation Needs: Unmet Transportation Needs    Lack of Transportation (Medical):  Yes    Lack of Transportation (Non-Medical): Yes   Physical Activity: Sufficiently Active    Days of Exercise per Week: 3 days    Minutes of Exercise per Session: 60 min   Stress: Not on file   Social Connections: Not on file   Intimate Partner Violence: Not on file   Housing Stability: [address] Unable to Pay for Housing in the Last Year: No    Number of Places Lived in the Last Year: 1    Unstable Housing in the Last Year: No         Sleep Schedule: unremarkable    Snoring:  Yes    Witnessed Apnea:  No    Medications/Allergies:    Current Outpatient Medications:     albuterol (2 5 mg/3 mL) 0 083 % nebulizer solution, Take 1 vial (2 5 mg total) by nebulization every 6 (six) hours as needed for wheezing or shortness of breath, Disp: 75 mL, Rfl: 0    albuterol (Ventolin HFA) 90 mcg/act inhaler, Inhale 2 puffs every 4 (four) hours as needed for wheezing, Disp: 18 g, Rfl: 3   ARIPiprazole (ABILIFY) 10 mg tablet, Take 10 mg by mouth daily at bedtime, Disp: , Rfl:     azelastine (ASTELIN) 0 1 % nasal spray, 1-2 sprays into each nostril 2 (two) times a day as needed for rhinitis Use in each nostril as directed, Disp: 30 mL, Rfl: 5    busPIRone (BUSPAR) 10 mg tablet, , Disp: , Rfl:     cloNIDine (CATAPRES) 0 2 mg tablet, , Disp: , Rfl:     Diclofenac Sodium (VOLTAREN) 1 %, Apply 2 g topically 4 (four) times a day, Disp: 100 g, Rfl: 1    divalproex sodium (DEPAKOTE ER) 250 mg 24 hr tablet, Take 250 mg by mouth in the morning and 250 mg in the evening , Disp: , Rfl:     Epinastine HCl 0 05 % ophthalmic solution, Administer 1 drop to both eyes 2 (two) times a day, Disp: 5 mL, Rfl: 5    fluticasone-salmeterol (Advair HFA) 115-21 MCG/ACT inhaler, Inhale 2 puffs 2 (two) times a day Rinse mouth after use , Disp: 12 g, Rfl: 5    hydrOXYzine HCL (ATARAX) 10 mg tablet, 1-2 po q 6 hrs prn anxiety, sleep, Disp: 50 tablet, Rfl: 1    hydrOXYzine pamoate (VISTARIL) 50 mg capsule,  , Disp: , Rfl:     mometasone (ELOCON) 0 1 % cream, Apply topically daily, Disp: 45 g, Rfl: 0    ondansetron (Zofran ODT) 4 mg disintegrating tablet, Take 1 tablet (4 mg total) by mouth every 6 (six) hours as needed for nausea or vomiting, Disp: 20 tablet, Rfl: 0    ondansetron (Zofran ODT) 8 mg disintegrating tablet, Take 1 tablet (8 mg total) by mouth every 8 (eight) hours as needed for nausea or vomiting, Disp: 30 tablet, Rfl: 0    Spacer/Aero-Holding Chambers (Vortex Valved Holding Chamber) FROYLAN, Use 2 (two) times a day, Disp: 1 each, Rfl: 0    buPROPion (WELLBUTRIN) 75 mg tablet, , Disp: , Rfl:     EPINEPHrine (EPIPEN) 0 3 mg/0 3 mL SOAJ, Inject 0 3 mL (0 3 mg total) into a muscle once for 1 dose, Disp: 4 each, Rfl: 3        No notes on file                  Objective:    Vital Signs:   Vitals:    05/25/22 1218   BP: 90/58   Pulse: 72   SpO2: 97%   Weight: 89 1 kg (196 lb 6 4 oz)   Height: 5' 3" (1 6 m) [place] Sleepiness Scale: Total score: 14    Physical Exam:    General: Alert, appropriate, cooperative, overweight    Head: NC/AT, moderate retrognathia    Nose: No septal deviation, nares not obstructed, mucosa normal    Throat: Airway diminished, tongue base thickened, 1+ tonsils visualized    Neck: Normal, no thyromegaly or lymphadenopathy, no JVD    Heart: RR, normal S1 and S2, no murmurs    Chest: Clear bilaterally    Extremity: No clubbing, cyanosis, no edema    Skin: Warm, dry    Neuro: No motor abnormalities, cranial nerves appear intact        Counseling / Coordination of Care  A description of the counseling / coordination of care: We discussed the pathophysiology of obstructive sleep apnea as well as the possible treatment options  We also discussed the rationale for positive airway pressure therapy  Board Certified Sleep Specialist    Portions of the record may have been created with voice recognition software  Occasional wrong word or "sound a like" substitutions may have occurred due to the inherent limitations of voice recognition software  Read the chart carefully and recognize, using context, where substitutions have occurred

## 2022-05-26 ENCOUNTER — HOSPITAL ENCOUNTER (OUTPATIENT)
Dept: SLEEP CENTER | Facility: CLINIC | Age: 22
Discharge: HOME | End: 2022-05-26
Payer: COMMERCIAL

## 2022-05-26 DIAGNOSIS — G47.33 OSA (OBSTRUCTIVE SLEEP APNEA): ICD-10-CM

## 2022-05-26 PROCEDURE — 95810 POLYSOM 6/> YRS 4/> PARAM: CPT

## 2022-05-26 PROCEDURE — 95810 POLYSOM 6/> YRS 4/> PARAM: CPT | Performed by: INTERNAL MEDICINE

## 2022-05-27 NOTE — PROGRESS NOTES
Sleep Study Documentation    Pre-Sleep Study       Sleep testing procedure explained to patient:YES    Patient napped prior to study:NO    Caffeine:Dayshift worker after 12PM   Caffeine use:YES- coffee  6 to 18 ounces    Alcohol:Dayshift workers after 5PM: Alcohol use:NO    Typical day for patient:YES       Study Documentation    Sleep Study Indications: Snoring, EDS, Chonic or AM headaches, RLS    Sleep Study: Diagnostic   Snore: Moderate  Supplemental O2: no    O2 flow rate (L/min) range   O2 flow rate (L/min) final   Minimum SaO2 94%  Baseline SaO2 98%        Mode of Therapy:    EKG abnormalities: no     EEG abnormalities: no    Sleep Study Recorded < 2 hours: N/A    Sleep Study Recorded > 2 hours but incomplete study: N/A    Sleep Study Recorded 6 hours but no sleep obtained: NO    Patient classification: unemployed       Post-Sleep Study    Medication used at bedtime or during sleep study:YES other prescription medications    Patient reports time it took to fall asleep:20 to 30 minutes    Patient reports waking up during study:3 or more times  Patient reports returning to sleep without difficulty  Patient reports sleeping 4 to 6 hours without dreaming  Patient reports sleep during study:typical    Patient rated sleepiness: Somewhat sleepy or tired    PAP treatment:no

## 2022-06-09 ENCOUNTER — TELEPHONE (OUTPATIENT)
Dept: SLEEP CENTER | Facility: CLINIC | Age: 22
End: 2022-06-09

## 2022-06-13 ENCOUNTER — OFFICE VISIT (OUTPATIENT)
Dept: FAMILY MEDICINE CLINIC | Facility: CLINIC | Age: 22
End: 2022-06-13

## 2022-06-13 VITALS
RESPIRATION RATE: 18 BRPM | TEMPERATURE: 97 F | HEART RATE: 78 BPM | OXYGEN SATURATION: 97 % | HEIGHT: 63 IN | WEIGHT: 199 LBS | BODY MASS INDEX: 35.26 KG/M2 | DIASTOLIC BLOOD PRESSURE: 66 MMHG | SYSTOLIC BLOOD PRESSURE: 102 MMHG

## 2022-06-13 DIAGNOSIS — J02.0 PHARYNGITIS, STREPTOCOCCAL, ACUTE: Primary | ICD-10-CM

## 2022-06-13 DIAGNOSIS — Z13.41 ENCOUNTER FOR AUTISM SCREENING: ICD-10-CM

## 2022-06-13 DIAGNOSIS — J02.9 PHARYNGITIS, UNSPECIFIED ETIOLOGY: ICD-10-CM

## 2022-06-13 DIAGNOSIS — Z78.9 ANTIBIOTIC DRUG INTOLERANCE: ICD-10-CM

## 2022-06-13 DIAGNOSIS — J35.1 CHRONIC TONSILLAR HYPERTROPHY: ICD-10-CM

## 2022-06-13 LAB — SL AMB POCT URINE HCG: NEGATIVE

## 2022-06-13 PROCEDURE — 87147 CULTURE TYPE IMMUNOLOGIC: CPT | Performed by: STUDENT IN AN ORGANIZED HEALTH CARE EDUCATION/TRAINING PROGRAM

## 2022-06-13 PROCEDURE — 99213 OFFICE O/P EST LOW 20 MIN: CPT | Performed by: FAMILY MEDICINE

## 2022-06-13 PROCEDURE — 87070 CULTURE OTHR SPECIMN AEROBIC: CPT | Performed by: STUDENT IN AN ORGANIZED HEALTH CARE EDUCATION/TRAINING PROGRAM

## 2022-06-13 PROCEDURE — 81025 URINE PREGNANCY TEST: CPT | Performed by: FAMILY MEDICINE

## 2022-06-13 RX ORDER — AZITHROMYCIN 250 MG/1
500 TABLET, FILM COATED ORAL EVERY 24 HOURS
Qty: 10 TABLET | Refills: 0 | Status: SHIPPED | OUTPATIENT
Start: 2022-06-13 | End: 2022-06-18

## 2022-06-13 NOTE — ASSESSMENT & PLAN NOTE
· History of recurrent streptococcal pharyngitis infections most recently 1 year ago  Presents today with a 2 day history of sore throat without fevers, chills congestion, cough  No recent sick contacts  Has not taken any pain medications  Has no issues swallowing or voice changes  · On physical examination erythematous and swollen pharynx with tonsillar hypertrophy with tonsillar exudates  Edematous uvula  No rashes along the palate and noticeable anterior cervical lymphadenopathy right greater than left  · With her allergic history to penicillin and the side effects of rash, throat closing up will start azithromycin 5 mg once daily for 5 days  Urine pregnancy test was negative and last EKG was performed in November which showed a QTC within normal range of 426    · ER precautions given for peritonsillar abscess  · Will send out Throat cultures   · Will refer to ENT for recurrent streptococcal pharyngitis, tonsillar hypertrophy

## 2022-06-13 NOTE — PROGRESS NOTES
[facility]   [address] [facility]  [facility] [place], [address]  Phone#  [phone]  Fax#  [phone]    ASSESSMENT and PLAN  Pharyngitis  · History of recurrent streptococcal pharyngitis infections most recently 1 year ago  Presents today with a 2 day history of sore throat without fevers, chills congestion, cough  No recent sick contacts  Has not taken any pain medications  Has no issues swallowing or voice changes  · On physical examination erythematous and swollen pharynx with tonsillar hypertrophy with tonsillar exudates  Edematous uvula  No rashes along the palate and noticeable anterior cervical lymphadenopathy right greater than left  · With her allergic history to penicillin and the side effects of rash, throat closing up will start azithromycin 5 mg once daily for 5 days  Urine pregnancy test was negative and last EKG was performed in November which showed a QTC within normal range of 426  · ER precautions given for peritonsillar abscess  · Will send out Throat cultures   · Will refer to ENT for recurrent streptococcal pharyngitis, tonsillar hypertrophy    Encounter for autism screening  · Concurrent diagnosis bipolar disorder, schizophrenia  Previously stated workup was initiated for autism disorder and is currently concern  · Will refer to Psychiatry for initial evaluation    Diagnoses and all orders for this visit:    Pharyngitis, streptococcal, acute  -     Ambulatory Referral to Otolaryngology; Future  -     Strep A PCR  -     Cancel: Streptococcus, Group A Culture  -     azithromycin (ZITHROMAX) 250 mg tablet; Take 2 tablets (500 mg total) by mouth every 24 hours for 5 days  -     Throat culture; Future  -     Throat culture    Encounter for autism screening  -     Ambulatory Referral to Psychiatry; Future    Chronic tonsillar hypertrophy  -     Ambulatory Referral to Otolaryngology;  Future    Antibiotic drug intolerance  -     POCT urine HCG    Pharyngitis, unspecified etiology      HISTORY OF PRESENT ILLNESS  Apurva Shell is a 20 y o  female with a significant PMHx of  Schizophrenia, bipolar disorder, recurrent streptococcal pharyngitis who presents to the clinic for  Evaluation for autism spectrum disorder  She also presents today with a 2 day history of sore throat  She denies any fevers, chills, chest pain, shortness a bath, vision changes, cough, congestion  She states that she has had these recurrent strep throat infections since the child  She has not taken any pain medication and has no associated symptoms  REVIEW OF SYSTEMS  Review of Systems   Constitutional: Negative for chills, fatigue and fever  HENT: Positive for sore throat  Respiratory: Negative for cough, chest tightness and shortness of breath  Cardiovascular: Negative for chest pain and leg swelling  Gastrointestinal: Negative for constipation, diarrhea, nausea and vomiting  Endocrine: Negative for polydipsia, polyphagia and polyuria  Genitourinary: Negative for dysuria  Musculoskeletal: Negative for arthralgias  Skin: Negative for rash  Neurological: Negative for dizziness, light-headedness and headaches  Psychiatric/Behavioral: Negative for agitation and behavioral problems  PAST MEDICAL HISTORY   Past Medical History:   Diagnosis Date    Anaphylaxis     apricot    Anxiety     Asthma     Cluster headache     Eczema     Headache, tension-type     Lymphadenitis     Last assessed 12/30/14    Lymphadenopathy     Last assessed 10/16/13    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 [address] [facility]     Migraine     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[facility] [address] [facility]     Psychiatric disorder     Psychiatric illness     Psychosis ([facility] [address] [facility]     Seizures ([facility] [address] [facility]      Past Surgical History:   Procedure Laterality Date    COLONOSCOPY N/A 05/03/2019    Procedure: COLONOSCOPY;  Surgeon: Cody Scherer MD;  Location: [facility];   Service: Gastroenterology    ESOPHAGOGASTRODUODENOSCOPY N/A 05/03/2019 Procedure: ESOPHAGOGASTRODUODENOSCOPY (EGD); Surgeon: Malena Gongora MD;  Location: [facility]; Service: Gastroenterology     Social History     Socioeconomic History    Marital status: Single     Spouse name: Not on file    Number of children: 0    Years of education: Not on file    Highest education level: High school graduate   Occupational History    Occupation: unemployed   Tobacco Use    Smoking status: Former Smoker     Packs/day: 0 00     Years: 0 00     Pack years: 0 00     Types: Cigarettes, Cigarettes    Smokeless tobacco: Never Used    Tobacco comment: I stopped smoking cigarettes but started vaping   Vaping Use    Vaping Use: Every day   Substance and Sexual Activity    Alcohol use: Yes     Alcohol/week: 2 0 standard drinks     Types: 2 Shots of liquor per week     Comment: Occasionally    Drug use: Yes     Frequency: 3 0 times per week     Types: Marijuana    Sexual activity: Yes     Partners: Female     Birth control/protection: None   Other Topics Concern    Not on file   Social History Narrative    Lives in [place] with her girlfriend, mother, siblings, step-father    Pt has Dog - Not allowed in bedroom     Social Determinants of Health     Financial Resource Strain: Medium Risk    Difficulty of Paying Living Expenses: Somewhat hard   Food Insecurity: Food Insecurity Present    Worried About Running Out of Food in the Last Year: Sometimes true    Rosa of Food in the Last Year: Sometimes true   Transportation Needs: Unmet Transportation Needs    Lack of Transportation (Medical):  Yes    Lack of Transportation (Non-Medical): Yes   Physical Activity: Sufficiently Active    Days of Exercise per Week: 3 days    Minutes of Exercise per Session: 60 min   Stress: Not on file   Social Connections: Not on file   Intimate Partner Violence: Not on file   Housing Stability: [address] Unable to Pay for Housing in the Last Year: No    Number of [place] in the Last Year: 1  Unstable Housing in the Last Year: No     Family History   Problem Relation Age of Onset   Marlin Ledesma Migraines Mother     Other Mother         Neck pain    Depression Mother     Drug abuse Mother     Drug abuse Father     Schizophrenia Father     Febrile seizures Maternal Uncle     Stroke Maternal Uncle        MEDICATIONS    Current Outpatient Medications:     azithromycin (ZITHROMAX) 250 mg tablet, Take 2 tablets (500 mg total) by mouth every 24 hours for 5 days, Disp: 10 tablet, Rfl: 0    albuterol (2 5 mg/3 mL) 0 083 % nebulizer solution, Take 1 vial (2 5 mg total) by nebulization every 6 (six) hours as needed for wheezing or shortness of breath, Disp: 75 mL, Rfl: 0    albuterol (Ventolin HFA) 90 mcg/act inhaler, Inhale 2 puffs every 4 (four) hours as needed for wheezing, Disp: 18 g, Rfl: 3    ARIPiprazole (ABILIFY) 10 mg tablet, Take 10 mg by mouth daily at bedtime, Disp: , Rfl:     azelastine (ASTELIN) 0 1 % nasal spray, 1-2 sprays into each nostril 2 (two) times a day as needed for rhinitis Use in each nostril as directed, Disp: 30 mL, Rfl: 5    buPROPion (WELLBUTRIN) 75 mg tablet, , Disp: , Rfl:     busPIRone (BUSPAR) 10 mg tablet, , Disp: , Rfl:     cloNIDine (CATAPRES) 0 2 mg tablet, , Disp: , Rfl:     Diclofenac Sodium (VOLTAREN) 1 %, Apply 2 g topically 4 (four) times a day, Disp: 100 g, Rfl: 1    divalproex sodium (DEPAKOTE ER) 250 mg 24 hr tablet, Take 250 mg by mouth in the morning and 250 mg in the evening , Disp: , Rfl:     Epinastine HCl 0 05 % ophthalmic solution, Administer 1 drop to both eyes 2 (two) times a day, Disp: 5 mL, Rfl: 5    EPINEPHrine (EPIPEN) 0 3 mg/0 3 mL SOAJ, Inject 0 3 mL (0 3 mg total) into a muscle once for 1 dose, Disp: 4 each, Rfl: 3    fluticasone-salmeterol (Advair HFA) 115-21 MCG/ACT inhaler, Inhale 2 puffs 2 (two) times a day Rinse mouth after use , Disp: 12 g, Rfl: 5    hydrOXYzine HCL (ATARAX) 10 mg tablet, 1-2 po q 6 hrs prn anxiety, sleep, Disp: 50 tablet, Rfl: 1    hydrOXYzine pamoate (VISTARIL) 50 mg capsule,  , Disp: , Rfl:     mometasone (ELOCON) 0 1 % cream, Apply topically daily, Disp: 45 g, Rfl: 0    ondansetron (Zofran ODT) 4 mg disintegrating tablet, Take 1 tablet (4 mg total) by mouth every 6 (six) hours as needed for nausea or vomiting, Disp: 20 tablet, Rfl: 0    ondansetron (Zofran ODT) 8 mg disintegrating tablet, Take 1 tablet (8 mg total) by mouth every 8 (eight) hours as needed for nausea or vomiting, Disp: 30 tablet, Rfl: 0    Spacer/Aero-Holding Chambers (Vortex Valved Holding Chamber) FROYLAN, Use 2 (two) times a day, Disp: 1 each, Rfl: 0    PHYSICAL EXAM  Vitals:    06/13/22 1018   BP: 102/66   BP Location: Left arm   Patient Position: Sitting   Cuff Size: Adult   Pulse: 78   Resp: 18   Temp: (!) 97 °F (36 1 °C)   TempSrc: Temporal   SpO2: 97%   Weight: 90 3 kg (199 lb)   Height: 5' 3" (1 6 m)     Wt Readings from Last 3 Encounters:   06/13/22 90 3 kg (199 lb)   05/25/22 89 1 kg (196 lb 6 4 oz)   05/16/22 87 1 kg (192 lb)    , Body mass index is 35 25 kg/m²  Physical Exam  Constitutional:       Appearance: She is well-developed  HENT:      Head: Normocephalic and atraumatic  Right Ear: Hearing normal  Tympanic membrane is not perforated, erythematous or retracted  Left Ear: Hearing normal  Tympanic membrane is bulging  Tympanic membrane is not perforated, erythematous or retracted  Mouth/Throat:      Mouth: Mucous membranes are dry  Tongue: No lesions  Tongue does not deviate from midline  Pharynx: Oropharyngeal exudate, posterior oropharyngeal erythema and uvula swelling present  Tonsils: Tonsillar exudate present  No tonsillar abscesses  1+ on the right  1+ on the left  Eyes:      Pupils: Pupils are equal, round, and reactive to light  Cardiovascular:      Rate and Rhythm: Normal rate and regular rhythm  Heart sounds: Normal heart sounds     Pulmonary:      Effort: Pulmonary effort is normal  Breath sounds: Normal breath sounds  Abdominal:      General: Bowel sounds are normal       Palpations: Abdomen is soft  Musculoskeletal:         General: Normal range of motion  Cervical back: Normal range of motion and neck supple  Skin:     General: Skin is warm  Findings: No erythema or rash  Neurological:      Mental Status: She is alert and oriented to person, place, and time  Psychiatric:         Behavior: Behavior normal          LABS/IMAGING  No results found for: HGBA1C  Cholesterol   Date Value Ref Range Status   11/11/2015 197 mg/dL Final     Comment:     CHOLESTEROL:       Desirable        <200 mg/dl       Borderline High  200-239 mg/dl       High             >239 mg/dl  ____________________________________       HDL   Date Value Ref Range Status   11/11/2015 42 mg/dL Final     Comment:     HDL:       High       >59 mg/dl       Low        <41 mg/dl  ______________________________       HDL, Direct   Date Value Ref Range Status   08/04/2016 48 40 - 60 mg/dL Final     Comment:     Specimen collection should occur prior to Metamizole administration due to the potential for falsely depressed results  Triglycerides   Date Value Ref Range Status   08/04/2016 62 <=150 mg/dL Final     Comment:     Specimen collection should occur prior to N-Acetylcysteine or Metamizole administration due to the potential for falsely depressed results     11/11/2015 115 mg/dL Final     Comment:     TRIGLYCERIDE:       Normal              <150 mg/dl       Borderline High    150-199 mg/dl       High               200-499 mg/dl       Very High          >499 mg/dl  _______________________________________        WBC   Date Value Ref Range Status   12/20/2021 8 90 4 50 - 11 00 Thousand/uL Final   12/17/2021 19 50 (H) 4 50 - 11 00 Thousand/uL Final   12/01/2021 8 50 4 50 - 11 00 Thousand/uL Final   11/11/2015 7 65 4 31 - 10 16 Thousand/uL Final   12/10/2014 7 00 5 00 - 13 00 Thousand/uL Final   01/02/2014 9 00 5 00 - 13 00 Thousand/uL Final     Comment:     New Reference Range     Hemoglobin   Date Value Ref Range Status   12/20/2021 14 2 12 0 - 16 0 g/dL Final   12/17/2021 14 3 12 0 - 16 0 g/dL Final   12/01/2021 13 7 12 0 - 16 0 g/dL Final   11/11/2015 13 2 11 5 - 15 4 g/dL Final   12/10/2014 12 7 11 0 - 15 0 g/dL Final   01/02/2014 12 7 11 0 - 15 0 g/dL Final     Comment:     New Reference Range     Hemoglobin, Istat   Date Value Ref Range Status   02/23/2015 12 2 11 3 - 15 3 g/dL Final     Platelets   Date Value Ref Range Status   12/20/2021 332 150 - 450 Thousands/uL Final   12/17/2021 406 150 - 450 Thousands/uL Final   12/01/2021 383 150 - 450 Thousands/uL Final   11/11/2015 409 (H) 149 - 390 Thousand/uL Final   12/10/2014 379 149 - 390 Thousand/uL Final   01/02/2014 375 149 - 390 Thousand/uL Final     Comment:     New Reference Range     Potassium   Date Value Ref Range Status   12/20/2021 3 3 (L) 3 6 - 5 0 mmol/L Final   12/20/2021 3 3 (L) 3 6 - 5 0 mmol/L Final   12/17/2021 3 4 (L) 3 6 - 5 0 mmol/L Final   11/11/2015 4 0 3 5 - 5 3 mmol/L Final   12/10/2014 3 8 3 6 - 5 0 mmol/L Final   01/02/2014 3 6 3 6 - 5 0 mmol/L Final     Chloride   Date Value Ref Range Status   12/20/2021 101 97 - 108 mmol/L Final   12/20/2021 103 97 - 108 mmol/L Final   12/17/2021 106 97 - 108 mmol/L Final   11/11/2015 105 98 - 108 mmol/L Final   12/10/2014 103 101 - 111 mmol/L Final   01/02/2014 100 (L) 101 - 111 mmol/L Final     CO2   Date Value Ref Range Status   12/20/2021 25 22 - 30 mmol/L Final   12/20/2021 27 22 - 30 mmol/L Final   12/17/2021 24 22 - 30 mmol/L Final   11/11/2015 25 4 21 0 - 32 0 mmol/L Final   12/10/2014 25 21 - 31 mmol/L Final   01/02/2014 29 21 - 31 mmol/L Final     Anion Gap   Date Value Ref Range Status   11/11/2015 11 4 - 13 mmol/L Final   02/23/2015 19 10 - 20 mmol/L Final   12/10/2014 8 4 - 13 mmol/L Final     BUN   Date Value Ref Range Status   12/20/2021 9 5 - 25 mg/dL Final   12/20/2021 8 5 - 25 mg/dL Final 12/17/2021 10 5 - 25 mg/dL Final   11/11/2015 7 5 - 25 mg/dL Final   12/10/2014 12 5 - 27 mg/dL Final   01/02/2014 11 5 - 27 mg/dL Final     Creatinine   Date Value Ref Range Status   12/20/2021 0 74 0 60 - 1 20 mg/dL Final     Comment:     Standardized to IDMS reference method   12/20/2021 0 79 0 60 - 1 20 mg/dL Final     Comment:     Standardized to IDMS reference method   12/17/2021 0 68 0 60 - 1 20 mg/dL Final     Comment:     Standardized to IDMS reference method   11/11/2015 0 75 0 60 - 1 30 mg/dL Final     Comment:     Standardized to IDMS reference method   12/10/2014 0 90 0 60 - 1 30 mg/dL Final     Comment:     Standardized to IDMS reference method   01/02/2014 0 70 0 60 - 1 30 mg/dL Final     Comment:     Standardized to IDMS reference method     eGFR   Date Value Ref Range Status   12/20/2021 115 ml/min/1 73sq m Final   12/20/2021 106 ml/min/1 73sq m Final   12/17/2021 124 ml/min/1 73sq m Final     Glucose   Date Value Ref Range Status   11/11/2015 88 65 - 140 mg/dL Final     Comment:     If patient is fasting, the ADA then defines impaired fasting glucose as  >100 mg/dl and diabetes as  >or equal to 126 mg/dl  12/10/2014 98 65 - 140 mg/dL Final     Comment:     If patient is fasting, the ADA then defines impaired fasting glucose as  >100 mg/dl and diabetes as  >or equal to 126 mg/dl  01/02/2014 87 65 - 140 mg/dL Final     Comment:     If patient is fasting, the ADA then defines impaired fasting glucose as  >100 mg/dl and diabetes as  >or equal to 126 mg/dl         Calcium   Date Value Ref Range Status   12/20/2021 9 0 8 4 - 10 2 mg/dL Final   12/20/2021 9 1 8 4 - 10 2 mg/dL Final   12/17/2021 9 6 8 4 - 10 2 mg/dL Final   11/11/2015 8 8 8 3 - 10 1 mg/dL Final   12/10/2014 9 3 8 3 - 10 1 mg/dL Final   01/02/2014 9 0 8 3 - 10 1 mg/dL Final     AST   Date Value Ref Range Status   12/20/2021 25 14 - 36 U/L Final     Comment:     Specimen collection should occur prior to Sulfasalazine administration due to the potential for falsely depressed results  12/20/2021 28 14 - 36 U/L Final     Comment:     Specimen collection should occur prior to Sulfasalazine administration due to the potential for falsely depressed results  12/17/2021 24 14 - 36 U/L Final     Comment:     Specimen collection should occur prior to Sulfasalazine administration due to the potential for falsely depressed results  11/11/2015 22 5 - 45 U/L Final   12/10/2014 16 10 - 42 U/L Final   01/02/2014 16 10 - 42 U/L Final     ALT   Date Value Ref Range Status   12/20/2021 15 <35 U/L Final     Comment:     Specimen collection should occur prior to Sulfasalazine administration due to the potential for falsely depressed results  12/20/2021 17 <35 U/L Final     Comment:     Specimen collection should occur prior to Sulfasalazine administration due to the potential for falsely depressed results  12/17/2021 22 <35 U/L Final     Comment:     Specimen collection should occur prior to Sulfasalazine administration due to the potential for falsely depressed results      11/11/2015 29 12 - 78 U/L Final   12/10/2014 25 6 - 78 U/L Final   01/02/2014 22 6 - 78 U/L Final     Alkaline Phosphatase   Date Value Ref Range Status   12/20/2021 123 (H) 43 - 122 U/L Final   12/20/2021 111 43 - 122 U/L Final   12/17/2021 136 (H) 43 - 122 U/L Final   11/11/2015 152 46 - 384 U/L Final   12/10/2014 145 60 - 382 U/L Final   01/02/2014 158 60 - 382 U/L Final     Total Protein   Date Value Ref Range Status   11/11/2015 8 0 6 4 - 8 2 g/dL Final   12/10/2014 8 3 (H) 6 4 - 8 2 g/dL Final   01/02/2014 8 1 6 4 - 8 2 g/dL Final     Total Bilirubin   Date Value Ref Range Status   11/11/2015 0 22 0 20 - 1 00 mg/dL Final   12/10/2014 0 3 0 2 - 1 0 mg/dL Final   01/02/2014 0 3 0 2 - 1 0 mg/dL Final     Magnesium   Date Value Ref Range Status   12/20/2021 1 8 1 6 - 2 3 mg/dL Final   08/27/2019 1 7 1 6 - 2 6 mg/dL Final   09/29/2018 1 8 1 6 - 2 6 mg/dL Final     No results found for: TSH    This note has been dictated using [employer] software  It may contain errors, including improperly dictated words  Please contact physician directly for any questions       Chris Montiel MD   PGY-2

## 2022-06-13 NOTE — ASSESSMENT & PLAN NOTE
· Concurrent diagnosis bipolar disorder, schizophrenia  Previously stated workup was initiated for autism disorder and is currently concern      · Will refer to Psychiatry for initial evaluation

## 2022-06-14 ENCOUNTER — TELEPHONE (OUTPATIENT)
Dept: PSYCHIATRY | Facility: CLINIC | Age: 22
End: 2022-06-14

## 2022-06-15 LAB — BACTERIA THROAT CULT: ABNORMAL

## 2022-06-20 ENCOUNTER — HOSPITAL ENCOUNTER (EMERGENCY)
Facility: HOSPITAL | Age: 22
Discharge: HOME | End: 2022-06-20
Attending: EMERGENCY MEDICINE | Admitting: EMERGENCY MEDICINE
Payer: COMMERCIAL

## 2022-06-20 ENCOUNTER — TELEPHONE (OUTPATIENT)
Dept: PSYCHIATRY | Facility: CLINIC | Age: 22
End: 2022-06-20

## 2022-06-20 ENCOUNTER — APPOINTMENT (EMERGENCY)
Dept: CT IMAGING | Facility: HOSPITAL | Age: 22
End: 2022-06-20
Payer: COMMERCIAL

## 2022-06-20 VITALS
SYSTOLIC BLOOD PRESSURE: 142 MMHG | DIASTOLIC BLOOD PRESSURE: 87 MMHG | HEART RATE: 80 BPM | TEMPERATURE: 98.2 F | BODY MASS INDEX: 33.83 KG/M2 | OXYGEN SATURATION: 100 % | RESPIRATION RATE: 20 BRPM | WEIGHT: 191 LBS

## 2022-06-20 DIAGNOSIS — R11.2 NAUSEA AND VOMITING, UNSPECIFIED VOMITING TYPE: ICD-10-CM

## 2022-06-20 DIAGNOSIS — F12.10 MILD TETRAHYDROCANNABINOL (THC) ABUSE: ICD-10-CM

## 2022-06-20 DIAGNOSIS — R10.9 ABDOMINAL PAIN: Primary | ICD-10-CM

## 2022-06-20 LAB
ALBUMIN SERPL BCP-MCNC: 5 G/DL (ref 3–5.2)
ALP SERPL-CCNC: 127 U/L (ref 43–122)
ALT SERPL W P-5'-P-CCNC: 24 U/L
AMPHETAMINES SERPL QL SCN: NEGATIVE
ANION GAP SERPL CALCULATED.3IONS-SCNC: 15 MMOL/L (ref 5–14)
APTT PPP: 27 SECONDS (ref 23–37)
AST SERPL W P-5'-P-CCNC: 32 U/L (ref 14–36)
ATRIAL RATE: 86 BPM
BACTERIA UR QL AUTO: ABNORMAL /HPF
BARBITURATES UR QL: NEGATIVE
BASOPHILS # BLD AUTO: 0.03 THOUSANDS/ΜL (ref 0–0.1)
BASOPHILS NFR BLD AUTO: 0 % (ref 0–1)
BENZODIAZ UR QL: NEGATIVE
BILIRUB SERPL-MCNC: 0.62 MG/DL
BILIRUB UR QL STRIP: NEGATIVE
BUN SERPL-MCNC: 14 MG/DL (ref 5–25)
CALCIUM SERPL-MCNC: 9.6 MG/DL (ref 8.4–10.2)
CARDIAC TROPONIN I PNL SERPL HS: 2 NG/L
CHLORIDE SERPL-SCNC: 106 MMOL/L (ref 97–108)
CLARITY UR: CLEAR
CO2 SERPL-SCNC: 19 MMOL/L (ref 22–30)
COCAINE UR QL: NEGATIVE
COLOR UR: ABNORMAL
CREAT SERPL-MCNC: 0.63 MG/DL (ref 0.6–1.2)
EOSINOPHIL # BLD AUTO: 0.01 THOUSAND/ΜL (ref 0–0.61)
EOSINOPHIL NFR BLD AUTO: 0 % (ref 0–6)
ERYTHROCYTE [DISTWIDTH] IN BLOOD BY AUTOMATED COUNT: 13.6 % (ref 11.6–15.1)
EXT PREG TEST URINE: NEGATIVE
EXT. CONTROL ED NAV: NORMAL
GFR SERPL CREATININE-BSD FRML MDRD: 126 ML/MIN/1.73SQ M
GLUCOSE SERPL-MCNC: 155 MG/DL (ref 70–99)
GLUCOSE UR STRIP-MCNC: NEGATIVE MG/DL
HCG SERPL QL: NEGATIVE
HCT VFR BLD AUTO: 41.3 % (ref 34.8–46.1)
HGB BLD-MCNC: 14.3 G/DL (ref 11.5–15.4)
HGB UR QL STRIP.AUTO: 250
IMM GRANULOCYTES # BLD AUTO: 0.12 THOUSAND/UL (ref 0–0.2)
IMM GRANULOCYTES NFR BLD AUTO: 1 % (ref 0–2)
INR PPP: 1.08 (ref 0.84–1.19)
KETONES UR STRIP-MCNC: ABNORMAL MG/DL
LEUKOCYTE ESTERASE UR QL STRIP: 100
LIPASE SERPL-CCNC: 75 U/L (ref 23–300)
LYMPHOCYTES # BLD AUTO: 1.14 THOUSANDS/ΜL (ref 0.6–4.47)
LYMPHOCYTES NFR BLD AUTO: 6 % (ref 14–44)
MAGNESIUM SERPL-MCNC: 1.9 MG/DL (ref 1.6–2.3)
MCH RBC QN AUTO: 29.1 PG (ref 26.8–34.3)
MCHC RBC AUTO-ENTMCNC: 34.6 G/DL (ref 31.4–37.4)
MCV RBC AUTO: 84 FL (ref 82–98)
METHADONE UR QL: NEGATIVE
MONOCYTES # BLD AUTO: 0.83 THOUSAND/ΜL (ref 0.17–1.22)
MONOCYTES NFR BLD AUTO: 4 % (ref 4–12)
MUCOUS THREADS UR QL AUTO: ABNORMAL
NEUTROPHILS # BLD AUTO: 17.16 THOUSANDS/ΜL (ref 1.85–7.62)
NEUTS SEG NFR BLD AUTO: 89 % (ref 43–75)
NITRITE UR QL STRIP: NEGATIVE
NON-SQ EPI CELLS URNS QL MICRO: ABNORMAL /HPF
NRBC BLD AUTO-RTO: 0 /100 WBCS
OPIATES UR QL SCN: NEGATIVE
OXYCODONE+OXYMORPHONE UR QL SCN: NEGATIVE
P AXIS: 77 DEGREES
PCP UR QL: NEGATIVE
PH UR STRIP.AUTO: 8 [PH]
PLATELET # BLD AUTO: 380 THOUSANDS/UL (ref 149–390)
PMV BLD AUTO: 9.5 FL (ref 8.9–12.7)
POTASSIUM SERPL-SCNC: 3.2 MMOL/L (ref 3.6–5)
PR INTERVAL: 138 MS
PROT SERPL-MCNC: 9.1 G/DL (ref 5.9–8.4)
PROT UR STRIP-MCNC: ABNORMAL MG/DL
PROTHROMBIN TIME: 13.6 SECONDS (ref 11.6–14.5)
QRS AXIS: 83 DEGREES
QRSD INTERVAL: 104 MS
QT INTERVAL: 410 MS
QTC INTERVAL: 490 MS
RBC # BLD AUTO: 4.91 MILLION/UL (ref 3.81–5.12)
RBC #/AREA URNS AUTO: ABNORMAL /HPF
SODIUM SERPL-SCNC: 140 MMOL/L (ref 137–147)
SP GR UR STRIP.AUTO: 1.01 (ref 1–1.04)
T WAVE AXIS: 63 DEGREES
THC UR QL: POSITIVE
UROBILINOGEN UA: NEGATIVE MG/DL
VALPROATE SERPL-MCNC: <10 UG/ML (ref 50–120)
VENTRICULAR RATE: 86 BPM
WBC # BLD AUTO: 19.29 THOUSAND/UL (ref 4.31–10.16)
WBC #/AREA URNS AUTO: ABNORMAL /HPF

## 2022-06-20 PROCEDURE — 96372 THER/PROPH/DIAG INJ SC/IM: CPT

## 2022-06-20 PROCEDURE — 85025 COMPLETE CBC W/AUTO DIFF WBC: CPT | Performed by: EMERGENCY MEDICINE

## 2022-06-20 PROCEDURE — 85730 THROMBOPLASTIN TIME PARTIAL: CPT | Performed by: EMERGENCY MEDICINE

## 2022-06-20 PROCEDURE — 80307 DRUG TEST PRSMV CHEM ANLYZR: CPT | Performed by: EMERGENCY MEDICINE

## 2022-06-20 PROCEDURE — 84703 CHORIONIC GONADOTROPIN ASSAY: CPT | Performed by: EMERGENCY MEDICINE

## 2022-06-20 PROCEDURE — 93005 ELECTROCARDIOGRAM TRACING: CPT

## 2022-06-20 PROCEDURE — 81001 URINALYSIS AUTO W/SCOPE: CPT | Performed by: EMERGENCY MEDICINE

## 2022-06-20 PROCEDURE — G1004 CDSM NDSC: HCPCS

## 2022-06-20 PROCEDURE — 80164 ASSAY DIPROPYLACETIC ACD TOT: CPT | Performed by: EMERGENCY MEDICINE

## 2022-06-20 PROCEDURE — 93010 ELECTROCARDIOGRAM REPORT: CPT | Performed by: INTERNAL MEDICINE

## 2022-06-20 PROCEDURE — 74176 CT ABD & PELVIS W/O CONTRAST: CPT

## 2022-06-20 PROCEDURE — 83690 ASSAY OF LIPASE: CPT | Performed by: EMERGENCY MEDICINE

## 2022-06-20 PROCEDURE — 99284 EMERGENCY DEPT VISIT MOD MDM: CPT

## 2022-06-20 PROCEDURE — 81025 URINE PREGNANCY TEST: CPT | Performed by: EMERGENCY MEDICINE

## 2022-06-20 PROCEDURE — 36415 COLL VENOUS BLD VENIPUNCTURE: CPT | Performed by: EMERGENCY MEDICINE

## 2022-06-20 PROCEDURE — 85610 PROTHROMBIN TIME: CPT | Performed by: EMERGENCY MEDICINE

## 2022-06-20 PROCEDURE — 84484 ASSAY OF TROPONIN QUANT: CPT | Performed by: EMERGENCY MEDICINE

## 2022-06-20 PROCEDURE — 96375 TX/PRO/DX INJ NEW DRUG ADDON: CPT

## 2022-06-20 PROCEDURE — 80053 COMPREHEN METABOLIC PANEL: CPT | Performed by: EMERGENCY MEDICINE

## 2022-06-20 PROCEDURE — 96361 HYDRATE IV INFUSION ADD-ON: CPT

## 2022-06-20 PROCEDURE — 96374 THER/PROPH/DIAG INJ IV PUSH: CPT

## 2022-06-20 PROCEDURE — 83735 ASSAY OF MAGNESIUM: CPT | Performed by: EMERGENCY MEDICINE

## 2022-06-20 PROCEDURE — 99285 EMERGENCY DEPT VISIT HI MDM: CPT | Performed by: EMERGENCY MEDICINE

## 2022-06-20 RX ORDER — METOCLOPRAMIDE HYDROCHLORIDE 5 MG/ML
10 INJECTION INTRAMUSCULAR; INTRAVENOUS ONCE
Status: COMPLETED | OUTPATIENT
Start: 2022-06-20 | End: 2022-06-20

## 2022-06-20 RX ORDER — DIPHENHYDRAMINE HYDROCHLORIDE 50 MG/ML
25 INJECTION INTRAMUSCULAR; INTRAVENOUS ONCE
Status: COMPLETED | OUTPATIENT
Start: 2022-06-20 | End: 2022-06-20

## 2022-06-20 RX ORDER — KETOROLAC TROMETHAMINE 30 MG/ML
30 INJECTION, SOLUTION INTRAMUSCULAR; INTRAVENOUS ONCE
Status: COMPLETED | OUTPATIENT
Start: 2022-06-20 | End: 2022-06-20

## 2022-06-20 RX ORDER — HALOPERIDOL 5 MG/ML
5 INJECTION INTRAMUSCULAR ONCE
Status: COMPLETED | OUTPATIENT
Start: 2022-06-20 | End: 2022-06-20

## 2022-06-20 RX ORDER — ONDANSETRON 2 MG/ML
4 INJECTION INTRAMUSCULAR; INTRAVENOUS ONCE
Status: COMPLETED | OUTPATIENT
Start: 2022-06-20 | End: 2022-06-20

## 2022-06-20 RX ORDER — ONDANSETRON 4 MG/1
4 TABLET, ORALLY DISINTEGRATING ORAL EVERY 6 HOURS PRN
Qty: 8 TABLET | Refills: 0 | Status: SHIPPED | OUTPATIENT
Start: 2022-06-20 | End: 2022-07-22

## 2022-06-20 RX ORDER — DEXTROSE MONOHYDRATE 25 G/50ML
25 INJECTION, SOLUTION INTRAVENOUS ONCE
Status: COMPLETED | OUTPATIENT
Start: 2022-06-20 | End: 2022-06-20

## 2022-06-20 RX ADMIN — SODIUM CHLORIDE 1000 ML: 0.9 INJECTION, SOLUTION INTRAVENOUS at 13:22

## 2022-06-20 RX ADMIN — HALOPERIDOL LACTATE 5 MG: 5 INJECTION, SOLUTION INTRAMUSCULAR at 15:27

## 2022-06-20 RX ADMIN — KETOROLAC TROMETHAMINE 30 MG: 30 INJECTION, SOLUTION INTRAMUSCULAR; INTRAVENOUS at 14:56

## 2022-06-20 RX ADMIN — ONDANSETRON 4 MG: 2 INJECTION INTRAMUSCULAR; INTRAVENOUS at 14:06

## 2022-06-20 RX ADMIN — DIPHENHYDRAMINE HYDROCHLORIDE 25 MG: 50 INJECTION, SOLUTION INTRAMUSCULAR; INTRAVENOUS at 13:27

## 2022-06-20 RX ADMIN — SODIUM CHLORIDE 1000 ML: 0.9 INJECTION, SOLUTION INTRAVENOUS at 14:55

## 2022-06-20 RX ADMIN — DEXTROSE MONOHYDRATE 25 ML: 25 INJECTION, SOLUTION INTRAVENOUS at 14:57

## 2022-06-20 RX ADMIN — METOCLOPRAMIDE 10 MG: 5 INJECTION, SOLUTION INTRAMUSCULAR; INTRAVENOUS at 13:27

## 2022-06-20 NOTE — ED PROVIDER NOTES
History  Chief Complaint   Patient presents with    Abdominal Pain     Upper abdominal pain  Vomiting since last night, cant keep anything down     Patient is a 65-year-old female coming in today complaining of abdominal pain with persistent nausea vomiting since yesterday  She states she was in her normal state of health Saturday  No changes in diet or no sick contacts  No one else with similar symptoms  She states yesterday throughout the day into today she has had persistent epigastric discomfort described as a burning on fire sensation  She has persistent vomiting  No hematemesis or coffee-ground emesis or bilious emesis  No melena or bright red blood per rectum  No diarrhea  She has not take anything for the pain  No recent travel  She does have a history of migraines, depression, and seizures which she states she has been compliant with her medication  She reports decreased p o   Intake as well as urine output      History provided by:  Patient   used: No    Abdominal Pain  Pain location:  Epigastric  Pain quality: burning    Pain radiates to:  Does not radiate  Pain severity:  Moderate  Onset quality:  Gradual  Timing:  Constant  Progression:  Unchanged  Chronicity:  New  Context: not alcohol use, not awakening from sleep, not diet changes, not eating, not laxative use, not medication withdrawal, not previous surgeries, not recent illness, not recent sexual activity, not recent travel, not retching, not sick contacts, not suspicious food intake and not trauma    Relieved by:  None tried  Worsened by:  Nothing  Ineffective treatments:  None tried  Associated symptoms: anorexia, nausea and vomiting    Associated symptoms: no belching, no chest pain, no chills, no constipation, no cough, no diarrhea, no dysuria, no fatigue, no fever, no flatus, no hematemesis, no hematochezia, no hematuria, no melena, no shortness of breath, no sore throat, no vaginal bleeding and no vaginal discharge    Nausea:     Severity:  Mild    Onset quality:  Gradual    Timing:  Intermittent    Progression:  Waxing and waning  Vomiting:     Quality:  Unable to specify    Severity:  Mild    Timing:  Intermittent    Progression:  Unchanged  Risk factors: no alcohol abuse, no aspirin use, not elderly, has not had multiple surgeries, no NSAID use, not obese, not pregnant and no recent hospitalization        Prior to Admission Medications   Prescriptions Last Dose Informant Patient Reported? Taking? ARIPiprazole (ABILIFY) 10 mg tablet   Yes No   Sig: Take 10 mg by mouth daily at bedtime   Diclofenac Sodium (VOLTAREN) 1 %   No No   Sig: Apply 2 g topically 4 (four) times a day   EPINEPHrine (EPIPEN) 0 3 mg/0 3 mL SOAJ   No No   Sig: Inject 0 3 mL (0 3 mg total) into a muscle once for 1 dose   Epinastine HCl 0 05 % ophthalmic solution   No No   Sig: Administer 1 drop to both eyes 2 (two) times a day   Spacer/Aero-Holding Chambers (Vortex Valved Holding Chamber) FROYLAN   No No   Sig: Use 2 (two) times a day   albuterol (2 5 mg/3 mL) 0 083 % nebulizer solution  Self No No   Sig: Take 1 vial (2 5 mg total) by nebulization every 6 (six) hours as needed for wheezing or shortness of breath   albuterol (Ventolin HFA) 90 mcg/act inhaler   No No   Sig: Inhale 2 puffs every 4 (four) hours as needed for wheezing   azelastine (ASTELIN) 0 1 % nasal spray   No No   Si-2 sprays into each nostril 2 (two) times a day as needed for rhinitis Use in each nostril as directed   buPROPion (WELLBUTRIN) 75 mg tablet   Yes No   Patient not taking: No sig reported   busPIRone (BUSPAR) 10 mg tablet   Yes No   cloNIDine (CATAPRES) 0 2 mg tablet   Yes No   divalproex sodium (DEPAKOTE ER) 250 mg 24 hr tablet   Yes No   Sig: Take 250 mg by mouth in the morning and 250 mg in the evening     fluticasone-salmeterol (Advair HFA) 115-21 MCG/ACT inhaler   No No   Sig: Inhale 2 puffs 2 (two) times a day Rinse mouth after use    hydrOXYzine HCL (ATARAX) 10 mg tablet   No No   Si-2 po q 6 hrs prn anxiety, sleep   hydrOXYzine pamoate (VISTARIL) 50 mg capsule   Yes No   Sig:     mometasone (ELOCON) 0 1 % cream   No No   Sig: Apply topically daily   ondansetron (Zofran ODT) 4 mg disintegrating tablet   No No   Sig: Take 1 tablet (4 mg total) by mouth every 6 (six) hours as needed for nausea or vomiting   ondansetron (Zofran ODT) 8 mg disintegrating tablet   No No   Sig: Take 1 tablet (8 mg total) by mouth every 8 (eight) hours as needed for nausea or vomiting      Facility-Administered Medications: None       Past Medical History:   Diagnosis Date    Anaphylaxis     apricot    Anxiety     Asthma     Cluster headache     Eczema     Headache, tension-type     Lymphadenitis     Last assessed 14    Lymphadenopathy     Last assessed 10/16/13    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     Migraine     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     Psychiatric disorder     Psychiatric illness     Psychosis ([facility]     Seizures ([facility] [facility]        Past Surgical History:   Procedure Laterality Date    COLONOSCOPY N/A 2019    Procedure: COLONOSCOPY;  Surgeon: Malena Gongora MD;  Location: [facility]; Service: Gastroenterology    ESOPHAGOGASTRODUODENOSCOPY N/A 2019    Procedure: ESOPHAGOGASTRODUODENOSCOPY (EGD); Surgeon: Malena Gongora MD;  Location: [facility]; Service: Gastroenterology       Family History   Problem Relation Age of Onset   Tex Spinner Migraines Mother     Other Mother         Neck pain    Depression Mother     Drug abuse Mother     Drug abuse Father     Schizophrenia Father     Febrile seizures Maternal Uncle     Stroke Maternal Uncle      I have reviewed and agree with the history as documented      E-Cigarette/Vaping    E-Cigarette Use Current Every Day User      E-Cigarette/Vaping Substances    Nicotine No     THC No     CBD No     Flavoring No     Other No     Unknown No      Social History     Tobacco Use    Smoking status: Former Smoker Packs/day: 0 00     Years: 0 00     Pack years: 0 00     Types: Cigarettes, Cigarettes    Smokeless tobacco: Never Used    Tobacco comment: I stopped smoking cigarettes but started vaping   Vaping Use    Vaping Use: Every day   Substance Use Topics    Alcohol use: Yes     Alcohol/week: 2 0 standard drinks     Types: 2 Shots of liquor per week     Comment: Occasionally    Drug use: Yes     Frequency: 3 0 times per week     Types: Marijuana       Review of Systems   Constitutional: Negative  Negative for chills, fatigue and fever  HENT: Negative  Negative for ear pain and sore throat  Eyes: Negative  Negative for pain and visual disturbance  Respiratory: Negative  Negative for cough and shortness of breath  Cardiovascular: Negative  Negative for chest pain and palpitations  Gastrointestinal: Positive for abdominal pain, anorexia, nausea and vomiting  Negative for constipation, diarrhea, flatus, hematemesis, hematochezia and melena  Endocrine: Negative  Genitourinary: Negative  Negative for dysuria, hematuria, vaginal bleeding and vaginal discharge  Musculoskeletal: Negative  Negative for arthralgias and back pain  Skin: Negative  Negative for color change and rash  Neurological: Negative  Negative for seizures and syncope  Hematological: Negative  Psychiatric/Behavioral: Negative  All other systems reviewed and are negative  Physical Exam  Physical Exam  Vitals and nursing note reviewed  Constitutional:       General: She is not in acute distress  Appearance: She is well-developed  Comments: Appears in pain   HENT:      Head: Normocephalic and atraumatic  Mouth/Throat:      Lips: Pink  Mouth: Mucous membranes are dry  Comments: Patient maintaining airway and secretions  No stridor   No brawniness under tongue  Eyes:      Extraocular Movements: Extraocular movements intact        Conjunctiva/sclera: Conjunctivae normal       Pupils: Pupils are equal, round, and reactive to light  Cardiovascular:      Rate and Rhythm: Normal rate and regular rhythm  Pulses:           Radial pulses are 2+ on the right side and 2+ on the left side  Dorsalis pedis pulses are 2+ on the right side and 2+ on the left side  Heart sounds: S1 normal and S2 normal  No murmur heard  Pulmonary:      Effort: Pulmonary effort is normal  No respiratory distress  Breath sounds: Normal breath sounds  Abdominal:      General: Bowel sounds are normal       Palpations: Abdomen is soft  Tenderness: There is abdominal tenderness in the right upper quadrant and epigastric area  There is no right CVA tenderness, left CVA tenderness, guarding or rebound  Negative signs include Mcrae's sign, Rovsing's sign, McBurney's sign and psoas sign  Musculoskeletal:      Cervical back: Neck supple  Skin:     General: Skin is warm and dry  Capillary Refill: Capillary refill takes less than 2 seconds  Neurological:      General: No focal deficit present  Mental Status: She is alert and oriented to person, place, and time  GCS: GCS eye subscore is 4  GCS verbal subscore is 5  GCS motor subscore is 6  Cranial Nerves: Cranial nerves are intact  Sensory: Sensation is intact  Motor: Motor function is intact  Coordination: Coordination is intact  Gait: Gait is intact     Psychiatric:         Mood and Affect: Mood normal          Behavior: Behavior normal          Vital Signs  ED Triage Vitals [06/20/22 1246]   Temperature Pulse Respirations Blood Pressure SpO2   98 2 °F (36 8 °C) 82 20 138/70 100 %      Temp Source Heart Rate Source Patient Position - Orthostatic VS BP Location FiO2 (%)   Oral Monitor Sitting Left arm --      Pain Score       10 - Worst Possible Pain           Vitals:    06/20/22 1246 06/20/22 1459   BP: 138/70 142/87   Pulse: 82 80   Patient Position - Orthostatic VS: Sitting Sitting         Visual Acuity      ED Medications  Medications   sodium chloride 0 9 % bolus 1,000 mL (0 mL Intravenous Stopped 6/20/22 1456)   metoclopramide (REGLAN) injection 10 mg (10 mg Intravenous Given 6/20/22 1327)   diphenhydrAMINE (BENADRYL) injection 25 mg (25 mg Intravenous Given 6/20/22 1327)   ondansetron (ZOFRAN) injection 4 mg (4 mg Intravenous Given 6/20/22 1406)   ketorolac (TORADOL) injection 30 mg (30 mg Intravenous Given 6/20/22 1456)   dextrose 50 % IV solution 25 mL (25 mL Intravenous Given 6/20/22 1457)   sodium chloride 0 9 % bolus 1,000 mL (0 mL Intravenous Stopped 6/20/22 1654)   haloperidol lactate (HALDOL) injection 5 mg (5 mg Intramuscular Given 6/20/22 1527)       Diagnostic Studies  Results Reviewed     Procedure Component Value Units Date/Time    Rapid drug screen, urine [[id number]]  (Abnormal) Collected: 06/20/22 1344    Lab Status: Final result Specimen: Urine, Clean Catch Updated: 06/20/22 1644     Amph/Meth UR Negative     Barbiturate Ur Negative     Benzodiazepine Urine Negative     Cocaine Urine Negative     Methadone Urine Negative     Opiate Urine Negative     PCP Ur Negative     THC Urine Positive     Oxycodone Urine Negative    Narrative:      Presumptive report  If requested, specimen will be sent to reference lab for confirmation  FOR MEDICAL PURPOSES ONLY  IF CONFIRMATION NEEDED PLEASE CONTACT THE LAB WITHIN 5 DAYS      Drug Screen Cutoff Levels:  AMPHETAMINE/METHAMPHETAMINES  1000 ng/mL  BARBITURATES     200 ng/mL  BENZODIAZEPINES     200 ng/mL  COCAINE      300 ng/mL  METHADONE      300 ng/mL  OPIATES      300 ng/mL  PHENCYCLIDINE     25 ng/mL  THC       50 ng/mL  OXYCODONE      100 ng/mL    Urine Microscopic [[id number]]  (Abnormal) Collected: 06/20/22 1344    Lab Status: Final result Specimen: Urine, Clean Catch Updated: 06/20/22 1434     RBC, UA 30-50 /hpf      WBC, UA 2-4 /hpf      Epithelial Cells Moderate /hpf      Bacteria, UA Occasional /hpf      MUCUS THREADS Moderate    HS Troponin 0hr (reflex protocol) [[id number]]  (Normal) Collected: 06/20/22 1402    Lab Status: Final result Specimen: Blood from Arm, Left Updated: 06/20/22 1433     hs TnI 0hr 2 ng/L     hCG, qualitative pregnancy [[id number]]  (Normal) Collected: 06/20/22 1322    Lab Status: Final result Specimen: Blood from Arm, Right Updated: 06/20/22 1424     Preg, Serum Negative    UA (URINE) with reflex to Scope [[id number]]  (Abnormal) Collected: 06/20/22 1344    Lab Status: Final result Specimen: Urine, Clean Catch Updated: 06/20/22 1414     Color, UA Paz     Clarity, UA Clear     Specific Gravity, UA 1 010     pH, UA 8 0     Leukocytes,  0     Nitrite, UA Negative     Protein, UA 30 (1+) mg/dl      Glucose, UA Negative mg/dl      Ketones, UA >=150 (3+) mg/dl      Bilirubin, UA Negative     Blood,  0     UROBILINOGEN UA Negative mg/dL     Valproic acid level, total [[id number]]  (Abnormal) Collected: 06/20/22 1322    Lab Status: Final result Specimen: Blood from Arm, Right Updated: 06/20/22 1412     Valproic Acid, Total <10 0 ug/mL     Protime-INR [[id number]]  (Normal) Collected: 06/20/22 1322    Lab Status: Final result Specimen: Blood from Arm, Right Updated: 06/20/22 1412     Protime 13 6 seconds      INR 1 08    APTT [[id number]]  (Normal) Collected: 06/20/22 1322    Lab Status: Final result Specimen: Blood from Arm, Right Updated: 06/20/22 1412     PTT 27 seconds     Lipase [[id number]]  (Normal) Collected: 06/20/22 1322    Lab Status: Final result Specimen: Blood from Arm, Right Updated: 06/20/22 1406     Lipase 75 u/L     Magnesium [[id number]]  (Normal) Collected: 06/20/22 1322    Lab Status: Final result Specimen: Blood from Arm, Right Updated: 06/20/22 1406     Magnesium 1 9 mg/dL     Comprehensive metabolic panel [[id number]]  (Abnormal) Collected: 06/20/22 1322    Lab Status: Final result Specimen: Blood from Arm, Right Updated: 06/20/22 1406     Sodium 140 mmol/L      Potassium 3 2 mmol/L      Chloride 106 mmol/L      CO2 19 mmol/L      ANION GAP 15 mmol/L      BUN 14 mg/dL      Creatinine 0 63 mg/dL      Glucose 155 mg/dL      Calcium 9 6 mg/dL      AST 32 U/L      ALT 24 U/L      Alkaline Phosphatase 127 U/L      Total Protein 9 1 g/dL      Albumin 5 0 g/dL      Total Bilirubin 0 62 mg/dL      eGFR 126 ml/min/1 73sq m     Narrative:      National Kidney Disease Foundation guidelines for Chronic Kidney Disease (CKD):     Stage 1 with normal or high GFR (GFR > 90 mL/min/1 73 square meters)    Stage 2 Mild CKD (GFR = 60-89 mL/min/1 73 square meters)    Stage 3A Moderate CKD (GFR = 45-59 mL/min/1 73 square meters)    Stage 3B Moderate CKD (GFR = 30-44 mL/min/1 73 square meters)    Stage 4 Severe CKD (GFR = 15-29 mL/min/1 73 square meters)    Stage 5 End Stage CKD (GFR <15 mL/min/1 73 square meters)  Note: GFR calculation is accurate only with a steady state creatinine    CBC and differential [[id number]]  (Abnormal) Collected: 06/20/22 1322    Lab Status: Final result Specimen: Blood from Arm, Right Updated: 06/20/22 1344     WBC 19 29 Thousand/uL      RBC 4 91 Million/uL      Hemoglobin 14 3 g/dL      Hematocrit 41 3 %      MCV 84 fL      MCH 29 1 pg      MCHC 34 6 g/dL      RDW 13 6 %      MPV 9 5 fL      Platelets 429 Thousands/uL      nRBC 0 /100 WBCs      Neutrophils Relative 89 %      Immat GRANS % 1 %      Lymphocytes Relative 6 %      Monocytes Relative 4 %      Eosinophils Relative 0 %      Basophils Relative 0 %      Neutrophils Absolute 17 16 Thousands/µL      Immature Grans Absolute 0 12 Thousand/uL      Lymphocytes Absolute 1 14 Thousands/µL      Monocytes Absolute 0 83 Thousand/µL      Eosinophils Absolute 0 01 Thousand/µL      Basophils Absolute 0 03 Thousands/µL     POCT pregnancy, urine [[id number]]  (Normal) Resulted: 06/20/22 1338    Lab Status: Final result Updated: 06/20/22 1339     EXT PREG TEST UR (Ref: Negative) Negative     Control Valid                 CT abdomen pelvis wo contrast   Final Result by Kelli Velázquez Gabo Llamas MD (06/20 1506)      Questionable mild bladder wall thickening  Correlation for cystitis advised  Small hiatal hernia  No bowel obstruction or focal inflammatory process  Normal appendix  Workstation performed: [id number]                    Procedures  Procedures         ED Course  ED Course as of 06/20/22 1707   Mon Jun 20, 2022   1252 Patient is a 69-year-old female otherwise healthy coming in today complaining of abdominal pain with persistent vomiting  On exam she does appear in discomfort with tenderness to epigastric region  Will obtain basic labs including lipase give Reglan Benadryl for symptomatic control and as well as IV fluids  Portions of the record may have been created with voice recognition software  Occasional wrong word or "sound a like" substitutions may have occurred due to the inherent limitations of voice recognition software  Read the chart carefully and recognize, using context, where substitutions have occurred  a     1344 Pregnancy negative  White count greater than 19,000 with no bands  [id number] Patient does have anion gap of 15 but within normal limits  Glucose 155  Potassium 3 2  Will replace potassium as well as obtain CT    1417 Patient does have leukocytes as well as ketones in urine  Will give 2nd L as well as dextrose for ketosis   1524 Patient with persistent vomiting  In the history noted to be THC  Will give Haldol  Approximately 10 minutes prior patient was given ice chips by myself as patient was feeling markedly improved  She is updated on labs and CT  She had no urinary symptoms   1700 After Haldol, patient feels markedly improved  She tolerated p o  Well and wishes to go home  Urine drug screen does show positive for THC  Will DC home and give antiemetics  Patient is well-appearing in no distress hemodynamically stable    Non peritoneal    Counseling: I had a detailed discussion with the patient and/or guardian regarding: the historical points, exam findings, and any diagnostic results supporting the discharge diagnosis, lab results, radiology results, discharge instructions reviewed with patient and/or family/caregiver and understanding was verbalized  Instructions given to return to the emergency department if symptoms worsen or persist, or if there are any questions or concerns that arise at home  HEART Risk Score    Flowsheet Row Most Recent Value   Heart Score Risk Calculator    History 0 Filed at: 06/20/2022 1443   ECG 1 Filed at: 06/20/2022 1443   Age 0 Filed at: 06/20/2022 1443   Risk Factors 0 Filed at: 06/20/2022 1443   Troponin 0 Filed at: 06/20/2022 1443   HEART Score 1 Filed at: 06/20/2022 1443                PERC Rule for PE    Flowsheet Row Most Recent Value   PERC Rule for PE    Age >=50 0 Filed at: 06/20/2022 1444   HR >=100 0 Filed at: 06/20/2022 1444   O2 Sat on room air < 95% 0 Filed at: 06/20/2022 1444   History of PE or DVT 0 Filed at: 06/20/2022 1444   Recent trauma or surgery 0 Filed at: 06/20/2022 1444   Hemoptysis 0 Filed at: 06/20/2022 1444   Exogenous estrogen 0 Filed at: 06/20/2022 1444   Unilateral leg swelling 0 Filed at: 06/20/2022 1444   PERC Rule for PE Results 0 Filed at: 06/20/2022 1444              SBIRT 20yo+    Flowsheet Row Most Recent Value   SBIRT (23 yo +)    In order to provide better care to our patients, we are screening all of our patients for alcohol and drug use  Would it be okay to ask you these screening questions?  No Filed at: 06/20/2022 1343          Wells' Criteria for PE    Flowsheet Row Most Recent Value   Wells' Criteria for PE    Clinical signs and symptoms of DVT 0 Filed at: 06/20/2022 1444   PE is primary diagnosis or equally likely 0 Filed at: 06/20/2022 1444   HR >100 0 Filed at: 06/20/2022 1444   Immobilization at least 3 days or Surgery in the previous 4 weeks 0 Filed at: 06/20/2022 1444   Previous, objectively diagnosed PE or DVT 0 Filed at: 06/20/2022 1443 Hemoptysis 0 Filed at: 06/20/2022 1444   Malignancy with treatment within 6 months or palliative 0 Filed at: 06/20/2022 1444   Wells' Criteria Total 0 Filed at: 06/20/2022 1444                [facility]  Number of Diagnoses or Management Options  Diagnosis management comments: EKG INTERPRETATION @ 7691  RHYTHM:NSR @ 86 bpm  AXIS: normal axis   INTERVALS: DE @ 138 ms  QRS COMPLEX: QRS @ 104 ms  Incomplete RBBB  ST SEGMENT: non specific st segment changes   Diffuse artifact  QT INTERVAL: QTC @ 490 ms  COMPARED WITH PRIOR QTC longer compared to older but not exceeding >500 ms  Interpretation by Orly Gramajo DO      Differential diagnosis includes but not limited to:  Appendicitis, viral syndrome, constipation, AMI, NSTEMI, pneumonia, pneuothorax, gerd, gastritis,  mesenteric ischemia, mesenteric adenitis, pancreatitis, cholecystitis, choledocholithiasis, hepatitis, bowel obstruction, ileus, gastroenteritis, colitis, malignancy, AAA, perforation, toxicologic poisoning, renal infarct, acute kidney injury, splenic infarct, splenic injury, nephrolithiasis, UTI, muscular strain, intra-abdominal hematoma, hernia,         Amount and/or Complexity of Data Reviewed  Clinical lab tests: ordered and reviewed  Tests in the radiology section of CPT®: ordered and reviewed  Tests in the medicine section of CPT®: ordered and reviewed  Review and summarize past medical records: yes  Independent visualization of images, tracings, or specimens: yes        Disposition  Final diagnoses:   Abdominal pain   Nausea and vomiting, unspecified vomiting type   Mild tetrahydrocannabinol (THC) abuse     Time reflects when diagnosis was documented in both MDM as applicable and the Disposition within this note     Time User Action Codes Description Comment    6/20/2022  5:02 PM Clemente Bee Add [R10 9] Abdominal pain     6/20/2022  5:02 PM Citlali Bee Add [R11 2] Nausea and vomiting, unspecified vomiting type     6/20/2022  5:02 PM Rohit Adia Damon Add [F12 10] Mild tetrahydrocannabinol [facility]) abuse       ED Disposition     ED Disposition   Discharge    Condition   Stable    Date/Time   Mon Jun 20, 2022  5:02 PM    Comment   Spring Stewart discharge to home/self care  Follow-up Information     Follow up With Specialties Details Why Contact Info Additional [address] [facility] Schedule an appointment as soon as possible for a visit in 1 week  [address], [address] [facility] [address]  [address], [address], [address] [place], [place], [address], [address] [facility]   [address]  [address] [facility]  [address]              Patient's Medications   Discharge Prescriptions    ONDANSETRON (ZOFRAN ODT) 4 MG DISINTEGRATING TABLET    Take 1 tablet (4 mg total) by mouth every 6 (six) hours as needed for nausea or vomiting for up to 2 days       Start Date: 6/20/2022 End Date: 6/22/2022       Order Dose: 4 mg       Quantity: 8 tablet    Refills: 0       No discharge procedures on file      PDMP Review       Value Time User    PDMP Reviewed  Yes 11/25/2021 10:36 PM Nora Patel MD          ED Provider  Electronically Signed by           Charles Jung DO  06/20/22 6682

## 2022-06-20 NOTE — DISCHARGE INSTRUCTIONS
B  R  A  T  DIET Your doctor has recommended the B R A T  diet for you or your child until his or her condition improves  This is often used to help control diarrhea and vomiting symptoms  If you or your child can tolerate clear liquids, you may offer: · Bananas · Rice · Applesauce · Toast (and other simple starches such as crackers, potatoes, noodles) Be sure to avoid dairy products, meats, and fatty foods until you or  your child's symptoms are completely better       IF YOU USE GATORADE - SPLIT A BOTTLE IN HALF AND WATER DOWN   TAKE SIPS OF FLUIDS AND DO NOT CHUG    IF YOU USE PEPTO-BISMOL, IT CAN TURN YOUR TONGUE AND OR STOOLS BLACK

## 2022-06-21 ENCOUNTER — HOSPITAL ENCOUNTER (EMERGENCY)
Facility: HOSPITAL | Age: 22
Discharge: HOME | End: 2022-06-22
Attending: EMERGENCY MEDICINE | Admitting: EMERGENCY MEDICINE
Payer: COMMERCIAL

## 2022-06-21 VITALS
OXYGEN SATURATION: 98 % | RESPIRATION RATE: 18 BRPM | BODY MASS INDEX: 33.78 KG/M2 | HEART RATE: 61 BPM | WEIGHT: 190.7 LBS | DIASTOLIC BLOOD PRESSURE: 65 MMHG | TEMPERATURE: 98.4 F | SYSTOLIC BLOOD PRESSURE: 124 MMHG

## 2022-06-21 DIAGNOSIS — R11.10 HYPEREMESIS: Primary | ICD-10-CM

## 2022-06-21 LAB
ALBUMIN SERPL BCP-MCNC: 4.9 G/DL (ref 3–5.2)
ALP SERPL-CCNC: 104 U/L (ref 43–122)
ALT SERPL W P-5'-P-CCNC: 24 U/L
ANION GAP SERPL CALCULATED.3IONS-SCNC: 14 MMOL/L (ref 5–14)
AST SERPL W P-5'-P-CCNC: 49 U/L (ref 14–36)
BACTERIA UR QL AUTO: ABNORMAL /HPF
BASOPHILS # BLD AUTO: 0.06 THOUSANDS/ΜL (ref 0–0.1)
BASOPHILS NFR BLD AUTO: 0 % (ref 0–1)
BILIRUB SERPL-MCNC: 1.05 MG/DL
BILIRUB UR QL STRIP: NEGATIVE
BUN SERPL-MCNC: 12 MG/DL (ref 5–25)
CALCIUM SERPL-MCNC: 8.9 MG/DL (ref 8.4–10.2)
CHLORIDE SERPL-SCNC: 104 MMOL/L (ref 97–108)
CLARITY UR: CLEAR
CO2 SERPL-SCNC: 21 MMOL/L (ref 22–30)
COLOR UR: ABNORMAL
CREAT SERPL-MCNC: 0.65 MG/DL (ref 0.6–1.2)
EOSINOPHIL # BLD AUTO: 0.01 THOUSAND/ΜL (ref 0–0.61)
EOSINOPHIL NFR BLD AUTO: 0 % (ref 0–6)
ERYTHROCYTE [DISTWIDTH] IN BLOOD BY AUTOMATED COUNT: 13.5 % (ref 11.6–15.1)
EXT PREG TEST URINE: NEGATIVE
EXT. CONTROL ED NAV: NORMAL
GFR SERPL CREATININE-BSD FRML MDRD: 125 ML/MIN/1.73SQ M
GLUCOSE SERPL-MCNC: 109 MG/DL (ref 70–99)
GLUCOSE UR STRIP-MCNC: NEGATIVE MG/DL
HCT VFR BLD AUTO: 41.1 % (ref 34.8–46.1)
HGB BLD-MCNC: 13.8 G/DL (ref 11.5–15.4)
HGB UR QL STRIP.AUTO: 150
IMM GRANULOCYTES # BLD AUTO: 0.11 THOUSAND/UL (ref 0–0.2)
IMM GRANULOCYTES NFR BLD AUTO: 1 % (ref 0–2)
KETONES UR STRIP-MCNC: ABNORMAL MG/DL
LEUKOCYTE ESTERASE UR QL STRIP: NEGATIVE
LIPASE SERPL-CCNC: 67 U/L (ref 23–300)
LYMPHOCYTES # BLD AUTO: 2.42 THOUSANDS/ΜL (ref 0.6–4.47)
LYMPHOCYTES NFR BLD AUTO: 13 % (ref 14–44)
MCH RBC QN AUTO: 28.8 PG (ref 26.8–34.3)
MCHC RBC AUTO-ENTMCNC: 33.6 G/DL (ref 31.4–37.4)
MCV RBC AUTO: 86 FL (ref 82–98)
MONOCYTES # BLD AUTO: 1.26 THOUSAND/ΜL (ref 0.17–1.22)
MONOCYTES NFR BLD AUTO: 7 % (ref 4–12)
MUCOUS THREADS UR QL AUTO: ABNORMAL
NEUTROPHILS # BLD AUTO: 15.42 THOUSANDS/ΜL (ref 1.85–7.62)
NEUTS SEG NFR BLD AUTO: 79 % (ref 43–75)
NITRITE UR QL STRIP: NEGATIVE
NON-SQ EPI CELLS URNS QL MICRO: ABNORMAL /HPF
NRBC BLD AUTO-RTO: 0 /100 WBCS
PH UR STRIP.AUTO: 7 [PH]
PLATELET # BLD AUTO: 337 THOUSANDS/UL (ref 149–390)
PMV BLD AUTO: 9.4 FL (ref 8.9–12.7)
POTASSIUM SERPL-SCNC: 3.5 MMOL/L (ref 3.6–5)
PROT SERPL-MCNC: 8.9 G/DL (ref 5.9–8.4)
PROT UR STRIP-MCNC: ABNORMAL MG/DL
RBC # BLD AUTO: 4.8 MILLION/UL (ref 3.81–5.12)
RBC #/AREA URNS AUTO: ABNORMAL /HPF
SODIUM SERPL-SCNC: 139 MMOL/L (ref 137–147)
SP GR UR STRIP.AUTO: 1.01 (ref 1–1.04)
UROBILINOGEN UA: 4 MG/DL
WBC # BLD AUTO: 19.28 THOUSAND/UL (ref 4.31–10.16)
WBC #/AREA URNS AUTO: ABNORMAL /HPF

## 2022-06-21 PROCEDURE — 96361 HYDRATE IV INFUSION ADD-ON: CPT

## 2022-06-21 PROCEDURE — 80053 COMPREHEN METABOLIC PANEL: CPT | Performed by: PHYSICIAN ASSISTANT

## 2022-06-21 PROCEDURE — 36415 COLL VENOUS BLD VENIPUNCTURE: CPT | Performed by: PHYSICIAN ASSISTANT

## 2022-06-21 PROCEDURE — 85025 COMPLETE CBC W/AUTO DIFF WBC: CPT | Performed by: PHYSICIAN ASSISTANT

## 2022-06-21 PROCEDURE — 83690 ASSAY OF LIPASE: CPT | Performed by: PHYSICIAN ASSISTANT

## 2022-06-21 PROCEDURE — 81025 URINE PREGNANCY TEST: CPT | Performed by: PHYSICIAN ASSISTANT

## 2022-06-21 PROCEDURE — 81001 URINALYSIS AUTO W/SCOPE: CPT | Performed by: PHYSICIAN ASSISTANT

## 2022-06-21 PROCEDURE — 96374 THER/PROPH/DIAG INJ IV PUSH: CPT

## 2022-06-21 PROCEDURE — 96372 THER/PROPH/DIAG INJ SC/IM: CPT

## 2022-06-21 PROCEDURE — 99284 EMERGENCY DEPT VISIT MOD MDM: CPT

## 2022-06-21 RX ORDER — ONDANSETRON 4 MG/1
4 TABLET, ORALLY DISINTEGRATING ORAL EVERY 6 HOURS PRN
Qty: 20 TABLET | Refills: 0 | Status: SHIPPED | OUTPATIENT
Start: 2022-06-21 | End: 2022-07-22

## 2022-06-21 RX ORDER — ONDANSETRON 2 MG/ML
INJECTION INTRAMUSCULAR; INTRAVENOUS
Status: COMPLETED
Start: 2022-06-21 | End: 2022-06-21

## 2022-06-21 RX ORDER — HALOPERIDOL 5 MG/ML
5 INJECTION INTRAMUSCULAR ONCE
Status: COMPLETED | OUTPATIENT
Start: 2022-06-21 | End: 2022-06-21

## 2022-06-21 RX ORDER — ONDANSETRON 2 MG/ML
4 INJECTION INTRAMUSCULAR; INTRAVENOUS ONCE
Status: COMPLETED | OUTPATIENT
Start: 2022-06-21 | End: 2022-06-21

## 2022-06-21 RX ADMIN — SODIUM CHLORIDE 1000 ML: 0.9 INJECTION, SOLUTION INTRAVENOUS at 21:43

## 2022-06-21 RX ADMIN — HALOPERIDOL LACTATE 5 MG: 5 INJECTION, SOLUTION INTRAMUSCULAR at 21:46

## 2022-06-21 RX ADMIN — ONDANSETRON 4 MG: 2 INJECTION INTRAMUSCULAR; INTRAVENOUS at 22:16

## 2022-06-22 PROCEDURE — 99285 EMERGENCY DEPT VISIT HI MDM: CPT | Performed by: PHYSICIAN ASSISTANT

## 2022-06-22 NOTE — ED PROVIDER NOTES
History  Chief Complaint   Patient presents with    Vomiting    Abdominal Pain    Dizziness     Pt states "So I was here for the exact same thing yesterday, and they told me if it doesn't get better to come back  And the pain has just gotten worse "     79-year-old female with history of marijuana use presents complaining of continued nausea and vomiting  Patient was seen in the emergency department yesterday and had largely benign evaluation and relief of symptoms with Haldol and was discharged home with Zofran however was taking Zofran at home without relief and states that she cannot stop vomiting or keep food down  Denies fevers or sick contacts  Admits to mild epigastric pain only while vomiting but denies any other pain complaints  Denies changes to bowel or bladder habits  History provided by:  Patient   used: No        Prior to Admission Medications   Prescriptions Last Dose Informant Patient Reported? Taking?    ARIPiprazole (ABILIFY) 10 mg tablet   Yes No   Sig: Take 10 mg by mouth daily at bedtime   Diclofenac Sodium (VOLTAREN) 1 %   No No   Sig: Apply 2 g topically 4 (four) times a day   EPINEPHrine (EPIPEN) 0 3 mg/0 3 mL SOAJ   No No   Sig: Inject 0 3 mL (0 3 mg total) into a muscle once for 1 dose   Epinastine HCl 0 05 % ophthalmic solution   No No   Sig: Administer 1 drop to both eyes 2 (two) times a day   Spacer/Aero-Holding Chambers (Vortex Valved Holding Chamber) FROYLAN   No No   Sig: Use 2 (two) times a day   albuterol (2 5 mg/3 mL) 0 083 % nebulizer solution  Self No No   Sig: Take 1 vial (2 5 mg total) by nebulization every 6 (six) hours as needed for wheezing or shortness of breath   albuterol (Ventolin HFA) 90 mcg/act inhaler   No No   Sig: Inhale 2 puffs every 4 (four) hours as needed for wheezing   azelastine (ASTELIN) 0 1 % nasal spray   No No   Si-2 sprays into each nostril 2 (two) times a day as needed for rhinitis Use in each nostril as directed buPROPion (WELLBUTRIN) 75 mg tablet   Yes No   Patient not taking: No sig reported   busPIRone (BUSPAR) 10 mg tablet   Yes No   cloNIDine (CATAPRES) 0 2 mg tablet   Yes No   divalproex sodium (DEPAKOTE ER) 250 mg 24 hr tablet   Yes No   Sig: Take 250 mg by mouth in the morning and 250 mg in the evening  fluticasone-salmeterol (Advair HFA) 115-21 MCG/ACT inhaler   No No   Sig: Inhale 2 puffs 2 (two) times a day Rinse mouth after use    hydrOXYzine HCL (ATARAX) 10 mg tablet   No No   Si-2 po q 6 hrs prn anxiety, sleep   hydrOXYzine pamoate (VISTARIL) 50 mg capsule   Yes No   Sig:     mometasone (ELOCON) 0 1 % cream   No No   Sig: Apply topically daily   ondansetron (Zofran ODT) 4 mg disintegrating tablet   No No   Sig: Take 1 tablet (4 mg total) by mouth every 6 (six) hours as needed for nausea or vomiting   ondansetron (Zofran ODT) 4 mg disintegrating tablet   No No   Sig: Take 1 tablet (4 mg total) by mouth every 6 (six) hours as needed for nausea or vomiting for up to 2 days   ondansetron (Zofran ODT) 8 mg disintegrating tablet   No No   Sig: Take 1 tablet (8 mg total) by mouth every 8 (eight) hours as needed for nausea or vomiting      Facility-Administered Medications: None       Past Medical History:   Diagnosis Date    Anaphylaxis     apricot    Anxiety     Asthma     Cluster headache     Eczema     Headache, tension-type     Lymphadenitis     Last assessed 14    Lymphadenopathy     Last assessed 10/16/13    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 [address] [facility]     Migraine     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 [facility]     Psychiatric disorder     Psychiatric illness     Psychosis [facility] [facility]     Seizures ([facility] [address] [facility]        Past Surgical History:   Procedure Laterality Date    COLONOSCOPY N/A 2019    Procedure: COLONOSCOPY;  Surgeon: Kj Bowser MD;  Location: [facility]; Service: Gastroenterology    ESOPHAGOGASTRODUODENOSCOPY N/A 2019    Procedure: ESOPHAGOGASTRODUODENOSCOPY (EGD);   Surgeon: Kj Bowser MD; Location: [facility]; Service: Gastroenterology       Family History   Problem Relation Age of Onset   Julieann Frankel Migraines Mother     Other Mother         Neck pain    Depression Mother     Drug abuse Mother     Drug abuse Father     Schizophrenia Father     Febrile seizures Maternal Uncle     Stroke Maternal Uncle      I have reviewed and agree with the history as documented  E-Cigarette/Vaping    E-Cigarette Use Current Every Day User      E-Cigarette/Vaping Substances    Nicotine No     THC No     CBD No     Flavoring No     Other No     Unknown No      Social History     Tobacco Use    Smoking status: Former Smoker     Packs/day: 0 00     Years: 0 00     Pack years: 0 00     Types: Cigarettes, Cigarettes    Smokeless tobacco: Never Used    Tobacco comment: I stopped smoking cigarettes but started vaping   Vaping Use    Vaping Use: Every day   Substance Use Topics    Alcohol use: Yes     Alcohol/week: 2 0 standard drinks     Types: 2 Shots of liquor per week     Comment: Occasionally    Drug use: Yes     Frequency: 3 0 times per week     Types: Marijuana       Review of Systems   Constitutional: Negative  Negative for chills and fatigue  HENT: Negative for ear pain and sore throat  Eyes: Negative for photophobia and redness  Respiratory: Negative for apnea, cough and shortness of breath  Cardiovascular: Negative for chest pain  Gastrointestinal: Positive for abdominal pain, nausea and vomiting  Genitourinary: Negative for dysuria  Musculoskeletal: Negative for arthralgias, neck pain and neck stiffness  Skin: Negative for rash  Neurological: Negative for dizziness, tremors, syncope and weakness  Psychiatric/Behavioral: Negative for suicidal ideas  Physical Exam  Physical Exam  Constitutional:       General: She is not in acute distress  Appearance: She is well-developed  She is not diaphoretic  Eyes:      Pupils: Pupils are equal, round, and reactive to light  Cardiovascular:      Rate and Rhythm: Normal rate and regular rhythm  Pulmonary:      Effort: Pulmonary effort is normal  No respiratory distress  Breath sounds: Normal breath sounds  Abdominal:      General: Bowel sounds are normal  There is no distension  Palpations: Abdomen is soft  Comments: Soft nondistended mild epigastric tenderness no guarding rebound or rigidity negative CVA tenderness bilaterally   Musculoskeletal:         General: Normal range of motion  Cervical back: Normal range of motion and neck supple  Skin:     General: Skin is warm and dry  Neurological:      Mental Status: She is alert and oriented to person, place, and time           Vital Signs  ED Triage Vitals [06/21/22 2104]   Temperature Pulse Respirations Blood Pressure SpO2   98 4 °F (36 9 °C) 61 18 124/65 98 %      Temp Source Heart Rate Source Patient Position - Orthostatic VS BP Location FiO2 (%)   Tympanic Monitor Sitting Left arm --      Pain Score       9           Vitals:    06/21/22 2104   BP: 124/65   Pulse: 61   Patient Position - Orthostatic VS: Sitting         Visual Acuity      ED Medications  Medications   sodium chloride 0 9 % bolus 1,000 mL (0 mL Intravenous Stopped 6/22/22 0022)   haloperidol lactate (HALDOL) injection 5 mg (5 mg Intramuscular Given 6/21/22 2146)   ondansetron (ZOFRAN) injection 4 mg (4 mg Intravenous Given 6/21/22 2216)       Diagnostic Studies  Results Reviewed     Procedure Component Value Units Date/Time    Urine Microscopic [[id number]]  (Abnormal) Collected: 06/21/22 2143    Lab Status: Final result Specimen: Urine, Clean Catch Updated: 06/21/22 2209     RBC, UA 4-10 /hpf      WBC, UA 1-2 /hpf      Epithelial Cells Occasional /hpf      Bacteria, UA Occasional /hpf      MUCUS THREADS Occasional    Comprehensive metabolic panel [[id number]]  (Abnormal) Collected: 06/21/22 2143    Lab Status: Final result Specimen: Blood from Arm, Right Updated: 06/21/22 2202     Sodium 139 mmol/L      Potassium 3 5 mmol/L      Chloride 104 mmol/L      CO2 21 mmol/L      ANION GAP 14 mmol/L      BUN 12 mg/dL      Creatinine 0 65 mg/dL      Glucose 109 mg/dL      Calcium 8 9 mg/dL      AST 49 U/L      ALT 24 U/L      Alkaline Phosphatase 104 U/L      Total Protein 8 9 g/dL      Albumin 4 9 g/dL      Total Bilirubin 1 05 mg/dL      eGFR 125 ml/min/1 73sq m     Narrative:      Hemolysis  National Kidney Disease Foundation guidelines for Chronic Kidney Disease (CKD):     Stage 1 with normal or high GFR (GFR > 90 mL/min/1 73 square meters)    Stage 2 Mild CKD (GFR = 60-89 mL/min/1 73 square meters)    Stage 3A Moderate CKD (GFR = 45-59 mL/min/1 73 square meters)    Stage 3B Moderate CKD (GFR = 30-44 mL/min/1 73 square meters)    Stage 4 Severe CKD (GFR = 15-29 mL/min/1 73 square meters)    Stage 5 End Stage CKD (GFR <15 mL/min/1 73 square meters)  Note: GFR calculation is accurate only with a steady state creatinine    Lipase [[id number]]  (Normal) Collected: 06/21/22 2143    Lab Status: Final result Specimen: Blood from Arm, Right Updated: 06/21/22 2201     Lipase 67 u/L     Narrative:      Hemolysis    UA (URINE) with reflex to Scope [[id number]]  (Abnormal) Collected: 06/21/22 2143    Lab Status: Final result Specimen: Urine, Clean Catch Updated: 06/21/22 2156     Color, UA Paz     Clarity, UA Clear     Specific Gravity, UA 1 015     pH, UA 7 0     Leukocytes, UA Negative     Nitrite, UA Negative     Protein, UA 30 (1+) mg/dl      Glucose, UA Negative mg/dl      Ketones, UA >=150 (3+) mg/dl      Bilirubin, UA Negative     Blood,  0     UROBILINOGEN UA 4 0 mg/dL     CBC and differential [[id number]]  (Abnormal) Collected: 06/21/22 2143    Lab Status: Final result Specimen: Blood from Arm, Right Updated: 06/21/22 2150     WBC 19 28 Thousand/uL      RBC 4 80 Million/uL      Hemoglobin 13 8 g/dL      Hematocrit 41 1 %      MCV 86 fL      MCH 28 8 pg      MCHC 33 6 g/dL      RDW 13 5 %      MPV 9 4 fL      Platelets 449 Thousands/uL      nRBC 0 /100 WBCs      Neutrophils Relative 79 %      Immat GRANS % 1 %      Lymphocytes Relative 13 %      Monocytes Relative 7 %      Eosinophils Relative 0 %      Basophils Relative 0 %      Neutrophils Absolute 15 42 Thousands/µL      Immature Grans Absolute 0 11 Thousand/uL      Lymphocytes Absolute 2 42 Thousands/µL      Monocytes Absolute 1 26 Thousand/µL      Eosinophils Absolute 0 01 Thousand/µL      Basophils Absolute 0 06 Thousands/µL     POCT pregnancy, urine [[id number]]  (Normal) Resulted: 06/21/22 2142    Lab Status: Final result Updated: 06/21/22 2143     EXT PREG TEST UR (Ref: Negative) negative     Control valid                 No orders to display              Procedures  Procedures         ED Course                               SBIRT 20yo+    Flowsheet Row Most Recent Value   SBIRT (25 yo +)    In order to provide better care to our patients, we are screening all of our patients for alcohol and drug use  Would it be okay to ask you these screening questions? No Filed at: 06/21/2022 2115                    MDM  Number of Diagnoses or Management Options  Hyperemesis: new and does not require workup  Diagnosis management comments: Patient had largely benign laboratory evaluation, CT scan from yesterday reviewed by me without focal abnormality  Patient had significant symptomatic relief following Haldol and Zofran in the emergency department  Symptoms thought to be related to cannabinoid hyperemesis  Patient was educated on supportive care and return precautions and was hemodynamically stable throughout ED course  Patient tolerated p o  Intake following medications given in the emergency department         Amount and/or Complexity of Data Reviewed  Clinical lab tests: ordered and reviewed    Risk of Complications, Morbidity, and/or Mortality  Presenting problems: moderate  Diagnostic procedures: moderate  Management options: moderate    Patient Progress  Patient progress: stable      Disposition  Final diagnoses:   Hyperemesis     Time reflects when diagnosis was documented in both MDM as applicable and the Disposition within this note     Time User Action Codes Description Comment    6/21/2022 10:45 PM Austin Lira Add [R11 [id number]] Hyperemesis       ED Disposition     ED Disposition   Discharge    Condition   Stable    Date/Time   Tue Jun 21, 2022 10:45 PM    Comment   Robinson Rowan discharge to home/self care  Follow-up Information     Follow up With Specialties Details Why Contact Info    Dhiraj Tamez MD Family Medicine Call  As needed [address]  [facility]  [address]             Discharge Medication List as of 6/21/2022 10:45 PM      START taking these medications    Details   !! ondansetron (Zofran ODT) 4 mg disintegrating tablet Take 1 tablet (4 mg total) by mouth every 6 (six) hours as needed for nausea or vomiting, Starting Tue 6/21/2022, Normal       !! - Potential duplicate medications found  Please discuss with provider        CONTINUE these medications which have NOT CHANGED    Details   albuterol (2 5 mg/3 mL) 0 083 % nebulizer solution Take 1 vial (2 5 mg total) by nebulization every 6 (six) hours as needed for wheezing or shortness of breath, Starting Tue 10/30/2018, Print      albuterol (Ventolin HFA) 90 mcg/act inhaler Inhale 2 puffs every 4 (four) hours as needed for wheezing, Starting Wed 3/9/2022, Normal      ARIPiprazole (ABILIFY) 10 mg tablet Take 10 mg by mouth daily at bedtime, Starting Thu 3/10/2022, Historical Med      azelastine (ASTELIN) 0 1 % nasal spray 1-2 sprays into each nostril 2 (two) times a day as needed for rhinitis Use in each nostril as directed, Starting Wed 4/13/2022, Normal      buPROPion (WELLBUTRIN) 75 mg tablet Starting Mon 3/14/2022, Historical Med      busPIRone (BUSPAR) 10 mg tablet Starting Mon 3/14/2022, Historical Med      cloNIDine (CATAPRES) 0 2 mg tablet Starting Mon 3/14/2022, Historical Med      Diclofenac Sodium (VOLTAREN) 1 % Apply 2 g topically 4 (four) times a day, Starting Sat 1/15/2022, Normal      divalproex sodium (DEPAKOTE ER) 250 mg 24 hr tablet Take 250 mg by mouth in the morning and 250 mg in the evening , Starting Wed 5/11/2022, Historical Med      Epinastine HCl 0 05 % ophthalmic solution Administer 1 drop to both eyes 2 (two) times a day, Starting Wed 4/13/2022, Normal      EPINEPHrine (EPIPEN) 0 3 mg/0 3 mL SOAJ Inject 0 3 mL (0 3 mg total) into a muscle once for 1 dose, Starting Wed 3/9/2022, Normal      fluticasone-salmeterol (Advair HFA) 115-21 MCG/ACT inhaler Inhale 2 puffs 2 (two) times a day Rinse mouth after use , Starting Wed 3/9/2022, Normal      hydrOXYzine HCL (ATARAX) 10 mg tablet 1-2 po q 6 hrs prn anxiety, sleep, Normal      hydrOXYzine pamoate (VISTARIL) 50 mg capsule  , Starting Mon 3/14/2022, Historical Med      mometasone (ELOCON) 0 1 % cream Apply topically daily, Starting Wed 3/9/2022, Normal      !! ondansetron (Zofran ODT) 4 mg disintegrating tablet Take 1 tablet (4 mg total) by mouth every 6 (six) hours as needed for nausea or vomiting, Starting Sat 12/18/2021, Normal      !! ondansetron (Zofran ODT) 4 mg disintegrating tablet Take 1 tablet (4 mg total) by mouth every 6 (six) hours as needed for nausea or vomiting for up to 2 days, Starting Mon 6/20/2022, Until Wed 6/22/2022 at 2359, Normal      !! ondansetron (Zofran ODT) 8 mg disintegrating tablet Take 1 tablet (8 mg total) by mouth every 8 (eight) hours as needed for nausea or vomiting, Starting Wed 12/22/2021, Normal      Spacer/Aero-Holding Chambers ([employer]) FROYLAN Use 2 (two) times a day, Starting Wed 3/9/2022, Normal       !! - Potential duplicate medications found  Please discuss with provider  No discharge procedures on file      PDMP Review       Value Time User    PDMP Reviewed  Yes 11/25/2021 10:36 PM Mavis Muniz MD          ED Provider  Electronically Signed by           Scott Pollock PA-C  06/22/22 0124

## 2022-06-23 ENCOUNTER — OFFICE VISIT (OUTPATIENT)
Dept: FAMILY MEDICINE CLINIC | Facility: CLINIC | Age: 22
End: 2022-06-23

## 2022-06-23 VITALS
HEART RATE: 97 BPM | BODY MASS INDEX: 33.49 KG/M2 | WEIGHT: 189 LBS | RESPIRATION RATE: 22 BRPM | SYSTOLIC BLOOD PRESSURE: 112 MMHG | HEIGHT: 63 IN | TEMPERATURE: 98.5 F | OXYGEN SATURATION: 98 % | DIASTOLIC BLOOD PRESSURE: 64 MMHG

## 2022-06-23 DIAGNOSIS — D72.829 LEUKOCYTOSIS, UNSPECIFIED TYPE: ICD-10-CM

## 2022-06-23 DIAGNOSIS — K21.9 GASTROESOPHAGEAL REFLUX DISEASE, UNSPECIFIED WHETHER ESOPHAGITIS PRESENT: ICD-10-CM

## 2022-06-23 DIAGNOSIS — K59.00 CONSTIPATION, UNSPECIFIED CONSTIPATION TYPE: ICD-10-CM

## 2022-06-23 DIAGNOSIS — R10.13 EPIGASTRIC PAIN: ICD-10-CM

## 2022-06-23 DIAGNOSIS — Z11.8 SCREENING FOR CHLAMYDIAL DISEASE: ICD-10-CM

## 2022-06-23 DIAGNOSIS — N32.89 BLADDER WALL THICKENING: ICD-10-CM

## 2022-06-23 DIAGNOSIS — K44.9 HIATAL HERNIA: Primary | ICD-10-CM

## 2022-06-23 PROCEDURE — 87491 CHLMYD TRACH DNA AMP PROBE: CPT | Performed by: FAMILY MEDICINE

## 2022-06-23 PROCEDURE — 87591 N.GONORRHOEAE DNA AMP PROB: CPT | Performed by: FAMILY MEDICINE

## 2022-06-23 PROCEDURE — 99213 OFFICE O/P EST LOW 20 MIN: CPT | Performed by: FAMILY MEDICINE

## 2022-06-23 RX ORDER — POLYETHYLENE GLYCOL 3350 17 G/17G
17 POWDER, FOR SOLUTION ORAL DAILY
Qty: 255 G | Refills: 0 | Status: SHIPPED | OUTPATIENT
Start: 2022-06-23 | End: 2022-07-22

## 2022-06-23 RX ORDER — PANTOPRAZOLE SODIUM 20 MG/1
20 TABLET, DELAYED RELEASE ORAL
Qty: 30 TABLET | Refills: 2 | Status: SHIPPED | OUTPATIENT
Start: 2022-06-23 | End: 2022-07-07 | Stop reason: DRUGHIGH

## 2022-06-23 NOTE — PATIENT INSTRUCTIONS
Hiatal Hernia   AMBULATORY CARE:   A hiatal hernia  is a condition that causes part of your stomach to bulge through the hiatus (small opening) in your diaphragm  The part of the stomach may move up and down, or it may get trapped above the diaphragm  Common symptoms include the following: The most common symptom is heartburn  This usually occurs after meals and spreads to your neck, jaw, or shoulder  You may have no signs or symptoms, or you may have any of the following:  Abdominal pain, especially in the area just above your navel    Bitter or acid taste in your mouth    Trouble swallowing    Coughing or hoarseness    Chest pain or shortness of breath that occurs after eating    Frequent burping or hiccups    Uncomfortable feeling of fullness after eating    Call your local emergency number (911 in the [address] [facility] [facility]) if:   You have severe chest pain and sudden trouble breathing  Seek care immediately if:   You have severe abdominal pain  You try to vomit but nothing comes out (retching)  Your bowel movements are black or bloody  Your vomit looks like coffee grounds or has blood in it  Call your doctor if:   Your symptoms are getting worse  You have nausea, and you are vomiting  You are losing weight without trying  You have questions or concerns about your condition or care  Treatment for hiatal hernia  depends on the type of hiatal hernia you have and on your symptoms  You may not need any treatment  You may need any of the following:  Medicines  may be given to relieve heartburn symptoms  These medicines help to decrease or block stomach acid  You may also be given medicines that help to tighten the esophageal sphincter  Surgery  may be done when medicines cannot control your symptoms, or other problems are present  Your healthcare provider may also suggest surgery depending on the type of hernia you have  Your healthcare provider can put your stomach back into its normal location  He or she may make the hiatus (hole) smaller and anchor your stomach in your abdomen  Fundoplication is a surgery that wraps the upper part of the stomach around the esophageal sphincter to strengthen it  Manage symptoms: The following nutrition and lifestyle changes may be recommended to relieve symptoms of heartburn:     Avoid foods that make your symptoms worse  These may include spicy foods, fruit juices, alcohol, caffeine, chocolate, and mint  Eat several small meals during the day  Small meals give your stomach less food to digest     Avoid lying down and bending forward after you eat  Do not eat meals 2 to 3 hours before bedtime  This decreases your risk for reflux  Maintain a healthy weight  If you are overweight, weight loss may help relieve your symptoms  Sleep with your head elevated  at least 6 inches  Do not smoke  Smoking can increase your symptoms of heartburn  Follow up with your doctor as directed:  Write down your questions so you remember to ask them during your visits  © Copyright [employer] 2022 Information is for End User's use only and may not be sold, redistributed or otherwise used for commercial purposes  All illustrations and images included in CareNotes® are the copyrighted property of [employer]  or [address] [place], [place] [address]michelle dar   The above information is an  only  It is not intended as medical advice for individual conditions or treatments  Talk to your doctor, nurse or pharmacist before following any medical regimen to see if it is safe and effective for you

## 2022-06-23 NOTE — PROGRESS NOTES
Assessment/Plan:    Hiatal hernia  Likely causing her reflux symptoms   Continue PPI for her reflux   referal to GI given, since symptoms have been ongoing  Lifestyle modification discussed      Epigastric pain  Likely 2/2 to hiatial hernia   Continue PPI   And follow up with GI    Bladder wall thickening  Found in CT abdomen on 6/22  No dysuria or urinary symptoms currently  UA obtained in the ED negative for nitrites or leukocytes, showed some blood mild ketones and some protein   Chlamydia gonorea negative   WBC of 19 found in the ED,   Will repeat CBC and UA at this time         Diagnoses and all orders for this visit:    Hiatal hernia  -     Ambulatory Referral to Gastroenterology; Future  -     pantoprazole (PROTONIX) 20 mg tablet; Take 1 tablet (20 mg total) by mouth daily before breakfast    Gastroesophageal reflux disease, unspecified whether esophagitis present  -     pantoprazole (PROTONIX) 20 mg tablet; Take 1 tablet (20 mg total) by mouth daily before breakfast    Bladder wall thickening  -     Cancel: Chlamydia/GC amplified DNA by PCR; Future  -     CBC and differential; Future  -     UA w Reflex to Microscopic w Reflex to Culture -Lab Collect; Future    Screening for chlamydial disease  -     Cancel: Chlamydia/GC amplified DNA by PCR; Future  -     Chlamydia/GC amplified DNA by PCR    Constipation, unspecified constipation type  -     polyethylene glycol (GLYCOLAX) 17 GM/SCOOP powder; Take 17 g by mouth daily    Leukocytosis, unspecified type  -     CBC and differential; Future  -     UA w Reflex to Microscopic w Reflex to Culture -Lab Collect; Future    Epigastric pain          Subjective:      Patient ID: Connie Thorne is a 20 y o  female  HPI  Connie Thorne is a 20 y o  female who present to the office for ED follow up due to abdominal pain, nausea and vomiting on 6/22  Her symptoms at the time was thought to be related to cannabinoid hyperemesis  her symptoms then improved with haldol and zofran  On 6/20 she had a CT of her abdomen that showed mild bladder wall thickening, and a small hiatal hernia  Currently her nausea and vomiting have greatly improve, however her epigastric pain is still present with some improvement  Currently denies fever, chills, chest pain, SOB, dysuria, urinary frequency or urgency   The following portions of the patient's history were reviewed and updated as appropriate: allergies, current medications, past family history, past medical history, past social history, past surgical history and problem list     Review of Systems   Constitutional: Negative for chills and fever  Respiratory: Negative for cough, chest tightness and shortness of breath  Cardiovascular: Negative for chest pain and leg swelling  Genitourinary: Negative for decreased urine volume, difficulty urinating, dyspareunia, dysuria, enuresis, flank pain, frequency, hematuria, urgency, vaginal bleeding, vaginal discharge and vaginal pain  Objective:      /64 (BP Location: Left arm, Patient Position: Sitting, Cuff Size: Standard)   Pulse 97   Temp 98 5 °F (36 9 °C) (Temporal)   Resp 22   Ht 5' 3" (1 6 m)   Wt 85 7 kg (189 lb)   LMP 06/19/2022   SpO2 98%   Breastfeeding No   BMI 33 48 kg/m²          Physical Exam  Constitutional:       General: She is not in acute distress  Appearance: She is well-developed  HENT:      Head: Normocephalic and atraumatic  Cardiovascular:      Rate and Rhythm: Normal rate  Heart sounds: Normal heart sounds  No murmur heard  Pulmonary:      Effort: Pulmonary effort is normal  No respiratory distress  Breath sounds: Normal breath sounds  Abdominal:      General: Bowel sounds are normal  There is no distension  Palpations: Abdomen is soft  Tenderness: There is no abdominal tenderness  There is no right CVA tenderness, left CVA tenderness, guarding or rebound  Musculoskeletal:      Cervical back: Neck supple     Skin: General: Skin is warm and dry  Findings: No rash  Neurological:      Mental Status: She is alert and oriented to person, place, and time  Mental status is at baseline

## 2022-06-25 LAB
C TRACH DNA SPEC QL NAA+PROBE: NEGATIVE
N GONORRHOEA DNA SPEC QL NAA+PROBE: NEGATIVE

## 2022-06-27 ENCOUNTER — TELEPHONE (OUTPATIENT)
Dept: PSYCHIATRY | Facility: CLINIC | Age: 22
End: 2022-06-27

## 2022-06-30 PROBLEM — K44.9 HIATAL HERNIA: Status: ACTIVE | Noted: 2022-06-30

## 2022-06-30 PROBLEM — N32.89 BLADDER WALL THICKENING: Status: ACTIVE | Noted: 2022-06-30

## 2022-06-30 NOTE — ASSESSMENT & PLAN NOTE
Found in CT abdomen on 6/22  No dysuria or urinary symptoms currently  UA obtained in the ED negative for nitrites or leukocytes, showed some blood mild ketones and some protein   Chlamydia gonorea negative   WBC of 19 found in the ED,   Will repeat CBC and UA at this time

## 2022-06-30 NOTE — ASSESSMENT & PLAN NOTE
Likely causing her reflux symptoms   Continue PPI for her reflux   referal to GI given, since symptoms have been ongoing  Lifestyle modification discussed

## 2022-07-01 NOTE — ASSESSMENT & PLAN NOTE
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

## 2022-07-07 ENCOUNTER — CONSULT (OUTPATIENT)
Dept: GASTROENTEROLOGY | Facility: CLINIC | Age: 22
End: 2022-07-07
Payer: COMMERCIAL

## 2022-07-07 VITALS
DIASTOLIC BLOOD PRESSURE: 60 MMHG | HEIGHT: 63 IN | HEART RATE: 76 BPM | WEIGHT: 195 LBS | BODY MASS INDEX: 34.55 KG/M2 | TEMPERATURE: 98.5 F | SYSTOLIC BLOOD PRESSURE: 120 MMHG | OXYGEN SATURATION: 98 %

## 2022-07-07 DIAGNOSIS — R11.2 NAUSEA AND VOMITING, UNSPECIFIED VOMITING TYPE: Primary | ICD-10-CM

## 2022-07-07 DIAGNOSIS — K21.9 GASTROESOPHAGEAL REFLUX DISEASE WITHOUT ESOPHAGITIS: ICD-10-CM

## 2022-07-07 DIAGNOSIS — R10.13 DYSPEPSIA: ICD-10-CM

## 2022-07-07 DIAGNOSIS — K44.9 HIATAL HERNIA: ICD-10-CM

## 2022-07-07 PROCEDURE — 99214 OFFICE O/P EST MOD 30 MIN: CPT | Performed by: PHYSICIAN ASSISTANT

## 2022-07-07 RX ORDER — PANTOPRAZOLE SODIUM 40 MG/1
40 TABLET, DELAYED RELEASE ORAL DAILY
Qty: 30 TABLET | Refills: 3 | Status: SHIPPED | OUTPATIENT
Start: 2022-07-07

## 2022-07-07 NOTE — LETTER
July 7, 2022     Tatyana Lanza MD  [id number]  [address] [employer]    Patient: Dominik Avitia   YOB: 2000   Date of Visit: 7/7/2022       Dear Dr Rach Herrera: Thank you for referring Dominik Avitia to me for evaluation  Below are my notes for this consultation  If you have questions, please do not hesitate to call me  I look forward to following your patient along with you  Sincerely,        Willy Lennox, PA-C        CC: No Recipients  Willy Lennox, PA-C  7/7/2022  2:07 PM  Sign when Signing Visit  [facility] - Outpatient Follow-up Note  Dominik Avitia 20 y o  female MRN: [id number]  Encounter: [id number]          ASSESSMENT AND PLAN:      1  Hiatal hernia  Small hiatal hernia found on recent CT scan, likely contributing to GERD symptoms  Reviewed reflux diet and lifestyle recommendations as outlined below  - Ambulatory Referral to Gastroenterology    2  Nausea and vomiting, unspecified vomiting type  3  Dyspepsia  She has chronic nausea and meal related epigastric discomfort, bloating, early satiety  Work-up in the past including EGD with biopsies, CT scan, ultrasound unremarkable  Interestingly, her sister was diagnosed with gastroparesis  Symptoms are consistent with gastroparesis but we will check gastric emptying study to confirm the diagnosis  Reviewed importance of eating small, frequent meals and avoiding fatty greasy foods  Continue Zofran as needed  - NM gastric emptying; Future    4  Gastroesophageal reflux disease without esophagitis  Poorly controlled on low-dose Protonix  Previous EGD showed no erosive reflux disease  We will increase Protonix to 40 mg daily  I reviewed reflux diet recommendations including avoiding coffee, tomatoes, citrus, fatty and spicy foods   I recommend trying to lose weight, avoiding lying down within 3 hours of eating, eating small frequent meals, and sleeping with head of bed elevated at night     - NM gastric emptying; Future  - pantoprazole (PROTONIX) 40 mg tablet; Take 1 tablet (40 mg total) by mouth daily before breakfast  Dispense: 30 tablet; Refill: 3    Follow-up in a few months  ______________________________________________________________________    SUBJECTIVE:  50-year-old female with psychiatric disorder, seizure, migraines, presenting for evaluation of reflux, nausea  She has had longstanding symptoms for several years  She complains of frequent heartburn and regurgitation about twice daily  She is taking Protonix 20 mg daily without relief  She also constantly feels nauseous  The nausea does worsen after eating as well as bloating and epigastric discomfort  Her sister was diagnosed with gastroparesis in the past  She is currently taking Zofran without much relief  She was in the ED 2-3 weeks ago with intractable nausea, vomiting, diarrhea  These symptoms lasted about 5 days  She was found to have elevated white blood cell count on blood work  CT scan showed small hiatal hernia but no focal inflammatory process  She did have gallbladder ultrasound in 2019 that was normal   She also had an EGD and colonoscopy in 2019 which was normal including gastric, duodenal, colon, and terminal ileal biopsies  REVIEW OF SYSTEMS IS OTHERWISE NEGATIVE  Historical Information   Past Medical History:   Diagnosis Date    Anaphylaxis     apricot    Anxiety     Asthma     Cluster headache     Eczema     Headache, tension-type     Lymphadenitis     Last assessed 12/30/14    Lymphadenopathy     Last assessed 10/16/13    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 [address] [facility]     Migraine     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 [address] [facility]     Psychiatric disorder     Psychiatric illness     Psychosis [facility] [address] [facility]     Seizures ([facility] [address] [facility]      Past Surgical History:   Procedure Laterality Date    COLONOSCOPY N/A 05/03/2019    Procedure: COLONOSCOPY;  Surgeon: Diana Anderson MD;  [facility];   Service: Gastroenterology   [facility] ESOPHAGOGASTRODUODENOSCOPY N/A 05/03/2019    Procedure: ESOPHAGOGASTRODUODENOSCOPY (EGD); Surgeon: Mauricio Nair MD;  Location: [facility];   Service: Gastroenterology     Social History   Social History     Substance and Sexual Activity   Alcohol Use Yes    Alcohol/week: 2 0 standard drinks    Types: 2 Shots of liquor per week    Comment: Occasionally     Social History     Substance and Sexual Activity   Drug Use Yes    Frequency: 3 0 times per week    Types: Marijuana     Social History     Tobacco Use   Smoking Status Former Smoker    Packs/day: 0 00    Years: 0 00    Pack years: 0 00    Types: Cigarettes, Cigarettes   Smokeless Tobacco Never Used   Tobacco Comment    I stopped smoking cigarettes but started vaping     Family History   Problem Relation Age of Onset    Migraines Mother     Other Mother         Neck pain    Depression Mother     Drug abuse Mother     Drug abuse Father     Schizophrenia Father     Febrile seizures Maternal Uncle     Stroke Maternal Uncle        Meds/Allergies       Current Outpatient Medications:     albuterol (2 5 mg/3 mL) 0 083 % nebulizer solution    albuterol (Ventolin HFA) 90 mcg/act inhaler    ARIPiprazole (ABILIFY) 10 mg tablet    azelastine (ASTELIN) 0 1 % nasal spray    buPROPion (WELLBUTRIN) 75 mg tablet    busPIRone (BUSPAR) 10 mg tablet    cloNIDine (CATAPRES) 0 2 mg tablet    Diclofenac Sodium (VOLTAREN) 1 %    divalproex sodium (DEPAKOTE ER) 250 mg 24 hr tablet    Epinastine HCl 0 05 % ophthalmic solution    EPINEPHrine (EPIPEN) 0 3 mg/0 3 mL SOAJ    fluticasone-salmeterol (Advair HFA) 115-21 MCG/ACT inhaler    hydrOXYzine HCL (ATARAX) 10 mg tablet    hydrOXYzine pamoate (VISTARIL) 50 mg capsule    mometasone (ELOCON) 0 1 % cream    ondansetron (Zofran ODT) 4 mg disintegrating tablet    ondansetron (Zofran ODT) 4 mg disintegrating tablet    ondansetron (Zofran ODT) 4 mg disintegrating tablet    ondansetron (Zofran ODT) 8 mg disintegrating tablet    pantoprazole (PROTONIX) 20 mg tablet    polyethylene glycol (GLYCOLAX) 17 GM/SCOOP powder    Spacer/Aero-Holding Chambers (Vortex Valved [employer]) FROYLAN    Allergies   Allergen Reactions    Bee Venom Swelling    Penicillins Hives    Blueberry Flavor - Food Allergy Rash    Pork-Derived Products - Food Allergy Rash    Shiitake Mushroom - Food Allergy Rash           Objective     Blood pressure 120/60, pulse 76, temperature 98 5 °F (36 9 °C), temperature source Tympanic, height 5' 3" (1 6 m), weight 88 5 kg (195 lb), last menstrual period 06/19/2022, SpO2 98 %, not currently breastfeeding  Body mass index is 34 54 kg/m²  PHYSICAL EXAM:      General Appearance:   Alert, cooperative, no distress   HEENT:   Normocephalic, atraumatic, anicteric      Neck:  Supple, symmetrical, trachea midline   Lungs:   Clear to auscultation bilaterally; no rales, rhonchi or wheezing; respirations unlabored    Heart[de-identified]   Regular rate and rhythm; no murmur, rub, or gallop  Abdomen:   Soft, non-tender, non-distended; normal bowel sounds; no masses, no organomegaly    Genitalia:   Deferred    Rectal:   Deferred    Extremities:  No cyanosis, clubbing or edema    Pulses:  2+ and symmetric    Skin:  No jaundice, rashes, or lesions    Lymph nodes:  No palpable cervical lymphadenopathy        Lab Results:   No visits with results within 1 Day(s) from this visit  Latest known visit with results is:   Office Visit on 06/23/2022   Component Date Value    N gonorrhoeae, DNA Probe 06/23/2022 Negative     Chlamydia trachomatis, D* 06/23/2022 Negative          Radiology Results:   CT abdomen pelvis wo contrast    Result Date: 6/20/2022  Narrative: CT ABDOMEN AND PELVIS WITHOUT IV CONTRAST INDICATION:   Abdominal pain, acute, nonlocalized abd pain  n/v  WBC 19  COMPARISON:  12/17/2021   TECHNIQUE:  CT examination of the abdomen and pelvis was performed without intravenous contrast  This examination was performed without intravenous contrast in the context of the critical nationwide Omnipaque shortage  Axial, sagittal, and coronal 2D reformatted images were created from the source data and submitted for interpretation  Radiation dose length product (DLP) for this visit:  796 mGy-cm   This examination, like all CT scans performed in the [facility], was performed utilizing techniques to minimize radiation dose exposure, including the use of iterative reconstruction and automated exposure control  Enteric contrast was not administered  FINDINGS: ABDOMEN LOWER CHEST:  No clinically significant abnormality identified in the visualized lower chest  LIVER/BILIARY TREE:  Unremarkable  GALLBLADDER:  No calcified gallstones  No pericholecystic inflammatory change  SPLEEN:  Unremarkable  PANCREAS:  Unremarkable  ADRENAL GLANDS:  Unremarkable  KIDNEYS/URETERS:  Unremarkable  No hydronephrosis  STOMACH AND BOWEL:  Small hiatal hernia  No bowel obstruction  The colon is relatively collapsed which limits evaluation but no focal inflammatory process is seen  APPENDIX:  A normal appendix was visualized  ABDOMINOPELVIC CAVITY:  No ascites  No pneumoperitoneum  No lymphadenopathy  VESSELS:  Unremarkable for patient's age  PELVIS REPRODUCTIVE ORGANS:  Unremarkable for patient's age  URINARY BLADDER:  There is questionable mild bladder wall thickening  Correlation for cystitis advised  ABDOMINAL WALL/INGUINAL REGIONS:  Unremarkable  OSSEOUS STRUCTURES:  No acute fracture or destructive osseous lesion  Impression: Questionable mild bladder wall thickening  Correlation for cystitis advised  Small hiatal hernia  No bowel obstruction or focal inflammatory process  Normal appendix   Workstation performed: [id number]

## 2022-07-07 NOTE — PROGRESS NOTES
[facility] - Outpatient Follow-up Note  Bettye Mesa 20 y o  female MRN: [id number]  Encounter: [id number]          ASSESSMENT AND PLAN:      1  Hiatal hernia  Small hiatal hernia found on recent CT scan, likely contributing to GERD symptoms  Reviewed reflux diet and lifestyle recommendations as outlined below  - Ambulatory Referral to Gastroenterology    2  Nausea and vomiting, unspecified vomiting type  3  Dyspepsia  She has chronic nausea and meal related epigastric discomfort, bloating, early satiety  Work-up in the past including EGD with biopsies, CT scan, ultrasound unremarkable  Interestingly, her sister was diagnosed with gastroparesis  Symptoms are consistent with gastroparesis but we will check gastric emptying study to confirm the diagnosis  Reviewed importance of eating small, frequent meals and avoiding fatty greasy foods  Continue Zofran as needed  - NM gastric emptying; Future    4  Gastroesophageal reflux disease without esophagitis  Poorly controlled on low-dose Protonix  Previous EGD showed no erosive reflux disease  We will increase Protonix to 40 mg daily  I reviewed reflux diet recommendations including avoiding coffee, tomatoes, citrus, fatty and spicy foods  I recommend trying to lose weight, avoiding lying down within 3 hours of eating, eating small frequent meals, and sleeping with head of bed elevated at night     - NM gastric emptying; Future  - pantoprazole (PROTONIX) 40 mg tablet; Take 1 tablet (40 mg total) by mouth daily before breakfast  Dispense: 30 tablet; Refill: 3    Follow-up in a few months  ______________________________________________________________________    SUBJECTIVE:  48-year-old female with psychiatric disorder, seizure, migraines, presenting for evaluation of reflux, nausea  She has had longstanding symptoms for several years  She complains of frequent heartburn and regurgitation about twice daily   She is taking Protonix 20 mg daily without relief  She also constantly feels nauseous  The nausea does worsen after eating as well as bloating and epigastric discomfort  Her sister was diagnosed with gastroparesis in the past  She is currently taking Zofran without much relief  She was in the ED 2-3 weeks ago with intractable nausea, vomiting, diarrhea  These symptoms lasted about 5 days  She was found to have elevated white blood cell count on blood work  CT scan showed small hiatal hernia but no focal inflammatory process  She did have gallbladder ultrasound in 2019 that was normal   She also had an EGD and colonoscopy in 2019 which was normal including gastric, duodenal, colon, and terminal ileal biopsies  REVIEW OF SYSTEMS IS OTHERWISE NEGATIVE  Historical Information   Past Medical History:   Diagnosis Date    Anaphylaxis     apricot    Anxiety     Asthma     Cluster headache     Eczema     Headache, tension-type     Lymphadenitis     Last assessed 12/30/14    Lymphadenopathy     Last assessed 10/16/13    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 [address]     Migraine     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 [address] [facility]     Psychiatric disorder     Psychiatric illness     Psychosis ([facility] [address]     Seizures ([facility] [address] [facility]      Past Surgical History:   Procedure Laterality Date    COLONOSCOPY N/A 05/03/2019    Procedure: COLONOSCOPY;  Surgeon: Debi Osgood, MD;  [facility]; Service: Gastroenterology    ESOPHAGOGASTRODUODENOSCOPY N/A 05/03/2019    Procedure: ESOPHAGOGASTRODUODENOSCOPY (EGD); Surgeon: Debi Osgood, MD;  [facility];   Service: Gastroenterology     Social History   Social History     Substance and Sexual Activity   Alcohol Use Yes    Alcohol/week: 2 0 standard drinks    Types: 2 Shots of liquor per week    Comment: Occasionally     Social History     Substance and Sexual Activity   Drug Use Yes    Frequency: 3 0 times per week    Types: Marijuana     Social History     Tobacco Use   Smoking Status Former Smoker    Packs/day: 0 00    Years: 0 00    Pack years: 0 00    Types: Cigarettes, Cigarettes   Smokeless Tobacco Never Used   Tobacco Comment    I stopped smoking cigarettes but started vaping     Family History   Problem Relation Age of Onset    Migraines Mother     Other Mother         Neck pain    Depression Mother     Drug abuse Mother     Drug abuse Father     Schizophrenia Father     Febrile seizures Maternal Uncle     Stroke Maternal Uncle        Meds/Allergies       Current Outpatient Medications:     albuterol (2 5 mg/3 mL) 0 083 % nebulizer solution    albuterol (Ventolin HFA) 90 mcg/act inhaler    ARIPiprazole (ABILIFY) 10 mg tablet    azelastine (ASTELIN) 0 1 % nasal spray    buPROPion (WELLBUTRIN) 75 mg tablet    busPIRone (BUSPAR) 10 mg tablet    cloNIDine (CATAPRES) 0 2 mg tablet    Diclofenac Sodium (VOLTAREN) 1 %    divalproex sodium (DEPAKOTE ER) 250 mg 24 hr tablet    Epinastine HCl 0 05 % ophthalmic solution    EPINEPHrine (EPIPEN) 0 3 mg/0 3 mL SOAJ    fluticasone-salmeterol (Advair HFA) 115-21 MCG/ACT inhaler    hydrOXYzine HCL (ATARAX) 10 mg tablet    hydrOXYzine pamoate (VISTARIL) 50 mg capsule    mometasone (ELOCON) 0 1 % cream    ondansetron (Zofran ODT) 4 mg disintegrating tablet    ondansetron (Zofran ODT) 4 mg disintegrating tablet    ondansetron (Zofran ODT) 4 mg disintegrating tablet    ondansetron (Zofran ODT) 8 mg disintegrating tablet    pantoprazole (PROTONIX) 20 mg tablet    polyethylene glycol (GLYCOLAX) 17 GM/SCOOP powder    Spacer/Aero-Holding Chambers (Vortex Valved [employer]) FROYLAN    Allergies   Allergen Reactions    Bee Venom Swelling    Penicillins Hives    Blueberry Flavor - Food Allergy Rash    Pork-Derived Products - Food Allergy Rash    Shiitake Mushroom - Food Allergy Rash           Objective     Blood pressure 120/60, pulse 76, temperature 98 5 °F (36 9 °C), temperature source Tympanic, height 5' 3" (1 6 m), weight 88 5 kg (195 lb), last menstrual period 06/19/2022, SpO2 98 %, not currently breastfeeding  Body mass index is 34 54 kg/m²  PHYSICAL EXAM:      General Appearance:   Alert, cooperative, no distress   HEENT:   Normocephalic, atraumatic, anicteric      Neck:  Supple, symmetrical, trachea midline   Lungs:   Clear to auscultation bilaterally; no rales, rhonchi or wheezing; respirations unlabored    Heart[de-identified]   Regular rate and rhythm; no murmur, rub, or gallop  Abdomen:   Soft, non-tender, non-distended; normal bowel sounds; no masses, no organomegaly    Genitalia:   Deferred    Rectal:   Deferred    Extremities:  No cyanosis, clubbing or edema    Pulses:  2+ and symmetric    Skin:  No jaundice, rashes, or lesions    Lymph nodes:  No palpable cervical lymphadenopathy        Lab Results:   No visits with results within 1 Day(s) from this visit  Latest known visit with results is:   Office Visit on 06/23/2022   Component Date Value    N gonorrhoeae, DNA Probe 06/23/2022 Negative     Chlamydia trachomatis, D* 06/23/2022 Negative          Radiology Results:   CT abdomen pelvis wo contrast    Result Date: 6/20/2022  Narrative: CT ABDOMEN AND PELVIS WITHOUT IV CONTRAST INDICATION:   Abdominal pain, acute, nonlocalized abd pain  n/v  WBC 19  COMPARISON:  12/17/2021  TECHNIQUE:  CT examination of the abdomen and pelvis was performed without intravenous contrast  This examination was performed without intravenous contrast in the context of the critical nationwide Omnipaque shortage  Axial, sagittal, and coronal 2D reformatted images were created from the source data and submitted for interpretation  Radiation dose length product (DLP) for this visit:  796 mGy-cm   This examination, like all CT scans performed in the [facility], was performed utilizing techniques to minimize radiation dose exposure, including the use of iterative reconstruction and automated exposure control  Enteric contrast was not administered   FINDINGS: ABDOMEN LOWER CHEST:  No clinically significant abnormality identified in the visualized lower chest  LIVER/BILIARY TREE:  Unremarkable  GALLBLADDER:  No calcified gallstones  No pericholecystic inflammatory change  SPLEEN:  Unremarkable  PANCREAS:  Unremarkable  ADRENAL GLANDS:  Unremarkable  KIDNEYS/URETERS:  Unremarkable  No hydronephrosis  STOMACH AND BOWEL:  Small hiatal hernia  No bowel obstruction  The colon is relatively collapsed which limits evaluation but no focal inflammatory process is seen  APPENDIX:  A normal appendix was visualized  ABDOMINOPELVIC CAVITY:  No ascites  No pneumoperitoneum  No lymphadenopathy  VESSELS:  Unremarkable for patient's age  PELVIS REPRODUCTIVE ORGANS:  Unremarkable for patient's age  URINARY BLADDER:  There is questionable mild bladder wall thickening  Correlation for cystitis advised  ABDOMINAL WALL/INGUINAL REGIONS:  Unremarkable  OSSEOUS STRUCTURES:  No acute fracture or destructive osseous lesion  Impression: Questionable mild bladder wall thickening  Correlation for cystitis advised  Small hiatal hernia  No bowel obstruction or focal inflammatory process  Normal appendix   Workstation performed: [id number]

## 2022-07-22 ENCOUNTER — OFFICE VISIT (OUTPATIENT)
Dept: FAMILY MEDICINE CLINIC | Facility: CLINIC | Age: 22
End: 2022-07-22

## 2022-07-22 VITALS
RESPIRATION RATE: 18 BRPM | WEIGHT: 196.6 LBS | HEART RATE: 98 BPM | OXYGEN SATURATION: 99 % | DIASTOLIC BLOOD PRESSURE: 72 MMHG | SYSTOLIC BLOOD PRESSURE: 128 MMHG | TEMPERATURE: 97.4 F | BODY MASS INDEX: 34.84 KG/M2 | HEIGHT: 63 IN

## 2022-07-22 DIAGNOSIS — R11.2 NAUSEA AND VOMITING, UNSPECIFIED VOMITING TYPE: Primary | ICD-10-CM

## 2022-07-22 PROCEDURE — 99213 OFFICE O/P EST LOW 20 MIN: CPT | Performed by: FAMILY MEDICINE

## 2022-07-22 NOTE — ASSESSMENT & PLAN NOTE
Denies any further vomiting spells but has constant nausea  Tolerating normal diet  Requesting rectal suppository anti nausea medications as Reglan and Zofran provided no relief  Patient has history of marijuana smoking and reports as social use however details frequency to be twice monthly  Historically used more frequently in adolescents  Sister diagnosed with gastroparesis and patient being worked up by gastroenterology for same  Gastric emptying study appt 9/7/22 8:30 AM  Recommended continue dietary modifications that have improved patient's nausea and vomiting and to complete follow-up with Gastroenterology before starting new medications  Also suggested marijuana likely cause of nausea and cut back for alternative medication therapy such as haloperidol may assist in symptom relief  However given patient's extensive psychiatric medication list will hold off on treatment until properly evaluated by Gastroenterology for gastroparesis which would be treated with alternative lines of therapy

## 2022-07-22 NOTE — PROGRESS NOTES
Assessment/Plan:    Nausea and vomiting  Denies any further vomiting spells but has constant nausea  Tolerating normal diet  Requesting rectal suppository anti nausea medications as Reglan and Zofran provided no relief  Patient has history of marijuana smoking and reports as social use however details frequency to be twice monthly  Historically used more frequently in adolescents  Sister diagnosed with gastroparesis and patient being worked up by gastroenterology for same  Gastric emptying study appt 9/7/22 8:30 AM  Recommended continue dietary modifications that have improved patient's nausea and vomiting and to complete follow-up with Gastroenterology before starting new medications  Also suggested marijuana likely cause of nausea and cut back for alternative medication therapy such as haloperidol may assist in symptom relief  However given patient's extensive psychiatric medication list will hold off on treatment until properly evaluated by Gastroenterology for gastroparesis which would be treated with alternative lines of therapy  Diagnoses and all orders for this visit:    Nausea and vomiting, unspecified vomiting type          Subjective:      Patient ID: Adam Mitchell is a 20 y o  female  Adam Mitchell is a very pleasant 20 y o  female who presents today for abdominal pain  No longer experiencing vomiting spells but only complains of constant nausea  Ephraim Susan abd pain 3/10 daily, constant  No significant weight changes and tolerating diet well  Has made significant dietary changes in keeping with reflux diet avoiding tomato, spicy/greasy foods, eating small meals regularly, no lying down within 2 hours of eating  Patient encouraged to continue these efforts        The following portions of the patient's history were reviewed and updated as appropriate: allergies, current medications, past family history, past medical history, past social history, past surgical history and problem list     Review of Systems   Constitutional: Negative for fever  Respiratory: Negative for cough and shortness of breath  Cardiovascular: Negative for chest pain and palpitations  Gastrointestinal: Positive for nausea  Negative for abdominal pain, constipation, diarrhea and vomiting  Neurological: Negative for dizziness and headaches  All other systems reviewed and are negative  Objective:      /72 (BP Location: Right arm, Patient Position: Sitting, Cuff Size: Standard)   Pulse 98   Temp (!) 97 4 °F (36 3 °C) (Temporal)   Resp 18   Ht 5' 3" (1 6 m)   Wt 89 2 kg (196 lb 9 6 oz)   LMP 07/20/2022 (Exact Date)   SpO2 99%   BMI 34 83 kg/m²          Physical Exam  Vitals reviewed  Constitutional:       General: She is not in acute distress  Appearance: She is obese  HENT:      Head: Atraumatic  Eyes:      Conjunctiva/sclera: Conjunctivae normal    Cardiovascular:      Rate and Rhythm: Normal rate and regular rhythm  Pulses: Normal pulses  Heart sounds: Normal heart sounds  No murmur heard  Pulmonary:      Effort: Pulmonary effort is normal       Breath sounds: Normal breath sounds  Abdominal:      General: Abdomen is flat  Bowel sounds are normal  There is no distension  Palpations: Abdomen is soft  Tenderness: There is abdominal tenderness (Mild discomfort noted in epigastrium  )  There is no right CVA tenderness, left CVA tenderness, guarding or rebound  Musculoskeletal:         General: Normal range of motion  Cervical back: Normal range of motion  Right lower leg: No edema  Left lower leg: No edema  Skin:     General: Skin is warm and dry  Findings: No rash  Neurological:      General: No focal deficit present  Mental Status: She is alert     Psychiatric:         Behavior: Behavior normal

## 2022-08-10 ENCOUNTER — DOCUMENTATION (OUTPATIENT)
Dept: PSYCHOLOGY | Facility: CLINIC | Age: 22
End: 2022-08-10

## 2022-08-10 NOTE — PROGRESS NOTES
Subjective:     Patient ID: Maria Eugenia Woody is a 20 y o  female  [facility] Clinical Progress Notes      Specialized Services Documentation  Therapist must complete separate progress note for each specific clinical activity in which the individual participated during the day  Case Management Note    Isaak JONES    TAR completed and faxed to [facility]

## 2022-08-11 NOTE — TELEPHONE ENCOUNTER
Spoke with pt regarding portal message. Pt reports elevated blood pressure readings along with a constant headache and elevated heart rate. Pt states her BP was 143/91 during the call - pt reports taking her medication at 5:30 this morning. Pt was advised per provider to report to the ER due to the symptoms she is experiencing. Verbal understanding.    When did migraine start? Sunday-ongoing and worsening, worse at night  Location/Description: throbbing pain and pounding, bilateral in the occipital area  Pain scale: 9/10  Associated symptoms:nausea, vomiting  Precipitating factors: "it just comes"  Alleviating factors: took advil x 5, prescription-not effective  What medications have you tried for this migraine headache? Educated the patient that we do not recommend they take  OTC med more than 3 days in any 1 week due to medication over use headache     Current migraine medications are confirmed as:  Venlafaxine 75 mg daily    Medications tried in the past? Not tried decadron (steroid)  Completed prednisone 10 mg (sciatica) prescribed by Dr Xin Johnson  Tried depakote, or olanzapine-both not effective  Gabapentin slightly effective but currently out             [phone]

## 2022-08-12 ENCOUNTER — HOSPITAL ENCOUNTER (OUTPATIENT)
Dept: NEUROLOGY | Facility: CLINIC | Age: 22
Discharge: HOME | End: 2022-08-12
Payer: COMMERCIAL

## 2022-08-12 DIAGNOSIS — F44.5 PSYCHOGENIC NONEPILEPTIC SEIZURE: ICD-10-CM

## 2022-08-12 PROCEDURE — 95816 EEG AWAKE AND DROWSY: CPT

## 2022-08-12 PROCEDURE — 95816 EEG AWAKE AND DROWSY: CPT | Performed by: PSYCHIATRY & NEUROLOGY

## 2022-08-24 ENCOUNTER — OFFICE VISIT (OUTPATIENT)
Dept: NEUROLOGY | Facility: CLINIC | Age: 22
End: 2022-08-24
Payer: COMMERCIAL

## 2022-08-24 VITALS
HEIGHT: 63 IN | WEIGHT: 207 LBS | TEMPERATURE: 98.6 F | DIASTOLIC BLOOD PRESSURE: 70 MMHG | HEART RATE: 84 BPM | BODY MASS INDEX: 36.68 KG/M2 | SYSTOLIC BLOOD PRESSURE: 116 MMHG

## 2022-08-24 DIAGNOSIS — G43.009 MIGRAINE WITHOUT AURA AND WITHOUT STATUS MIGRAINOSUS, NOT INTRACTABLE: ICD-10-CM

## 2022-08-24 DIAGNOSIS — R20.0 FACIAL NUMBNESS: ICD-10-CM

## 2022-08-24 DIAGNOSIS — F20.9 SCHIZOPHRENIA, UNSPECIFIED TYPE (HCC): ICD-10-CM

## 2022-08-24 DIAGNOSIS — F44.5 PSYCHOGENIC NONEPILEPTIC SEIZURE: Primary | ICD-10-CM

## 2022-08-24 DIAGNOSIS — H93.11 TINNITUS OF RIGHT EAR: ICD-10-CM

## 2022-08-24 PROCEDURE — 99214 OFFICE O/P EST MOD 30 MIN: CPT | Performed by: NURSE PRACTITIONER

## 2022-08-24 RX ORDER — ARIPIPRAZOLE 300 MG
KIT INTRAMUSCULAR
COMMUNITY
Start: 2022-08-08

## 2022-08-24 RX ORDER — DIVALPROEX SODIUM 250 MG/1
250 TABLET, DELAYED RELEASE ORAL 2 TIMES DAILY
COMMUNITY
Start: 2022-08-08

## 2022-08-24 RX ORDER — BENZTROPINE MESYLATE 2 MG/1
2 TABLET ORAL DAILY
COMMUNITY
Start: 2022-08-08

## 2022-08-24 RX ORDER — PAROXETINE HYDROCHLORIDE 20 MG/1
20 TABLET, FILM COATED ORAL DAILY
COMMUNITY
Start: 2022-08-08

## 2022-08-24 RX ORDER — ARIPIPRAZOLE 300 MG
KIT INTRAMUSCULAR
COMMUNITY
Start: 2022-07-11

## 2022-08-24 RX ORDER — BUSPIRONE HYDROCHLORIDE 15 MG/1
15 TABLET ORAL 3 TIMES DAILY
COMMUNITY
Start: 2022-08-08

## 2022-08-24 NOTE — PROGRESS NOTES
Patient ID: Sri Adan is a 20 y o  female with  nonepileptic psychogenic spells, who is presenting to Neurology office for follow up of her spells  Assessment/Plan:    Psychogenic nonepileptic seizure  Patient continues to have events that are consistent with her prior captured non-epileptic events  She continues to work with psychiatry and her therapist  Reports that her therapist does not feel that her events are related to her mental health  We reviewed discussing diagnosis in greater detail with her therapist vs thinking about finding a different therapist who is more familiar with non-epileptic events to provide CBT/biofeedback  Common migraine without aura  Patient reports that her migraine headaches have been much worse recently  Due to her use of psychiatric medications, we can not add on a tricyclic antidepressant  She also has a history of asthma, so beta blockers must be avoided  She is on Depakote through psychiatry so will reach out to see if this can possibly be increased as it may be beneficial for her migraine headaches  Could consider CGRP injectable if Depakote can not be increased  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  Check MRI brain due to worsening migraines  Recommend follow up with headache team      Facial numbness  Patient reports episodes of facial numbness over the last month that is new to her  Will check MRI brain  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      Tinnitus of right ear  Check MRI brain  Schizophrenia [facility])  Following closely with psychiatry      She will Return for 3-4 months with Dr Kerrie Garsia, follow up with headache team ASAP  Subjective:  Sri Adan is a 20 y o  female with  nonepileptic psychogenic spells, who is presenting to Neurology office for follow up of her spells  She was last seen in the office by Dr Kerrie Garsia on 5/16/2022 for initial consult   At that time, she continued to have spells consistent with prior EMU captured nonepileptic psychogenic spells  It was recommended that she continue to follow up with her mental health provider for ongoing management of spells, taking Depakote as prescribed by psychiatry  Recommended routine EEG prior to follow up appointment to reaffirm her diagnosis  If spells were to become more frequent or change, could consider another admission to the EMU to characterize events again  Recommended 3 month follow up  Since her last visit, she reports that she continues to have seizures since she was 24  She reports that they are getting longer but are not different than in the past      Headaches have been "terrible " Headaches are occurring about 2-3 times per week  Pain is typically 7/10  Can be either side of the head  She reports that she gets bad neck pain/stiffness with headaches  No visual symptoms  No provoking factors, occur randomly  Dark room, rest makes it better  She will usually take advil or tylenol for the headaches but this does not help  Headaches seem to be worse mid day  She has started notice episodes where her whole face goes numb  She will also get out of breath when this happens and confused (wondering where she is)  This started about one month ago  She is unsure if it is related to heat exhaustion  In the past for headaches she has been on topiramate, venlafaxine  Typically when she has a bad headache she has to go to the ER and they give her the migraine cocktail which does help  Continues to follow with psychiatry and therapist  She sees her therapist about once per month  She does speak with her therapist about her events but does not feel that it has been helpful  She reports that her therapist does not feel that her events are related to her mental health  Avoid BB because of history of asthma, low BP at baseline   On multiple psychiatric medications avoiding tricyclic ADs  Recent sleep study without sleep apnea    She did take a higher dose of Depakote in the past (1000 mg BID) and gained a lot of weight  She goes to [facility] for psychiatry [phone]  Sees Dr Lore Geller      Current seizure medications:  1  Depakote  mg BID - per psychiatry  Other medications as per Epic  Event/Seizure semiology:  1  They will sometimes begin with a severe migraine, but often without any warning, she will just fall to the ground and start shaking all over  She may vomit or urinate on herself  Her mother describes that her body will bounce up and down  Sometimes her head will turn side to side  These typically last less than 30 seconds, but have lasted long, up to 36 minutes  She may go a few days without an event, but they happen about once a day on average       Special Features  Status epilepticus: none  Self Injury Seizures: bumps to head, dislocated finger  Precipitating Factors: if she is overheated or too cold   Being sick, migraine, depression     Epilepsy Risk Factors:  Abnormal pregnancy:                          no  Abnormal birth/:                    no  Abnormal Development:                     no  Febrile seizures, simple:                     no  Febrile seizures, complex:                  no  CNS infection:                                     no  Intellectual disability:                           no  Cerebral palsy:                                    no  Head injury (moderate/severe):          no  CNS neoplasm:                                   no  CNS malformation:                             no  Neurosurgical procedure:                   no  Stroke:                                                 no  Alcohol abuse:                                    no  Drug abuse:                                        no  Family history Sz/epilepsy:                 no     Prior AEDs:  Valproate (for headaches, weight gain), Topiramate (for headaches, ineffective for HA) Gabapentin (side effects ), Levetiracetam      Prior Evaluation:  - 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  - Routine EE2019: normal  - Ambulatory EEG: yes in [place], records not available today  - Video EE2019 - 2019:  2 days of continuous video EEG monitoring, during which time 2 spells of unresponsiveness with whole body shaking, pelvic thrusting and side to side movements, and one spell of staring, all without EEG changes  Clinical features of these events with persistent alpha rhythm suggests these events are not seizures  Normal activities of wakefulness and sleep were seen  - PET scan brain : none  - Neuropsychologic testing: none  - Labs: none     Psychiatric History:  Depression: yes  Anxiety: yes  Psychosis: yes  Psychiatric Admissions: multiple prior admissions     I reviewed prior neurology notes, EEG reports, MRI brain report, and most recent labs, as documented in Epic[facility], and summarized above  Objective:    Blood pressure 116/70, pulse 84, temperature 98 6 °F (37 °C), temperature source Tympanic, height 5' 3" (1 6 m), weight 93 9 kg (207 lb), not currently breastfeeding  Physical Exam  No apparent distress  Appears well nourished  Mood appropriate for situation     Neurologic Exam  Mental status- alert and oriented to person, place, and time  Speech appropriate for conversation  Good attention and knowledge  Cranial Nerves- PERRL, VFFTC, EOMS normal, facial sensation and muscles symmetric, hearing intact bilaterally to finger rubs, tongue midline, palate rise symmetrical, shoulder shrug symmetrical     Motor- No pronator drift  Appropriate strength  Moves all extremities freely  No tremor  Sensory-  Intact distally in all extremities to light touch  DTRs- 2+ and symmetric in all extremities  Gait- normal casual gait  Normal tandem gait  Negative rhomberg  Coordination- FNF intact  ROS:    Review of Systems   Constitutional: Negative for appetite change and fever  HENT: Positive for tinnitus  Negative for hearing loss, trouble swallowing and voice change  Tinnitus in right ear   Eyes: Negative  Negative for photophobia and pain  Respiratory: Negative  Negative for shortness of breath  Cardiovascular: Negative  Negative for palpitations  Gastrointestinal: Negative  Negative for nausea and vomiting  Endocrine: Negative  Negative for cold intolerance  Genitourinary: Negative  Negative for dysuria, frequency and urgency  Musculoskeletal: Negative  Negative for myalgias and neck pain  Skin: Negative  Negative for rash  Neurological: Positive for dizziness, seizures, light-headedness and headaches  Negative for tremors, syncope, facial asymmetry, speech difficulty, weakness and numbness  10-15 seizures in last month   Hematological: Negative  Does not bruise/bleed easily  Psychiatric/Behavioral: Positive for confusion  Negative for hallucinations and sleep disturbance  All other systems reviewed and are negative  ROS obtained by MA and reviewed by myself  This note may have been created using voice recognition software  There may be unintentional errors such as grammatical errors, spelling errors, or pronoun errors

## 2022-08-24 NOTE — PATIENT INSTRUCTIONS
- Continue current medications for now  - I will reach out to psychiatry regarding the Depakote  - Call the office with change in seizure type or concerns  - Follow up in 4 months with Dr Syed Restrepo  - See the headache specialist ASAP  - Think about seeing a different therapist    - Review the information below:    Non-Epileptic Psychogenic Spells (NEPS)    What are non-epileptic psychogenic spells? Non-epileptic psychogenic spells (NEPS), sometimes called non-epileptic attack, nervous spells, pseudo-seizures, or stress spells, are behavioral events that look like epileptic seizures to an observer, but are not actually epileptic seizures  What is the difference between epileptic seizures and NEPS? Epileptic seizures can be described as an electrical storm in the brain  The electrical storm causes the symptoms of the seizure (shaking, unresponsiveness, etc) and shows up as abnormal changes in brain waves on EEG monitoring  Non-epileptic psychogenic spells (NEPS) are not due to an electrical storm in the brain and brain waves on EEG monitoring remain normal during NEPS  They are still real, uncontrollable events that can cause a person to be unresponsive and sometimes shake, but they are not caused by abnormal electrical signals in the brain  How is the diagnosis of NEPS made? In most cases, the events must be captured in the act on EEG while patients are monitored on video-EEG, like in an Epilepsy Monitoring Unit  This allows doctors to see both what the patient is doing during the event and monitor the brain waves to look for the presence or absence of abnormal brain waves (the electrical storm)  Why is it important to diagnose NEPS? The treatment for epileptic seizures and non-epileptic psychogenic spells are very different  Seizure medications are designed to treat electrical storms of the brain, and dont work in [employer] because an electrical storm is not the problem     By confirming the diagnosis of NEPS, patients can avoid unnecessary medications or procedures and focus on treatments specific for NEPS with a mental health provider  How common are NEPS? The exact prevalence of NEPS in the population is not known, but a third to a half of patients seen in Epilepsy monitoring units are diagnosed with NEPS  What causes NEPS? Sometimes the exact cause of NEPS is difficult to determine  Common causes are depression, anxiety, post-traumatic stress disorder, family conflict, or a history of abuse  Talking with a mental health provider is the best way to try to identify the cause  What if an underlying cause cant be found? Counseling from a certified counselor, psychologist or psychiatrist is helpful even if a specific cause of NEPS cannot be found  Mental health professionals can teach patients techniques for dealing with the spells  What are the treatment options for NEPS? Treatment begins with evaluation by a mental health specialist, including psychologists, licensed counselors, and psychiatrists  They will work to identify a cause if possible, and then focus treatments for that cause  Remember, often, no clear cause can be found  Working with a mental health provider is still important  Treatments include counseling, medications, and strategies to cope/deal with the spells  Some strategies to cope/deal with the spells include: relaxation training, visualization, biofeedback, and active problem solving skills  Are they faked or done purposely? No  NEPS are not faked  What happens during a non-epileptic psychogenic spell is out of the control of the patient  Working with a mental health provider can help get these events back under a persons control  Can the brain be damaged from NEPS? No  The brain has normal electrical activity during a NEPS  People can be injured if they fall during an event, so diagnosis and correct treatment are very important  Can NEPS affect a persons activities of daily living? Even though non-epileptic psychogenic spells are not epileptic seizures, the spells can affect peoples daily lives  When a spell occurs, patients should stop what you are doing until the spell passes  After the spell stops, he/she can then resume their regular activities as soon as they are able  Can people with epilepsy also have NEPS? Yes  It is possible for people with epilepsy to also have NEPS  EEG monitoring can determine which spells are epileptic seizures and which are NEPS  Where can I find a psychologist?   Often, your primary care doctor or the doctors in the Epilepsy monitoring unit can help direct you to a mental health provider  The American Psychological Association (APA) has a Psychologist  (http://  apa org/)  Enter your zip code and dissociative disorders in the area of specialization field to find the names and contact information of some mental health professionals in your area  For more information about NEPS, you can visit the following website:  [employer] cz  com    Book with information and tools for management of NEPS:   Mariposa Schuster  (2014) Psychogenic Non-epileptic Seizures: A Guide   [address]

## 2022-08-24 NOTE — PROGRESS NOTES
Patient ID: Maria Eugenia Woody is a 20 y o  female  Assessment/Plan:    No problem-specific Assessment & Plan notes found for this encounter  {Assess/PlanSmartLinks:52706}       Subjective:    HPI    {[facility] HPI texts:90750}    {Common ambulatory SmartLinks:25803}         Objective:    Blood pressure 116/70, pulse 84, temperature 98 6 °F (37 °C), temperature source Tympanic, height 5' 3" (1 6 m), weight 93 9 kg (207 lb), not currently breastfeeding  Physical Exam    Neurological Exam      ROS:    Review of Systems   Constitutional: Negative for appetite change and fever  HENT: Positive for tinnitus  Negative for hearing loss, trouble swallowing and voice change  Tinnitus in right ear   Eyes: Negative  Negative for photophobia and pain  Respiratory: Negative  Negative for shortness of breath  Cardiovascular: Negative  Negative for palpitations  Gastrointestinal: Negative  Negative for nausea and vomiting  Endocrine: Negative  Negative for cold intolerance  Genitourinary: Negative  Negative for dysuria, frequency and urgency  Musculoskeletal: Negative  Negative for myalgias and neck pain  Skin: Negative  Negative for rash  Neurological: Positive for dizziness, seizures, light-headedness and headaches  Negative for tremors, syncope, facial asymmetry, speech difficulty, weakness and numbness  10-15 seizures in last month   Hematological: Negative  Does not bruise/bleed easily  Psychiatric/Behavioral: Positive for confusion  Negative for hallucinations and sleep disturbance  All other systems reviewed and are negative

## 2022-08-31 DIAGNOSIS — F44.5 PSEUDOSEIZURE: Primary | ICD-10-CM

## 2022-08-31 NOTE — PROGRESS NOTES
Received a message to put a referral to neurology because patient has an appointment with neurology     referral place     Diagnosis they ask for is F44 [id number]

## 2022-09-02 PROBLEM — H93.11 TINNITUS OF RIGHT EAR: Status: ACTIVE | Noted: 2022-09-02

## 2022-09-02 PROBLEM — R20.0 FACIAL NUMBNESS: Status: ACTIVE | Noted: 2022-09-02

## 2022-09-02 NOTE — ASSESSMENT & PLAN NOTE
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

## 2022-09-02 NOTE — ASSESSMENT & PLAN NOTE
Patient continues to have events that are consistent with her prior captured non-epileptic events  She continues to work with psychiatry and her therapist  Reports that her therapist does not feel that her events are related to her mental health  We reviewed discussing diagnosis in greater detail with her therapist vs thinking about finding a different therapist who is more familiar with non-epileptic events to provide CBT/biofeedback

## 2022-09-02 NOTE — ASSESSMENT & PLAN NOTE
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

## 2022-09-07 ENCOUNTER — HOSPITAL ENCOUNTER (OUTPATIENT)
Dept: NUCLEAR MEDICINE | Facility: HOSPITAL | Age: 22
Discharge: HOME | End: 2022-09-07
Payer: COMMERCIAL

## 2022-09-07 DIAGNOSIS — K21.9 GASTROESOPHAGEAL REFLUX DISEASE WITHOUT ESOPHAGITIS: ICD-10-CM

## 2022-09-07 DIAGNOSIS — R10.13 DYSPEPSIA: ICD-10-CM

## 2022-09-07 DIAGNOSIS — R11.2 NAUSEA AND VOMITING, UNSPECIFIED VOMITING TYPE: ICD-10-CM

## 2022-09-07 PROCEDURE — G1004 CDSM NDSC: HCPCS

## 2022-09-07 PROCEDURE — 78264 GASTRIC EMPTYING IMG STUDY: CPT

## 2022-09-07 PROCEDURE — A9541 TC99M SULFUR COLLOID: HCPCS

## 2022-09-08 ENCOUNTER — HOSPITAL ENCOUNTER (OUTPATIENT)
Dept: MRI IMAGING | Facility: HOSPITAL | Age: 22
End: 2022-09-08
Payer: COMMERCIAL

## 2022-09-08 DIAGNOSIS — G43.009 MIGRAINE WITHOUT AURA AND WITHOUT STATUS MIGRAINOSUS, NOT INTRACTABLE: ICD-10-CM

## 2022-09-08 DIAGNOSIS — R20.0 FACIAL NUMBNESS: ICD-10-CM

## 2022-09-08 PROCEDURE — G1004 CDSM NDSC: HCPCS

## 2022-09-08 PROCEDURE — 70553 MRI BRAIN STEM W/O & W/DYE: CPT

## 2022-09-08 PROCEDURE — A9585 GADOBUTROL INJECTION: HCPCS | Performed by: NURSE PRACTITIONER

## 2022-09-08 RX ADMIN — GADOBUTROL 9 ML: 604.72 INJECTION INTRAVENOUS at 13:53

## 2022-09-15 ENCOUNTER — OFFICE VISIT (OUTPATIENT)
Dept: FAMILY MEDICINE CLINIC | Facility: CLINIC | Age: 22
End: 2022-09-15

## 2022-09-15 VITALS
TEMPERATURE: 96.6 F | WEIGHT: 202.6 LBS | RESPIRATION RATE: 17 BRPM | OXYGEN SATURATION: 98 % | HEIGHT: 63 IN | SYSTOLIC BLOOD PRESSURE: 116 MMHG | HEART RATE: 71 BPM | DIASTOLIC BLOOD PRESSURE: 74 MMHG | BODY MASS INDEX: 35.9 KG/M2

## 2022-09-15 DIAGNOSIS — R10.13 EPIGASTRIC PAIN: ICD-10-CM

## 2022-09-15 DIAGNOSIS — Z72.0 CURRENT EVERY DAY NICOTINE VAPING: ICD-10-CM

## 2022-09-15 DIAGNOSIS — F50.89 PSYCHOGENIC VOMITING WITH NAUSEA: Primary | ICD-10-CM

## 2022-09-15 PROBLEM — A64 STD (SEXUALLY TRANSMITTED DISEASE): Status: RESOLVED | Noted: 2018-10-08 | Resolved: 2022-09-15

## 2022-09-15 PROBLEM — R19.7 DIARRHEA: Status: RESOLVED | Noted: 2019-04-02 | Resolved: 2022-09-15

## 2022-09-15 PROCEDURE — 99213 OFFICE O/P EST LOW 20 MIN: CPT | Performed by: FAMILY MEDICINE

## 2022-09-15 NOTE — ASSESSMENT & PLAN NOTE
Improving with dietary changes  Every other day  Compliant on Pantoprazole 40mg daily  Advised to complete outstanding labs by allergist and follow up planned with GI  Advised nicotine vaping cessation  Will review in 2 months with continued dietary changes advised

## 2022-09-15 NOTE — PROGRESS NOTES
Name: Bettye Mesa      : 2000      MRN: [id number]  Encounter Provider: Elisabet Cortes MD  Encounter Date: 9/15/2022   Encounter department: [address] [place] [address]     1  Psychogenic vomiting with nausea  Assessment & Plan:  Gastric emptying study normal despite early interruption due to vomiting at 90 min  Able to tolerate soft foods and encouraged to continue in this vein  5lb weight loss in last month but overal up 6lb since 2022  No signs oif dehydration and symptoms decreased to every other day and therefore will continue with this management with PPI  Will not add any new antiemetics as most patient found to be ineffective  Follow up with GI scheduled Oct 21       2  Epigastric pain  Assessment & Plan:  Improving with dietary changes  Every other day  Compliant on Pantoprazole 40mg daily  Advised to complete outstanding labs by allergist and follow up planned with GI  Advised nicotine vaping cessation  Will review in 2 months with continued dietary changes advised  3  Current every day nicotine vaping  Assessment & Plan:  Advised cessation  Quit cigarette smoking  Recommended to switch to non nicotine substances first and then may address habit of vaping entirely  Patient agrees  Subjective      Bettye Mesa is a very pleasant 20 y o  female who presents today for review of abd pain  Results of gastric emptying study reviewed  Patient reports symptoms now improving with decreased frequency to every other day and also better with liquid consumption including soups and smoothies  Also reports sister found to have liver disease in addition to gastroparesis confirmed on imaging  Review of Systems   Constitutional: Negative for fever  Gastrointestinal: Positive for abdominal pain and nausea  Negative for constipation, diarrhea and vomiting         Current Outpatient Medications on File Prior to Visit   Medication Sig  Abilify Maintena 300 MG injection INJECT 1 EVERY 4 WEEKS    Abilify Maintena 300 MG SRER INJECT INTRAMUSCULARLY EVERY 4 WEEKS    albuterol (Ventolin HFA) 90 mcg/act inhaler Inhale 2 puffs every 4 (four) hours as needed for wheezing    azelastine (ASTELIN) 0 1 % nasal spray 1-2 sprays into each nostril 2 (two) times a day as needed for rhinitis Use in each nostril as directed    azelastine (OPTIVAR) 0 05 % ophthalmic solution Administer 1 drop to both eyes 2 (two) times a day    benztropine (COGENTIN) 2 mg tablet Take 2 mg by mouth daily    busPIRone (BUSPAR) 15 mg tablet Take 15 mg by mouth 3 (three) times a day    cloNIDine (CATAPRES) 0 2 mg tablet     divalproex sodium (DEPAKOTE) 250 mg EC tablet Take 250 mg by mouth 2 (two) times a day    EPINEPHrine (EPIPEN) 0 3 mg/0 3 mL SOAJ Inject 0 3 mL (0 3 mg total) into a muscle once for 1 dose    fluticasone (FLONASE) 50 mcg/act nasal spray 2 sprays into each nostril daily    fluticasone-salmeterol (Advair HFA) 115-21 MCG/ACT inhaler Inhale 2 puffs 2 (two) times a day Rinse mouth after use   hydrOXYzine pamoate (VISTARIL) 50 mg capsule 50 mg daily at bedtime    levalbuterol (XOPENEX) 1 25 mg/3 mL nebulizer solution Take 3 mL (1 25 mg total) by nebulization every 6 (six) hours as needed for wheezing or shortness of breath    mometasone (ELOCON) 0 1 % cream Apply topically daily    pantoprazole (PROTONIX) 40 mg tablet Take 1 tablet (40 mg total) by mouth daily before breakfast    PARoxetine (PAXIL) 20 mg tablet Take 20 mg by mouth daily    Spacer/Aero-Holding Chambers (Vortex Valved Holding Chamber) FROYLAN Use 2 (two) times a day       Objective     /74 (BP Location: Right arm, Patient Position: Sitting, Cuff Size: Standard)   Pulse 71   Temp (!) 96 6 °F (35 9 °C) (Temporal)   Resp 17   Ht 5' 3" (1 6 m)   Wt 91 9 kg (202 lb 9 6 oz)   SpO2 98%   BMI 35 89 kg/m²     Physical Exam  Vitals reviewed     Constitutional:       General: She is not in acute distress  Appearance: She is obese  HENT:      Head: Atraumatic  Eyes:      Conjunctiva/sclera: Conjunctivae normal    Cardiovascular:      Rate and Rhythm: Normal rate  Pulmonary:      Effort: Pulmonary effort is normal    Abdominal:      General: Abdomen is flat  Bowel sounds are normal  There is no distension  Palpations: Abdomen is soft  Tenderness: There is no abdominal tenderness  Musculoskeletal:      Cervical back: Normal range of motion  Skin:     General: Skin is warm and dry  Neurological:      General: No focal deficit present  Mental Status: She is alert     Psychiatric:         Behavior: Behavior normal        Marcell Loaiza MD

## 2022-09-15 NOTE — ASSESSMENT & PLAN NOTE
Advised cessation  Quit cigarette smoking  Recommended to switch to non nicotine substances first and then may address habit of vaping entirely  Patient agrees

## 2022-09-15 NOTE — ASSESSMENT & PLAN NOTE
Gastric emptying study normal despite early interruption due to vomiting at 90 min  Able to tolerate soft foods and encouraged to continue in this vein  5lb weight loss in last month but overal up 6lb since July 2022  No signs oif dehydration and symptoms decreased to every other day and therefore will continue with this management with PPI  Will not add any new antiemetics as most patient found to be ineffective  Follow up with GI scheduled Oct 21

## 2022-09-16 ENCOUNTER — TELEPHONE (OUTPATIENT)
Dept: NEUROLOGY | Facility: CLINIC | Age: 22
End: 2022-09-16

## 2022-10-02 ENCOUNTER — HOSPITAL ENCOUNTER (EMERGENCY)
Facility: HOSPITAL | Age: 22
Discharge: HOME | End: 2022-10-02
Attending: EMERGENCY MEDICINE
Payer: COMMERCIAL

## 2022-10-02 VITALS
OXYGEN SATURATION: 96 % | HEART RATE: 96 BPM | RESPIRATION RATE: 16 BRPM | SYSTOLIC BLOOD PRESSURE: 119 MMHG | WEIGHT: 198 LBS | DIASTOLIC BLOOD PRESSURE: 66 MMHG | BODY MASS INDEX: 35.07 KG/M2 | TEMPERATURE: 98.1 F

## 2022-10-02 DIAGNOSIS — N39.0 UTI (URINARY TRACT INFECTION): Primary | ICD-10-CM

## 2022-10-02 DIAGNOSIS — R10.2 PELVIC PAIN: ICD-10-CM

## 2022-10-02 LAB
ANION GAP SERPL CALCULATED.3IONS-SCNC: 10 MMOL/L (ref 5–14)
BACTERIA UR QL AUTO: ABNORMAL /HPF
BASOPHILS # BLD AUTO: 0.04 THOUSANDS/ΜL (ref 0–0.1)
BASOPHILS NFR BLD AUTO: 0 % (ref 0–1)
BILIRUB UR QL STRIP: ABNORMAL
BUN SERPL-MCNC: 11 MG/DL (ref 5–25)
CALCIUM SERPL-MCNC: 9.3 MG/DL (ref 8.4–10.2)
CHLORIDE SERPL-SCNC: 101 MMOL/L (ref 96–108)
CLARITY UR: ABNORMAL
CO2 SERPL-SCNC: 28 MMOL/L (ref 21–32)
COLOR UR: ABNORMAL
CREAT SERPL-MCNC: 0.6 MG/DL (ref 0.6–1.2)
EOSINOPHIL # BLD AUTO: 0.01 THOUSAND/ΜL (ref 0–0.61)
EOSINOPHIL NFR BLD AUTO: 0 % (ref 0–6)
ERYTHROCYTE [DISTWIDTH] IN BLOOD BY AUTOMATED COUNT: 13.2 % (ref 11.6–15.1)
EXT PREG TEST URINE: NEGATIVE
EXT. CONTROL ED NAV: NORMAL
GFR SERPL CREATININE-BSD FRML MDRD: 128 ML/MIN/1.73SQ M
GLUCOSE SERPL-MCNC: 115 MG/DL (ref 70–99)
GLUCOSE UR STRIP-MCNC: ABNORMAL MG/DL
HCT VFR BLD AUTO: 41.8 % (ref 34.8–46.1)
HGB BLD-MCNC: 13.8 G/DL (ref 11.5–15.4)
HGB UR QL STRIP.AUTO: ABNORMAL
IMM GRANULOCYTES # BLD AUTO: 0.09 THOUSAND/UL (ref 0–0.2)
IMM GRANULOCYTES NFR BLD AUTO: 0 % (ref 0–2)
KETONES UR STRIP-MCNC: ABNORMAL MG/DL
LEUKOCYTE ESTERASE UR QL STRIP: ABNORMAL
LIPASE SERPL-CCNC: 40 U/L (ref 23–300)
LYMPHOCYTES # BLD AUTO: 1.98 THOUSANDS/ΜL (ref 0.6–4.47)
LYMPHOCYTES NFR BLD AUTO: 10 % (ref 14–44)
MAGNESIUM SERPL-MCNC: 1.9 MG/DL (ref 1.6–2.3)
MCH RBC QN AUTO: 28.5 PG (ref 26.8–34.3)
MCHC RBC AUTO-ENTMCNC: 33 G/DL (ref 31.4–37.4)
MCV RBC AUTO: 86 FL (ref 82–98)
MONOCYTES # BLD AUTO: 1.26 THOUSAND/ΜL (ref 0.17–1.22)
MONOCYTES NFR BLD AUTO: 6 % (ref 4–12)
NEUTROPHILS # BLD AUTO: 16.78 THOUSANDS/ΜL (ref 1.85–7.62)
NEUTS SEG NFR BLD AUTO: 84 % (ref 43–75)
NITRITE UR QL STRIP: ABNORMAL
NON-SQ EPI CELLS URNS QL MICRO: ABNORMAL /HPF
NRBC BLD AUTO-RTO: 0 /100 WBCS
PH UR STRIP.AUTO: ABNORMAL [PH]
PLATELET # BLD AUTO: 387 THOUSANDS/UL (ref 149–390)
PMV BLD AUTO: 9 FL (ref 8.9–12.7)
POTASSIUM SERPL-SCNC: 3.5 MMOL/L (ref 3.5–5.3)
PROT UR STRIP-MCNC: ABNORMAL MG/DL
RBC # BLD AUTO: 4.84 MILLION/UL (ref 3.81–5.12)
RBC #/AREA URNS AUTO: ABNORMAL /HPF
SODIUM SERPL-SCNC: 139 MMOL/L (ref 135–147)
UROBILINOGEN UA: ABNORMAL MG/DL
WBC # BLD AUTO: 20.16 THOUSAND/UL (ref 4.31–10.16)
WBC #/AREA URNS AUTO: ABNORMAL /HPF

## 2022-10-02 PROCEDURE — 81025 URINE PREGNANCY TEST: CPT | Performed by: EMERGENCY MEDICINE

## 2022-10-02 PROCEDURE — 83735 ASSAY OF MAGNESIUM: CPT | Performed by: EMERGENCY MEDICINE

## 2022-10-02 PROCEDURE — 87086 URINE CULTURE/COLONY COUNT: CPT | Performed by: EMERGENCY MEDICINE

## 2022-10-02 PROCEDURE — 81001 URINALYSIS AUTO W/SCOPE: CPT | Performed by: EMERGENCY MEDICINE

## 2022-10-02 PROCEDURE — 80048 BASIC METABOLIC PNL TOTAL CA: CPT | Performed by: EMERGENCY MEDICINE

## 2022-10-02 PROCEDURE — 96375 TX/PRO/DX INJ NEW DRUG ADDON: CPT

## 2022-10-02 PROCEDURE — 83690 ASSAY OF LIPASE: CPT | Performed by: EMERGENCY MEDICINE

## 2022-10-02 PROCEDURE — 99284 EMERGENCY DEPT VISIT MOD MDM: CPT

## 2022-10-02 PROCEDURE — 99284 EMERGENCY DEPT VISIT MOD MDM: CPT | Performed by: EMERGENCY MEDICINE

## 2022-10-02 PROCEDURE — 96365 THER/PROPH/DIAG IV INF INIT: CPT

## 2022-10-02 PROCEDURE — 36415 COLL VENOUS BLD VENIPUNCTURE: CPT | Performed by: EMERGENCY MEDICINE

## 2022-10-02 PROCEDURE — 85025 COMPLETE CBC W/AUTO DIFF WBC: CPT | Performed by: EMERGENCY MEDICINE

## 2022-10-02 RX ORDER — ONDANSETRON 2 MG/ML
4 INJECTION INTRAMUSCULAR; INTRAVENOUS ONCE
Status: COMPLETED | OUTPATIENT
Start: 2022-10-02 | End: 2022-10-02

## 2022-10-02 RX ORDER — NITROFURANTOIN 25; 75 MG/1; MG/1
100 CAPSULE ORAL 2 TIMES DAILY
Qty: 10 CAPSULE | Refills: 0 | Status: SHIPPED | OUTPATIENT
Start: 2022-10-02

## 2022-10-02 RX ORDER — KETOROLAC TROMETHAMINE 30 MG/ML
15 INJECTION, SOLUTION INTRAMUSCULAR; INTRAVENOUS ONCE
Status: COMPLETED | OUTPATIENT
Start: 2022-10-02 | End: 2022-10-02

## 2022-10-02 RX ADMIN — KETOROLAC TROMETHAMINE 15 MG: 30 INJECTION, SOLUTION INTRAMUSCULAR; INTRAVENOUS at 15:31

## 2022-10-02 RX ADMIN — SODIUM CHLORIDE, SODIUM LACTATE, POTASSIUM CHLORIDE, AND CALCIUM CHLORIDE 1000 ML: .6; .31; .03; .02 INJECTION, SOLUTION INTRAVENOUS at 15:37

## 2022-10-02 RX ADMIN — ONDANSETRON 4 MG: 2 INJECTION INTRAMUSCULAR; INTRAVENOUS at 15:32

## 2022-10-03 ENCOUNTER — APPOINTMENT (EMERGENCY)
Dept: CT IMAGING | Facility: HOSPITAL | Age: 22
End: 2022-10-03
Payer: COMMERCIAL

## 2022-10-03 ENCOUNTER — HOSPITAL ENCOUNTER (EMERGENCY)
Facility: HOSPITAL | Age: 22
Discharge: HOME | End: 2022-10-03
Attending: STUDENT IN AN ORGANIZED HEALTH CARE EDUCATION/TRAINING PROGRAM
Payer: COMMERCIAL

## 2022-10-03 VITALS
OXYGEN SATURATION: 97 % | WEIGHT: 198 LBS | BODY MASS INDEX: 35.07 KG/M2 | TEMPERATURE: 98.8 F | HEART RATE: 66 BPM | SYSTOLIC BLOOD PRESSURE: 134 MMHG | RESPIRATION RATE: 18 BRPM | DIASTOLIC BLOOD PRESSURE: 72 MMHG

## 2022-10-03 DIAGNOSIS — R10.84 GENERALIZED ABDOMINAL PAIN: Primary | ICD-10-CM

## 2022-10-03 LAB
ALBUMIN SERPL BCP-MCNC: 4.9 G/DL (ref 3.5–5)
ALP SERPL-CCNC: 114 U/L (ref 43–122)
ALT SERPL W P-5'-P-CCNC: 31 U/L
ANION GAP SERPL CALCULATED.3IONS-SCNC: 15 MMOL/L (ref 5–14)
AST SERPL W P-5'-P-CCNC: 35 U/L (ref 14–36)
ATRIAL RATE: 67 BPM
BACTERIA UR QL AUTO: ABNORMAL /HPF
BASOPHILS # BLD AUTO: 0.06 THOUSANDS/ΜL (ref 0–0.1)
BASOPHILS NFR BLD AUTO: 0 % (ref 0–1)
BILIRUB SERPL-MCNC: 0.67 MG/DL (ref 0.2–1)
BILIRUB UR QL STRIP: ABNORMAL
BUN SERPL-MCNC: 14 MG/DL (ref 5–25)
CALCIUM SERPL-MCNC: 9.4 MG/DL (ref 8.4–10.2)
CHLORIDE SERPL-SCNC: 100 MMOL/L (ref 96–108)
CLARITY UR: ABNORMAL
CO2 SERPL-SCNC: 24 MMOL/L (ref 21–32)
COLOR UR: ABNORMAL
CREAT SERPL-MCNC: 0.73 MG/DL (ref 0.6–1.2)
EOSINOPHIL # BLD AUTO: 0 THOUSAND/ΜL (ref 0–0.61)
EOSINOPHIL NFR BLD AUTO: 0 % (ref 0–6)
ERYTHROCYTE [DISTWIDTH] IN BLOOD BY AUTOMATED COUNT: 13.1 % (ref 11.6–15.1)
EXT PREG TEST URINE: NEGATIVE
EXT. CONTROL ED NAV: NORMAL
GFR SERPL CREATININE-BSD FRML MDRD: 116 ML/MIN/1.73SQ M
GLUCOSE SERPL-MCNC: 125 MG/DL (ref 65–140)
GLUCOSE SERPL-MCNC: 127 MG/DL (ref 70–99)
GLUCOSE UR STRIP-MCNC: NEGATIVE MG/DL
HCT VFR BLD AUTO: 44.4 % (ref 34.8–46.1)
HGB BLD-MCNC: 14.9 G/DL (ref 11.5–15.4)
HGB UR QL STRIP.AUTO: 250
IMM GRANULOCYTES # BLD AUTO: 0.1 THOUSAND/UL (ref 0–0.2)
IMM GRANULOCYTES NFR BLD AUTO: 1 % (ref 0–2)
KETONES UR STRIP-MCNC: ABNORMAL MG/DL
LACTATE SERPL-SCNC: 2.5 MMOL/L (ref 0.5–2)
LEUKOCYTE ESTERASE UR QL STRIP: 25
LIPASE SERPL-CCNC: 47 U/L (ref 23–300)
LYMPHOCYTES # BLD AUTO: 1.22 THOUSANDS/ΜL (ref 0.6–4.47)
LYMPHOCYTES NFR BLD AUTO: 7 % (ref 14–44)
MCH RBC QN AUTO: 28.9 PG (ref 26.8–34.3)
MCHC RBC AUTO-ENTMCNC: 33.6 G/DL (ref 31.4–37.4)
MCV RBC AUTO: 86 FL (ref 82–98)
MONOCYTES # BLD AUTO: 0.54 THOUSAND/ΜL (ref 0.17–1.22)
MONOCYTES NFR BLD AUTO: 3 % (ref 4–12)
MUCOUS THREADS UR QL AUTO: ABNORMAL
NEUTROPHILS # BLD AUTO: 15 THOUSANDS/ΜL (ref 1.85–7.62)
NEUTS SEG NFR BLD AUTO: 89 % (ref 43–75)
NITRITE UR QL STRIP: NEGATIVE
NON-SQ EPI CELLS URNS QL MICRO: ABNORMAL /HPF
NRBC BLD AUTO-RTO: 0 /100 WBCS
P AXIS: 67 DEGREES
PH UR STRIP.AUTO: 6 [PH]
PLATELET # BLD AUTO: 401 THOUSANDS/UL (ref 149–390)
PMV BLD AUTO: 9.4 FL (ref 8.9–12.7)
POTASSIUM SERPL-SCNC: 3.5 MMOL/L (ref 3.5–5.3)
PR INTERVAL: 124 MS
PROT SERPL-MCNC: 9.1 G/DL (ref 6.4–8.4)
PROT UR STRIP-MCNC: ABNORMAL MG/DL
QRS AXIS: 81 DEGREES
QRSD INTERVAL: 92 MS
QT INTERVAL: 430 MS
QTC INTERVAL: 454 MS
RBC # BLD AUTO: 5.16 MILLION/UL (ref 3.81–5.12)
RBC #/AREA URNS AUTO: ABNORMAL /HPF
SODIUM SERPL-SCNC: 139 MMOL/L (ref 135–147)
SP GR UR STRIP.AUTO: 1.02 (ref 1–1.04)
T WAVE AXIS: 55 DEGREES
UROBILINOGEN UA: 4 MG/DL
VENTRICULAR RATE: 67 BPM
WBC # BLD AUTO: 16.92 THOUSAND/UL (ref 4.31–10.16)
WBC #/AREA URNS AUTO: ABNORMAL /HPF

## 2022-10-03 PROCEDURE — 96365 THER/PROPH/DIAG IV INF INIT: CPT

## 2022-10-03 PROCEDURE — 81001 URINALYSIS AUTO W/SCOPE: CPT | Performed by: STUDENT IN AN ORGANIZED HEALTH CARE EDUCATION/TRAINING PROGRAM

## 2022-10-03 PROCEDURE — 93010 ELECTROCARDIOGRAM REPORT: CPT | Performed by: INTERNAL MEDICINE

## 2022-10-03 PROCEDURE — 74177 CT ABD & PELVIS W/CONTRAST: CPT

## 2022-10-03 PROCEDURE — 83605 ASSAY OF LACTIC ACID: CPT | Performed by: STUDENT IN AN ORGANIZED HEALTH CARE EDUCATION/TRAINING PROGRAM

## 2022-10-03 PROCEDURE — 99284 EMERGENCY DEPT VISIT MOD MDM: CPT

## 2022-10-03 PROCEDURE — 81025 URINE PREGNANCY TEST: CPT | Performed by: STUDENT IN AN ORGANIZED HEALTH CARE EDUCATION/TRAINING PROGRAM

## 2022-10-03 PROCEDURE — 82948 REAGENT STRIP/BLOOD GLUCOSE: CPT

## 2022-10-03 PROCEDURE — 36415 COLL VENOUS BLD VENIPUNCTURE: CPT | Performed by: STUDENT IN AN ORGANIZED HEALTH CARE EDUCATION/TRAINING PROGRAM

## 2022-10-03 PROCEDURE — 96366 THER/PROPH/DIAG IV INF ADDON: CPT

## 2022-10-03 PROCEDURE — 80053 COMPREHEN METABOLIC PANEL: CPT | Performed by: STUDENT IN AN ORGANIZED HEALTH CARE EDUCATION/TRAINING PROGRAM

## 2022-10-03 PROCEDURE — 81003 URINALYSIS AUTO W/O SCOPE: CPT | Performed by: STUDENT IN AN ORGANIZED HEALTH CARE EDUCATION/TRAINING PROGRAM

## 2022-10-03 PROCEDURE — 83690 ASSAY OF LIPASE: CPT | Performed by: STUDENT IN AN ORGANIZED HEALTH CARE EDUCATION/TRAINING PROGRAM

## 2022-10-03 PROCEDURE — G1004 CDSM NDSC: HCPCS

## 2022-10-03 PROCEDURE — 96375 TX/PRO/DX INJ NEW DRUG ADDON: CPT

## 2022-10-03 PROCEDURE — 99284 EMERGENCY DEPT VISIT MOD MDM: CPT | Performed by: STUDENT IN AN ORGANIZED HEALTH CARE EDUCATION/TRAINING PROGRAM

## 2022-10-03 PROCEDURE — 85025 COMPLETE CBC W/AUTO DIFF WBC: CPT | Performed by: STUDENT IN AN ORGANIZED HEALTH CARE EDUCATION/TRAINING PROGRAM

## 2022-10-03 PROCEDURE — 93005 ELECTROCARDIOGRAM TRACING: CPT

## 2022-10-03 RX ORDER — ONDANSETRON 2 MG/ML
4 INJECTION INTRAMUSCULAR; INTRAVENOUS ONCE
Status: COMPLETED | OUTPATIENT
Start: 2022-10-03 | End: 2022-10-03

## 2022-10-03 RX ORDER — KETOROLAC TROMETHAMINE 30 MG/ML
15 INJECTION, SOLUTION INTRAMUSCULAR; INTRAVENOUS ONCE
Status: COMPLETED | OUTPATIENT
Start: 2022-10-03 | End: 2022-10-03

## 2022-10-03 RX ORDER — PROMETHAZINE HYDROCHLORIDE 25 MG/1
25 SUPPOSITORY RECTAL EVERY 6 HOURS PRN
Qty: 12 SUPPOSITORY | Refills: 0 | Status: SHIPPED | OUTPATIENT
Start: 2022-10-03

## 2022-10-03 RX ADMIN — ONDANSETRON 4 MG: 2 INJECTION INTRAMUSCULAR; INTRAVENOUS at 14:51

## 2022-10-03 RX ADMIN — KETOROLAC TROMETHAMINE 15 MG: 30 INJECTION, SOLUTION INTRAMUSCULAR; INTRAVENOUS at 14:51

## 2022-10-03 RX ADMIN — IOHEXOL 100 ML: 350 INJECTION, SOLUTION INTRAVENOUS at 15:09

## 2022-10-03 RX ADMIN — SODIUM CHLORIDE, SODIUM LACTATE, POTASSIUM CHLORIDE, AND CALCIUM CHLORIDE 1000 ML: .6; .31; .03; .02 INJECTION, SOLUTION INTRAVENOUS at 13:57

## 2022-10-03 NOTE — DISCHARGE INSTRUCTIONS
You were seen in the emergency department today for abdominal pain  You had labs and a CT scan that did not show a cause of your symptoms today  Make sure that you are taking your antibiotics as prescribed  You are being sent with medication to help with your nausea  We do recommend that you follow-up with the urologist to discuss your frequent urinary tract infections  Return to the emergency department if you develop fevers, difficulty breathing, or inability to keep food/fluids down

## 2022-10-03 NOTE — ED PROVIDER NOTES
History  Chief Complaint   Patient presents with    Fall     Pt reports that she was checking in for [place] since last night, and fell while being registered  48-year-old female with PMH significant for pseudoseizures and chronic nausea/vomiting here for evaluation of upper abdominal pain and nausea/vomiting  Patient reports that symptoms have been present since yesterday  She is currently being treated for a UTI  Denies any chest pain or shortness of breath  No blood in the vomit  No diarrhea or constipation  Does report that her menstrual cycle started yesterday  She was seen yesterday for abdominal pain, at that time had been diagnosed with the UTI and started on treatment  She states she has been taking the antibiotics  Nausea vomiting worsened today as did her abdominal pain, prompting her to come in  While in triage, she fell to the ground  Reports that she has a history of pseudoseizures for which she sees Neurology  Prior to Admission Medications   Prescriptions Last Dose Informant Patient Reported? Taking?    Abilify Maintena 300 MG SRER   Yes No   Sig: INJECT INTRAMUSCULARLY EVERY 4 WEEKS   Abilify Maintena 300 MG injection   Yes No   Sig: INJECT 1 EVERY 4 WEEKS   EPINEPHrine (EPIPEN) 0 3 mg/0 3 mL SOAJ   No No   Sig: Inject 0 3 mL (0 3 mg total) into a muscle once for 1 dose   PARoxetine (PAXIL) 20 mg tablet   Yes No   Sig: Take 20 mg by mouth daily   Spacer/Aero-Holding Chambers (Vortex Valved Holding Chamber) FROYLAN  Self No No   Sig: Use 2 (two) times a day   albuterol (Ventolin HFA) 90 mcg/act inhaler  Self No No   Sig: Inhale 2 puffs every 4 (four) hours as needed for wheezing   azelastine (ASTELIN) 0 1 % nasal spray   No No   Si-2 sprays into each nostril 2 (two) times a day as needed for rhinitis Use in each nostril as directed   azelastine (OPTIVAR) 0 05 % ophthalmic solution   No No   Sig: Administer 1 drop to both eyes 2 (two) times a day   benztropine (COGENTIN) 2 mg tablet   Yes No   Sig: Take 2 mg by mouth daily   busPIRone (BUSPAR) 15 mg tablet   Yes No   Sig: Take 15 mg by mouth 3 (three) times a day   cloNIDine (CATAPRES) 0 2 mg tablet  Self Yes No   divalproex sodium (DEPAKOTE) 250 mg EC tablet   Yes No   Sig: Take 250 mg by mouth 2 (two) times a day   fluticasone (FLONASE) 50 mcg/act nasal spray   No No   Si sprays into each nostril daily   fluticasone-salmeterol (Advair HFA) 115-21 MCG/ACT inhaler   No No   Sig: Inhale 2 puffs 2 (two) times a day Rinse mouth after use    hydrOXYzine pamoate (VISTARIL) 50 mg capsule  Self Yes No   Si mg daily at bedtime   levalbuterol (XOPENEX) 1 25 mg/3 mL nebulizer solution   No No   Sig: Take 3 mL (1 25 mg total) by nebulization every 6 (six) hours as needed for wheezing or shortness of breath   mometasone (ELOCON) 0 1 % cream   No No   Sig: Apply topically daily   nitrofurantoin (MACROBID) 100 mg capsule   No No   Sig: Take 1 capsule (100 mg total) by mouth 2 (two) times a day   pantoprazole (PROTONIX) 40 mg tablet   No No   Sig: Take 1 tablet (40 mg total) by mouth daily before breakfast      Facility-Administered Medications: None       Past Medical History:   Diagnosis Date    Anaphylaxis     apricot    Anxiety     Asthma     Cluster headache     Eczema     Headache, tension-type     Lymphadenitis     Last assessed 14    Lymphadenopathy     Last assessed 10/16/13    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HCC)     Migraine     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[facility] )     Psychiatric disorder     Psychiatric illness     Psychosis ([facility] )     Seizures ([facility] )        Past Surgical History:   Procedure Laterality Date    COLONOSCOPY N/A 2019    Procedure: COLONOSCOPY;  Surgeon: Radha Chakraborty MD;  Location: [facility]; Service: Gastroenterology    ESOPHAGOGASTRODUODENOSCOPY N/A 2019    Procedure: ESOPHAGOGASTRODUODENOSCOPY (EGD); Surgeon: Radha Chakraborty MD;  Location: [facility];   Service: Gastroenterology       Family History   Problem Relation Age of Onset   Neal Shaggy Migraines Mother     Other Mother         Neck pain    Depression Mother     Drug abuse Mother     Drug abuse Father     Schizophrenia Father     Febrile seizures Maternal Uncle     Stroke Maternal Uncle      I have reviewed and agree with the history as documented  E-Cigarette/Vaping    E-Cigarette Use Current Every Day User      E-Cigarette/Vaping Substances    Nicotine Yes     THC No     CBD No     Flavoring Yes     Other No     Unknown No      Social History     Tobacco Use    Smoking status: Current Some Day Smoker     Packs/day: 0 00     Years: 0 00     Pack years: 0 00     Types: Cigarettes    Smokeless tobacco: Never Used    Tobacco comment: I stopped smoking cigarettes but started vaping   Vaping Use    Vaping Use: Every day    Substances: Nicotine, Flavoring   Substance Use Topics    Alcohol use: Yes     Alcohol/week: 2 0 standard drinks     Types: 2 Shots of liquor per week     Comment: Occasionally    Drug use: Yes     Frequency: 3 0 times per week     Types: Marijuana       Review of Systems   Constitutional: Negative for chills and fever  HENT: Negative for ear pain and sore throat  Eyes: Negative for pain and visual disturbance  Respiratory: Negative for cough and shortness of breath  Cardiovascular: Negative for chest pain and palpitations  Gastrointestinal: Positive for abdominal pain, nausea and vomiting  Negative for blood in stool, constipation and diarrhea  Genitourinary: Positive for dysuria  Negative for hematuria and vaginal bleeding  Musculoskeletal: Negative for arthralgias and back pain  Skin: Negative for color change and rash  Neurological: Positive for headaches  All other systems reviewed and are negative  Physical Exam  Physical Exam  Vitals and nursing note reviewed  Constitutional:       General: She is not in acute distress  Appearance: She is well-developed   She is not ill-appearing  HENT:      Head: Normocephalic and atraumatic  Eyes:      Extraocular Movements: Extraocular movements intact  Conjunctiva/sclera: Conjunctivae normal       Pupils: Pupils are equal, round, and reactive to light  Cardiovascular:      Rate and Rhythm: Normal rate and regular rhythm  Pulmonary:      Effort: Pulmonary effort is normal  No respiratory distress  Breath sounds: Normal breath sounds  Abdominal:      General: There is no distension  Palpations: Abdomen is soft  Tenderness: There is abdominal tenderness in the right upper quadrant, epigastric area and left upper quadrant  Musculoskeletal:         General: No deformity or signs of injury  Skin:     General: Skin is warm and dry  Neurological:      Mental Status: She is alert and oriented to person, place, and time  Sensory: No sensory deficit  Motor: No weakness        Comments: Alert immediately after fall in lobby, no postictal confusion         Vital Signs  ED Triage Vitals   Temperature Pulse Respirations Blood Pressure SpO2   10/03/22 1347 10/03/22 1347 10/03/22 1347 10/03/22 1347 10/03/22 1347   98 8 °F (37 1 °C) 66 16 137/76 98 %      Temp Source Heart Rate Source Patient Position - Orthostatic VS BP Location FiO2 (%)   10/03/22 1347 10/03/22 1347 10/03/22 1347 10/03/22 1347 --   Oral Monitor Lying Right arm       Pain Score       10/03/22 1451       10 - Worst Possible Pain           Vitals:    10/03/22 1347 10/03/22 1552   BP: 137/76 134/72   Pulse: 66 66   Patient Position - Orthostatic VS: Lying Sitting         Visual Acuity      ED Medications  Medications   lactated ringers bolus 1,000 mL (0 mL Intravenous Stopped 10/3/22 1530)   ondansetron (ZOFRAN) injection 4 mg (4 mg Intravenous Given 10/3/22 1451)   ketorolac (TORADOL) injection 15 mg (15 mg Intravenous Given 10/3/22 1451)   iohexol (OMNIPAQUE) 350 MG/ML injection (SINGLE-DOSE) 100 mL (100 mL Intravenous Given 10/3/22 1509) Diagnostic Studies  Results Reviewed     Procedure Component Value Units Date/Time    Urine Microscopic [[id number]]  (Abnormal) Collected: 10/03/22 1355    Lab Status: Final result Specimen: Urine, Other Updated: 10/03/22 1558     RBC, UA Innumerable /hpf      WBC, UA 2-4 /hpf      Epithelial Cells Moderate /hpf      Bacteria, UA Innumerable /hpf      MUCUS THREADS Moderate    UA w Reflex to Microscopic w Reflex to Culture [[id number]]  (Abnormal) Collected: 10/03/22 1355    Lab Status: Final result Specimen: Urine, Other Updated: 10/03/22 1449     Color, UA Brown     Clarity, UA Cloudy     Specific Gravity, UA 1 025     pH, UA 6 0     Leukocytes, UA 25 0     Nitrite, UA Negative     Protein,  (2+) mg/dl      Glucose, UA Negative mg/dl      Ketones, UA >=150 (3+) mg/dl      Bilirubin, UA 1 mg/dL     Occult Blood,  0     UROBILINOGEN UA 4 0 mg/dL     Lactic acid [[id number]]  (Abnormal) Collected: 10/03/22 1355    Lab Status: Final result Specimen: Blood from Arm, Left Updated: 10/03/22 1440     LACTIC ACID 2 5 mmol/L     Narrative:      Result may be elevated if tourniquet was used during collection      Lactic acid 2 Hours [[id number]]     Lab Status: No result Specimen: Blood     Lipase [[id number]]  (Normal) Collected: 10/03/22 1355    Lab Status: Final result Specimen: Blood from Arm, Left Updated: 10/03/22 1429     Lipase 47 u/L     Narrative:      Hemolysis    Comprehensive metabolic panel [[id number]]  (Abnormal) Collected: 10/03/22 1355    Lab Status: Final result Specimen: Blood from Arm, Left Updated: 10/03/22 1429     Sodium 139 mmol/L      Potassium 3 5 mmol/L      Chloride 100 mmol/L      CO2 24 mmol/L      ANION GAP 15 mmol/L      BUN 14 mg/dL      Creatinine 0 73 mg/dL      Glucose 127 mg/dL      Calcium 9 4 mg/dL      AST 35 U/L      ALT 31 U/L      Alkaline Phosphatase 114 U/L      Total Protein 9 1 g/dL      Albumin 4 9 g/dL      Total Bilirubin 0 67 mg/dL      eGFR 116 ml/min/1 73sq m Narrative:      Hemolysis  National Kidney Disease Foundation guidelines for Chronic Kidney Disease (CKD):     Stage 1 with normal or high GFR (GFR > 90 mL/min/1 73 square meters)    Stage 2 Mild CKD (GFR = 60-89 mL/min/1 73 square meters)    Stage 3A Moderate CKD (GFR = 45-59 mL/min/1 73 square meters)    Stage 3B Moderate CKD (GFR = 30-44 mL/min/1 73 square meters)    Stage 4 Severe CKD (GFR = 15-29 mL/min/1 73 square meters)    Stage 5 End Stage CKD (GFR <15 mL/min/1 73 square meters)  Note: GFR calculation is accurate only with a steady state creatinine    CBC and differential [[id number]]  (Abnormal) Collected: 10/03/22 1355    Lab Status: Final result Specimen: Blood from Arm, Left Updated: 10/03/22 1404     WBC 16 92 Thousand/uL      RBC 5 16 Million/uL      Hemoglobin 14 9 g/dL      Hematocrit 44 4 %      MCV 86 fL      MCH 28 9 pg      MCHC 33 6 g/dL      RDW 13 1 %      MPV 9 4 fL      Platelets 571 Thousands/uL      nRBC 0 /100 WBCs      Neutrophils Relative 89 %      Immat GRANS % 1 %      Lymphocytes Relative 7 %      Monocytes Relative 3 %      Eosinophils Relative 0 %      Basophils Relative 0 %      Neutrophils Absolute 15 00 Thousands/µL      Immature Grans Absolute 0 10 Thousand/uL      Lymphocytes Absolute 1 22 Thousands/µL      Monocytes Absolute 0 54 Thousand/µL      Eosinophils Absolute 0 00 Thousand/µL      Basophils Absolute 0 06 Thousands/µL     POCT pregnancy, urine [[id number]]  (Normal) Resulted: 10/03/22 1400    Lab Status: Final result Updated: 10/03/22 1401     EXT PREG TEST UR (Ref: Negative) negative     Control valid    Fingerstick Glucose (POCT) [[id number]]  (Normal) Collected: 10/03/22 1337    Lab Status: Final result Updated: 10/03/22 1339     POC Glucose 125 mg/dl                  CT abdomen pelvis with contrast   Final Result by Laya Manzanares MD (10/03 1521)      Possible bladder wall thickening again noted  Correlation for cystitis advised        Otherwise, no acute CT findings in the abdomen or pelvis  No bowel obstruction or focal inflammatory process  Normal appendix  Workstation performed: [id number]                    Procedures  Procedures         ED Course  ED Course as of 10/03/22 1758   Mon Oct 03, 2022   1529 WBC(!): 16 92  Improving leukocytosis                               SBIRT 20yo+    Flowsheet Row Most Recent Value   SBIRT (23 yo +)    In order to provide better care to our patients, we are screening all of our patients for alcohol and drug use  Would it be okay to ask you these screening questions? No Filed at: 10/03/2022 1449                    MDM  Number of Diagnoses or Management Options  Generalized abdominal pain  Diagnosis management comments: Patient presenting with nausea and vomiting as well as abdominal pain  She did have an episode where she had a fall and shaking noted in the lobby, however no tonic clonic movements and no postictal phase, so lower suspicion of true epileptic seizure  Suspect may have been a pseudo-seizure triggered by her presentation  She has some mild abdominal tenderness but otherwise has a benign exam   Her lab workup showed a moderate leukocytosis that has improved from her labs yesterday along with a slightly elevated lactic acid at 2 5, likely secondary to dehydration as I would expected to be much higher if it were secondary to an epileptic seizure  Given this is her 2nd visit in as many days as well as her level of reported pain, a CT abdomen/pelvis was ordered  This resulted with evidence of bladder wall thickening consistent with cystitis  I did discuss these results with the patient  She is already on antibiotics for a UTI  Given that she has had several visits in the last year with a finding of bladder wall thickness on exam, I recommended that she seek with her primary doctor about a referral to Urology as multiple UTIs in this age group may benefit from some additional workup and/or treatment  She expressed understanding  She was tolerating p o  At the time of discharge  Prescription for Phenergan suppositories sent to her pharmacy for nausea control  Disposition  Final diagnoses:   Generalized abdominal pain     Time reflects when diagnosis was documented in both MDM as applicable and the Disposition within this note     Time User Action Codes Description Comment    10/3/2022  3:38 PM Magdalena Cohen Therese [R10 [id number]] Generalized abdominal pain       ED Disposition     ED Disposition   Discharge    Condition   Stable    Date/Time   Mon Oct 3, 2022 1230 [place] Mehoopany discharge to home/self care                 Follow-up Information     Follow up With Specialties Details Why Contact Info Additional [address] [facility]   [address] [id number]  [address] [facility] [address] [facility], [address], [address],  [facility]          Discharge Medication List as of 10/3/2022  3:47 PM      START taking these medications    Details   promethazine (PHENERGAN) 25 mg suppository Insert 1 suppository (25 mg total) into the rectum every 6 (six) hours as needed for nausea or vomiting, Starting Mon 10/3/2022, Normal         CONTINUE these medications which have NOT CHANGED    Details   Abilify Maintena 300 MG injection INJECT 1 EVERY 4 WEEKS, Historical Med      Abilify Maintena 300 MG SRER INJECT INTRAMUSCULARLY EVERY 4 WEEKS, Historical Med      albuterol (Ventolin HFA) 90 mcg/act inhaler Inhale 2 puffs every 4 (four) hours as needed for wheezing, Starting Wed 3/9/2022, Normal      azelastine (ASTELIN) 0 1 % nasal spray 1-2 sprays into each nostril 2 (two) times a day as needed for rhinitis Use in each nostril as directed, Starting Wed 7/13/2022, Normal      azelastine (OPTIVAR) 0 05 % ophthalmic solution Administer 1 drop to both eyes 2 (two) times a day, Starting Wed 7/20/2022, Normal      benztropine (COGENTIN) 2 mg tablet Take 2 mg by mouth daily, Starting Mon 8/8/2022, Historical Med      busPIRone (BUSPAR) 15 mg tablet Take 15 mg by mouth 3 (three) times a day, Starting Mon 8/8/2022, Historical Med      cloNIDine (CATAPRES) 0 2 mg tablet Starting Mon 3/14/2022, Historical Med      divalproex sodium (DEPAKOTE) 250 mg EC tablet Take 250 mg by mouth 2 (two) times a day, Starting Mon 8/8/2022, Historical Med      EPINEPHrine (EPIPEN) 0 3 mg/0 3 mL SOAJ Inject 0 3 mL (0 3 mg total) into a muscle once for 1 dose, Starting Wed 3/9/2022, Normal      fluticasone (FLONASE) 50 mcg/act nasal spray 2 sprays into each nostril daily, Starting Wed 7/13/2022, Normal      fluticasone-salmeterol (Advair HFA) 115-21 MCG/ACT inhaler Inhale 2 puffs 2 (two) times a day Rinse mouth after use , Starting Wed 7/13/2022, Normal      hydrOXYzine pamoate (VISTARIL) 50 mg capsule 50 mg daily at bedtime, Starting Mon 3/14/2022, Historical Med      levalbuterol (XOPENEX) 1 25 mg/3 mL nebulizer solution Take 3 mL (1 25 mg total) by nebulization every 6 (six) hours as needed for wheezing or shortness of breath, Starting Wed 7/13/2022, Normal      mometasone (ELOCON) 0 1 % cream Apply topically daily, Starting Wed 7/13/2022, Normal      nitrofurantoin (MACROBID) 100 mg capsule Take 1 capsule (100 mg total) by mouth 2 (two) times a day, Starting Sun 10/2/2022, Normal      pantoprazole (PROTONIX) 40 mg tablet Take 1 tablet (40 mg total) by mouth daily before breakfast, Starting u 7/7/2022, Normal      PARoxetine (PAXIL) 20 mg tablet Take 20 mg by mouth daily, Starting Mon 8/8/2022, Historical Med      Spacer/[employer]ero-[employer]olding [employer] FROYLAN Use 2 (two) times a day, Starting Wed 3/9/2022, Normal             No discharge procedures on file      PDMP Review       Value Time User    PDMP Reviewed  Yes 11/25/2021 10:36 PM Ramo Flowers MD          ED Provider  Electronically Signed by Nona Ruffin MD  10/03/22 2021

## 2022-10-03 NOTE — ED PROVIDER NOTES
History  Chief Complaint   Patient presents with    Pelvic Pain     Patient reports bilateral ovary pain since yesterday, "I'm on my period"  Unable to take meds due to vomiting  HPI  Patient is a 48-year-old female with past medical history of anxiety, psychiatric disorder, previous psychosis presenting today with bilateral pelvic pain since starting her menstrual cycle  Patient reports that she started her menstrual cycle yesterday began to experience intense cramping bilateral pelvic pain  Reports vaginal bleeding  Reports that she gets similar type pains with her menstrual cycle however is more severe this time  Has been taking ibuprofen only occasionally  Denies any vaginal discharge  Denies any urinary symptoms  Denies any chest pain, shortness of breath, fevers chills Denies any diarrhea or constipation  Reports associated nausea and nonbloody nonbilious vomiting  States that is also normal for when she she is on her menstrual cycle  And has previously followed with OBGYN however has not undergone any other workup for menorrhagia  Prior to Admission Medications   Prescriptions Last Dose Informant Patient Reported? Taking?    Abilify Maintena 300 MG SRER   Yes No   Sig: INJECT INTRAMUSCULARLY EVERY 4 WEEKS   Abilify Maintena 300 MG injection   Yes No   Sig: INJECT 1 EVERY 4 WEEKS   EPINEPHrine (EPIPEN) 0 3 mg/0 3 mL SOAJ   No No   Sig: Inject 0 3 mL (0 3 mg total) into a muscle once for 1 dose   PARoxetine (PAXIL) 20 mg tablet   Yes No   Sig: Take 20 mg by mouth daily   Spacer/Aero-Holding Chambers (Vortex Valved Holding Chamber) FROYLAN  Self No No   Sig: Use 2 (two) times a day   albuterol (Ventolin HFA) 90 mcg/act inhaler  Self No No   Sig: Inhale 2 puffs every 4 (four) hours as needed for wheezing   azelastine (ASTELIN) 0 1 % nasal spray   No No   Si-2 sprays into each nostril 2 (two) times a day as needed for rhinitis Use in each nostril as directed   azelastine (OPTIVAR) 0 05 % ophthalmic solution   No No   Sig: Administer 1 drop to both eyes 2 (two) times a day   benztropine (COGENTIN) 2 mg tablet   Yes No   Sig: Take 2 mg by mouth daily   busPIRone (BUSPAR) 15 mg tablet   Yes No   Sig: Take 15 mg by mouth 3 (three) times a day   cloNIDine (CATAPRES) 0 2 mg tablet  Self Yes No   divalproex sodium (DEPAKOTE) 250 mg EC tablet   Yes No   Sig: Take 250 mg by mouth 2 (two) times a day   fluticasone (FLONASE) 50 mcg/act nasal spray   No No   Si sprays into each nostril daily   fluticasone-salmeterol (Advair HFA) 115-21 MCG/ACT inhaler   No No   Sig: Inhale 2 puffs 2 (two) times a day Rinse mouth after use    hydrOXYzine pamoate (VISTARIL) 50 mg capsule  Self Yes No   Si mg daily at bedtime   levalbuterol (XOPENEX) 1 25 mg/3 mL nebulizer solution   No No   Sig: Take 3 mL (1 25 mg total) by nebulization every 6 (six) hours as needed for wheezing or shortness of breath   mometasone (ELOCON) 0 1 % cream   No No   Sig: Apply topically daily   pantoprazole (PROTONIX) 40 mg tablet   No No   Sig: Take 1 tablet (40 mg total) by mouth daily before breakfast      Facility-Administered Medications: None       Past Medical History:   Diagnosis Date    Anaphylaxis     apricot    Anxiety     Asthma     Cluster headache     Eczema     Headache, tension-type     Lymphadenitis     Last assessed 14    Lymphadenopathy     Last assessed 10/16/13    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HCC)     Migraine     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[facility] )     Psychiatric disorder     Psychiatric illness     Psychosis ([facility] )     Seizures ([facility] )        Past Surgical History:   Procedure Laterality Date    COLONOSCOPY N/A 2019    Procedure: COLONOSCOPY;  Surgeon: Diana Anderson MD;  Location: [facility]; Service: Gastroenterology    ESOPHAGOGASTRODUODENOSCOPY N/A 2019    Procedure: ESOPHAGOGASTRODUODENOSCOPY (EGD); Surgeon: Diana Anderson MD;  Location: [facility];   Service: Gastroenterology       Family History Problem Relation Age of Onset   Eros Lemme Migraines Mother     Other Mother         Neck pain    Depression Mother     Drug abuse Mother     Drug abuse Father     Schizophrenia Father     Febrile seizures Maternal Uncle     Stroke Maternal Uncle      I have reviewed and agree with the history as documented  E-Cigarette/Vaping    E-Cigarette Use Current Every Day User      E-Cigarette/Vaping Substances    Nicotine Yes     THC No     CBD No     Flavoring Yes     Other No     Unknown No      Social History     Tobacco Use    Smoking status: Current Some Day Smoker     Packs/day: 0 00     Years: 0 00     Pack years: 0 00     Types: Cigarettes    Smokeless tobacco: Never Used    Tobacco comment: I stopped smoking cigarettes but started vaping   Vaping Use    Vaping Use: Every day    Substances: Nicotine, Flavoring   Substance Use Topics    Alcohol use: Yes     Alcohol/week: 2 0 standard drinks     Types: 2 Shots of liquor per week     Comment: Occasionally    Drug use: Yes     Frequency: 3 0 times per week     Types: Marijuana       Review of Systems   Constitutional: Negative for chills and fever  HENT: Negative for congestion and sore throat  Eyes: Negative for redness and visual disturbance  Respiratory: Negative for cough and shortness of breath  Cardiovascular: Negative for chest pain and palpitations  Gastrointestinal: Positive for nausea and vomiting  Negative for constipation and diarrhea  Genitourinary: Positive for pelvic pain and vaginal bleeding  Negative for dysuria, hematuria and vaginal discharge  Musculoskeletal: Negative for myalgias  Skin: Negative for rash and wound  Allergic/Immunologic: Negative for immunocompromised state  Neurological: Negative for seizures and syncope  Psychiatric/Behavioral: Negative for confusion, self-injury and suicidal ideas  Physical Exam  Physical Exam  Vitals and nursing note reviewed     Constitutional:       General: She is not in acute distress  Appearance: Normal appearance  She is well-developed  She is not ill-appearing  HENT:      Head: Normocephalic and atraumatic  No raccoon eyes  Right Ear: External ear normal       Left Ear: External ear normal       Nose: Nose normal  No congestion  Mouth/Throat:      Lips: Pink  Mouth: Mucous membranes are moist    Eyes:      General: Lids are normal  No scleral icterus  Conjunctiva/sclera: Conjunctivae normal    Cardiovascular:      Rate and Rhythm: Normal rate and regular rhythm  Heart sounds: No murmur heard  No friction rub  Pulmonary:      Effort: Pulmonary effort is normal  No respiratory distress  Breath sounds: No wheezing or rales  Abdominal:      General: Abdomen is flat  Tenderness: There is no abdominal tenderness  There is no guarding or rebound  Musculoskeletal:         General: No swelling or signs of injury  Cervical back: Normal range of motion  No rigidity or tenderness  Skin:     General: Skin is warm and dry  Coloration: Skin is not jaundiced  Findings: No rash  Neurological:      Mental Status: She is alert and oriented to person, place, and time  Mental status is at baseline  Psychiatric:         Behavior: Behavior normal  Behavior is cooperative           Vital Signs  ED Triage Vitals   Temperature Pulse Respirations Blood Pressure SpO2   10/02/22 1419 10/02/22 1419 10/02/22 1419 10/02/22 1419 10/02/22 1419   98 1 °F (36 7 °C) 96 16 119/66 96 %      Temp Source Heart Rate Source Patient Position - Orthostatic VS BP Location FiO2 (%)   10/02/22 1419 10/02/22 1419 10/02/22 1419 10/02/22 1419 --   Oral Monitor Sitting Left arm       Pain Score       10/02/22 1531       8           Vitals:    10/02/22 1419   BP: 119/66   Pulse: 96   Patient Position - Orthostatic VS: Sitting         Visual Acuity      ED Medications  Medications   lactated ringers bolus 1,000 mL (0 mL Intravenous Stopped 10/2/22 1601) ketorolac (TORADOL) injection 15 mg (15 mg Intravenous Given 10/2/22 1531)   ondansetron (ZOFRAN) injection 4 mg (4 mg Intravenous Given 10/2/22 1532)       Diagnostic Studies  Results Reviewed     Procedure Component Value Units Date/Time    Urine Microscopic [[id number]]  (Abnormal) Collected: 10/02/22 1525    Lab Status: Final result Specimen: Urine, Clean Catch Updated: 10/02/22 1542     RBC, UA Innumerable /hpf      WBC, UA 30-50 /hpf      Epithelial Cells Occasional /hpf      Bacteria, UA       Field obscured, unable to enumerate     /hpf    Urine culture [[id number]] Collected: 10/02/22 1525    Lab Status:  In process Specimen: Urine, Clean Catch Updated: 10/02/22 1541    POCT pregnancy, urine [[id number]]  (Normal) Resulted: 10/02/22 1540    Lab Status: Final result Updated: 10/02/22 1540     EXT PREG TEST UR (Ref: Negative) Negative     Control Valid    Basic metabolic panel [[id number]]  (Abnormal) Collected: 10/02/22 1520    Lab Status: Final result Specimen: Blood from Arm, Right Updated: 10/02/22 1537     Sodium 139 mmol/L      Potassium 3 5 mmol/L      Chloride 101 mmol/L      CO2 28 mmol/L      ANION GAP 10 mmol/L      BUN 11 mg/dL      Creatinine 0 60 mg/dL      Glucose 115 mg/dL      Calcium 9 3 mg/dL      eGFR 128 ml/min/1 73sq m     Narrative:      [place] guidelines for Chronic Kidney Disease (CKD):     Stage 1 with normal or high GFR (GFR > 90 mL/min/1 73 square meters)    Stage 2 Mild CKD (GFR = 60-89 mL/min/1 73 square meters)    Stage 3A Moderate CKD (GFR = 45-59 mL/min/1 73 square meters)    Stage 3B Moderate CKD (GFR = 30-44 mL/min/1 73 square meters)    Stage 4 Severe CKD (GFR = 15-29 mL/min/1 73 square meters)    Stage 5 End Stage CKD (GFR <15 mL/min/1 73 square meters)  Note: GFR calculation is accurate only with a steady state creatinine    Magnesium [[id number]]  (Normal) Collected: 10/02/22 1520    Lab Status: Final result Specimen: Blood from Arm, Right Updated: 10/02/22 1537     Magnesium 1 9 mg/dL     Lipase [[id number]]  (Normal) Collected: 10/02/22 1520    Lab Status: Final result Specimen: Blood from Arm, Right Updated: 10/02/22 1537     Lipase 40 u/L     UA w Reflex to Microscopic w Reflex to Culture [[id number]]  (Abnormal) Collected: 10/02/22 1525    Lab Status: Final result Specimen: Urine, Clean Catch Updated: 10/02/22 1535     Color, UA Red     Clarity, UA Turbid     Specific Gravity, UA --     pH, UA Interference-unable to analyze     Leukocytes, UA Interference- unable to analyze     Nitrite, UA Interference- unable to analyze     Protein, UA       Interference- unable to analyze     mg/dl     Glucose, UA       Interference- unable to analyze     mg/dl     Ketones, UA       Interference- unable to analyze     mg/dl     Bilirubin, UA Interference- unable to analyze     Occult Blood, UA Interference- unable to analyze     UROBILINOGEN UA       Interference-unable to analyze     mg/dL    CBC and differential [[id number]]  (Abnormal) Collected: 10/02/22 1520    Lab Status: Final result Specimen: Blood from Arm, Right Updated: 10/02/22 1527     WBC 20 16 Thousand/uL      RBC 4 84 Million/uL      Hemoglobin 13 8 g/dL      Hematocrit 41 8 %      MCV 86 fL      MCH 28 5 pg      MCHC 33 0 g/dL      RDW 13 2 %      MPV 9 0 fL      Platelets 613 Thousands/uL      nRBC 0 /100 WBCs      Neutrophils Relative 84 %      Immat GRANS % 0 %      Lymphocytes Relative 10 %      Monocytes Relative 6 %      Eosinophils Relative 0 %      Basophils Relative 0 %      Neutrophils Absolute 16 78 Thousands/µL      Immature Grans Absolute 0 09 Thousand/uL      Lymphocytes Absolute 1 98 Thousands/µL      Monocytes Absolute 1 26 Thousand/µL      Eosinophils Absolute 0 01 Thousand/µL      Basophils Absolute 0 04 Thousands/µL                  No orders to display              Procedures  Procedures         ED Course  ED Course as of 10/03/22 0719   Sun Oct 02, 2022   1537 WBC(!): 20 16 1538 Hemoglobin: 13 8   1538 Neutrophils %(!): 84   1538 Sodium: 139   1538 Potassium: 3 5   1538 Chloride: 101   1538 Creatinine: 0 60   1538 Lipase: 40   1538 Magnesium: 1 9   1546 PREGNANCY TEST URINE: Negative   1547 Chronic leukocytosis similar to prior   1548 Bacteria, UA(!): Field obscured, unable to enumerate      significant amount of bacteria in urine  Patient does have leukocytosis however similar to prior  Will treat for UTI  [place] with OBGYN  Return precautions discussed  MDM  Patient is a 79-year-old female with past medical history of anxiety, psychiatric disorder, previous psychosis presenting today with bilateral pelvic pain since starting her menstrual cycle  Patient on arrival is ambulatory to room is in no acute distress, vital signs stable, afebrile  On exam lungs clear auscultation, heart without murmurs rubs or gallops abdomen soft nontender  Will give fluids, Zofran, Toradol and check electrolytes given consistent nausea/vomiting  Disposition  Final diagnoses:   UTI (urinary tract infection)   Pelvic pain     Time reflects when diagnosis was documented in both MDM as applicable and the Disposition within this note     Time User Action Codes Description Comment    10/2/2022  3:51 PM Elaine Veliz Add [N39 0] UTI (urinary tract infection)     10/2/2022  3:51 PM Elaine Veliz Add [R10 2] Pelvic pain       ED Disposition     ED Disposition   Discharge    Condition   Stable    Date/Time   Sun Oct 2, 2022  3:53 PM    [address] discharge to home/self care                 Follow-up Information    None         Discharge Medication List as of 10/2/2022  3:54 PM      START taking these medications    Details   nitrofurantoin (MACROBID) 100 mg capsule Take 1 capsule (100 mg total) by mouth 2 (two) times a day, Starting Sun 10/2/2022, Normal         CONTINUE these medications which have NOT CHANGED    Details   Abilify Maintena 300 MG injection INJECT 1 EVERY 4 WEEKS, Historical Med      Abilify Maintena 300 MG SRER INJECT INTRAMUSCULARLY EVERY 4 WEEKS, Historical Med      albuterol (Ventolin HFA) 90 mcg/act inhaler Inhale 2 puffs every 4 (four) hours as needed for wheezing, Starting Wed 3/9/2022, Normal      azelastine (ASTELIN) 0 1 % nasal spray 1-2 sprays into each nostril 2 (two) times a day as needed for rhinitis Use in each nostril as directed, Starting Wed 7/13/2022, Normal      azelastine (OPTIVAR) 0 05 % ophthalmic solution Administer 1 drop to both eyes 2 (two) times a day, Starting Wed 7/20/2022, Normal      benztropine (COGENTIN) 2 mg tablet Take 2 mg by mouth daily, Starting Mon 8/8/2022, Historical Med      busPIRone (BUSPAR) 15 mg tablet Take 15 mg by mouth 3 (three) times a day, Starting Mon 8/8/2022, Historical Med      cloNIDine (CATAPRES) 0 2 mg tablet Starting Mon 3/14/2022, Historical Med      divalproex sodium (DEPAKOTE) 250 mg EC tablet Take 250 mg by mouth 2 (two) times a day, Starting Mon 8/8/2022, Historical Med      EPINEPHrine (EPIPEN) 0 3 mg/0 3 mL SOAJ Inject 0 3 mL (0 3 mg total) into a muscle once for 1 dose, Starting Wed 3/9/2022, Normal      fluticasone (FLONASE) 50 mcg/act nasal spray 2 sprays into each nostril daily, Starting Wed 7/13/2022, Normal      fluticasone-salmeterol (Advair HFA) 115-21 MCG/ACT inhaler Inhale 2 puffs 2 (two) times a day Rinse mouth after use , Starting Wed 7/13/2022, Normal      hydrOXYzine pamoate (VISTARIL) 50 mg capsule 50 mg daily at bedtime, Starting Mon 3/14/2022, Historical Med      levalbuterol (XOPENEX) 1 25 mg/3 mL nebulizer solution Take 3 mL (1 25 mg total) by nebulization every 6 (six) hours as needed for wheezing or shortness of breath, Starting Wed 7/13/2022, Normal      mometasone (ELOCON) 0 1 % cream Apply topically daily, Starting Wed 7/13/2022, Normal      pantoprazole (PROTONIX) 40 mg tablet Take 1 tablet (40 mg total) by mouth daily before breakfast, Starting Thu 7/7/2022, Normal      PARoxetine (PAXIL) 20 mg tablet Take 20 mg by mouth daily, Starting Mon 8/8/2022, Historical Med      Spacer/Aero-Holding Chambers ([employer]) FROYLAN Use 2 (two) times a day, Starting Wed 3/9/2022, Normal                 PDMP Review       Value Time User    PDMP Reviewed  Yes 11/25/2021 10:36 PM Bj Huertas MD          ED Provider  Electronically Signed by           Agusto Zarco MD  10/03/22 0922

## 2022-10-04 LAB — BACTERIA UR CULT: NORMAL

## 2022-10-18 ENCOUNTER — TELEPHONE (OUTPATIENT)
Dept: NEUROLOGY | Facility: CLINIC | Age: 22
End: 2022-10-18

## 2022-10-28 ENCOUNTER — CONSULT (OUTPATIENT)
Dept: NEUROLOGY | Facility: CLINIC | Age: 22
End: 2022-10-28

## 2022-10-28 VITALS
BODY MASS INDEX: 36.74 KG/M2 | SYSTOLIC BLOOD PRESSURE: 108 MMHG | DIASTOLIC BLOOD PRESSURE: 68 MMHG | HEART RATE: 68 BPM | WEIGHT: 207.4 LBS

## 2022-10-28 DIAGNOSIS — R41.89 BRAIN FOG: ICD-10-CM

## 2022-10-28 DIAGNOSIS — G43.109 MIGRAINE WITH AURA AND WITHOUT STATUS MIGRAINOSUS, NOT INTRACTABLE: Primary | ICD-10-CM

## 2022-10-28 DIAGNOSIS — R41.3 SHORT-TERM MEMORY LOSS: ICD-10-CM

## 2022-10-28 RX ORDER — DEXAMETHASONE 1 MG
TABLET ORAL
Qty: 5 TABLET | Refills: 0 | Status: SHIPPED | OUTPATIENT
Start: 2022-10-28

## 2022-10-28 RX ORDER — KETOROLAC TROMETHAMINE 30 MG/ML
60 INJECTION, SOLUTION INTRAMUSCULAR; INTRAVENOUS ONCE
Status: COMPLETED | OUTPATIENT
Start: 2022-10-28 | End: 2022-10-28

## 2022-10-28 RX ORDER — RIZATRIPTAN BENZOATE 10 MG/1
10 TABLET, ORALLY DISINTEGRATING ORAL ONCE AS NEEDED
Qty: 10 TABLET | Refills: 3 | Status: SHIPPED | OUTPATIENT
Start: 2022-10-28

## 2022-10-28 RX ORDER — DIPHENHYDRAMINE HYDROCHLORIDE 50 MG/ML
25 INJECTION INTRAMUSCULAR; INTRAVENOUS ONCE
Status: DISCONTINUED | OUTPATIENT
Start: 2022-10-28 | End: 2022-10-28

## 2022-10-28 RX ORDER — PROCHLORPERAZINE EDISYLATE 5 MG/ML
10 INJECTION INTRAMUSCULAR; INTRAVENOUS ONCE
Status: DISCONTINUED | OUTPATIENT
Start: 2022-10-28 | End: 2022-10-28

## 2022-10-28 RX ADMIN — KETOROLAC TROMETHAMINE 60 MG: 30 INJECTION, SOLUTION INTRAMUSCULAR; INTRAVENOUS at 16:08

## 2022-10-28 NOTE — PROGRESS NOTES
Review of Systems   Constitutional: Negative  Negative for appetite change and fever  HENT: Positive for tinnitus (right ear)  Negative for hearing loss, trouble swallowing and voice change  Eyes: Positive for photophobia and pain  Negative for visual disturbance  Respiratory: Negative  Negative for shortness of breath  Cardiovascular: Negative  Negative for palpitations  Gastrointestinal: Positive for nausea and vomiting  Endocrine: Negative  Negative for cold intolerance  Genitourinary: Negative  Negative for dysuria, frequency and urgency  Musculoskeletal: Positive for gait problem (balance), neck pain and neck stiffness  Negative for myalgias  Skin: Negative  Negative for rash  Allergic/Immunologic: Negative  Neurological: Positive for dizziness, weakness, light-headedness, numbness (left side, feet and legs) and headaches  Negative for tremors, seizures, syncope, facial asymmetry and speech difficulty  Hematological: Negative  Does not bruise/bleed easily  Psychiatric/Behavioral: Positive for confusion (brain fog) and sleep disturbance  Negative for hallucinations  All other systems reviewed and are negative

## 2022-10-28 NOTE — PATIENT INSTRUCTIONS
Headache Calendar  Please maintain a headache calendar  Consider using phone applications such as Migraine [facility] or [place] [facility]raine [facility]acker    Headache/migraine treatment:   Acute medications (for immediate treatment of a headache): It is ok to take ibuprofen, acetaminophen or naproxen (Advil, Tylenol,  Aleve, Excedrin) if they help your headaches you should limit these to No more than 2-3 times a week to avoid medication overuse/rebound headaches  For your more moderate to severe migraines take this medication early  Maxalt (rizatriptan) 10mg tabs - take one at the onset of headache  May repeat one time after 2 hours if pain has not resolved  (Max 2 a day and 10 a month)     Rescue medications (for prolonged or intractable headache)  - 1 mg dexamethasone tablet daily for up to a total of 5 days for prolonged migraine    Prescription preventive medications for headaches/migraines   (to take every day to help prevent headaches - not to take at the time of headache):  [x] Aimovig monthly injection    *Typically these types of medications take time until you see the benefit, although some may see improvement in days, often it may take weeks, especially if the medication is being titrated up to a beneficial level  Please contact us if there are any concerns or questions regarding the medication  Sleep and headache prevention:   [x] Melatonin - you may take 1-3 mg nightly for sleep or headache prevention  You should take this at sundown consistently every night for it to work  It works by gradually helping to adjust your sleep time over days to weeks, rather than immediately making you feel sleepy  Lifestyle Recommendations:  [x] SLEEP - Maintain a regular sleep schedule: Adults need at least 7-8 hours of uninterrupted a night   Maintain good sleep hygiene:  Going to bed and waking up at consistent times, avoiding excessive daytime naps, avoiding caffeinated beverages in the evening, avoid excessive stimulation in the evening and generally using bed primarily for sleeping  One hour before bedtime would recommend turning lights down lower, decreasing your activity (may read quietly, listen to music at a low volume)  When you get into bed, should eliminate all technology (no texting, emailing, playing with your phone, iPad or tablet in bed)  [x] HYDRATION - Maintain good hydration  Drink  2L of fluid a day (4 typical small water bottles)  [x] DIET - Maintain good nutrition  In particular don't skip meals and try and eat healthy balanced meals regularly  [x] TRIGGERS - Look for other triggers and avoid them: Limit caffeine to 1-2 cups a day or less  Avoid dietary triggers that you have noticed bring on your headaches (this could include aged cheese, peanuts, MSG, aspartame and nitrates)  [x] EXERCISE - physical exercise as we all know is good for you in many ways, and not only is good for your heart, but also is beneficial for your mental health, cognitive health and  chronic pain/headaches  I would encourage at the least 5 days of physical exercise weekly for at least 30 minutes  Education and Follow-up  [x] Please call with any questions or concerns  Of course if any new concerning symptoms go to the emergency department    [x] Follow up in 3 months

## 2022-10-28 NOTE — PROGRESS NOTES
Administered toradol injection into pt right buttock  No erythema   No induration   Pt tolerated injection

## 2022-10-28 NOTE — PROGRESS NOTES
[facility] Consult  PATIENT:  Karine Logan  MRN:  [id number]  :  2000  DATE OF SERVICE:  10/28/2022  REFERRED BY: Aspen Mccall MD  PMD: Brad Dawkins MD    Assessment/Plan:     Karine Logan is a very pleasant 20 y o  female with a past medical history that includes GERD, asthma, bipolar, vertigo, schizophrenia referred here for evaluation of headache  Initial evaluation 10/28/2022     Apurva reports a longstanding history of migraines since she was about 12years old  She was previously tried on Topamax but reports that it increased her headaches  She takes Depakote for mood but it has not helped with her headaches at all  For abortive management she has really only tried over-the-counter medications  We discussed variety of treatment options but thought that Lorin Carlosler would be a good choice for her  From an abortive standpoint I have prescribed Maxalt and for rescue dexamethasone as needed  She has multiple potential contributing factors to her headaches including a strong mental health history as well as poor sleep  Furthermore, during today's visit she endorsed difficulties with memory and brain fog which I suspect are in the setting of her poor sleep and ongoing headaches  Nonetheless I will order some blood work including B12, folate, TSH  I have encouraged her to keep me updated throughout the process and let me know if there are any issues or concerns  Due to ongoing migraine at her office visit today we have decided to treat her with 1 time dose of IM Toradol 60 mg    Apurva was seen today for migraine  Diagnoses and all orders for this visit:    Migraine with aura and without status migrainosus, not intractable  -     Ambulatory Referral to Neurology  -     Erenumab-aooe 140 MG/ML SOAJ; Inject 140 mg under the skin every 30 (thirty) days  -     rizatriptan (MAXALT-MLT) 10 mg disintegrating tablet;  Take 1 tablet (10 mg total) by mouth once as needed for migraine for up to 1 dose May repeat in 2 hours if needed  -     dexamethasone (DECADRON) 1 mg tablet; Take 1 tablet daily for up to 5 days for prolonged migraine rescue  -     ketorolac (TORADOL) 60 mg/2 mL IM injection 60 mg  -     prochlorperazine (COMPAZINE) injection 10 mg  -     diphenhydrAMINE (BENADRYL) injection 25 mg    Brain fog  -     Vitamin B12; Future  -     Folate; Future  -     TSH, 3rd generation with Free T4 reflex; Future    Short-term memory loss  -     Vitamin B12; Future  -     Folate; Future  -     TSH, 3rd generation with Free T4 reflex; Future      Workup:  - Neurologic assessment reveals unremarkable neurological exam   - With no new or concerning symptoms, no red flags and an unremarkable neurologic exam, there is no specific indication for further evaluation with MRI brain  However, this could be obtained at any time if indicated  Preventative:  - we discussed headache hygiene and lifestyle factors that may improve headaches  - Aimovig  - Currently on through other providers: Depakote 250mg BID  - Past/ failed/contraindicated: Topamax (made headaches worse)  - future options: CGRP med, botox    Acute:  - discussed not taking over-the-counter or prescription pain medications more than 3 days per week to prevent medication overuse/rebound headache  - Maxalt 10mg ODT  - Currently on through other providers: None  - Past/ failed/contraindicated: None  - future options:  Triptan, Toradol IM or p o , kevin Hagan nurtec    Rescue:  - 1 mg dexamethasone tablet daily for up to a total of 5 days for prolonged migraine  Patient instructions   Headache Calendar  Please maintain a headache calendar  Consider using phone applications such as Migraine Buddy or [place] Migraine Tracker    Headache/migraine treatment:   Acute medications (for immediate treatment of a headache):    It is ok to take ibuprofen, acetaminophen or naproxen (Advil, Tylenol,  Aleve, Excedrin) if they help your headaches you should limit these to No more than 2-3 times a week to avoid medication overuse/rebound headaches  For your more moderate to severe migraines take this medication early  Maxalt (rizatriptan) 10mg tabs - take one at the onset of headache  May repeat one time after 2 hours if pain has not resolved  (Max 2 a day and 10 a month)     Rescue medications (for prolonged or intractable headache)  - 1 mg dexamethasone tablet daily for up to a total of 5 days for prolonged migraine    Prescription preventive medications for headaches/migraines   (to take every day to help prevent headaches - not to take at the time of headache):  [x] Aimovig monthly injection    *Typically these types of medications take time until you see the benefit, although some may see improvement in days, often it may take weeks, especially if the medication is being titrated up to a beneficial level  Please contact us if there are any concerns or questions regarding the medication  Sleep and headache prevention:   [x] Melatonin - you may take 1-3 mg nightly for sleep or headache prevention  You should take this at sundown consistently every night for it to work  It works by gradually helping to adjust your sleep time over days to weeks, rather than immediately making you feel sleepy  Lifestyle Recommendations:  [x] SLEEP - Maintain a regular sleep schedule: Adults need at least 7-8 hours of uninterrupted a night  Maintain good sleep hygiene:  Going to bed and waking up at consistent times, avoiding excessive daytime naps, avoiding caffeinated beverages in the evening, avoid excessive stimulation in the evening and generally using bed primarily for sleeping  One hour before bedtime would recommend turning lights down lower, decreasing your activity (may read quietly, listen to music at a low volume)   When you get into bed, should eliminate all technology (no texting, emailing, playing with your phone, iPad or tablet in bed)  [x] HYDRATION - Maintain good hydration  Drink  2L of fluid a day (4 typical small water bottles)  [x] DIET - Maintain good nutrition  In particular don't skip meals and try and eat healthy balanced meals regularly  [x] TRIGGERS - Look for other triggers and avoid them: Limit caffeine to 1-2 cups a day or less  Avoid dietary triggers that you have noticed bring on your headaches (this could include aged cheese, peanuts, MSG, aspartame and nitrates)  [x] EXERCISE - physical exercise as we all know is good for you in many ways, and not only is good for your heart, but also is beneficial for your mental health, cognitive health and  chronic pain/headaches  I would encourage at the least 5 days of physical exercise weekly for at least 30 minutes  Education and Follow-up  [x] Please call with any questions or concerns  Of course if any new concerning symptoms go to the emergency department  [x] Follow up in 3 months  CC: We had the pleasure of evaluating Jessica Salter in neurological consultation today  Jessica Salter is a   right handed female who presents today for evaluation of headaches  History obtained from patient as well as available medical record review  History of Present Illness:   Current medical illnesses  or past medical history include GERD, asthma, bipolar, vertigo, schizophrenia, PNES    Pertinent history:  - Seen in neurology clinic for PNES on 8/24/22  Endorsed worsening migraines  Headaches started at what age? 12years old  How often do the headaches occur?   - as of 10/28/2022: 15/30 (Severe: 15)  What time of the day do the headaches start? No particular time of day   How long do the headaches last? Up to a week at times  Are you ever headache free? Yes    Aura? with aura - facial numbness (left sided)     Where is your headache located and pain quality? Occipital or frontal; tightness and throbbing  What is the intensity of pain?  Worst 9-10/10, Average: 5/10  Associated symptoms:   [x] Nausea  [x] Stiff or sore neck   [x] Problems with concentration  [x] Photophobia     [x]Phonophobia  [x] Blurred vision   [x] Prefer quiet, dark room  [x] Light-headed or dizzy     [x] Tinnitus     Things that make the headache worse? Any movement    Headache triggers:  none    Have you seen someone else for headaches or pain? Yes, previously seen by neurology when younger  Have you had trigger point injection performed and how often? No  Have you had Botox injection performed and how often? No   Have you had epidural injections or transforaminal injections performed? No  Are you current pregnant or planning on getting pregnant? No; not on birth control  Have you ever had any Brain imaging? Yes, MRI brain    Last eye exam: couple months ago; significantly poor vision in the right eye; normal dilated exam    What medications do you take or have you taken for your headaches? ABORTIVE:    OTC medications: None  Prescription: None    Past/ failed/contraindicated:  OTC medications: Tylenol/Ibuprofen did not help  Prescription: Unclear if previously tried Imitrex    PREVENTIVE:   Depakote (for PNES)    Past/ failed/contraindicated:   Topamax (made headaches worse)    LIFESTYLE  Sleep   - averages: about 5 hours  Problems falling asleep?:   Yes  Problems staying asleep?:  Yes  - Positive snoring  - Prior sleep study did not demonstrate CHANDANA    Physical activity: None    Water: plenty per day  Caffeine: 1 cup of coffee per day    Mood:   Anxiety, depression, schizophrenia  - Follows with psychiatry    The following portions of the patient's history were reviewed and updated as appropriate: allergies, current medications, past family history, past medical history, past social history, past surgical history and problem list     Pertinent family history:  Family history of headaches:  migraine headaches in mother  Any family history of aneurysms - Yes - maternal uncle    Pertinent social history:  Work: Not at the moment  Education: high school  Lives with mom and siblings    Illicit Drugs: occasionally smokes marijuana  Alcohol/tobacco: Vapes daily, cigarettes occasionally, alcohol socially    Past Medical History:     Past Medical History:   Diagnosis Date   • Anaphylaxis     apricot   • Anxiety    • Asthma    • Cluster headache    • Eczema    • Headache, tension-type    • Lymphadenitis     Last assessed 12/30/14   • Lymphadenopathy     Last assessed 10/16/13   • 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[place] [address] )    • Migraine    • 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[address] )    • Psychiatric disorder    • Psychiatric illness    • Psychosis ([place] [address] )    • Seizures ([place] [address] )        Patient Active Problem List   Diagnosis   • Acid reflux   • Allergic rhinitis   • Asthma   • Bipolar disorder ([place] [address] )   • Breast pain   • Common migraine without aura   • Cyst of right ovary   • Fibrocystic breast   • Hyperlipidemia   • Overweight   • Vertigo   • Lymphadenopathy   • Functional neurological symptom disorder with attacks or seizures   • Epigastric pain   • Loose stools   • Change in bowel habits   • Nausea and vomiting   • Psychogenic nonepileptic seizure   • Lumbar radiculopathy   • Tobacco abuse   • COVID-19 virus infection   • Bipolar I disorder, most recent episode mixed, severe with psychotic features ([place] [address] )   • Schizophrenia ([place] [address] )   • Pharyngitis   • Encounter for autism screening   • Hiatal hernia   • Bladder wall thickening   • Facial numbness   • Tinnitus of right ear   • Current every day nicotine vaping       Medications:      Current Outpatient Medications   Medication Sig Dispense Refill   • Abilify Maintena 300 MG injection INJECT 1 EVERY 4 WEEKS     • Abilify Maintena 300 MG SRER INJECT INTRAMUSCULARLY EVERY 4 WEEKS     • albuterol (Ventolin HFA) 90 mcg/act inhaler Inhale 2 puffs every 4 (four) hours as needed for wheezing 18 g 3   • azelastine (ASTELIN) 0 1 % nasal spray 1-2 sprays into each nostril 2 (two) times a day as needed for rhinitis Use in each nostril as directed 30 mL 5   • azelastine (OPTIVAR) 0 05 % ophthalmic solution Administer 1 drop to both eyes 2 (two) times a day 6 mL 5   • benztropine (COGENTIN) 2 mg tablet Take 2 mg by mouth daily     • busPIRone (BUSPAR) 15 mg tablet Take 15 mg by mouth 3 (three) times a day     • cloNIDine (CATAPRES) 0 2 mg tablet      • divalproex sodium (DEPAKOTE) 250 mg EC tablet Take 250 mg by mouth 2 (two) times a day     • EPINEPHrine (EPIPEN) 0 3 mg/0 3 mL SOAJ Inject 0 3 mL (0 3 mg total) into a muscle once for 1 dose 4 each 3   • fluticasone (FLONASE) 50 mcg/act nasal spray 2 sprays into each nostril daily 18 2 mL 3   • fluticasone-salmeterol (Advair HFA) 115-21 MCG/ACT inhaler Inhale 2 puffs 2 (two) times a day Rinse mouth after use  12 g 5   • hydrOXYzine pamoate (VISTARIL) 50 mg capsule 50 mg daily at bedtime     • levalbuterol (XOPENEX) 1 25 mg/3 mL nebulizer solution Take 3 mL (1 25 mg total) by nebulization every 6 (six) hours as needed for wheezing or shortness of breath 75 mL 3   • mometasone (ELOCON) 0 1 % cream Apply topically daily 45 g 1   • nitrofurantoin (MACROBID) 100 mg capsule Take 1 capsule (100 mg total) by mouth 2 (two) times a day 10 capsule 0   • pantoprazole (PROTONIX) 40 mg tablet Take 1 tablet (40 mg total) by mouth daily before breakfast 30 tablet 3   • PARoxetine (PAXIL) 20 mg tablet Take 20 mg by mouth daily     • promethazine (PHENERGAN) 25 mg suppository Insert 1 suppository (25 mg total) into the rectum every 6 (six) hours as needed for nausea or vomiting 12 suppository 0   • Spacer/Aero-Holding Chambers (Vortex Valved Holding Chamber) FROYLAN Use 2 (two) times a day 1 each 0     No current facility-administered medications for this visit  Allergies:       Allergies   Allergen Reactions   • Bee Venom Swelling   • Penicillins Hives   • Blueberry Flavor - Food Allergy Rash   • Pork-Derived Products - Food Allergy Rash   • Shiitake Mushroom - Food Allergy Rash       Family History:     Family History   Problem Relation Age of Onset   • Migraines Mother    • Other Mother         Neck pain   • Depression Mother    • Drug abuse Mother    • Drug abuse Father    • Schizophrenia Father    • Febrile seizures Maternal Uncle    • Stroke Maternal Uncle        Social History:       Social History     Socioeconomic History   • Marital status: Single     Spouse name: Not on file   • Number of children: 0   • Years of education: Not on file   • Highest education level: High school graduate   Occupational History   • Occupation: unemployed   Tobacco Use   • Smoking status: Current Some Day Smoker     Packs/day: 0 00     Years: 0 00     Pack years: 0 00     Types: Cigarettes   • Smokeless tobacco: Never Used   • Tobacco comment: I stopped smoking cigarettes but started vaping   Vaping Use   • Vaping Use: Every day   • Substances: Nicotine, Flavoring   Substance and Sexual Activity   • Alcohol use: Not Currently     Alcohol/week: 2 0 standard drinks     Types: 2 Shots of liquor per week     Comment: Occasionally   • Drug use: Not Currently     Frequency: 3 0 times per week     Types: Marijuana   • Sexual activity: Yes     Partners: Female     Birth control/protection: None   Other Topics Concern   • Not on file   Social History Narrative    Lives in [place] [facility] [facility] with her girlfriend, mother, siblings, step-father    Pt has Dog - Not allowed in bedroom     Social Determinants of Health     Financial Resource Strain: Medium Risk   • Difficulty of Paying Living Expenses: Somewhat hard   Food Insecurity: Food Insecurity Present   • Worried About Running Out of Food in the Last Year: Sometimes true   • Ran Out of Food in the Last Year: Sometimes true   Transportation Needs: Unmet Transportation Needs   • Lack of Transportation (Medical):  Yes   • Lack of Transportation (Non-Medical): Yes   Physical Activity: Sufficiently Active   • Days of Exercise per Week: 3 days   • Minutes of Exercise per Session: 60 min Stress: Not on file   Social Connections: Not on file   Intimate Partner Violence: Not on file   Housing Stability: Low Risk    • Unable to Pay for Housing in the Last Year: No   • Number of Places Lived in the Last Year: 1   • Unstable Housing in the Last Year: No         Objective:   Physical Exam:                                                                 Vitals:            Constitutional:    /68 (BP Location: Left arm, Patient Position: Sitting, Cuff Size: Adult)   Pulse 68   Wt 94 1 kg (207 lb 6 4 oz)   LMP 10/01/2022 (Approximate)   BMI 36 74 kg/m²   BP Readings from Last 3 Encounters:   10/28/22 108/68   10/03/22 134/72   10/02/22 119/66     Pulse Readings from Last 3 Encounters:   10/28/22 68   10/03/22 66   10/02/22 96         Well developed, well nourished, well groomed  No dysmorphic features  HEENT:  Normocephalic atraumatic  Oropharynx is clear and moist  No oral mucosal lesions  Chest:  Respirations regular and unlabored  Cardiovascular:  Distal extremities warm without palpable edema or tenderness, no observed significant swelling  Musculoskeletal:  (see below under neurologic exam for evaluation of motor function and gait)   Skin:  warm and dry, not diaphoretic  No apparent birthmarks or stigmata of neurocutaneous disease  Psychiatric:  Normal behavior and appropriate affect     Neurological Examination:     Mental status/cognitive function:   Orientated to time, place and person  Recent and remote memory intact  Attention span and concentration as well as fund of knowledge are appropriate for age  Normal language and spontaneous speech  Cranial Nerves:  II-visual fields full  Fundi poorly visualized due to pupillary constriction  III, IV, VI-Pupils were equal, round, and reactive to light and accomodation  Extraocular movements were full and conjugate without nystagmus  Conjugate gaze, normal smooth pursuits, normal saccades   V-facial sensation symmetric  VII-facial expression symmetric, intact forehead wrinkle, strong eye closure, symmetric smile    VIII-hearing grossly intact bilaterally   IX, X-palate elevation symmetric, no dysarthria  XI-shoulder shrug strength intact    XII-tongue protrusion midline  Motor Exam: symmetric bulk and tone throughout, no pronator drift  Power/strength 5/5 bilateral upper and lower extremities, no atrophy, fasciculations or abnormal movements noted  Sensory: grossly intact light touch in all extremities  Reflexes: brachioradialis 2+, biceps 2+, knee 2+, ankle 2+ bilaterally  No ankle clonus  Coordination: Finger nose finger intact bilaterally, no apparent dysmetria, ataxia or tremor noted  Gait: steady casual and tandem gait  Pertinent lab results: None     Pertinent Imaging:   MRI brain w/ and w/o contrast 9/8/22: stable exam  No acute intracranial abnormality  Mild white matter changes within the cerebral hemispheres suggestive of chronic microangiopathy  I have personally reviewed imaging and radiology read  Review of Systems:   Constitutional: Negative  Negative for appetite change and fever  HENT: Positive for tinnitus (right ear)  Negative for hearing loss, trouble swallowing and voice change  Eyes: Positive for photophobia and pain  Negative for visual disturbance  Respiratory: Negative  Negative for shortness of breath  Cardiovascular: Negative  Negative for palpitations  Gastrointestinal: Positive for nausea and vomiting  Endocrine: Negative  Negative for cold intolerance  Genitourinary: Negative  Negative for dysuria, frequency and urgency  Musculoskeletal: Positive for gait problem (balance), neck pain and neck stiffness  Negative for myalgias  Skin: Negative  Negative for rash  Allergic/Immunologic: Negative  Neurological: Positive for dizziness, weakness, light-headedness, numbness (left side, feet and legs) and headaches   Negative for tremors, seizures, syncope, facial asymmetry and speech difficulty  Hematological: Negative  Does not bruise/bleed easily  Psychiatric/Behavioral: Positive for confusion (brain fog) and sleep disturbance  Negative for hallucinations  All other systems reviewed and are negative  I have spent 40 minutes with the patient today in which greater than 50% of this time was spent in counseling/coordination of care regarding Risks and benefits of tx options, Importance of tx compliance and Impressions  I also spent 20 minutes non face to face for this patient the same day       Activity Minutes   Precharting/reviewing 10   Patient care/counseling 40   Postcharting/care coordination 10       Author:  Grace Muir 10/28/2022 3:22 PM

## 2022-11-14 ENCOUNTER — OFFICE VISIT (OUTPATIENT)
Dept: GASTROENTEROLOGY | Facility: CLINIC | Age: 22
End: 2022-11-14

## 2022-11-14 VITALS
WEIGHT: 206 LBS | DIASTOLIC BLOOD PRESSURE: 78 MMHG | BODY MASS INDEX: 36.5 KG/M2 | SYSTOLIC BLOOD PRESSURE: 113 MMHG | HEART RATE: 83 BPM | TEMPERATURE: 97.8 F | OXYGEN SATURATION: 98 % | HEIGHT: 63 IN

## 2022-11-14 DIAGNOSIS — R19.7 DIARRHEA, UNSPECIFIED TYPE: Primary | ICD-10-CM

## 2022-11-14 DIAGNOSIS — K21.9 GASTROESOPHAGEAL REFLUX DISEASE WITHOUT ESOPHAGITIS: ICD-10-CM

## 2022-11-14 DIAGNOSIS — R11.2 NAUSEA AND VOMITING, UNSPECIFIED VOMITING TYPE: ICD-10-CM

## 2022-11-14 DIAGNOSIS — R10.9 ABDOMINAL PAIN, UNSPECIFIED ABDOMINAL LOCATION: ICD-10-CM

## 2022-11-14 RX ORDER — FAMOTIDINE 40 MG/1
40 TABLET, FILM COATED ORAL
Qty: 30 TABLET | Refills: 3 | Status: SHIPPED | OUTPATIENT
Start: 2022-11-14

## 2022-11-14 RX ORDER — DICYCLOMINE HCL 20 MG
20 TABLET ORAL EVERY 6 HOURS PRN
Qty: 120 TABLET | Refills: 2 | Status: SHIPPED | OUTPATIENT
Start: 2022-11-14

## 2022-11-14 NOTE — PATIENT INSTRUCTIONS
Schedule an endoscopy given the ongoing nausea and the pain  Blood work and stool tests for diarrhea  Trial of Bentyl  Continue Protonix 40 mg daily  Can add Pepcid at bedtime  Gaviscon or Tums as needed  Can continue promethazine suppository as needed for nausea  Can trial a gastroparesis diet  Continue to not smoke Marijuana  Continue to try and work on quitting smoking (I know you can do it)    Gastroparesis diet:  Changes to your diet  Maintaining adequate nutrition is the most important goal in the treatment of gastroparesis  Many people can manage gastroparesis with diet changes and dietary changes are the first step in managing this condition  Your doctor may refer you to a dietitian who can work with you to find foods that are easier for you to digest so that you're more likely to get enough calories and nutrients from the food you eat   A dietitian might suggest that you try to:  Eat smaller meals more frequently   Chew food thoroughly   Eat well-cooked fruits and vegetables rather than raw fruits and vegetables   Avoid fibrous fruits and vegetables, such as oranges and broccoli, which may cause bezoars   Choose mostly low-fat foods, but if you can tolerate them, add small servings of fatty foods to your diet   Try soups and pureed foods if liquids are easier for you to swallow   Drink about 34 to 51 ounces (1 to 1 5 liters) of water a day   Exercise gently after you eat, such as going for a walk   Avoid carbonated drinks, alcohol and smoking   Try to avoid lying down for 2 hours after a meal   Take a multivitamin daily    Here's a brief list of foods recommended for people with gastroparesis (your dietitian can give you a more comprehensive list):  Starches  White bread and rolls and "light" whole-wheat bread without nuts or seeds   Plain or egg bagels   English muffins   Flour or corn tortillas   Pancakes   Puffed wheat and rice cereals   Cream of wheat or rice   White crackers   Potatoes, white or sweet (no skin)   Baked french fries   Rice   Pasta  Protein  Lean beef, veal and pork (not fried)   Chicken or turkey (no skin and not fried)   Crab, lobster, shrimp, clams, scallops, oysters   Tuna (packed in water)   M D C  Holdings   Eggs   Tofu   Strained meat baby food  Fruits and vegetables  Baby food vegetables and fruits   Tomato sauce, paste, puree, juice   Carrots (cooked)   Beets (cooked)   Mushrooms (cooked)   Vegetable juice   Vegetable broth   Fruit juices and drinks   Applesauce   Bananas   Peaches and pears (canned)  Dairy  Milk, if tolerated   Yogurt (without fruit pieces)   Custard and pudding   Frozen yogurt      Scheduled date of EGD (as of today): 3/23/2022  Physician performing: Dr Tammy Betancur  Location of procedure: [facility]  Bowel prep: EGD  Clearances:   n/a

## 2022-11-14 NOTE — PROGRESS NOTES
[facility] - Outpatient Follow-up Note  Maria Eugenia Woody 20 y o  female MRN: [id number]  Encounter: [id number]          ASSESSMENT AND PLAN:    Maria Eugenia Woody is a 20 y o  female with hiatal hernia, nausea, dyspepsia, GERD, family history of Crohn's who presents for follow-up  She was last seen in July and at that time he later ordered a gastric emptying study and increased her Protonix  She notes that she has continued to have some nausea and vomiting, however her biggest symptom now is diarrhea that started a few days after she completed antibiotics for UTI  The diarrhea has persisted now for a month  She has also continued to have nausea and vomiting as well as other gastrointestinal symptoms  CT from October with possible bladder wall thickening possibly cystitis  Brain MRI without acute intracranial abnormalities and mild white matter changes within the cerebral hemisphere suggestive of chronic microangiopathy  Gastric emptying study noted to be incomplete as the patient vomited after 90 minutes and at the 90 minute renzo the rate was within normal     Endoscopy and colonoscopy from May 2019 with overall normal upper GI exam and overall normal TI and colon  Biopsies overall with benign duodenum, mild chronic inflammation with reactive gastritis but no H pylori, benign terminal ileum and benign colon  Most recent blood work notable for CMP with total protein of 9 1 and glucose of 127 but otherwise normal   White blood cell count 16 92 but normal hemoglobin, MCV  Platelets 4 1  UA with elevated leukocytes, blood, ketones, innumerable bacteria  1  Diarrhea, unspecified type    2  Nausea and vomiting, unspecified vomiting type    3  Gastroesophageal reflux disease without esophagitis    4   Abdominal pain, unspecified abdominal location        Orders Placed This Encounter   Procedures   • Calprotectin,Fecal   • Clostridium difficile toxin by PCR with EIA   • Fecal fat, qualitative   • Giardia, EIA, Ova/Parasite   • H  pylori antigen, stool   • Ova and parasite examination   • Pancreatic elastase, fecal   • Rotavirus antigen, stool   • Stool Enteric Bacterial Panel by PCR   • Celiac Disease Antibody Profile   • CBC and differential   • Comprehensive metabolic panel   • EGD     Schedule an endoscopy   Blood work and stool tests for diarrhea  Gastric emptying study  Trial of Bentyl  Trial of Rifaximin pending above  Continue Protonix 40 mg daily  Can add Pepcid at bedtime  Gaviscon or Tums as needed  Can continue promethazine suppository as needed for nausea  Repeat CBC, CMP  Can trial a gastroparesis diet  Continue to not smoke Marijuana  Smoking cessation  ______________________________________________________________________    SUBJECTIVE:    Archana Foote is a 20 y o  female who presents with complaint of soft stools    She is having soft stools, [place]  It started 1 month ago  She took an antibiotic for a UTI and the symptoms started right after  No travel  10 stools per day  Worse when she eats  Nothing makes it better  Still present even when not eating  No BRBPR or black stools  + epigastric pain, like a squeezing pain  Better with cold  It feels like a constant burning  Not on any medications for it  The pain is a/w heartburn  Always nauseated and sometimes foam comes up which is like vomiting  No marijuana use  These symptoms feel similar to the prior symptoms  + weight gain  She burps up whatever she eats  REVIEW OF SYSTEMS IS OTHERWISE NEGATIVE    10 point ROS reviewed and negative, except as above      Historical Information   Past Medical History:   Diagnosis Date   • Anaphylaxis     apricot   • Anxiety    • Asthma    • Cluster headache    • Eczema    • Headache, tension-type    • Lymphadenitis     Last assessed 12/30/14   • Lymphadenopathy     Last assessed 10/16/13   • 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[facility] [address] )    • Migraine    • 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[facility])    • Psychiatric disorder    • Psychiatric illness    • Psychosis ([facility] [address] )    • Seizures ([facility] [address] )      Past Surgical History:   Procedure Laterality Date   • COLONOSCOPY N/A 05/03/2019    Procedure: COLONOSCOPY;  Surgeon: Norma Cornejo MD;  Location:  GI LAB; Service: Gastroenterology   • ESOPHAGOGASTRODUODENOSCOPY N/A 05/03/2019    Procedure: ESOPHAGOGASTRODUODENOSCOPY (EGD); Surgeon: Norma Cornejo MD;  Location:  GI LAB;   Service: Gastroenterology     Social History   Social History     Substance and Sexual Activity   Alcohol Use Not Currently   • Alcohol/week: 2 0 standard drinks   • Types: 2 Shots of liquor per week    Comment: Occasionally     Social History     Substance and Sexual Activity   Drug Use Not Currently   • Frequency: 3 0 times per week   • Types: Marijuana     Social History     Tobacco Use   Smoking Status Current Some Day Smoker   • Packs/day: 0 00   • Years: 0 00   • Pack years: 0 00   • Types: Cigarettes   Smokeless Tobacco Never Used   Tobacco Comment    I stopped smoking cigarettes but started vaping     Family History   Problem Relation Age of Onset   • Migraines Mother    • Other Mother         Neck pain   • Depression Mother    • Drug abuse Mother    • Drug abuse Father    • Schizophrenia Father    • Febrile seizures Maternal Uncle    • Stroke Maternal Uncle        Meds/Allergies       Current Outpatient Medications:   •  Abilify Maintena 300 MG injection  •  Abilify Maintena 300 MG SRER  •  albuterol (Ventolin HFA) 90 mcg/act inhaler  •  azelastine (ASTELIN) 0 1 % nasal spray  •  azelastine (OPTIVAR) 0 05 % ophthalmic solution  •  benztropine (COGENTIN) 2 mg tablet  •  busPIRone (BUSPAR) 15 mg tablet  •  cloNIDine (CATAPRES) 0 2 mg tablet  •  dexamethasone (DECADRON) 1 mg tablet  •  dicyclomine (BENTYL) 20 mg tablet  •  divalproex sodium (DEPAKOTE) 250 mg EC tablet  •  Erenumab-aooe 140 MG/ML SOAJ  •  famotidine (PEPCID) 40 MG tablet  •  fluticasone (FLONASE) 50 mcg/act nasal spray  • fluticasone-salmeterol (Advair HFA) 115-21 MCG/ACT inhaler  •  hydrOXYzine pamoate (VISTARIL) 50 mg capsule  •  levalbuterol (XOPENEX) 1 25 mg/3 mL nebulizer solution  •  mometasone (ELOCON) 0 1 % cream  •  nitrofurantoin (MACROBID) 100 mg capsule  •  pantoprazole (PROTONIX) 40 mg tablet  •  PARoxetine (PAXIL) 20 mg tablet  •  promethazine (PHENERGAN) 25 mg suppository  •  rizatriptan (MAXALT-MLT) 10 mg disintegrating tablet  •  Spacer/Aero-Holding Chambers (Vortex Valved Holding Chamber) FROYLAN  •  EPINEPHrine (EPIPEN) 0 3 mg/0 3 mL SOAJ    Allergies   Allergen Reactions   • Bee Venom Swelling   • Penicillins Hives   • Blueberry Flavor - Food Allergy Rash   • Pork-Derived Products - Food Allergy Rash   • Shiitake Mushroom - Food Allergy Rash           Objective     Blood pressure 113/78, pulse 83, temperature 97 8 °F (36 6 °C), temperature source Tympanic, height 5' 3" (1 6 m), weight 93 4 kg (206 lb), SpO2 98 %, not currently breastfeeding  Body mass index is 36 49 kg/m²  PHYSICAL EXAMINATION:    General Appearance:   Alert, cooperative, no distress   HEENT:  Normocephalic, atraumatic, anicteric  Neck supple, symmetrical, trachea midline  Lungs:   Equal chest rise and unlabored breathing, normal effort, no coughing  Cardiovascular:   No visualized JVD  Abdomen:   No abdominal distension  Skin:   No jaundice, rashes, or lesions  Musculoskeletal:   Normal range of motion visualized  Psych:  Normal affect and normal insight  Neuro:  Alert and appropriate  Lab Results:   No visits with results within 1 Day(s) from this visit     Latest known visit with results is:   Admission on 10/03/2022, Discharged on 10/03/2022   Component Date Value   • Ventricular Rate 10/03/2022 67    • Atrial Rate 10/03/2022 67    • RI Interval 10/03/2022 124    • QRSD Interval 10/03/2022 92    • QT Interval 10/03/2022 430    • QTC Interval 10/03/2022 454    • P Axis 10/03/2022 67    • QRS Axis 10/03/2022 81    • T Wave Axis 10/03/2022 55    • POC Glucose 10/03/2022 125    • WBC 10/03/2022 16 92 (A)   • RBC 10/03/2022 5 16 (A)   • Hemoglobin 10/03/2022 14 9    • Hematocrit 10/03/2022 44 4    • MCV 10/03/2022 86    • MCH 10/03/2022 28 9    • MCHC 10/03/2022 33 6    • RDW 10/03/2022 13 1    • MPV 10/03/2022 9 4    • Platelets 67/53/6661 401 (A)   • nRBC 10/03/2022 0    • Neutrophils Relative 10/03/2022 89 (A)   • Immat GRANS % 10/03/2022 1    • Lymphocytes Relative 10/03/2022 7 (A)   • Monocytes Relative 10/03/2022 3 (A)   • Eosinophils Relative 10/03/2022 0    • Basophils Relative 10/03/2022 0    • Neutrophils Absolute 10/03/2022 15 00 (A)   • Immature Grans Absolute 10/03/2022 0 10    • Lymphocytes Absolute 10/03/2022 1 22    • Monocytes Absolute 10/03/2022 0 54    • Eosinophils Absolute 10/03/2022 0 00    • Basophils Absolute 10/03/2022 0 06    • Sodium 10/03/2022 139    • Potassium 10/03/2022 3 5    • Chloride 10/03/2022 100    • CO2 10/03/2022 24    • ANION GAP 10/03/2022 15 (A)   • BUN 10/03/2022 14    • Creatinine 10/03/2022 0 73    • Glucose 10/03/2022 127 (A)   • Calcium 10/03/2022 9 4    • AST 10/03/2022 35    • ALT 10/03/2022 31    • Alkaline Phosphatase 10/03/2022 114    • Total Protein 10/03/2022 9 1 (A)   • Albumin 10/03/2022 4 9    • Total Bilirubin 10/03/2022 0 67    • eGFR 10/03/2022 116    • Lipase 10/03/2022 47    • LACTIC ACID 10/03/2022 2 5 (A)   • Color, UA 10/03/2022 Brown (A)   • Clarity, UA 10/03/2022 Cloudy (A)   • Specific [place], UA 10/03/2022 1 025    • pH, UA 10/03/2022 6 0    • Leukocytes, UA 10/03/2022 25 0 (A)   • Nitrite, UA 10/03/2022 Negative    • Protein, UA 10/03/2022 100 (2+) (A)   • Glucose, UA 10/03/2022 Negative    • Ketones, UA 10/03/2022 >=150 (3+) (A)   • Bilirubin, UA 10/03/2022 1 mg/dL (A)   • Occult Blood, UA 10/03/2022 250 0 (A)   • UROBILINOGEN UA 10/03/2022 4 0 (A)   • EXT PREG TEST UR (Ref: N* 10/03/2022 negative    • Control 10/03/2022 valid    • RBC, UA 10/03/2022 Innumerable (A)   • WBC, UA 10/03/2022 2-4    • Epithelial Cells 10/03/2022 Moderate (A)   • Bacteria, UA 10/03/2022 Innumerable (A)   • MUCUS THREADS 10/03/2022 Moderate (A)       Lab Results   Component Value Date    WBC 16 92 (H) 10/03/2022    HGB 14 9 10/03/2022    HCT 44 4 10/03/2022    MCV 86 10/03/2022     (H) 10/03/2022       Lab Results   Component Value Date     11/11/2015    SODIUM 139 10/03/2022    K 3 5 10/03/2022     10/03/2022    CO2 24 10/03/2022    ANIONGAP 11 11/11/2015    AGAP 15 (H) 10/03/2022    BUN 14 10/03/2022    CREATININE 0 73 10/03/2022    GLUC 127 (H) 10/03/2022    GLUF 106 (H) 12/20/2021    CALCIUM 9 4 10/03/2022    AST 35 10/03/2022    ALT 31 10/03/2022    ALKPHOS 114 10/03/2022    PROT 8 0 11/11/2015    TP 9 1 (H) 10/03/2022    BILITOT 0 22 11/11/2015    TBILI 0 67 10/03/2022    EGFR 116 10/03/2022       Lab Results   Component Value Date    CRP <3 0 05/30/2019       Lab Results   Component Value Date    [id number] 2 060 06/05/2019       No results found for: IRON, TIBC, FERRITIN    Radiology Results:   No results found

## 2022-11-15 ENCOUNTER — TELEPHONE (OUTPATIENT)
Dept: UROLOGY | Facility: AMBULATORY SURGERY CENTER | Age: 22
End: 2022-11-15

## 2022-11-15 ENCOUNTER — TELEPHONE (OUTPATIENT)
Dept: GASTROENTEROLOGY | Facility: CLINIC | Age: 22
End: 2022-11-15

## 2022-11-15 DIAGNOSIS — R10.84 GENERALIZED ABDOMINAL PAIN: ICD-10-CM

## 2022-11-15 RX ORDER — PROMETHAZINE HYDROCHLORIDE 25 MG/1
25 SUPPOSITORY RECTAL EVERY 6 HOURS PRN
Qty: 12 SUPPOSITORY | Refills: 0 | Status: SHIPPED | OUTPATIENT
Start: 2022-11-15

## 2022-11-15 NOTE — TELEPHONE ENCOUNTER
Pt returned call and rs to 01/09/2023 at [facility] with Dr Roberto Enamorado

## 2022-11-15 NOTE — TELEPHONE ENCOUNTER
Patient returned missed call, she stated that 1/9/23 at [facility] does work for her  Please call back to discuss

## 2022-11-15 NOTE — TELEPHONE ENCOUNTER
Please Triage  New Patient    What is the reason for the patient’s appointment? Pt called and was sen in the ER over a month ago for a UTI and was told that she had bladder wall thickening  What office [place]     Imaging/Lab Results: Epic     Do we accept the patient's insurance or is the patient Self-Pay? Insurance Provider:  [employer]   Plan Type/Number:  Member ID#: Has the patient had any previous Urologist(s)? no    Have patient records been requested? If not are records showing in Epic:     Has the patient had any outside testing done? no    Does the patient have a personal history of cancer?  No    appt made for 12/20/00

## 2022-11-15 NOTE — TELEPHONE ENCOUNTER
Left message for patient and offered sooner opening with Dr Bre Durán for 01/09/2023 at [facility]    Back line phone number given

## 2022-11-15 NOTE — TELEPHONE ENCOUNTER
Dr Carri Campos,  Pt requested if you could please sent her promethazine suppository to her [facility] on [address]  Thank you

## 2022-11-16 ENCOUNTER — OFFICE VISIT (OUTPATIENT)
Dept: FAMILY MEDICINE CLINIC | Facility: CLINIC | Age: 22
End: 2022-11-16

## 2022-11-16 ENCOUNTER — LAB (OUTPATIENT)
Dept: LAB | Facility: HOSPITAL | Age: 22
End: 2022-11-16
Attending: FAMILY MEDICINE

## 2022-11-16 VITALS
SYSTOLIC BLOOD PRESSURE: 118 MMHG | HEIGHT: 63 IN | HEART RATE: 97 BPM | BODY MASS INDEX: 36.86 KG/M2 | WEIGHT: 208 LBS | OXYGEN SATURATION: 97 % | RESPIRATION RATE: 16 BRPM | DIASTOLIC BLOOD PRESSURE: 80 MMHG | TEMPERATURE: 98.7 F

## 2022-11-16 DIAGNOSIS — R41.89 BRAIN FOG: ICD-10-CM

## 2022-11-16 DIAGNOSIS — D72.829 LEUKOCYTOSIS, UNSPECIFIED TYPE: ICD-10-CM

## 2022-11-16 DIAGNOSIS — R11.10 VOMITING, UNSPECIFIED VOMITING TYPE, UNSPECIFIED WHETHER NAUSEA PRESENT: ICD-10-CM

## 2022-11-16 DIAGNOSIS — J02.9 PHARYNGITIS, UNSPECIFIED ETIOLOGY: ICD-10-CM

## 2022-11-16 DIAGNOSIS — R10.9 ABDOMINAL PAIN, UNSPECIFIED ABDOMINAL LOCATION: ICD-10-CM

## 2022-11-16 DIAGNOSIS — R19.7 DIARRHEA, UNSPECIFIED TYPE: ICD-10-CM

## 2022-11-16 DIAGNOSIS — Z23 ENCOUNTER FOR IMMUNIZATION: ICD-10-CM

## 2022-11-16 DIAGNOSIS — R11.2 NAUSEA AND VOMITING, UNSPECIFIED VOMITING TYPE: ICD-10-CM

## 2022-11-16 DIAGNOSIS — N32.89 BLADDER WALL THICKENING: ICD-10-CM

## 2022-11-16 DIAGNOSIS — R41.3 SHORT-TERM MEMORY LOSS: ICD-10-CM

## 2022-11-16 DIAGNOSIS — R10.84 GENERALIZED ABDOMINAL PAIN: ICD-10-CM

## 2022-11-16 DIAGNOSIS — K90.41 GLUTEN INTOLERANCE: ICD-10-CM

## 2022-11-16 DIAGNOSIS — K21.9 GASTROESOPHAGEAL REFLUX DISEASE WITHOUT ESOPHAGITIS: ICD-10-CM

## 2022-11-16 DIAGNOSIS — J11.1 INFLUENZA-LIKE ILLNESS: ICD-10-CM

## 2022-11-16 DIAGNOSIS — J02.9 SORE THROAT: Primary | ICD-10-CM

## 2022-11-16 LAB
ALBUMIN SERPL BCP-MCNC: 4.2 G/DL (ref 3.5–5)
ALP SERPL-CCNC: 96 U/L (ref 43–122)
ALT SERPL W P-5'-P-CCNC: 28 U/L
ANION GAP SERPL CALCULATED.3IONS-SCNC: 9 MMOL/L (ref 5–14)
AST SERPL W P-5'-P-CCNC: 27 U/L (ref 14–36)
BASOPHILS # BLD AUTO: 0.07 THOUSANDS/ÂΜL (ref 0–0.1)
BASOPHILS NFR BLD AUTO: 1 % (ref 0–1)
BILIRUB SERPL-MCNC: 0.28 MG/DL (ref 0.2–1)
BUN SERPL-MCNC: 10 MG/DL (ref 5–25)
CALCIUM SERPL-MCNC: 9.5 MG/DL (ref 8.4–10.2)
CHLORIDE SERPL-SCNC: 105 MMOL/L (ref 96–108)
CO2 SERPL-SCNC: 23 MMOL/L (ref 21–32)
CREAT SERPL-MCNC: 0.66 MG/DL (ref 0.6–1.2)
EOSINOPHIL # BLD AUTO: 0.65 THOUSAND/ÂΜL (ref 0–0.61)
EOSINOPHIL NFR BLD AUTO: 6 % (ref 0–6)
ERYTHROCYTE [DISTWIDTH] IN BLOOD BY AUTOMATED COUNT: 13.2 % (ref 11.6–15.1)
FOLATE SERPL-MCNC: 9.8 NG/ML (ref 3.1–17.5)
GFR SERPL CREATININE-BSD FRML MDRD: 124 ML/MIN/1.73SQ M
GLUCOSE P FAST SERPL-MCNC: 92 MG/DL (ref 70–99)
HCT VFR BLD AUTO: 42 % (ref 34.8–46.1)
HGB BLD-MCNC: 14.2 G/DL (ref 11.5–15.4)
IMM GRANULOCYTES # BLD AUTO: 0.05 THOUSAND/UL (ref 0–0.2)
IMM GRANULOCYTES NFR BLD AUTO: 1 % (ref 0–2)
LYMPHOCYTES # BLD AUTO: 2.51 THOUSANDS/ÂΜL (ref 0.6–4.47)
LYMPHOCYTES NFR BLD AUTO: 24 % (ref 14–44)
MCH RBC QN AUTO: 28.9 PG (ref 26.8–34.3)
MCHC RBC AUTO-ENTMCNC: 33.8 G/DL (ref 31.4–37.4)
MCV RBC AUTO: 86 FL (ref 82–98)
MONOCYTES # BLD AUTO: 0.7 THOUSAND/ÂΜL (ref 0.17–1.22)
MONOCYTES NFR BLD AUTO: 7 % (ref 4–12)
NEUTROPHILS # BLD AUTO: 6.56 THOUSANDS/ÂΜL (ref 1.85–7.62)
NEUTS SEG NFR BLD AUTO: 61 % (ref 43–75)
NRBC BLD AUTO-RTO: 0 /100 WBCS
PLATELET # BLD AUTO: 350 THOUSANDS/UL (ref 149–390)
PMV BLD AUTO: 9.4 FL (ref 8.9–12.7)
POTASSIUM SERPL-SCNC: 4.7 MMOL/L (ref 3.5–5.3)
PROT SERPL-MCNC: 7.3 G/DL (ref 6.4–8.4)
RBC # BLD AUTO: 4.91 MILLION/UL (ref 3.81–5.12)
SODIUM SERPL-SCNC: 137 MMOL/L (ref 135–147)
TSH SERPL DL<=0.05 MIU/L-ACNC: 1.73 UIU/ML (ref 0.45–4.5)
VIT B12 SERPL-MCNC: 483 PG/ML (ref 100–900)
WBC # BLD AUTO: 10.54 THOUSAND/UL (ref 4.31–10.16)

## 2022-11-16 RX ORDER — AZITHROMYCIN 250 MG/1
TABLET, FILM COATED ORAL
Qty: 6 TABLET | Refills: 0 | Status: SHIPPED | OUTPATIENT
Start: 2022-11-16 | End: 2022-11-21

## 2022-11-16 NOTE — ASSESSMENT & PLAN NOTE
Chronic   Follows with GI   Several work up unrevealing, she is scheduled to have an EGD with GI in December, advice to continue   Will continue to follow along

## 2022-11-16 NOTE — PROGRESS NOTES
Name: Sri Adan      : 2000      MRN: [id number]  Encounter Provider: Zak Hearn MD  Encounter Date: 2022   Encounter department: [address] [place] [address]     1  Sore throat  -     azithromycin (Zithromax) 250 mg tablet; Take 2 tablets (500 mg total) by mouth daily for 1 day, THEN 1 tablet (250 mg total) daily for 4 days  -     menthol-cetylpyridinium (CEPACOL) 3 MG lozenge; Take 1 lozenge (3 mg total) by mouth as needed for sore throat  -     Throat culture    2  Encounter for immunization  -     influenza vaccine, quadrivalent, recombinant, PF, 0 5 mL, for patients 18 yr+ (FLUBLOK)    3  Pharyngitis, unspecified etiology  Assessment & Plan:  History of recurrent pharyngitis when she was younger, treated with antibiotic for those time, still has her tonsil   Currently presenting with sore throat, 1 time fever of 101 degree at home, mild cervical lymphanodinopathy   centor criteria score of 2     Will get a rapid strep, it is negative, will send it for culture   Will also order COVID and flu test   Will treat with azythromycin given patient's allergy to penicillin  Retur to office precaution given    Orders:  -     azithromycin (Zithromax) 250 mg tablet; Take 2 tablets (500 mg total) by mouth daily for 1 day, THEN 1 tablet (250 mg total) daily for 4 days  -     COVID/FLU/RSV    4  Influenza-like illness    5  Generalized abdominal pain  Assessment & Plan:  Chronic   Follows with GI   Several work up unrevealing, she is scheduled to have an EGD with GI in December, advice to continue   Will continue to follow along            Subjective      HPI   Sri Adan is a 20 y o  female who present to the office for follow up abdominal pain  Reports that is feeling better and she follow with GI for it   She complaint of sore throat of 3 days duration, associated with 1 time fever of 101 at home 2 days ago   Denies difficulties swallowing   Review of Systems   Constitutional: Negative for appetite change, chills and fever  HENT: Positive for sore throat  Negative for postnasal drip, rhinorrhea, sinus pressure, sinus pain and sneezing  Respiratory: Negative for chest tightness, shortness of breath and wheezing  Cardiovascular: Negative for chest pain  Gastrointestinal: Positive for abdominal pain  Current Outpatient Medications on File Prior to Visit   Medication Sig   • Abilify Maintena 300 MG injection INJECT 1 EVERY 4 WEEKS   • Abilify Maintena 300 MG SRER INJECT INTRAMUSCULARLY EVERY 4 WEEKS   • albuterol (Ventolin HFA) 90 mcg/act inhaler Inhale 2 puffs every 4 (four) hours as needed for wheezing   • azelastine (ASTELIN) 0 1 % nasal spray 1-2 sprays into each nostril 2 (two) times a day as needed for rhinitis Use in each nostril as directed   • azelastine (OPTIVAR) 0 05 % ophthalmic solution Administer 1 drop to both eyes 2 (two) times a day   • benztropine (COGENTIN) 2 mg tablet Take 2 mg by mouth daily   • busPIRone (BUSPAR) 15 mg tablet Take 15 mg by mouth 3 (three) times a day   • cloNIDine (CATAPRES) 0 2 mg tablet    • dexamethasone (DECADRON) 1 mg tablet Take 1 tablet daily for up to 5 days for prolonged migraine rescue   • dicyclomine (BENTYL) 20 mg tablet Take 1 tablet (20 mg total) by mouth every 6 (six) hours as needed (urgency, abdominal pain)   • divalproex sodium (DEPAKOTE) 250 mg EC tablet Take 250 mg by mouth 2 (two) times a day   • EPINEPHrine (EPIPEN) 0 3 mg/0 3 mL SOAJ Inject 0 3 mL (0 3 mg total) into a muscle once for 1 dose   • Erenumab-aooe 140 MG/ML SOAJ Inject 140 mg under the skin every 30 (thirty) days   • famotidine (PEPCID) 40 MG tablet Take 1 tablet (40 mg total) by mouth daily at bedtime   • fluticasone (FLONASE) 50 mcg/act nasal spray 2 sprays into each nostril daily   • fluticasone-salmeterol (Advair HFA) 115-21 MCG/ACT inhaler Inhale 2 puffs 2 (two) times a day Rinse mouth after use     • hydrOXYzine pamoate (VISTARIL) 50 mg capsule 50 mg daily at bedtime   • levalbuterol (XOPENEX) 1 25 mg/3 mL nebulizer solution Take 3 mL (1 25 mg total) by nebulization every 6 (six) hours as needed for wheezing or shortness of breath   • mometasone (ELOCON) 0 1 % cream Apply topically daily   • nitrofurantoin (MACROBID) 100 mg capsule Take 1 capsule (100 mg total) by mouth 2 (two) times a day   • pantoprazole (PROTONIX) 40 mg tablet Take 1 tablet (40 mg total) by mouth daily before breakfast   • PARoxetine (PAXIL) 20 mg tablet Take 20 mg by mouth daily   • promethazine (PHENERGAN) 25 mg suppository Insert 1 suppository (25 mg total) into the rectum every 6 (six) hours as needed for nausea or vomiting   • rizatriptan (MAXALT-MLT) 10 mg disintegrating tablet Take 1 tablet (10 mg total) by mouth once as needed for migraine for up to 1 dose May repeat in 2 hours if needed   • Spacer/Aero-Holding Chambers (Vortex Valved Holding Chamber) FROYLAN Use 2 (two) times a day       Objective     /80 (BP Location: Right arm, Patient Position: Sitting, Cuff Size: Large)   Pulse 97   Temp 98 7 °F (37 1 °C) (Temporal)   Resp 16   Ht 5' 3" (1 6 m)   Wt 94 3 kg (208 lb)   LMP 10/24/2022 (Approximate)   SpO2 97%   Breastfeeding No   BMI 36 85 kg/m²     Physical Exam  Constitutional:       General: She is not in acute distress  Appearance: She is well-developed and well-nourished  HENT:      Head: Normocephalic and atraumatic  Mouth/Throat:      Mouth: No oral lesions  Tongue: No lesions  Pharynx: No pharyngeal swelling, oropharyngeal exudate, posterior oropharyngeal erythema or uvula swelling  Tonsils: No tonsillar exudate or tonsillar abscesses  Eyes:      General: No scleral icterus  Extraocular Movements: EOM normal       Conjunctiva/sclera: Conjunctivae normal    Cardiovascular:      Rate and Rhythm: Normal rate  Heart sounds: Normal heart sounds  No murmur heard    Pulmonary:      Effort: Pulmonary effort is normal  No respiratory distress  Breath sounds: Normal breath sounds  Abdominal:      General: Bowel sounds are normal       Palpations: Abdomen is soft  Tenderness: There is no abdominal tenderness  Musculoskeletal:         General: No deformity  Normal range of motion  Cervical back: Neck supple  Lymphadenopathy:      Cervical: Cervical adenopathy present  Skin:     General: Skin is warm and dry  Findings: No rash  Neurological:      Mental Status: She is alert and oriented to person, place, and time  Cranial Nerves: No cranial nerve deficit     Psychiatric:         Mood and Affect: Mood and affect normal        Nayeli Jacobsen MD

## 2022-11-17 DIAGNOSIS — D72.829 LEUKOCYTOSIS, UNSPECIFIED TYPE: Primary | ICD-10-CM

## 2022-11-17 LAB
FLUAV RNA RESP QL NAA+PROBE: NEGATIVE
FLUBV RNA RESP QL NAA+PROBE: NEGATIVE
RSV RNA RESP QL NAA+PROBE: NEGATIVE
SARS-COV-2 RNA RESP QL NAA+PROBE: NEGATIVE
WHEAT IGE QN: <0.1 KUA/I

## 2022-11-17 NOTE — ASSESSMENT & PLAN NOTE
History of recurrent pharyngitis when she was younger, treated with antibiotic for those time, still has her tonsil   Currently presenting with sore throat, 1 time fever of 101 degree at home, mild cervical lymphanodinopathy   centor criteria score of 2     Will get a rapid strep, it is negative, will send it for culture   Will also order COVID and flu test   Will treat with azythromycin given patient's allergy to penicillin  Retur to office precaution given

## 2022-11-18 LAB — BACTERIA THROAT CULT: NORMAL

## 2022-11-22 ENCOUNTER — TELEPHONE (OUTPATIENT)
Dept: HEMATOLOGY ONCOLOGY | Facility: CLINIC | Age: 22
End: 2022-11-22

## 2022-11-22 LAB
BARLEY IGE QN: <0.1 KU/L
RYE IGE QN: <0.1 KU/L

## 2022-11-22 NOTE — TELEPHONE ENCOUNTER
Attempt to schedule new patient appointment  During scheduling, call was disconnected  Made attempt to continue the call but patient could not hear me

## 2022-11-25 ENCOUNTER — HOSPITAL ENCOUNTER (EMERGENCY)
Facility: HOSPITAL | Age: 22
Discharge: HOME | End: 2022-11-25
Attending: EMERGENCY MEDICINE

## 2022-11-25 VITALS
WEIGHT: 195.9 LBS | HEART RATE: 88 BPM | OXYGEN SATURATION: 99 % | SYSTOLIC BLOOD PRESSURE: 143 MMHG | BODY MASS INDEX: 34.7 KG/M2 | DIASTOLIC BLOOD PRESSURE: 83 MMHG | RESPIRATION RATE: 18 BRPM | TEMPERATURE: 98.5 F

## 2022-11-25 DIAGNOSIS — R11.2 NAUSEA VOMITING AND DIARRHEA: Primary | ICD-10-CM

## 2022-11-25 DIAGNOSIS — R19.7 NAUSEA VOMITING AND DIARRHEA: Primary | ICD-10-CM

## 2022-11-25 LAB
ALBUMIN SERPL BCP-MCNC: 5 G/DL (ref 3.5–5)
ALP SERPL-CCNC: 115 U/L (ref 43–122)
ALT SERPL W P-5'-P-CCNC: 26 U/L
ANION GAP SERPL CALCULATED.3IONS-SCNC: 14 MMOL/L (ref 5–14)
AST SERPL W P-5'-P-CCNC: 29 U/L (ref 14–36)
BASOPHILS # BLD AUTO: 0.03 THOUSANDS/ÂΜL (ref 0–0.1)
BASOPHILS NFR BLD AUTO: 0 % (ref 0–1)
BILIRUB SERPL-MCNC: 0.53 MG/DL (ref 0.2–1)
BUN SERPL-MCNC: 15 MG/DL (ref 5–25)
CALCIUM SERPL-MCNC: 9.6 MG/DL (ref 8.4–10.2)
CHLORIDE SERPL-SCNC: 93 MMOL/L (ref 96–108)
CO2 SERPL-SCNC: 29 MMOL/L (ref 21–32)
CREAT SERPL-MCNC: 0.71 MG/DL (ref 0.6–1.2)
EOSINOPHIL # BLD AUTO: 0.01 THOUSAND/ÂΜL (ref 0–0.61)
EOSINOPHIL NFR BLD AUTO: 0 % (ref 0–6)
ERYTHROCYTE [DISTWIDTH] IN BLOOD BY AUTOMATED COUNT: 13.1 % (ref 11.6–15.1)
FLUAV RNA RESP QL NAA+PROBE: NEGATIVE
FLUBV RNA RESP QL NAA+PROBE: NEGATIVE
GFR SERPL CREATININE-BSD FRML MDRD: 120 ML/MIN/1.73SQ M
GLUCOSE SERPL-MCNC: 127 MG/DL (ref 70–99)
HCT VFR BLD AUTO: 43.1 % (ref 34.8–46.1)
HGB BLD-MCNC: 14.6 G/DL (ref 11.5–15.4)
IMM GRANULOCYTES # BLD AUTO: 0.05 THOUSAND/UL (ref 0–0.2)
IMM GRANULOCYTES NFR BLD AUTO: 0 % (ref 0–2)
LIPASE SERPL-CCNC: 64 U/L (ref 23–300)
LYMPHOCYTES # BLD AUTO: 2.13 THOUSANDS/ÂΜL (ref 0.6–4.47)
LYMPHOCYTES NFR BLD AUTO: 16 % (ref 14–44)
MCH RBC QN AUTO: 28.3 PG (ref 26.8–34.3)
MCHC RBC AUTO-ENTMCNC: 33.9 G/DL (ref 31.4–37.4)
MCV RBC AUTO: 84 FL (ref 82–98)
MONOCYTES # BLD AUTO: 1.27 THOUSAND/ÂΜL (ref 0.17–1.22)
MONOCYTES NFR BLD AUTO: 9 % (ref 4–12)
NEUTROPHILS # BLD AUTO: 10.17 THOUSANDS/ÂΜL (ref 1.85–7.62)
NEUTS SEG NFR BLD AUTO: 75 % (ref 43–75)
NRBC BLD AUTO-RTO: 0 /100 WBCS
PLATELET # BLD AUTO: 384 THOUSANDS/UL (ref 149–390)
PMV BLD AUTO: 9.1 FL (ref 8.9–12.7)
POTASSIUM SERPL-SCNC: 2.8 MMOL/L (ref 3.5–5.3)
PROT SERPL-MCNC: 9.4 G/DL (ref 6.4–8.4)
RBC # BLD AUTO: 5.15 MILLION/UL (ref 3.81–5.12)
RSV RNA RESP QL NAA+PROBE: NEGATIVE
SARS-COV-2 RNA RESP QL NAA+PROBE: NEGATIVE
SODIUM SERPL-SCNC: 136 MMOL/L (ref 135–147)
WBC # BLD AUTO: 13.66 THOUSAND/UL (ref 4.31–10.16)

## 2022-11-25 RX ORDER — ONDANSETRON 2 MG/ML
4 INJECTION INTRAMUSCULAR; INTRAVENOUS ONCE
Status: COMPLETED | OUTPATIENT
Start: 2022-11-25 | End: 2022-11-25

## 2022-11-25 RX ORDER — ALUMINUM HYDROXIDE, MAGNESIUM HYDROXIDE, SIMETHICONE 400; 400; 40 MG/10ML; MG/10ML; MG/10ML
15 SUSPENSION ORAL
Qty: 150 ML | Refills: 0 | Status: SHIPPED | OUTPATIENT
Start: 2022-11-25

## 2022-11-25 RX ORDER — MAGNESIUM HYDROXIDE/ALUMINUM HYDROXICE/SIMETHICONE 120; 1200; 1200 MG/30ML; MG/30ML; MG/30ML
30 SUSPENSION ORAL ONCE
Status: COMPLETED | OUTPATIENT
Start: 2022-11-25 | End: 2022-11-25

## 2022-11-25 RX ORDER — PANTOPRAZOLE SODIUM 40 MG/10ML
40 INJECTION, POWDER, LYOPHILIZED, FOR SOLUTION INTRAVENOUS ONCE
Status: COMPLETED | OUTPATIENT
Start: 2022-11-25 | End: 2022-11-25

## 2022-11-25 RX ORDER — ONDANSETRON 4 MG/1
4 TABLET, ORALLY DISINTEGRATING ORAL EVERY 8 HOURS PRN
Qty: 20 TABLET | Refills: 0 | Status: SHIPPED | OUTPATIENT
Start: 2022-11-25 | End: 2022-12-05

## 2022-11-25 RX ORDER — POTASSIUM CHLORIDE 750 MG/1
40 TABLET, EXTENDED RELEASE ORAL ONCE
Status: COMPLETED | OUTPATIENT
Start: 2022-11-25 | End: 2022-11-25

## 2022-11-25 RX ORDER — DIPHENHYDRAMINE HYDROCHLORIDE 50 MG/ML
25 INJECTION INTRAMUSCULAR; INTRAVENOUS ONCE
Status: COMPLETED | OUTPATIENT
Start: 2022-11-25 | End: 2022-11-25

## 2022-11-25 RX ORDER — METOCLOPRAMIDE HYDROCHLORIDE 5 MG/ML
10 INJECTION INTRAMUSCULAR; INTRAVENOUS ONCE
Status: COMPLETED | OUTPATIENT
Start: 2022-11-25 | End: 2022-11-25

## 2022-11-25 RX ORDER — LIDOCAINE HYDROCHLORIDE 20 MG/ML
15 SOLUTION OROPHARYNGEAL ONCE
Status: COMPLETED | OUTPATIENT
Start: 2022-11-25 | End: 2022-11-25

## 2022-11-25 RX ADMIN — POTASSIUM CHLORIDE 40 MEQ: 10 TABLET, EXTENDED RELEASE ORAL at 18:02

## 2022-11-25 RX ADMIN — LIDOCAINE HYDROCHLORIDE 15 ML: 20 SOLUTION ORAL; TOPICAL at 18:02

## 2022-11-25 RX ADMIN — DIPHENHYDRAMINE HYDROCHLORIDE 25 MG: 50 INJECTION, SOLUTION INTRAMUSCULAR; INTRAVENOUS at 18:43

## 2022-11-25 RX ADMIN — PANTOPRAZOLE SODIUM 40 MG: 40 INJECTION, POWDER, FOR SOLUTION INTRAVENOUS at 18:44

## 2022-11-25 RX ADMIN — SODIUM CHLORIDE 1000 ML: 0.9 INJECTION, SOLUTION INTRAVENOUS at 17:07

## 2022-11-25 RX ADMIN — DIPHENHYDRAMINE HYDROCHLORIDE 25 MG: 12.5 LIQUID ORAL at 18:02

## 2022-11-25 RX ADMIN — ONDANSETRON 4 MG: 2 INJECTION INTRAMUSCULAR; INTRAVENOUS at 17:07

## 2022-11-25 RX ADMIN — METOCLOPRAMIDE 10 MG: 5 INJECTION, SOLUTION INTRAMUSCULAR; INTRAVENOUS at 18:43

## 2022-11-25 RX ADMIN — ALUMINUM HYDROXIDE, MAGNESIUM HYDROXIDE, AND SIMETHICONE 30 ML: 200; 200; 20 SUSPENSION ORAL at 18:02

## 2022-11-25 NOTE — ED PROVIDER NOTES
History  Chief Complaint   Patient presents with   • Vomiting     Pt came to ER with nausea, vomiting, and epigastric that started three days ago  Pt had diarrhea yesterday  19 y/o female reports past medical history significant for asthma, headaches, eczema, manic depression, migraines, seizures presenting for evaluation of epigastric abdominal discomfort which she reports is a burning sensation which gradually began 3 days ago has been associated with nausea vomiting and diarrhea as been worsening over the past 3 days  Patient reports her pain gets worse when she eats or drinks and reports she is following with a gastroenterologist for an unknown diagnosis for which she underwent endoscopy and states she is unsure of any abnormal findings from the study  Patient reports she is unvaccinated for COVID-19 is around a large amount of people on a regular basis and has also been having coughing, congestion and fatigue associated with her epigastric abdominal pain nausea vomiting and diarrhea  Patient reports she has been urinating without difficulty and reports a decreased oral intake states she has not been able to tolerate any solid or liquid oral intake however reports she was able to take her Depakote used for seizures today  History provided by:  Patient  Vomiting  Severity:  Moderate  Timing:  Constant  Quality:  Stomach contents  Progression:  Unchanged  Chronicity:  New  Relieved by:  None tried  Worsened by:  Nothing  Ineffective treatments:  None tried  Associated symptoms: abdominal pain, cough and diarrhea    Associated symptoms: no chills, no fever and no sore throat        Prior to Admission Medications   Prescriptions Last Dose Informant Patient Reported? Taking?    Abilify Maintena 300 MG SRER   Yes No   Sig: INJECT INTRAMUSCULARLY EVERY 4 WEEKS   Abilify Maintena 300 MG injection   Yes No   Sig: INJECT 1 EVERY 4 WEEKS   EPINEPHrine (EPIPEN) 0 3 mg/0 3 mL SOAJ   No No   Sig: Inject 0 3 mL (0 3 mg total) into a muscle once for 1 dose   Erenumab-aooe 140 MG/ML SOAJ   No No   Sig: Inject 140 mg under the skin every 30 (thirty) days   PARoxetine (PAXIL) 20 mg tablet   Yes No   Sig: Take 20 mg by mouth daily   Spacer/Aero-Holding Chambers (Vortex Valved Holding Chamber) FROYLAN   No No   Sig: Use 2 (two) times a day   albuterol (Ventolin HFA) 90 mcg/act inhaler   No No   Sig: Inhale 2 puffs every 4 (four) hours as needed for wheezing   azelastine (ASTELIN) 0 1 % nasal spray   No No   Si-2 sprays into each nostril 2 (two) times a day as needed for rhinitis Use in each nostril as directed   azelastine (OPTIVAR) 0 05 % ophthalmic solution   No No   Sig: Administer 1 drop to both eyes 2 (two) times a day   benztropine (COGENTIN) 2 mg tablet   Yes No   Sig: Take 2 mg by mouth daily   busPIRone (BUSPAR) 15 mg tablet   Yes No   Sig: Take 15 mg by mouth 3 (three) times a day   cloNIDine (CATAPRES) 0 2 mg tablet   Yes No   dexamethasone (DECADRON) 1 mg tablet   No No   Sig: Take 1 tablet daily for up to 5 days for prolonged migraine rescue   dicyclomine (BENTYL) 20 mg tablet   No No   Sig: Take 1 tablet (20 mg total) by mouth every 6 (six) hours as needed (urgency, abdominal pain)   divalproex sodium (DEPAKOTE) 250 mg EC tablet   Yes No   Sig: Take 250 mg by mouth 2 (two) times a day   famotidine (PEPCID) 40 MG tablet   No No   Sig: Take 1 tablet (40 mg total) by mouth daily at bedtime   fluticasone (FLONASE) 50 mcg/act nasal spray   No No   Si sprays into each nostril daily   fluticasone-salmeterol (Advair HFA) 115-21 MCG/ACT inhaler   No No   Sig: Inhale 2 puffs 2 (two) times a day Rinse mouth after use    hydrOXYzine pamoate (VISTARIL) 50 mg capsule   Yes No   Si mg daily at bedtime   levalbuterol (XOPENEX) 1 25 mg/3 mL nebulizer solution   No No   Sig: Take 3 mL (1 25 mg total) by nebulization every 6 (six) hours as needed for wheezing or shortness of breath   menthol-cetylpyridinium (CEPACOL) 3 MG lozenge   No No   Sig: Take 1 lozenge (3 mg total) by mouth as needed for sore throat   mometasone (ELOCON) 0 1 % cream   No No   Sig: Apply topically daily   nitrofurantoin (MACROBID) 100 mg capsule   No No   Sig: Take 1 capsule (100 mg total) by mouth 2 (two) times a day   pantoprazole (PROTONIX) 40 mg tablet   No No   Sig: Take 1 tablet (40 mg total) by mouth daily before breakfast   promethazine (PHENERGAN) 25 mg suppository   No No   Sig: Insert 1 suppository (25 mg total) into the rectum every 6 (six) hours as needed for nausea or vomiting   rizatriptan (MAXALT-MLT) 10 mg disintegrating tablet   No No   Sig: Take 1 tablet (10 mg total) by mouth once as needed for migraine for up to 1 dose May repeat in 2 hours if needed      Facility-Administered Medications: None       Past Medical History:   Diagnosis Date   • Anaphylaxis     apricot   • Anxiety    • Asthma    • Cluster headache    • Eczema    • Headache, tension-type    • Lymphadenitis     Last assessed 12/30/14   • Lymphadenopathy     Last assessed 10/16/13   • 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[address] )    • Migraine    • 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[address] )    • Psychiatric disorder    • Psychiatric illness    • Psychosis ([facility] [address] )    • Seizures ([facility] [address] )        Past Surgical History:   Procedure Laterality Date   • COLONOSCOPY N/A 05/03/2019    Procedure: COLONOSCOPY;  Surgeon: Luis Armas MD;  Location: [facility]; Service: Gastroenterology   • ESOPHAGOGASTRODUODENOSCOPY N/A 05/03/2019    Procedure: ESOPHAGOGASTRODUODENOSCOPY (EGD); Surgeon: Luis Armas MD;  Location: [facility]; Service: Gastroenterology       Family History   Problem Relation Age of Onset   • Migraines Mother    • Other Mother         Neck pain   • Depression Mother    • Drug abuse Mother    • Drug abuse Father    • Schizophrenia Father    • Febrile seizures Maternal Uncle    • Stroke Maternal Uncle      I have reviewed and agree with the history as documented      E-Cigarette/Vaping   • E-Cigarette Use Current Every Day User      E-Cigarette/Vaping Substances   • Nicotine Yes    • THC No    • CBD No    • Flavoring Yes    • Other No    • Unknown No      Social History     Tobacco Use   • Smoking status: Some Days     Packs/day: 0 00     Years: 0 00     Pack years: 0 00     Types: Cigarettes   • Smokeless tobacco: Never   • Tobacco comments:     I stopped smoking cigarettes but started vaping   Vaping Use   • Vaping Use: Every day   • Substances: Nicotine, Flavoring   Substance Use Topics   • Alcohol use: Not Currently     Alcohol/week: 2 0 standard drinks     Types: 2 Shots of liquor per week     Comment: Occasionally   • Drug use: Not Currently     Frequency: 3 0 times per week     Types: Marijuana       Review of Systems   Constitutional: Positive for fatigue  Negative for chills and fever  HENT: Positive for congestion  Negative for ear pain, rhinorrhea and sore throat  Eyes: Negative for redness  Respiratory: Positive for cough  Negative for chest tightness and shortness of breath  Cardiovascular: Negative for chest pain and palpitations  Gastrointestinal: Positive for abdominal pain, diarrhea, nausea and vomiting  Genitourinary: Negative for dysuria and hematuria  Musculoskeletal: Negative  Skin: Negative for rash  Neurological: Negative for dizziness, syncope, light-headedness and numbness  Physical Exam  Physical Exam  Vitals and nursing note reviewed  Constitutional:       Appearance: She is well-developed and well-nourished  HENT:      Head: Normocephalic  Eyes:      General: No scleral icterus  Cardiovascular:      Rate and Rhythm: Normal rate and regular rhythm  Pulmonary:      Effort: Pulmonary effort is normal       Breath sounds: Normal breath sounds  No stridor        Comments:  Patient in no respiratory distress, speaking in full sentences, managing oral secretions without difficulty, no accessory muscle use, retractions, or belly breathing noted, no adventitious lung sounds auscultated bilaterally  Abdominal:      General: There is no distension  Palpations: Abdomen is soft  Tenderness: There is abdominal tenderness ( Epigastric  Negative Mcrae's  No McBurney's point tenderness)  Musculoskeletal:         General: Normal range of motion  Skin:     General: Skin is warm and dry  Capillary Refill: Capillary refill takes less than 2 seconds  Neurological:      Mental Status: She is alert and oriented to person, place, and time     Psychiatric:         Mood and Affect: Mood and affect normal          Vital Signs  ED Triage Vitals [11/25/22 1652]   Temperature Pulse Respirations Blood Pressure SpO2   98 1 °F (36 7 °C) (!) 111 18 151/62 98 %      Temp Source Heart Rate Source Patient Position - Orthostatic VS BP Location FiO2 (%)   Tympanic Monitor Sitting Left arm --      Pain Score       --           Vitals:    11/25/22 1652   BP: 151/62   Pulse: (!) 111   Patient Position - Orthostatic VS: Sitting         Visual Acuity      ED Medications  Medications   sodium chloride 0 9 % bolus 1,000 mL (0 mL Intravenous Stopped 11/25/22 1803)   ondansetron (ZOFRAN) injection 4 mg (4 mg Intravenous Given 11/25/22 1707)   potassium chloride (K-DUR,KLOR-CON) CR tablet 40 mEq (40 mEq Oral Given 11/25/22 1802)   aluminum-magnesium hydroxide-simethicone (MYLANTA) oral suspension 30 mL (30 mL Oral Given 11/25/22 1802)   Lidocaine Viscous HCl (XYLOCAINE) 2 % mucosal solution 15 mL (15 mL Swish & Swallow Given 11/25/22 1802)   diphenhydrAMINE (BENADRYL) oral liquid 25 mg (25 mg Oral Given 11/25/22 1802)   metoclopramide (REGLAN) injection 10 mg (10 mg Intravenous Given 11/25/22 1843)   diphenhydrAMINE (BENADRYL) injection 25 mg (25 mg Intravenous Given 11/25/22 1843)   pantoprazole (PROTONIX) injection 40 mg (40 mg Intravenous Given 11/25/22 1844)       Diagnostic Studies  Results Reviewed     Procedure Component Value Units Date/Time    FLU/RSV/COVID - if FLU/RSV clinically relevant [[id number]]  (Normal) Collected: 11/25/22 1708    Lab Status: Final result Specimen: Nares from Nose Updated: 11/25/22 1757     SARS-CoV-2 Negative     INFLUENZA A PCR Negative     INFLUENZA B PCR Negative     RSV PCR Negative    Narrative:      FOR PEDIATRIC PATIENTS - copy/paste COVID Guidelines URL to browser: [email or web address]    SARS-CoV-2 assay is a Nucleic Acid Amplification assay intended for the  qualitative detection of nucleic acid from SARS-CoV-2 in nasopharyngeal  swabs  Results are for the presumptive identification of SARS-CoV-2 RNA  Positive results are indicative of infection with SARS-CoV-2, the virus  causing COVID-19, but do not rule out bacterial infection or co-infection  with other viruses  Laboratories within the [place] and its  territories are required to report all positive results to the appropriate  public health authorities  Negative results do not preclude SARS-CoV-2  infection and should not be used as the sole basis for treatment or other  patient management decisions  Negative results must be combined with  clinical observations, patient history, and epidemiological information  This test has not been FDA cleared or approved  This test has been authorized by FDA under an Emergency Use Authorization  (EUA)  This test is only authorized for the duration of time the  declaration that circumstances exist justifying the authorization of the  emergency use of an in vitro diagnostic tests for detection of SARS-CoV-2  virus and/or diagnosis of COVID-19 infection under section 564(b)(1) of  the Act, 21 U  S C  852OSO-3(V)(7), unless the authorization is terminated  or revoked sooner  The test has been validated but independent review by FDA  and CLIA is pending  Test performed using [employer]: This RT-PCR assay targets N2,  a region unique to SARS-CoV-2   A conserved region in the E-gene was chosen  for pan-Sarbecovirus detection which includes SARS-CoV-2  According to CMS-2020-01-R, this platform meets the definition of high-throughput technology      Lipase [[id number]]  (Normal) Collected: 11/25/22 1708    Lab Status: Final result Specimen: Blood from Arm, Left Updated: 11/25/22 1741     Lipase 64 u/L     Comprehensive metabolic panel [[id number]]  (Abnormal) Collected: 11/25/22 1708    Lab Status: Final result Specimen: Blood from Arm, Left Updated: 11/25/22 1741     Sodium 136 mmol/L      Potassium 2 8 mmol/L      Chloride 93 mmol/L      CO2 29 mmol/L      ANION GAP 14 mmol/L      BUN 15 mg/dL      Creatinine 0 71 mg/dL      Glucose 127 mg/dL      Calcium 9 6 mg/dL      AST 29 U/L      ALT 26 U/L      Alkaline Phosphatase 115 U/L      Total Protein 9 4 g/dL      Albumin 5 0 g/dL      Total Bilirubin 0 53 mg/dL      eGFR 120 ml/min/1 73sq m     Narrative:      National Kidney Disease Foundation guidelines for Chronic Kidney Disease (CKD):   •  Stage 1 with normal or high GFR (GFR > 90 mL/min/1 73 square meters)  •  Stage 2 Mild CKD (GFR = 60-89 mL/min/1 73 square meters)  •  Stage 3A Moderate CKD (GFR = 45-59 mL/min/1 73 square meters)  •  Stage 3B Moderate CKD (GFR = 30-44 mL/min/1 73 square meters)  •  Stage 4 Severe CKD (GFR = 15-29 mL/min/1 73 square meters)  •  Stage 5 End Stage CKD (GFR <15 mL/min/1 73 square meters)  Note: GFR calculation is accurate only with a steady state creatinine    CBC and differential [[id number]]  (Abnormal) Collected: 11/25/22 1708    Lab Status: Final result Specimen: Blood from Arm, Left Updated: 11/25/22 1718     WBC 13 66 Thousand/uL      RBC 5 15 Million/uL      Hemoglobin 14 6 g/dL      Hematocrit 43 1 %      MCV 84 fL      MCH 28 3 pg      MCHC 33 9 g/dL      RDW 13 1 %      MPV 9 1 fL      Platelets 586 Thousands/uL      nRBC 0 /100 WBCs      Neutrophils Relative 75 %      Immat GRANS % 0 %      Lymphocytes Relative 16 %      Monocytes Relative 9 %      Eosinophils Relative 0 %      Basophils Relative 0 %      Neutrophils Absolute 10 17 Thousands/µL      Immature Grans Absolute 0 05 Thousand/uL      Lymphocytes Absolute 2 13 Thousands/µL      Monocytes Absolute 1 27 Thousand/µL      Eosinophils Absolute 0 01 Thousand/µL      Basophils Absolute 0 03 Thousands/µL                  No orders to display              Procedures  Procedures         ED Course  ED Course as of 11/25/22 1914 Fri Nov 25, 2022 1814 Patient was given GI cocktail and potassium and promptly vomited will give Reglan and Benadryl for nausea   1907 Patient is tolerating pretzels and water after Reglan and Benadryl administration  Patient feels comfortable for discharge  Patient was reexamined at this time and informed of laboratory and/or imaging results and was found to be stable for discharge  Return to emergency department criteria was reviewed with the patient who verbalized understanding and was agreeable to discharge and the treatment plan at this time  MDM  Number of Diagnoses or Management Options  Nausea vomiting and diarrhea  Diagnosis management comments: All imaging and/or lab testing discussed with patient, strict return to ED precautions discussed  Patient recommended to follow up promptly with appropriate outpatient provider  Patient and/or family members verbalizes understanding and agrees with plan  Patient is stable for discharge      Portions of the record may have been created with voice recognition software  Occasional wrong word or "sound a like" substitutions may have occurred due to the inherent limitations of voice recognition software  Read the chart carefully and recognize, using context, where substitutions have occurred          Disposition  Final diagnoses:   Nausea vomiting and diarrhea     Time reflects when diagnosis was documented in both MDM as applicable and the Disposition within this note     Time User Action Codes Description Comment    11/25/2022  7:07 PM Fiorella Urrutiaanjelica Add [R11 2,  R19 7] Nausea vomiting and diarrhea       ED Disposition     ED Disposition   Discharge    Condition   Stable    Date/Time   Fri Nov 25, 2022  7:07 PM    Comment   Genny Maxwell discharge to home/self care  Follow-up Information     Follow up With Specialties Details Why Contact Info Additional Information    Dalia Mcburney, MD Family Medicine Schedule an appointment as soon as possible for a visit  As needed [address] [facility] [address] [facility] [address]       [address] [facility] Emergency Department Emergency Medicine  If symptoms worsen [address] [id number]  [facility] Emergency Department          Patient's Medications   Discharge Prescriptions    ALUMINUM-MAGNESIUM HYDROXIDE-SIMETHICONE (MAALOX) 200-200-20 MG/5ML SUSP    Take 15 mL by mouth 4 (four) times a day (before meals and at bedtime)       Start Date: 11/25/2022End Date: --       Order Dose: 15 mL       Quantity: 150 mL    Refills: 0    ONDANSETRON (ZOFRAN-ODT) 4 MG DISINTEGRATING TABLET    Take 1 tablet (4 mg total) by mouth every 8 (eight) hours as needed (nausea) for up to 10 days       Start Date: 11/25/2022End Date: 12/5/2022       Order Dose: 4 mg       Quantity: 20 tablet    Refills: 0       No discharge procedures on file      PDMP Review       Value Time User    PDMP Reviewed  Yes 11/25/2021 10:36 PM Raul Farris MD          ED Provider  Electronically Signed by           Abril Ramirez PA-C  11/25/22 3651

## 2022-11-28 NOTE — RESULT ENCOUNTER NOTE
Please call patient  IgE to wheat, barley, rye are undetectable  Please make an appointment if there are questions

## 2022-12-02 ENCOUNTER — TELEPHONE (OUTPATIENT)
Dept: PSYCHIATRY | Facility: CLINIC | Age: 22
End: 2022-12-02

## 2022-12-02 NOTE — TELEPHONE ENCOUNTER
Spoke to Patient in regards to med mgmt wait list to determine if still interested in services   Patient stated she is looking for autisms evaluation, Patient will remain on the wait list

## 2022-12-20 ENCOUNTER — TELEPHONE (OUTPATIENT)
Dept: NEUROLOGY | Facility: CLINIC | Age: 22
End: 2022-12-20

## 2022-12-20 NOTE — TELEPHONE ENCOUNTER
Attempted to reach pt to reschedule fu with Dr Julieta Kehr pt to call back   Utilize rescheduling openings on 02/17 from 10-12 if those times work for patient

## 2022-12-21 ENCOUNTER — HOSPITAL ENCOUNTER (EMERGENCY)
Facility: HOSPITAL | Age: 22
Discharge: HOME | End: 2022-12-21
Attending: EMERGENCY MEDICINE

## 2022-12-21 VITALS
HEART RATE: 88 BPM | TEMPERATURE: 100 F | SYSTOLIC BLOOD PRESSURE: 120 MMHG | RESPIRATION RATE: 18 BRPM | OXYGEN SATURATION: 99 % | DIASTOLIC BLOOD PRESSURE: 72 MMHG | BODY MASS INDEX: 35.22 KG/M2 | WEIGHT: 198.8 LBS

## 2022-12-21 DIAGNOSIS — R11.2 NAUSEA AND VOMITING: Primary | ICD-10-CM

## 2022-12-21 LAB
FLUAV RNA RESP QL NAA+PROBE: NEGATIVE
FLUBV RNA RESP QL NAA+PROBE: NEGATIVE
RSV RNA RESP QL NAA+PROBE: NEGATIVE
SARS-COV-2 RNA RESP QL NAA+PROBE: NEGATIVE

## 2022-12-21 RX ORDER — HALOPERIDOL 5 MG/ML
5 INJECTION INTRAMUSCULAR ONCE
Status: COMPLETED | OUTPATIENT
Start: 2022-12-21 | End: 2022-12-21

## 2022-12-21 RX ADMIN — HALOPERIDOL LACTATE 5 MG: 5 INJECTION, SOLUTION INTRAMUSCULAR at 21:28

## 2022-12-21 NOTE — Clinical Note
accompanied Herman Goodson to the emergency department on 12/21/2022  Return date if applicable: 87/25/6400        If you have any questions or concerns, please don't hesitate to call        Subhash Hu, DO

## 2022-12-21 NOTE — Clinical Note
Unknown Balling was seen and treated in our emergency department on 12/21/2022  Diagnosis:     Apurva    She may return on this date: 12/24/2022         If you have any questions or concerns, please don't hesitate to call        Chapin Sanchez, DO    ______________________________           _______________          _______________  Hospital Representative                              Date                                Time

## 2022-12-22 NOTE — ED PROVIDER NOTES
History  Chief Complaint   Patient presents with   • Vomiting     Vomiting since yesterday  Exposed to someone sick  20year old female, here with girlfriend  Starting yesterday with nausea and vomiting  Has sick contacts at home that are COVID positive  No blood in emesis  Worse with food intake  Has tried compazine suppository w/o relief  Denies recreational drug use  Generalized abdominal discomfort  No fevers  No previous abdominal surgeries  Urinated 5 to 6 times in last 24  Hours without difficulty  Prior to Admission Medications   Prescriptions Last Dose Informant Patient Reported? Taking?    Abilify Maintena 300 MG SRER   Yes No   Sig: INJECT INTRAMUSCULARLY EVERY 4 WEEKS   Abilify Maintena 300 MG injection   Yes No   Sig: INJECT 1 EVERY 4 WEEKS   EPINEPHrine (EPIPEN) 0 3 mg/0 3 mL SOAJ   No No   Sig: Inject 0 3 mL (0 3 mg total) into a muscle once for 1 dose   Erenumab-aooe 140 MG/ML SOAJ   No No   Sig: Inject 140 mg under the skin every 30 (thirty) days   PARoxetine (PAXIL) 20 mg tablet   Yes No   Sig: Take 20 mg by mouth daily   Spacer/Aero-Holding Chambers (Vortex Valved Holding Chamber) FROYLAN   No No   Sig: Use 2 (two) times a day   albuterol (Ventolin HFA) 90 mcg/act inhaler   No No   Sig: Inhale 2 puffs every 4 (four) hours as needed for wheezing   aluminum-magnesium hydroxide-simethicone (MAALOX) 200-200-20 MG/5ML SUSP   No No   Sig: Take 15 mL by mouth 4 (four) times a day (before meals and at bedtime)   azelastine (ASTELIN) 0 1 % nasal spray   No No   Si-2 sprays into each nostril 2 (two) times a day as needed for rhinitis Use in each nostril as directed   azelastine (OPTIVAR) 0 05 % ophthalmic solution   No No   Sig: Administer 1 drop to both eyes 2 (two) times a day   benztropine (COGENTIN) 2 mg tablet   Yes No   Sig: Take 2 mg by mouth daily   busPIRone (BUSPAR) 15 mg tablet   Yes No   Sig: Take 15 mg by mouth 3 (three) times a day   cloNIDine (CATAPRES) 0 2 mg tablet   Yes No dexamethasone (DECADRON) 1 mg tablet   No No   Sig: Take 1 tablet daily for up to 5 days for prolonged migraine rescue   dicyclomine (BENTYL) 20 mg tablet   No No   Sig: Take 1 tablet (20 mg total) by mouth every 6 (six) hours as needed (urgency, abdominal pain)   divalproex sodium (DEPAKOTE) 250 mg EC tablet   Yes No   Sig: Take 250 mg by mouth 2 (two) times a day   famotidine (PEPCID) 40 MG tablet   No No   Sig: Take 1 tablet (40 mg total) by mouth daily at bedtime   fluticasone (FLONASE) 50 mcg/act nasal spray   No No   Si sprays into each nostril daily   fluticasone-salmeterol (Advair HFA) 115-21 MCG/ACT inhaler   No No   Sig: Inhale 2 puffs 2 (two) times a day Rinse mouth after use    hydrOXYzine pamoate (VISTARIL) 50 mg capsule   Yes No   Si mg daily at bedtime   levalbuterol (XOPENEX) 1 25 mg/3 mL nebulizer solution   No No   Sig: Take 3 mL (1 25 mg total) by nebulization every 6 (six) hours as needed for wheezing or shortness of breath   menthol-cetylpyridinium (CEPACOL) 3 MG lozenge   No No   Sig: Take 1 lozenge (3 mg total) by mouth as needed for sore throat   mometasone (ELOCON) 0 1 % cream   No No   Sig: Apply topically daily   nitrofurantoin (MACROBID) 100 mg capsule   No No   Sig: Take 1 capsule (100 mg total) by mouth 2 (two) times a day   ondansetron (ZOFRAN-ODT) 4 mg disintegrating tablet   No No   Sig: Take 1 tablet (4 mg total) by mouth every 8 (eight) hours as needed (nausea) for up to 10 days   pantoprazole (PROTONIX) 40 mg tablet   No No   Sig: Take 1 tablet (40 mg total) by mouth daily before breakfast   promethazine (PHENERGAN) 25 mg suppository   No No   Sig: Insert 1 suppository (25 mg total) into the rectum every 6 (six) hours as needed for nausea or vomiting   rizatriptan (MAXALT-MLT) 10 mg disintegrating tablet   No No   Sig: Take 1 tablet (10 mg total) by mouth once as needed for migraine for up to 1 dose May repeat in 2 hours if needed      Facility-Administered Medications: None       Past Medical History:   Diagnosis Date   • Anaphylaxis     apricot   • Anxiety    • Asthma    • Cluster headache    • Eczema    • Headache, tension-type    • Lymphadenitis     Last assessed 12/30/14   • Lymphadenopathy     Last assessed 10/16/13   • 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[facility] )    • Migraine    • 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[facility] )    • Psychiatric disorder    • Psychiatric illness    • Psychosis ([facility] )    • Seizures ([facility] )        Past Surgical History:   Procedure Laterality Date   • COLONOSCOPY N/A 05/03/2019    Procedure: COLONOSCOPY;  Surgeon: Pema Flores MD;  [facility]; Service: Gastroenterology   • ESOPHAGOGASTRODUODENOSCOPY N/A 05/03/2019    Procedure: ESOPHAGOGASTRODUODENOSCOPY (EGD); Surgeon: Pema Flores MD;  [facility]; Service: Gastroenterology       Family History   Problem Relation Age of Onset   • Migraines Mother    • Other Mother         Neck pain   • Depression Mother    • Drug abuse Mother    • Drug abuse Father    • Schizophrenia Father    • Febrile seizures Maternal Uncle    • Stroke Maternal Uncle      I have reviewed and agree with the history as documented  E-Cigarette/Vaping   • E-Cigarette Use Current Every Day User      E-Cigarette/Vaping Substances   • Nicotine Yes    • THC No    • CBD No    • Flavoring Yes    • Other No    • Unknown No      Social History     Tobacco Use   • Smoking status: Some Days     Packs/day: 0 00     Years: 0 00     Pack years: 0 00     Types: Cigarettes   • Smokeless tobacco: Never   • Tobacco comments:     I stopped smoking cigarettes but started vaping   Vaping Use   • Vaping Use: Every day   • Substances: Nicotine, Flavoring   Substance Use Topics   • Alcohol use: Not Currently     Alcohol/week: 2 0 standard drinks     Types: 2 Shots of liquor per week     Comment: Occasionally   • Drug use: Not Currently     Frequency: 3 0 times per week     Types: Marijuana       Review of Systems   Constitutional: Negative    Negative for chills and fever  HENT: Negative  Negative for rhinorrhea, sore throat, trouble swallowing and voice change  Eyes: Negative  Negative for pain and visual disturbance  Respiratory: Negative  Negative for cough, shortness of breath and wheezing  Cardiovascular: Negative  Negative for chest pain and palpitations  Gastrointestinal: Positive for abdominal pain, nausea and vomiting  Negative for diarrhea  Genitourinary: Negative  Negative for dysuria and frequency  Musculoskeletal: Negative  Negative for neck pain and neck stiffness  Skin: Negative  Negative for rash  Neurological: Negative  Negative for dizziness, speech difficulty, weakness, light-headedness and numbness  Physical Exam  Physical Exam  Vitals and nursing note reviewed  Constitutional:       General: She is not in acute distress  Appearance: She is well-developed  HENT:      Head: Normocephalic and atraumatic  Eyes:      Conjunctiva/sclera: Conjunctivae normal       Pupils: Pupils are equal, round, and reactive to light  Neck:      Trachea: No tracheal deviation  Cardiovascular:      Rate and Rhythm: Normal rate and regular rhythm  Pulmonary:      Effort: Pulmonary effort is normal  No respiratory distress  Breath sounds: Normal breath sounds  No wheezing or rales  Abdominal:      General: Bowel sounds are normal  There is no distension  Palpations: Abdomen is soft  Tenderness: There is no abdominal tenderness  There is no guarding or rebound  Musculoskeletal:         General: No tenderness or deformity  Normal range of motion  Cervical back: Normal range of motion and neck supple  Skin:     General: Skin is warm and dry  Capillary Refill: Capillary refill takes less than 2 seconds  Findings: No rash  Neurological:      Mental Status: She is alert and oriented to person, place, and time     Psychiatric:         Behavior: Behavior normal          Vital Signs  ED Triage Vitals [12/21/22 2037]   Temperature Pulse Respirations Blood Pressure SpO2   100 °F (37 8 °C) 90 18 153/82 99 %      Temp Source Heart Rate Source Patient Position - Orthostatic VS BP Location FiO2 (%)   Tympanic Monitor Sitting Left arm --      Pain Score       --           Vitals:    12/21/22 2037 12/21/22 2318   BP: 153/82 120/72   Pulse: 90 88   Patient Position - Orthostatic VS: Sitting Sitting         Visual Acuity      ED Medications  Medications   haloperidol lactate (HALDOL) injection 5 mg (5 mg Intramuscular Given 12/21/22 2128)       Diagnostic Studies  Results Reviewed     Procedure Component Value Units Date/Time    FLU/RSV/COVID - if FLU/RSV clinically relevant [[id number]]  (Normal) Collected: 12/21/22 2128    Lab Status: Final result Specimen: Nares from Nose Updated: 12/21/22 2227     SARS-CoV-2 Negative     INFLUENZA A PCR Negative     INFLUENZA B PCR Negative     RSV PCR Negative    Narrative:      FOR PEDIATRIC PATIENTS - copy/paste COVID Guidelines URL to browser: [email or web address]    SARS-CoV-2 assay is a Nucleic Acid Amplification assay intended for the  qualitative detection of nucleic acid from SARS-CoV-2 in nasopharyngeal  swabs  Results are for the presumptive identification of SARS-CoV-2 RNA  Positive results are indicative of infection with SARS-CoV-2, the virus  causing COVID-19, but do not rule out bacterial infection or co-infection  with other viruses  Laboratories within the [place] and its  territories are required to report all positive results to the appropriate  public health authorities  Negative results do not preclude SARS-CoV-2  infection and should not be used as the sole basis for treatment or other  patient management decisions  Negative results must be combined with  clinical observations, patient history, and epidemiological information  This test has not been FDA cleared or approved      This test has been authorized by FDA under an Emergency Use Authorization  (EUA)  This test is only authorized for the duration of time the  declaration that circumstances exist justifying the authorization of the  emergency use of an in vitro diagnostic tests for detection of SARS-CoV-2  virus and/or diagnosis of COVID-19 infection under section 564(b)(1) of  the Act, 21 U  [place] C  281COK-3(E)(1), unless the authorization is terminated  or revoked sooner  The test has been validated but independent review by FDA  and CLIA is pending  Test performed using [employer] GeneXpert: This RT-PCR assay targets N2,  a region unique to SARS-CoV-2  A conserved region in the E-gene was chosen  for pan-Sarbecovirus detection which includes SARS-CoV-2  According to CMS-2020-01-R, this platform meets the definition of high-throughput technology  No orders to display              Procedures  Procedures         ED Course                                             MDM  Number of Diagnoses or Management Options  Nausea and vomiting  Diagnosis management comments: Patient feels better after Haldol, tolerated oral challenge  Reviewed that this could be viral versus hyperemesis cannabinoid syndrome  Repeat abdominal exam is benign  I have reviewed the patient's visit and any testing done in the emergency department  They have verbalized their understanding of any testing done today and have no further questions or concerns regarding their care in the emergency room  They will follow up with their primary care physician as well as with any specialist in their discharge instructions  Strict return precautions were discussed         Amount and/or Complexity of Data Reviewed  Clinical lab tests: ordered and reviewed        Disposition  Final diagnoses:   Nausea and vomiting     Time reflects when diagnosis was documented in both MDM as applicable and the Disposition within this note     Time User Action Codes Description Comment 12/21/2022 10:53 PM Isatu Elkins Add [R11 2] Nausea and vomiting       ED Disposition     ED Disposition   Discharge    Condition   Stable    Date/Time   Wed Dec 21, 2022 10:53 PM    Sabra Isidro discharge to home/self care                 Follow-up Information     Follow up With Specialties Details Why [address], [address]  [address]  [place] [address] [facility]            Discharge Medication List as of 12/21/2022 10:57 PM      CONTINUE these medications which have NOT CHANGED    Details   Abilify Maintena 300 MG injection INJECT 1 EVERY 4 WEEKS, Historical Med      Abilify Maintena 300 MG SRER INJECT INTRAMUSCULARLY EVERY 4 WEEKS, Historical Med      albuterol (Ventolin HFA) 90 mcg/act inhaler Inhale 2 puffs every 4 (four) hours as needed for wheezing, Starting Wed 3/9/2022, Normal      aluminum-magnesium hydroxide-simethicone (MAALOX) 200-200-20 MG/5ML SUSP Take 15 mL by mouth 4 (four) times a day (before meals and at bedtime), Starting Fri 11/25/2022, Normal      azelastine (ASTELIN) 0 1 % nasal spray 1-2 sprays into each nostril 2 (two) times a day as needed for rhinitis Use in each nostril as directed, Starting Wed 7/13/2022, Normal      azelastine (OPTIVAR) 0 05 % ophthalmic solution Administer 1 drop to both eyes 2 (two) times a day, Starting Wed 7/20/2022, Normal      benztropine (COGENTIN) 2 mg tablet Take 2 mg by mouth daily, Starting Mon 8/8/2022, Historical Med      busPIRone (BUSPAR) 15 mg tablet Take 15 mg by mouth 3 (three) times a day, Starting Mon 8/8/2022, Historical Med      cloNIDine (CATAPRES) 0 2 mg tablet Starting Mon 3/14/2022, Historical Med      dexamethasone (DECADRON) 1 mg tablet Take 1 tablet daily for up to 5 days for prolonged migraine rescue, Normal      dicyclomine (BENTYL) 20 mg tablet Take 1 tablet (20 mg total) by mouth every 6 (six) hours as needed (urgency, abdominal pain), Starting Mon 11/14/2022, Normal divalproex sodium (DEPAKOTE) 250 mg EC tablet Take 250 mg by mouth 2 (two) times a day, Starting Mon 8/8/2022, Historical Med      EPINEPHrine (EPIPEN) 0 3 mg/0 3 mL SOAJ Inject 0 3 mL (0 3 mg total) into a muscle once for 1 dose, Starting Wed 3/9/2022, Normal      Erenumab-aooe 140 MG/ML SOAJ Inject 140 mg under the skin every 30 (thirty) days, Starting Fri 10/28/2022, Normal      famotidine (PEPCID) 40 MG tablet Take 1 tablet (40 mg total) by mouth daily at bedtime, Starting Mon 11/14/2022, Normal      fluticasone (FLONASE) 50 mcg/act nasal spray 2 sprays into each nostril daily, Starting Wed 7/13/2022, Normal      fluticasone-salmeterol (Advair HFA) 115-21 MCG/ACT inhaler Inhale 2 puffs 2 (two) times a day Rinse mouth after use , Starting Wed 7/13/2022, Normal      hydrOXYzine pamoate (VISTARIL) 50 mg capsule 50 mg daily at bedtime, Starting Mon 3/14/2022, Historical Med      levalbuterol (XOPENEX) 1 25 mg/3 mL nebulizer solution Take 3 mL (1 25 mg total) by nebulization every 6 (six) hours as needed for wheezing or shortness of breath, Starting Wed 7/13/2022, Normal      menthol-cetylpyridinium (CEPACOL) 3 MG lozenge Take 1 lozenge (3 mg total) by mouth as needed for sore throat, Starting Wed 11/16/2022, Normal      mometasone (ELOCON) 0 1 % cream Apply topically daily, Starting Wed 7/13/2022, Normal      nitrofurantoin (MACROBID) 100 mg capsule Take 1 capsule (100 mg total) by mouth 2 (two) times a day, Starting Sun 10/2/2022, Normal      ondansetron (ZOFRAN-ODT) 4 mg disintegrating tablet Take 1 tablet (4 mg total) by mouth every 8 (eight) hours as needed (nausea) for up to 10 days, Starting Fri 11/25/2022, Until Mon 12/5/2022 at 2359, Normal      pantoprazole (PROTONIX) 40 mg tablet Take 1 tablet (40 mg total) by mouth daily before breakfast, Starting Thu 7/7/2022, Normal      PARoxetine (PAXIL) 20 mg tablet Take 20 mg by mouth daily, Starting Mon 8/8/2022, Historical Med      promethazine (PHENERGAN) 25 mg suppository Insert 1 suppository (25 mg total) into the rectum every 6 (six) hours as needed for nausea or vomiting, Starting Tue 11/15/2022, Normal      rizatriptan (MAXALT-MLT) 10 mg disintegrating tablet Take 1 tablet (10 mg total) by mouth once as needed for migraine for up to 1 dose May repeat in 2 hours if needed, Starting Fri 10/28/2022, Normal      Spacer/Aero-Holding Chambers ([employer]) Jeff Naseem Use 2 (two) times a day, Starting Wed 3/9/2022, Normal             No discharge procedures on file      PDMP Review       Value Time User    PDMP Reviewed  Yes 11/25/2021 10:36 PM Lisa Sexton MD          ED Provider  Electronically Signed by           oDnna Thompson DO  12/22/22 0169

## 2023-01-09 ENCOUNTER — ANESTHESIA (OUTPATIENT)
Dept: GASTROENTEROLOGY | Facility: HOSPITAL | Age: 23
End: 2023-01-09

## 2023-01-09 ENCOUNTER — ANESTHESIA EVENT (OUTPATIENT)
Dept: GASTROENTEROLOGY | Facility: HOSPITAL | Age: 23
End: 2023-01-09

## 2023-01-09 ENCOUNTER — HOSPITAL ENCOUNTER (OUTPATIENT)
Dept: GASTROENTEROLOGY | Facility: HOSPITAL | Age: 23
Setting detail: OUTPATIENT SURGERY
Discharge: HOME | End: 2023-01-09
Attending: INTERNAL MEDICINE

## 2023-01-09 VITALS
BODY MASS INDEX: 35.08 KG/M2 | HEART RATE: 78 BPM | SYSTOLIC BLOOD PRESSURE: 115 MMHG | TEMPERATURE: 97 F | RESPIRATION RATE: 16 BRPM | DIASTOLIC BLOOD PRESSURE: 66 MMHG | WEIGHT: 198 LBS | HEIGHT: 63 IN | OXYGEN SATURATION: 98 %

## 2023-01-09 DIAGNOSIS — R19.7 DIARRHEA, UNSPECIFIED TYPE: ICD-10-CM

## 2023-01-09 DIAGNOSIS — K21.9 GASTROESOPHAGEAL REFLUX DISEASE WITHOUT ESOPHAGITIS: ICD-10-CM

## 2023-01-09 DIAGNOSIS — R10.9 ABDOMINAL PAIN, UNSPECIFIED ABDOMINAL LOCATION: ICD-10-CM

## 2023-01-09 DIAGNOSIS — R11.2 NAUSEA AND VOMITING, UNSPECIFIED VOMITING TYPE: ICD-10-CM

## 2023-01-09 LAB
EXT PREGNANCY TEST URINE: NEGATIVE
EXT. CONTROL: NORMAL

## 2023-01-09 RX ORDER — PROPOFOL 10 MG/ML
INJECTION, EMULSION INTRAVENOUS AS NEEDED
Status: DISCONTINUED | OUTPATIENT
Start: 2023-01-09 | End: 2023-01-09

## 2023-01-09 RX ORDER — LIDOCAINE HYDROCHLORIDE 10 MG/ML
INJECTION, SOLUTION EPIDURAL; INFILTRATION; INTRACAUDAL; PERINEURAL AS NEEDED
Status: DISCONTINUED | OUTPATIENT
Start: 2023-01-09 | End: 2023-01-09

## 2023-01-09 RX ORDER — SODIUM CHLORIDE, SODIUM LACTATE, POTASSIUM CHLORIDE, CALCIUM CHLORIDE 600; 310; 30; 20 MG/100ML; MG/100ML; MG/100ML; MG/100ML
INJECTION, SOLUTION INTRAVENOUS CONTINUOUS PRN
Status: DISCONTINUED | OUTPATIENT
Start: 2023-01-09 | End: 2023-01-09

## 2023-01-09 RX ADMIN — PROPOFOL 50 MG: 10 INJECTION, EMULSION INTRAVENOUS at 14:53

## 2023-01-09 RX ADMIN — SODIUM CHLORIDE, SODIUM LACTATE, POTASSIUM CHLORIDE, AND CALCIUM CHLORIDE: .6; .31; .03; .02 INJECTION, SOLUTION INTRAVENOUS at 14:40

## 2023-01-09 RX ADMIN — PROPOFOL 50 MG: 10 INJECTION, EMULSION INTRAVENOUS at 14:50

## 2023-01-09 RX ADMIN — PROPOFOL 50 MG: 10 INJECTION, EMULSION INTRAVENOUS at 14:55

## 2023-01-09 RX ADMIN — LIDOCAINE HYDROCHLORIDE 20 MG: 10 INJECTION, SOLUTION EPIDURAL; INFILTRATION; INTRACAUDAL; PERINEURAL at 14:49

## 2023-01-09 RX ADMIN — PROPOFOL 200 MG: 10 INJECTION, EMULSION INTRAVENOUS at 14:49

## 2023-01-09 NOTE — ANESTHESIA POSTPROCEDURE EVALUATION
Post-Op Assessment Note    CV Status:  Stable  Pain Score: 0    Pain management: adequate     Mental Status:  Arousable and sleepy   Hydration Status:  Euvolemic   PONV Controlled:  Controlled   Airway Patency:  Patent      Post Op Vitals Reviewed: Yes      Staff: CRNA, Anesthesiologist   Comments: Report given to reccovering RN, VSS, Pt resting comfortably        No notable events documented      /61 (01/09/23 1504)    Temp (!) 97 °F (36 1 °C) (01/09/23 1504)    Pulse 83 (01/09/23 1504)   Resp 16 (01/09/23 1504)    SpO2 98 % (01/09/23 1504)

## 2023-01-09 NOTE — NURSING NOTE
Pt is awake,alert,tolerated diet, seen and instructed by Dr Brayden Verde, written and verbal instructions given, pt verbalizes an understanding and voices no questions or complaints

## 2023-01-09 NOTE — ANESTHESIA PREPROCEDURE EVALUATION
Procedure:  EGD    Relevant Problems   CARDIO   (+) Breast pain   (+) Common migraine without aura   (+) Hyperlipidemia      GI/HEPATIC   (+) Acid reflux   (+) Hiatal hernia      NEURO/PSYCH   (+) Common migraine without aura   (+) Psychogenic nonepileptic seizure   (+) Schizophrenia (HCC)      PULMONARY   (+) Asthma      Respiratory   (+) Allergic rhinitis      Digestive   (+) Nausea and vomiting      Genitourinary   (+) Bladder wall thickening      Other   (+) Bipolar I disorder, most recent episode mixed, severe with psychotic features ([address] )   (+) Bipolar disorder ([facility] )   (+) Change in bowel habits   (+) Current every day nicotine vaping   (+) Epigastric pain   (+) Facial numbness   (+) Functional neurological symptom disorder with attacks or seizures   (+) Generalized abdominal pain   (+) Loose stools   (+) Overweight   (+) Tinnitus of right ear   (+) Tobacco abuse      Lab Results   Component Value Date     11/11/2015    SODIUM 136 11/25/2022    K 2 8 (L) 11/25/2022    CL 93 (L) 11/25/2022    CO2 29 11/25/2022    ANIONGAP 11 11/11/2015    AGAP 14 11/25/2022    BUN 15 11/25/2022    CREATININE 0 71 11/25/2022    GLUC 127 (H) 11/25/2022    GLUF 92 11/16/2022    CALCIUM 9 6 11/25/2022    AST 29 11/25/2022    ALT 26 11/25/2022    ALKPHOS 115 11/25/2022    PROT 8 0 11/11/2015    TP 9 4 (H) 11/25/2022    BILITOT 0 22 11/11/2015    TBILI 0 53 11/25/2022    EGFR 120 11/25/2022     Lab Results   Component Value Date    WBC 13 66 (H) 11/25/2022    HGB 14 6 11/25/2022    HCT 43 1 11/25/2022    MCV 84 11/25/2022     11/25/2022              Anesthesia Plan  ASA Score- 2     Anesthesia Type- IV sedation with anesthesia with ASA Monitors  Additional Monitors:   Airway Plan:           Plan Factors-Exercise tolerance (METS): >4 METS  Chart reviewed  EKG reviewed  Imaging results reviewed  Existing labs reviewed  Patient summary reviewed  Induction- intravenous      Postoperative Plan- Informed Consent-

## 2023-01-09 NOTE — H&P
History and Physical - [facility]  Jhonatan Medina 24 y o  female MRN: [id number]    HPI: Jhonatan Medina is a 24y o  year old female w hiatal hernia, dyspepsia, GERD, FH Crohn's disease who presents for EGD for evaluation of chronic nausea, vomiting, and emesis  Had EGD and colonoscopy 5/2019 with normal upper GI exam and normal TI and colon  Biopsies from duodenum were normal, biopsies from stomach showed chronic inflammation with reactive gastritis  Last Hg   Lab Results   Component Value Date    HGB 14 6 11/25/2022     Last INR   Lab Results   Component Value Date    INR 1 08 06/20/2022       Review of Systems    Historical Information   Past Medical History:   Diagnosis Date   • Anaphylaxis     apricot   • Anxiety    • Asthma    • Cluster headache    • Eczema    • Headache, tension-type    • Lymphadenitis     Last assessed 12/30/14   • Lymphadenopathy     Last assessed 10/16/13   • 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[address] )    • Migraine    • 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[facility] )    • Psychiatric disorder    • Psychiatric illness    • Psychosis ([facility] )    • Seizures ([facility] )      Past Surgical History:   Procedure Laterality Date   • COLONOSCOPY N/A 05/03/2019    Procedure: COLONOSCOPY;  Surgeon: Debi Osgood, MD;  [facility]; Service: Gastroenterology   • ESOPHAGOGASTRODUODENOSCOPY N/A 05/03/2019    Procedure: ESOPHAGOGASTRODUODENOSCOPY (EGD); Surgeon: Debi Osgood, MD;  Location: [facility];   Service: Gastroenterology     Social History   Social History     Substance and Sexual Activity   Alcohol Use Not Currently   • Alcohol/week: 2 0 standard drinks   • Types: 2 Shots of liquor per week    Comment: Occasionally     Social History     Substance and Sexual Activity   Drug Use Not Currently   • Frequency: 3 0 times per week   • Types: Marijuana     Social History     Tobacco Use   Smoking Status Some Days   • Packs/day: 0 00   • Years: 0 00   • Pack years: 0 00   • Types: Cigarettes   Smokeless Tobacco Never   Tobacco Comments    I stopped smoking cigarettes but started vaping     Family History   Problem Relation Age of Onset   • Migraines Mother    • Other Mother         Neck pain   • Depression Mother    • Drug abuse Mother    • Drug abuse Father    • Schizophrenia Father    • Febrile seizures Maternal Uncle    • Stroke Maternal Uncle        Meds/Allergies     (Not in a hospital admission)      Allergies   Allergen Reactions   • Bee Venom Swelling   • Penicillins Hives   • Blueberry Flavor - Food Allergy Rash   • Pork-Derived Products - Food Allergy Rash   • Shiitake Mushroom - Food Allergy Rash       Objective     /61   Pulse 91   Temp 98 4 °F (36 9 °C) (Temporal)   Resp 18   Ht 5' 3" (1 6 m)   Wt 89 8 kg (198 lb)   LMP 12/20/2022   SpO2 96%   BMI 35 07 kg/m²     PHYSICAL EXAM    Gen: NAD  CV: RRR  CHEST: Clear  ABD: soft, NT/ND  EXT: no edema  Neuro: AAO    ASSESSMENT/PLAN:  This is a 24y o  year old female here for EGD    Plan/Procedure: EGD

## 2023-01-11 ENCOUNTER — CONSULT (OUTPATIENT)
Dept: HEMATOLOGY ONCOLOGY | Facility: CLINIC | Age: 23
End: 2023-01-11

## 2023-01-11 VITALS
BODY MASS INDEX: 35.79 KG/M2 | WEIGHT: 202 LBS | HEART RATE: 85 BPM | SYSTOLIC BLOOD PRESSURE: 108 MMHG | OXYGEN SATURATION: 98 % | RESPIRATION RATE: 16 BRPM | TEMPERATURE: 98.4 F | HEIGHT: 63 IN | DIASTOLIC BLOOD PRESSURE: 60 MMHG

## 2023-01-11 DIAGNOSIS — D72.828 OTHER ELEVATED WHITE BLOOD CELL (WBC) COUNT: Primary | ICD-10-CM

## 2023-01-11 DIAGNOSIS — R77.9 ELEVATED BLOOD PROTEIN: ICD-10-CM

## 2023-01-11 RX ORDER — PAROXETINE HYDROCHLORIDE 40 MG/1
40 TABLET, FILM COATED ORAL EVERY MORNING
COMMUNITY
Start: 2022-12-05

## 2023-01-11 NOTE — PROGRESS NOTES
Hematology/Oncology Outpatient Follow-up  Apurva Contreras 24 y o  female 2000 [id number]    Date:  1/11/2023      Assessment and Plan:  1  Other elevated white blood cell (WBC) count  Has a history of chronic leukocytosis on and off at least since 2018 which I suspect is likely reactive in nature  Has multiple reasons for chronic inflammation/elevated white cells including asthma, recurrent pharyngitis/sinopulmonary infections, tobacco abuse/marijuana use and occasional steroid use/dexamethasone with severe migraines  Did review other possible etiologies of her leukocytosis  Additional laboratory work-up to rule out other etiologies will be pursued  Request patient follow-up again in 1 or 2 months from now to review the findings of her initial work-up which will be acted on accordingly  Did recommend slowing down or better yet quitting smoking which will likely improve her white cells  States that she did quit temporarily over the summertime at that time her white cells were noted to be normal     - CBC and differential; Future  - Comprehensive metabolic panel; Future  - Peripheral Smear; Future  - C-reactive protein; Future  - Sedimentation rate, automated; Future  - Iron Panel (Includes Ferritin, Iron Sat%, Iron, and TIBC); Future  - Immunoglobulin free LT chains blood; Future  - IgG, IgA, IgM; Future  - Protein electrophoresis, serum; Future  - Erythropoietin; Future  - Leukemia/Lymphoma flow cytometry; Future  - HIV 1/2 AG/AB w Reflex [facility] for 2 yr old and above; Future  - RF Screen w/ Reflex to Titer; Future  - Antinuclear Antibodies, IFA; Future  - Chronic Hepatitis Panel; Future  - LD,Blood; Future    2  Elevated blood protein  Suspect this is likely reactive in nature due to chronic inflammation- additional work-up will be pursued including checking inflammatory markers, autoimmune markers and ruling out monoclonal protein      - CBC and differential; Future  - Comprehensive metabolic panel; Future  - C-reactive protein; Future  - Sedimentation rate, automated; Future  - Immunoglobulin free LT chains blood; Future  - IgG, IgA, IgM; Future  - Protein electrophoresis, serum; Future  - RF Screen w/ Reflex to Titer; Future  - Antinuclear Antibodies, IFA; Future    HPI:  21-year-old female who presents for further evaluation of her chronic leukocytosis  She has past medical history of GERD/hernia on PPI she is being monitored by GI  She also has past medical history of asthma, eczema, seasonal allergies, anxiety, manic depression, migraines, easier/pseudoseizure, bipolar disorder,schizophrenia and tobacco abuse (about 14yrs) with occasional marijuana use  She states that she does have a psychiatrist that she sees on a regular basis  She does report fatigue  Reports that she has gained weight recently  Chronic migraine headaches which are being treated  Mentions that she does get hives at times  Admits to night sweats most nights  She reports heavy menses goes usually last 1 to 5 days but she cannot feel well when she is on her menstrual cycle with a lot of nausea and migraines  She has loose stools and is following with her GI team   Denies any rashes or palpable lymphadenopathy  She mentions that she has been having current pharyngitis/strep throat since childhood  And also notes factions such as sinus infections, upper respiratory infections and strep throat frequently  Is on multiple medications  Does have a dose for dexamethasone 1 mg that she can take for up to 5 days with severe migraines; states that she is using this as needed monthly if not every other month on average  She did have abdominal imaging done recently 10/3/2022 CT scan of the abdomen and pelvis with contrast which showed unremarkable spleen and liver:  IMPRESSION:  Possible bladder wall thickening again noted  Correlation for cystitis advised  Otherwise, no acute CT findings in the abdomen or pelvis    No bowel obstruction or focal inflammatory process  Normal appendix  Believes she may have had a UTI around the time of her CT imaging; already scheduled to meet with urology 1/26/20223  ROS: Review of Systems   Constitutional: Positive for appetite change, fatigue and unexpected weight change (gain)  Gastrointestinal: Positive for diarrhea, nausea and vomiting  Genitourinary: Positive for menstrual problem  Skin: Positive for rash  Neurological: Positive for headaches  Psychiatric/Behavioral: Positive for sleep disturbance  All other systems reviewed and are negative  Past Medical History:   Diagnosis Date   • Anaphylaxis     apricot   • Anxiety    • Asthma    • Cluster headache    • Eczema    • Headache, tension-type    • Lymphadenitis     Last assessed 12/30/14   • Lymphadenopathy     Last assessed 10/16/13   • 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 )    • Migraine    • 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[facility] )    • Psychiatric disorder    • Psychiatric illness    • Psychosis ([facility] )    • Seizures ([facility] )        Past Surgical History:   Procedure Laterality Date   • COLONOSCOPY N/A 05/03/2019    Procedure: COLONOSCOPY;  Surgeon: Jewels Cervantes MD;  Location: [facility]; Service: Gastroenterology   • ESOPHAGOGASTRODUODENOSCOPY N/A 05/03/2019    Procedure: ESOPHAGOGASTRODUODENOSCOPY (EGD); Surgeon: Jewels Cervantes MD;  Location: [facility];   Service: Gastroenterology       Social History     Socioeconomic History   • Marital status: Single     Spouse name: None   • Number of children: 0   • Years of education: None   • Highest education level: High school graduate   Occupational History   • Occupation: unemployed   Tobacco Use   • Smoking status: Some Days     Packs/day: 0 00     Years: 0 00     Pack years: 0 00     Types: Cigarettes   • Smokeless tobacco: Never   • Tobacco comments:     I stopped smoking cigarettes but started vaping   Vaping Use   • Vaping Use: Every day   • Substances: Nicotine, Flavoring   Substance and Sexual Activity   • Alcohol use: Not Currently     Alcohol/week: 2 0 standard drinks     Types: 2 Shots of liquor per week     Comment: Occasionally   • Drug use: Not Currently     Frequency: 3 0 times per week     Types: Marijuana   • Sexual activity: Yes     Partners: Female     Birth control/protection: None   Other Topics Concern   • None   Social History Narrative    Lives in [place] [place] with her girlfriend, mother, siblings, step-father    Pt has Dog - Not allowed in bedroom     Social Determinants of Health     Financial Resource Strain: Medium Risk   • Difficulty of Paying Living Expenses: Somewhat hard   Food Insecurity: Food Insecurity Present   • Worried About Running Out of Food in the Last Year: Sometimes true   • Ran Out of Food in the Last Year: Sometimes true   Transportation Needs: Unmet Transportation Needs   • Lack of Transportation (Medical):  Yes   • Lack of Transportation (Non-Medical): Yes   Physical Activity: Sufficiently Active   • Days of Exercise per Week: 3 days   • Minutes of Exercise per Session: 60 min   Stress: Not on file   Social Connections: Not on file   Intimate Partner Violence: Not on file   Housing Stability: Low Risk    • Unable to Pay for Housing in the Last Year: No   • Number of Places Lived in the Last Year: 1   • Unstable Housing in the Last Year: No       Family History   Problem Relation Age of Onset   • Migraines Mother    • Other Mother         Neck pain   • Depression Mother    • Drug abuse Mother    • Drug abuse Father    • Schizophrenia Father    • Febrile seizures Maternal Uncle    • Stroke Maternal Uncle        Allergies   Allergen Reactions   • Bee Venom Swelling   • Penicillins Hives   • Blueberry Flavor - Food Allergy Rash   • Pork-Derived Products - Food Allergy Rash   • Shiitake Mushroom - Food Allergy Rash         Current Outpatient Medications:   •  Abilify Maintena 300 MG injection, INJECT 1 EVERY 4 WEEKS, Disp: , Rfl:   •  Abilify Maintena 300 MG SRER, INJECT INTRAMUSCULARLY EVERY 4 WEEKS, Disp: , Rfl:   •  albuterol (Ventolin HFA) 90 mcg/act inhaler, Inhale 2 puffs every 4 (four) hours as needed for wheezing, Disp: 18 g, Rfl: 3  •  aluminum-magnesium hydroxide-simethicone (MAALOX) 200-200-20 MG/5ML SUSP, Take 15 mL by mouth 4 (four) times a day (before meals and at bedtime), Disp: 150 mL, Rfl: 0  •  azelastine (ASTELIN) 0 1 % nasal spray, 1-2 sprays into each nostril 2 (two) times a day as needed for rhinitis Use in each nostril as directed, Disp: 90 mL, Rfl: 5  •  azelastine (OPTIVAR) 0 05 % ophthalmic solution, Administer 1 drop to both eyes 2 (two) times a day, Disp: 18 mL, Rfl: 5  •  benztropine (COGENTIN) 2 mg tablet, Take 2 mg by mouth daily, Disp: , Rfl:   •  busPIRone (BUSPAR) 15 mg tablet, Take 15 mg by mouth 3 (three) times a day, Disp: , Rfl:   •  cetirizine (ZyrTEC) 10 mg tablet, Take 1 tablet (10 mg total) by mouth daily, Disp: 90 tablet, Rfl: 2  •  cloNIDine (CATAPRES) 0 2 mg tablet, , Disp: , Rfl:   •  Compro 25 MG suppository, unwrap and insert 1 suppository rectally every 6 hours if needed for nausea and vomiting, Disp: , Rfl:   •  dexamethasone (DECADRON) 1 mg tablet, Take 1 tablet daily for up to 5 days for prolonged migraine rescue, Disp: 5 tablet, Rfl: 0  •  dicyclomine (BENTYL) 20 mg tablet, Take 1 tablet (20 mg total) by mouth every 6 (six) hours as needed (urgency, abdominal pain), Disp: 120 tablet, Rfl: 2  •  divalproex sodium (DEPAKOTE ER) 250 mg 24 hr tablet, Take 250 mg by mouth 2 (two) times a day, Disp: , Rfl:   •  Erenumab-aooe 140 MG/ML SOAJ, Inject 140 mg under the skin every 30 (thirty) days, Disp: 1 mL, Rfl: 11  •  famotidine (PEPCID) 40 MG tablet, Take 1 tablet (40 mg total) by mouth daily at bedtime, Disp: 30 tablet, Rfl: 3  •  fluticasone (FLONASE) 50 mcg/act nasal spray, 2 sprays into each nostril daily, Disp: 54 6 mL, Rfl: 3  •  fluticasone-salmeterol (Advair HFA) 115-21 MCG/ACT inhaler, Inhale 2 puffs 2 (two) times a day Rinse mouth after use , Disp: 36 g, Rfl: 5  •  hydrOXYzine pamoate (VISTARIL) 50 mg capsule, 50 mg daily at bedtime, Disp: , Rfl:   •  levalbuterol (XOPENEX) 1 25 mg/3 mL nebulizer solution, Take 3 mL (1 25 mg total) by nebulization every 8 (eight) hours as needed for wheezing or shortness of breath, Disp: 150 mL, Rfl: 3  •  menthol-cetylpyridinium (CEPACOL) 3 MG lozenge, Take 1 lozenge (3 mg total) by mouth as needed for sore throat, Disp: 9 lozenge, Rfl: 1  •  mometasone (ELOCON) 0 1 % cream, Apply topically daily, Disp: 45 g, Rfl: 1  •  ondansetron (ZOFRAN-ODT) 4 mg disintegrating tablet, Take 1 tablet (4 mg total) by mouth every 8 (eight) hours as needed (nausea) for up to 10 days, Disp: 20 tablet, Rfl: 0  •  pantoprazole (PROTONIX) 40 mg tablet, Take 1 tablet (40 mg total) by mouth daily before breakfast, Disp: 30 tablet, Rfl: 3  •  promethazine (PHENERGAN) 25 mg suppository, Insert 1 suppository (25 mg total) into the rectum every 6 (six) hours as needed for nausea or vomiting, Disp: 12 suppository, Rfl: 0  •  rizatriptan (MAXALT-MLT) 10 mg disintegrating tablet, Take 1 tablet (10 mg total) by mouth once as needed for migraine for up to 1 dose May repeat in 2 hours if needed, Disp: 10 tablet, Rfl: 3  •  Spacer/Aero-Holding Chambers (Vortex Valved Holding Chamber) FROYLAN, Use 2 (two) times a day, Disp: 1 each, Rfl: 0  •  EPINEPHrine (EPIPEN) 0 3 mg/0 3 mL SOAJ, Inject 0 3 mL (0 3 mg total) into a muscle once for 1 dose, Disp: 4 each, Rfl: 3  •  PARoxetine (PAXIL) 40 MG tablet, Take 40 mg by mouth every morning, Disp: , Rfl:       Physical Exam:  /60 (BP Location: Left arm, Patient Position: Sitting, Cuff Size: Adult)   Pulse 85   Temp 98 4 °F (36 9 °C) (Temporal)   Resp 16   Ht 5' 3" (1 6 m)   Wt 91 6 kg (202 lb)   LMP 12/20/2022   SpO2 98%   BMI 35 78 kg/m²     Physical Exam  Vitals reviewed  Constitutional:       General: She is not in acute distress  Appearance: She is well-developed   She is obese  She is not diaphoretic  HENT:      Head: Normocephalic and atraumatic  Mouth/Throat:      Mouth: Mucous membranes are moist       Pharynx: Oropharynx is clear  No oropharyngeal exudate or posterior oropharyngeal erythema  Tonsils: No tonsillar exudate or tonsillar abscesses  2+ on the right  2+ on the left  Eyes:      General: No scleral icterus  Conjunctiva/sclera: Conjunctivae normal    Neck:      Thyroid: No thyromegaly  Cardiovascular:      Rate and Rhythm: Normal rate and regular rhythm  Heart sounds: Normal heart sounds  No murmur heard  Pulmonary:      Effort: Pulmonary effort is normal  No respiratory distress  Breath sounds: Normal breath sounds  Abdominal:      General: There is no distension  Palpations: Abdomen is soft  There is no hepatomegaly or splenomegaly  Tenderness: There is no abdominal tenderness  Musculoskeletal:         General: No swelling  Normal range of motion  Cervical back: Normal range of motion and neck supple  Lymphadenopathy:      Cervical: No cervical adenopathy  Upper Body:      Right upper body: No axillary adenopathy  Left upper body: No axillary adenopathy  Skin:     General: Skin is warm and dry  Findings: No erythema or rash  Neurological:      General: No focal deficit present  Mental Status: She is alert and oriented to person, place, and time  Psychiatric:         Mood and Affect: Mood normal  Affect is flat  Behavior: Behavior normal  Behavior is cooperative  Thought Content:  Thought content normal          Judgment: Judgment normal            Labs:  Lab Results   Component Value Date    WBC 13 66 (H) 11/25/2022    HGB 14 6 11/25/2022    HCT 43 1 11/25/2022    MCV 84 11/25/2022     11/25/2022     Lab Results   Component Value Date     11/11/2015    K 2 8 (L) 11/25/2022    CL 93 (L) 11/25/2022    CO2 29 11/25/2022    ANIONGAP 11 11/11/2015    BUN 15 11/25/2022    CREATININE 0 71 11/25/2022    GLUCOSE 88 11/11/2015    GLUF 92 11/16/2022    CALCIUM 9 6 11/25/2022    AST 29 11/25/2022    ALT 26 11/25/2022    ALKPHOS 115 11/25/2022    PROT 8 0 11/11/2015    BILITOT 0 22 11/11/2015    EGFR 120 11/25/2022       Patient voiced understanding and agreement in the above discussion  Aware to contact our office with questions/symptoms in the interim  This note has been generated by voice recognition software system  Therefore, there may be spelling, grammar, and or syntax errors  Please contact if questions arise

## 2023-02-03 ENCOUNTER — TELEPHONE (OUTPATIENT)
Dept: NEUROLOGY | Facility: CLINIC | Age: 23
End: 2023-02-03

## 2023-02-03 NOTE — TELEPHONE ENCOUNTER
Left message for pt to call back and confirm appt on 2/17 with Jacky Miller in [facility]

## 2023-02-10 ENCOUNTER — TELEPHONE (OUTPATIENT)
Dept: NEUROLOGY | Facility: CLINIC | Age: 23
End: 2023-02-10

## 2023-02-10 NOTE — TELEPHONE ENCOUNTER
LMOM for pt to call back and confirm appt on 2/17 with [facility] in [facility]

## 2023-02-20 ENCOUNTER — TELEPHONE (OUTPATIENT)
Dept: HEMATOLOGY ONCOLOGY | Facility: CLINIC | Age: 23
End: 2023-02-20

## 2023-02-20 NOTE — TELEPHONE ENCOUNTER
Spoke with patient, she did not have her blood work completed and is scheduled to see Susi Pichardo on 2/22/23  I explained to patient that some of the tests would take up to 2 weeks to come back  Patient rescheduled to 4/17/23 at [facility]  She will have blood work completed

## 2023-03-08 ENCOUNTER — OFFICE VISIT (OUTPATIENT)
Dept: UROLOGY | Facility: CLINIC | Age: 23
End: 2023-03-08

## 2023-03-08 VITALS
BODY MASS INDEX: 34.91 KG/M2 | HEART RATE: 68 BPM | SYSTOLIC BLOOD PRESSURE: 110 MMHG | DIASTOLIC BLOOD PRESSURE: 64 MMHG | HEIGHT: 63 IN | WEIGHT: 197 LBS

## 2023-03-08 DIAGNOSIS — R39.14 FEELING OF INCOMPLETE BLADDER EMPTYING: ICD-10-CM

## 2023-03-08 DIAGNOSIS — N32.89 BLADDER WALL THICKENING: Primary | ICD-10-CM

## 2023-03-08 DIAGNOSIS — N39.41 URGE INCONTINENCE: ICD-10-CM

## 2023-03-08 PROBLEM — N39.0 RECURRENT UTI: Status: RESOLVED | Noted: 2023-03-08 | Resolved: 2023-03-08

## 2023-03-08 PROBLEM — N39.0 RECURRENT UTI: Status: ACTIVE | Noted: 2023-03-08

## 2023-03-08 LAB
BACTERIA UR QL AUTO: ABNORMAL /HPF
BILIRUB UR QL STRIP: NEGATIVE
CLARITY UR: CLEAR
COLOR UR: YELLOW
GLUCOSE UR STRIP-MCNC: NEGATIVE MG/DL
HGB UR QL STRIP.AUTO: NEGATIVE
KETONES UR STRIP-MCNC: NEGATIVE MG/DL
LEUKOCYTE ESTERASE UR QL STRIP: NEGATIVE
NITRITE UR QL STRIP: NEGATIVE
NON-SQ EPI CELLS URNS QL MICRO: ABNORMAL /HPF
PH UR STRIP.AUTO: 7 [PH]
POST-VOID RESIDUAL VOLUME, ML POC: 52 ML
PROT UR STRIP-MCNC: ABNORMAL MG/DL
RBC #/AREA URNS AUTO: ABNORMAL /HPF
SP GR UR STRIP.AUTO: 1.02 (ref 1–1.03)
UROBILINOGEN UR STRIP-ACNC: 2 MG/DL
WBC #/AREA URNS AUTO: ABNORMAL /HPF

## 2023-03-08 NOTE — ASSESSMENT & PLAN NOTE
· Continue to hydrate with water  · Decrease bladder irritants mainly iced tea  · Referral to pelvic floor physical therapy  · Daily bowel regimen event constipation

## 2023-03-08 NOTE — PATIENT INSTRUCTIONS
BLADDER HEALTH    WHAT IS CONSIDERED NORMAL? The average bladder can hold about 2 cups of urine before it needs to be emptied  The normal range of voiding urine is 6 to 8 times during a 24 hour period  As we get older, our bladder capacity can get smaller and we may need to pass urine more frequently but usually not more than every 2 hours  Urine should flow easily without discomfort in a good, steady stream until the bladder is empty  No pushing or straining is necessary to empty the bladder  An urge is a signal that you feel as the bladder stretches to fill with urine  Urges can be felt even if the bladder is not full  Urges are not commands to go to the toilet, merely a signal and can be controlled  WHAT ARE GOOD BLADDER HABITS? Take your time when emptying your bladder  Don’t strain or push to empty your bladder  Make sure you empty your bladder completely each time you pass urine  Do not rush the process  Consistently ignoring the urge to go (waiting more than 4 hours between toileting) or urinating too infrequently may be convenient but not healthy for your bladder  Avoid going to the toilet “just in case” or more often than every 2 hours  It is usually not necessary to go when you feel the first urge  Try to go only when your bladder is full  Urgency and frequency of urination can be improved by retraining the bladder and spacing your fluid intake throughout the day  Practice good toilet habits  Don’t let your bladder control your life  TIPS TO MAINTAIN GOOD BLADDER HABITS  Maintain a good fluid intake  Depending on your body size and environment, drink 6 -8 cups (8 oz each) of fluid per day unless otherwise advised by your doctor  Not enough fluid creates a foul odor and dark color of the urine  Limit the amount of caffeine (coffee, cola, chocolate or tea) and citrus foods that you consume as these foods can be associated with increased sensation of urinary urgency and frequency  Limit the amount of alcohol you drink  Alcohol increases urine production and makes it difficult for the brain to coordinate bladder control  Avoid constipation by maintaining a balanced diet of dietary fiber  Cigarette smoking is also irritating to the bladder surface and is associated with bladder cancer  In addition, the coughing associated with smoking may lead to increased incontinent episodes because of the increased pressure  HOW DIET CAN AFFECT YOUR BLADDER  Although there is no particular "diet" that can cure bladder control, there are certain dietary suggestions you can use to help control the problem  There are 2 points to consider when evaluating how your habits and diet may affect your bladder:    Foods and fluids can irritate the bladder  Some foods and beverages are thought to contribute to bladder leakage and irritability  However their effect on the bladder is not completely understood and you may want to see if eliminating one or all of these items improves your bladder control  If you are unable to give them up completely, it is recommended that you use the following items in moderation:  Acidic beverages and foods (orange juice, grapefruit juice, lemonade etc)  Alcoholic beverages  Vinegar  Coffee (regular and decaf)  Tea (regular and decaf)  Caffeinated beverages  Carbonated beverages          Drinking enough and the right kinds of fluids  Many people with bladder control issues decrease their intake of liquids in hope that they will need to urinate less frequently or have less urinary leakage  You should not restrict fluids to control your bladder  While a decrease in liquid intake does result in a decrease in the volume of urine, the smaller amount of urine may be more highly concentrated  Highly concentrated, dark yellow urine is irritating to the bladder surface and may actually cause you to go to the bathroom more frequently        It also encourages the growth of bacteria, which may lead to infections resulting in incontinence  Substitutions for Bladder Irritants: water is always the best beverage choice  Grape and apple juice are thirst quenchers are good selections and are not as irritating to the bladder  Low acid fruits:  Pears, apricots, papaya, watermelon  For coffee drinkers: KAVA®, Postum®, Hoang®, Kaffree Aura®  For tea drinkers:  non-citrus or herbal and sun brewed tea    BEHAVIORAL THERAPY FOR IMPROVED BLADDER CONTROL      1  Urge Control Techniques       A  Stop whatever you are doing and concentrate on tyler your pelvic floor muscles  B   Contract your pelvic floor muscles repetitively (as in your "flick" exercises)  C   Once the urgency has subsided, realize that sometimes the urgency is because you have a             full bladder and have to urinate  Other times the urgency is a false signal and you do not have a full bladder  D  If you have urgency triggered by running water, "key in the door," getting up from a sitting position, etc , contract your pelvic floor muscles prior to       initiating the activity  2   Daily Fluid Intake       A  Avoid drinking too little (less than 3 cups/day) or drinking too much (more than 6             cups/day)  B   Avoid caffeinated beverages  C   If voiding frequently at night, limit your liquid intake after 7 p m  3   Bowel Regularity--Constipation Makes Bladder Symptoms Worse       A  Drink enough liquids, eat plenty of high fiber food  B  Exercise       C  Avoid laxatives and enemas on a regular basis, this decreases the bowel's normal function  4   Voiding Schedules       A  Empty your bladder at 1½ to 2 hour intervals even if you may not have the urge to urinate             at that time  You may gradually increase the time between voidings to 30  minutes, until you can comfortably void every 3 hours  B    If you are voiding more frequently than every 1½ to 2 hours, resist the urge to go  by using urge control techniques (described in 1 )

## 2023-03-08 NOTE — PROGRESS NOTES
Assessment and plan:     Bladder wall thickening  · Noted on multiple imaging  · History of recurrent UTI  · Schedule cystoscopy    Urge incontinence  · Continue to hydrate with water  · Decrease bladder irritants mainly iced tea  · Referral to pelvic floor physical therapy  · Daily bowel regimen event constipation    Feeling of incomplete bladder emptying  · Avoid bladder irritants, mainly iced tea  · PVR 52 mL  · Referral to pelvic floor physical therapy          KAHLIL Tran    History of Present Illness     Fidencio Swanson is a 24 y o  new patient presents with her mother for ER follow-up with bladder wall thickening  She had presented to the emergency department x2 12/21/2022 for nausea and vomiting x1 day  She was also having generalized abdominal discomfort  She has not had any imaging of her abdomen since October 2022 at which time there was possible bladder wall thickening otherwise no acute findings  Her urine culture had > 100,000 mixed contaminants x4 prior to that she had a CT scan 6/20/2022 that revealed questionable mild bladder wall thickening    When she has to urinate, she does not get the feeling until she has urgency  She reports urinary incontinence and recurrent UTI  She drinks water (12-15 bottles), iced tea (7 glasses), coffee (1 cup)  She feels she is dehydrated due to her medications  She is constipated a lot, and at times will not have a BM for a week  As a child had recurrent uti hx  She reports history of recurrent UTI  I do not see any positive urine cultures in epic or care everywhere  She has urine microscopic testing completed that has revealed 4-innumerable RBCs through most of 2022  She had 1 occasion of 30-50 WBCs and a couple occasions of innumerable bacteria  Other reported she did have recurrent UTIs as a child  She also reported she would hold her urine and not go to the bathroom    She engages in sexual intercourse with sex toys and reports washing them with soap and water after each use  She does report anal intercourse  He does have a history of chronic constipation  Laboratory     Lab Results   Component Value Date    BUN 15 11/25/2022    CREATININE 0 71 11/25/2022       No components found for: GFR    Lab Results   Component Value Date    GLUCOSE 88 11/11/2015    CALCIUM 9 6 11/25/2022     11/11/2015    K 2 8 (L) 11/25/2022    CO2 29 11/25/2022    CL 93 (L) 11/25/2022       Lab Results   Component Value Date    WBC 13 66 (H) 11/25/2022    HGB 14 6 11/25/2022    HCT 43 1 11/25/2022    MCV 84 11/25/2022     11/25/2022       No results found for: PSA    Recent Results (from the past 1 hour(s))   POCT Measure PVR    Collection Time: 03/08/23  2:42 PM   Result Value Ref Range    POST-VOID RESIDUAL VOLUME, ML POC 52 mL       @RESULT(URINEMICROSCOPIC)@    @RESULT(URINECULTURE)@    Radiology     CT ABDOMEN AND PELVIS WITH IV CONTRAST 10/3/2022     INDICATION:   Abdominal pain, acute, nonlocalized  upper abd pain, vomiting, syncope      COMPARISON:  6/20/2022      TECHNIQUE:  CT examination of the abdomen and pelvis was performed  Axial, sagittal, and coronal 2D reformatted images were created from the source data and submitted for interpretation      Radiation dose length product (DLP) for this visit:  948 mGy-cm   This examination, like all CT scans performed in the [facility], was performed utilizing techniques to minimize radiation dose exposure, including the use of iterative   reconstruction and automated exposure control      IV Contrast:  100 mL of iohexol (OMNIPAQUE)  Enteric Contrast:  Enteric contrast was not administered      FINDINGS:     ABDOMEN     LOWER CHEST:  No clinically significant abnormality identified in the visualized lower chest      LIVER/BILIARY TREE:  Unremarkable      GALLBLADDER:  No calcified gallstones   No pericholecystic inflammatory change      SPLEEN:  Unremarkable      PANCREAS: Unremarkable      ADRENAL GLANDS:  Unremarkable      KIDNEYS/URETERS:  Unremarkable  No hydronephrosis      STOMACH AND BOWEL:  No bowel obstruction  No focal inflammatory process      APPENDIX:  A normal appendix was visualized      ABDOMINOPELVIC CAVITY:  No ascites  No pneumoperitoneum  No lymphadenopathy      VESSELS:  Unremarkable for patient's age      PELVIS     REPRODUCTIVE ORGANS:  Unremarkable for patient's age      URINARY BLADDER:  There is again noted possible bladder wall thickening  Correlation for cystitis advised      ABDOMINAL WALL/INGUINAL REGIONS:  Unremarkable      OSSEOUS STRUCTURES:  No acute fracture or destructive osseous lesion      IMPRESSION:     Possible bladder wall thickening again noted  Correlation for cystitis advised      Otherwise, no acute CT findings in the abdomen or pelvis  No bowel obstruction or focal inflammatory process  Normal appendix      Review of Systems     Review of Systems   Constitutional: Negative for activity change, appetite change, chills, fatigue, fever and unexpected weight change  HENT: Negative for facial swelling  Eyes: Negative for discharge  Respiratory: Negative  Negative for cough and shortness of breath  Cardiovascular: Negative for chest pain and leg swelling  Gastrointestinal: Positive for constipation  Negative for abdominal distention, abdominal pain, diarrhea, nausea and vomiting  Endocrine: Negative  Genitourinary: Positive for frequency and urgency  Negative for decreased urine volume, difficulty urinating, dysuria, enuresis, flank pain, genital sores and hematuria  Musculoskeletal: Positive for back pain  Negative for myalgias  Skin: Negative for pallor and rash  Allergic/Immunologic: Negative  Negative for immunocompromised state  Neurological: Negative for facial asymmetry and speech difficulty  Psychiatric/Behavioral: Negative for agitation and confusion         Allergies     Allergies   Allergen Reactions   • Bee Venom Swelling   • Penicillins Hives   • Blueberry Flavor - Food Allergy Rash   • Pork-Derived Products - Food Allergy Rash   • Shiitake Mushroom - Food Allergy Rash       Physical Exam     Physical Exam  Vitals reviewed  Constitutional:       General: She is not in acute distress  Appearance: Normal appearance  She is normal weight  She is not ill-appearing, toxic-appearing or diaphoretic  HENT:      Head: Normocephalic and atraumatic  Eyes:      General: No scleral icterus  Cardiovascular:      Rate and Rhythm: Normal rate  Pulmonary:      Effort: Pulmonary effort is normal  No respiratory distress  Abdominal:      General: Abdomen is flat  There is no distension  Palpations: Abdomen is soft  Tenderness: There is no abdominal tenderness  There is no right CVA tenderness, left CVA tenderness, guarding or rebound  Musculoskeletal:         General: No swelling  Cervical back: Normal range of motion  Skin:     General: Skin is warm and dry  Coloration: Skin is not jaundiced or pale  Findings: No rash  Neurological:      General: No focal deficit present  Mental Status: She is alert and oriented to person, place, and time  Gait: Gait normal    Psychiatric:         Mood and Affect: Mood normal          Behavior: Behavior normal          Thought Content:  Thought content normal          Judgment: Judgment normal          Vital Signs     Vitals:    03/08/23 1359   BP: 110/64   BP Location: Left arm   Patient Position: Sitting   Cuff Size: Adult   Pulse: 68   Weight: 89 4 kg (197 lb)   Height: 5' 3" (1 6 m)       Current Medications       Current Outpatient Medications:   •  Abilify Maintena 300 MG injection, INJECT 1 EVERY 4 WEEKS, Disp: , Rfl:   •  Abilify Maintena 300 MG SRER, INJECT INTRAMUSCULARLY EVERY 4 WEEKS, Disp: , Rfl:   •  albuterol (Ventolin HFA) 90 mcg/act inhaler, Inhale 2 puffs every 4 (four) hours as needed for wheezing, Disp: 18 g, Rfl: 3  •  aluminum-magnesium hydroxide-simethicone (MAALOX) 200-200-20 MG/5ML SUSP, Take 15 mL by mouth 4 (four) times a day (before meals and at bedtime), Disp: 150 mL, Rfl: 0  •  azelastine (ASTELIN) 0 1 % nasal spray, 1-2 sprays into each nostril 2 (two) times a day as needed for rhinitis Use in each nostril as directed, Disp: 90 mL, Rfl: 5  •  azelastine (OPTIVAR) 0 05 % ophthalmic solution, Administer 1 drop to both eyes 2 (two) times a day, Disp: 18 mL, Rfl: 5  •  benztropine (COGENTIN) 2 mg tablet, Take 2 mg by mouth daily, Disp: , Rfl:   •  cetirizine (ZyrTEC) 10 mg tablet, Take 1 tablet (10 mg total) by mouth daily, Disp: 90 tablet, Rfl: 2  •  divalproex sodium (DEPAKOTE ER) 250 mg 24 hr tablet, Take 250 mg by mouth 2 (two) times a day, Disp: , Rfl:   •  EPINEPHrine (EPIPEN) 0 3 mg/0 3 mL SOAJ, Inject 0 3 mL (0 3 mg total) into a muscle once for 1 dose, Disp: 4 each, Rfl: 3  •  Erenumab-aooe 140 MG/ML SOAJ, Inject 140 mg under the skin every 30 (thirty) days, Disp: 1 mL, Rfl: 11  •  fluticasone (FLONASE) 50 mcg/act nasal spray, 2 sprays into each nostril daily, Disp: 54 6 mL, Rfl: 3  •  fluticasone-salmeterol (Advair HFA) 115-21 MCG/ACT inhaler, Inhale 2 puffs 2 (two) times a day Rinse mouth after use , Disp: 36 g, Rfl: 5  •  hydrOXYzine pamoate (VISTARIL) 50 mg capsule, 50 mg daily at bedtime, Disp: , Rfl:   •  mometasone (ELOCON) 0 1 % cream, Apply topically daily, Disp: 45 g, Rfl: 1  •  pantoprazole (PROTONIX) 40 mg tablet, Take 1 tablet (40 mg total) by mouth daily before breakfast, Disp: 30 tablet, Rfl: 3  •  PARoxetine (PAXIL) 40 MG tablet, Take 40 mg by mouth every morning, Disp: , Rfl:   •  promethazine (PHENERGAN) 25 mg suppository, Insert 1 suppository (25 mg total) into the rectum every 6 (six) hours as needed for nausea or vomiting, Disp: 12 suppository, Rfl: 0  •  rizatriptan (MAXALT-MLT) 10 mg disintegrating tablet, Take 1 tablet (10 mg total) by mouth once as needed for migraine for up to 1 dose May repeat in 2 hours if needed, Disp: 10 tablet, Rfl: 3  •  Spacer/Aero-Holding Chambers (Vortex Valved Holding Chamber) FROYLAN, Use 2 (two) times a day, Disp: 1 each, Rfl: 0  •  busPIRone (BUSPAR) 15 mg tablet, Take 15 mg by mouth 3 (three) times a day (Patient not taking: Reported on 3/8/2023), Disp: , Rfl:   •  cloNIDine (CATAPRES) 0 2 mg tablet, , Disp: , Rfl:   •  Compro 25 MG suppository, unwrap and insert 1 suppository rectally every 6 hours if needed for nausea and vomiting (Patient not taking: Reported on 3/8/2023), Disp: , Rfl:   •  dexamethasone (DECADRON) 1 mg tablet, Take 1 tablet daily for up to 5 days for prolonged migraine rescue (Patient not taking: Reported on 3/8/2023), Disp: 5 tablet, Rfl: 0  •  dicyclomine (BENTYL) 20 mg tablet, Take 1 tablet (20 mg total) by mouth every 6 (six) hours as needed (urgency, abdominal pain) (Patient not taking: Reported on 3/8/2023), Disp: 120 tablet, Rfl: 2  •  famotidine (PEPCID) 40 MG tablet, Take 1 tablet (40 mg total) by mouth daily at bedtime (Patient not taking: Reported on 3/8/2023), Disp: 30 tablet, Rfl: 3  •  levalbuterol (XOPENEX) 1 25 mg/3 mL nebulizer solution, Take 3 mL (1 25 mg total) by nebulization every 8 (eight) hours as needed for wheezing or shortness of breath (Patient not taking: Reported on 3/8/2023), Disp: 150 mL, Rfl: 3  •  menthol-cetylpyridinium (CEPACOL) 3 MG lozenge, Take 1 lozenge (3 mg total) by mouth as needed for sore throat (Patient not taking: Reported on 3/8/2023), Disp: 9 lozenge, Rfl: 1  •  ondansetron (ZOFRAN-ODT) 4 mg disintegrating tablet, Take 1 tablet (4 mg total) by mouth every 8 (eight) hours as needed (nausea) for up to 10 days, Disp: 20 tablet, Rfl: 0    Active Problems     Patient Active Problem List   Diagnosis   • Acid reflux   • Allergic rhinitis   • Asthma   • Bipolar disorder (HCC)   • Breast pain   • Common migraine without aura   • Cyst of right ovary   • Fibrocystic breast   • Hyperlipidemia   • Overweight • Vertigo   • Lymphadenopathy   • Functional neurological symptom disorder with attacks or seizures   • Epigastric pain   • Loose stools   • Change in bowel habits   • Nausea and vomiting   • Psychogenic nonepileptic seizure   • Lumbar radiculopathy   • Tobacco abuse   • COVID-19 virus infection   • Bipolar I disorder, most recent episode mixed, severe with psychotic features ([facility] )   • Schizophrenia ([facility] )   • Pharyngitis   • Encounter for autism screening   • Hiatal hernia   • Bladder wall thickening   • Facial numbness   • Tinnitus of right ear   • Current every day nicotine vaping   • Generalized abdominal pain   • Urge incontinence   • Feeling of incomplete bladder emptying       Past Medical History     Past Medical History:   Diagnosis Date   • Anaphylaxis     apricot   • Anxiety    • Asthma    • Cluster headache    • Eczema    • Headache, tension-type    • Lymphadenitis     Last assessed 12/30/14   • Lymphadenopathy     Last assessed 10/16/13   • 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[place] [address] )    • Migraine    • 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[address] )    • Psychiatric disorder    • Psychiatric illness    • Psychosis ([place] [address] )    • Seizures ([place] [address] )        Surgical History     Past Surgical History:   Procedure Laterality Date   • COLONOSCOPY N/A 05/03/2019    Procedure: COLONOSCOPY;  Surgeon: Olman Hayes MD;  Location: [facility]; Service: Gastroenterology   • ESOPHAGOGASTRODUODENOSCOPY N/A 05/03/2019    Procedure: ESOPHAGOGASTRODUODENOSCOPY (EGD); Surgeon: Olman Hayes MD;  Location: [facility];   Service: Gastroenterology       Family History     Family History   Problem Relation Age of Onset   • Migraines Mother    • Other Mother         Neck pain   • Depression Mother    • Drug abuse Mother    • Drug abuse Father    • Schizophrenia Father    • Febrile seizures Maternal Uncle    • Stroke Maternal Uncle        Social History     Social History     Social History     Tobacco Use   Smoking Status Some Days   • Packs/day: 0 00   • Years: 0 00 • Pack years: 0 00   • Types: Cigarettes   Smokeless Tobacco Never   Tobacco Comments    I stopped smoking cigarettes but started vaping       Past Surgical History:   Procedure Laterality Date   • COLONOSCOPY N/A 05/03/2019    Procedure: COLONOSCOPY;  Surgeon: Ray Johnston MD;  Location: [facility]; Service: Gastroenterology   • ESOPHAGOGASTRODUODENOSCOPY N/A 05/03/2019    Procedure: ESOPHAGOGASTRODUODENOSCOPY (EGD); Surgeon: Ray Johnston MD;  Location: [facility]; Service: Gastroenterology         The following portions of the patient's history were reviewed and updated as appropriate: allergies, current medications, past family history, past medical history, past social history, past surgical history and problem list    Please note :  Voice dictation software has been used to create this document  There may be inadvertent transcription errors      [id number]

## 2023-03-08 NOTE — ASSESSMENT & PLAN NOTE
· Avoid bladder irritants, mainly iced tea  · PVR 52 mL  · Referral to pelvic floor physical therapy

## 2023-03-09 ENCOUNTER — TELEPHONE (OUTPATIENT)
Dept: PSYCHIATRY | Facility: CLINIC | Age: 23
End: 2023-03-09

## 2023-03-27 ENCOUNTER — OFFICE VISIT (OUTPATIENT)
Dept: FAMILY MEDICINE CLINIC | Facility: CLINIC | Age: 23
End: 2023-03-27

## 2023-03-27 VITALS
TEMPERATURE: 97.8 F | WEIGHT: 202 LBS | HEART RATE: 82 BPM | SYSTOLIC BLOOD PRESSURE: 118 MMHG | DIASTOLIC BLOOD PRESSURE: 80 MMHG | RESPIRATION RATE: 20 BRPM | OXYGEN SATURATION: 97 % | BODY MASS INDEX: 35.78 KG/M2

## 2023-03-27 DIAGNOSIS — J45.40 MODERATE PERSISTENT ASTHMA, UNSPECIFIED WHETHER COMPLICATED: ICD-10-CM

## 2023-03-27 DIAGNOSIS — J01.00 ACUTE NON-RECURRENT MAXILLARY SINUSITIS: Primary | ICD-10-CM

## 2023-03-27 DIAGNOSIS — R11.2 NAUSEA VOMITING AND DIARRHEA: ICD-10-CM

## 2023-03-27 DIAGNOSIS — K21.9 GASTROESOPHAGEAL REFLUX DISEASE WITHOUT ESOPHAGITIS: ICD-10-CM

## 2023-03-27 DIAGNOSIS — R19.7 NAUSEA VOMITING AND DIARRHEA: ICD-10-CM

## 2023-03-27 DIAGNOSIS — Z72.0 TOBACCO ABUSE: ICD-10-CM

## 2023-03-27 DIAGNOSIS — R10.84 GENERALIZED ABDOMINAL PAIN: ICD-10-CM

## 2023-03-27 DIAGNOSIS — T63.441A ANAPHYLACTIC REACTION TO BEE STING, ACCIDENTAL OR UNINTENTIONAL, INITIAL ENCOUNTER: ICD-10-CM

## 2023-03-27 DIAGNOSIS — G43.109 MIGRAINE WITH AURA AND WITHOUT STATUS MIGRAINOSUS, NOT INTRACTABLE: ICD-10-CM

## 2023-03-27 RX ORDER — PANTOPRAZOLE SODIUM 40 MG/1
40 TABLET, DELAYED RELEASE ORAL DAILY
Qty: 30 TABLET | Refills: 3 | Status: SHIPPED | OUTPATIENT
Start: 2023-03-27

## 2023-03-27 RX ORDER — ALUMINUM HYDROXIDE, MAGNESIUM HYDROXIDE, SIMETHICONE 400; 400; 40 MG/10ML; MG/10ML; MG/10ML
15 SUSPENSION ORAL
Qty: 150 ML | Refills: 0 | Status: SHIPPED | OUTPATIENT
Start: 2023-03-27

## 2023-03-27 RX ORDER — PROMETHAZINE HYDROCHLORIDE 25 MG/1
25 SUPPOSITORY RECTAL EVERY 6 HOURS PRN
Qty: 12 SUPPOSITORY | Refills: 0 | Status: SHIPPED | OUTPATIENT
Start: 2023-03-27

## 2023-03-27 RX ORDER — EPINEPHRINE 0.3 MG/.3ML
0.3 INJECTION SUBCUTANEOUS ONCE
Qty: 4 EACH | Refills: 3 | Status: SHIPPED | OUTPATIENT
Start: 2023-03-27 | End: 2023-04-04

## 2023-03-27 RX ORDER — RIZATRIPTAN BENZOATE 10 MG/1
10 TABLET, ORALLY DISINTEGRATING ORAL ONCE AS NEEDED
Qty: 10 TABLET | Refills: 3 | Status: SHIPPED | OUTPATIENT
Start: 2023-03-27

## 2023-03-27 RX ORDER — DOXYCYCLINE HYCLATE 100 MG
100 TABLET ORAL 2 TIMES DAILY
Qty: 14 TABLET | Refills: 0 | Status: SHIPPED | OUTPATIENT
Start: 2023-03-27 | End: 2023-04-04

## 2023-03-27 NOTE — PROGRESS NOTES
Name: Gloria Juarez      : 2000      MRN: [id number]  Encounter Provider: Leslie Alvarez MD  Encounter Date: 3/27/2023   Encounter department: [address] [place] [address]     1  Acute non-recurrent maxillary sinusitis  Assessment & Plan:  Start doxycycline for 7 days  May take over-the-counter ibuprofen or Tylenol as needed for fever and pain    Orders:  -     doxycycline hyclate (VIBRA-TABS) 100 mg tablet; Take 1 tablet (100 mg total) by mouth 2 (two) times a day for 7 days    2  Tobacco abuse  Assessment & Plan:  Counseled for greater than 3 minutes on smoking cessation  Patient willing to try Nicorette gum and nicotine inhaler  Referred to tobacco cessation program    Orders:  -     nicotine (NICOTROL) 10 MG inhaler; 1-2 puffs by inhalation as needed daily  Use 6-16 cartridges/day (Patient not taking: Reported on 2023)  -     nicotine polacrilex (NICORETTE) 4 mg gum; Chew 4mg every 1-2 hours as needed  Maximum 24 pieces/day  -     Ambulatory referral to Smoking Cessation Program; Future    3  Moderate persistent asthma, unspecified whether complicated  -     EPINEPHrine (EPIPEN) 0 3 mg/0 3 mL SOAJ; Inject 0 3 mL (0 3 mg total) into a muscle once for 1 dose    4  Gastroesophageal reflux disease without esophagitis  -     pantoprazole (PROTONIX) 40 mg tablet; Take 1 tablet (40 mg total) by mouth daily before breakfast    5  Migraine with aura and without status migrainosus, not intractable  -     rizatriptan (MAXALT-MLT) 10 mg disintegrating tablet; Take 1 tablet (10 mg total) by mouth once as needed for migraine for up to 1 dose May repeat in 2 hours if needed    6  Anaphylactic reaction to bee sting, accidental or unintentional, initial encounter  -     EPINEPHrine (EPIPEN) 0 3 mg/0 3 mL SOAJ; Inject 0 3 mL (0 3 mg total) into a muscle once for 1 dose    7  Generalized abdominal pain  -     promethazine (PHENERGAN) 25 mg suppository;  Insert 1 suppository (25 mg total) into the rectum every 6 (six) hours as needed for nausea or vomiting    8  Nausea vomiting and diarrhea  -     aluminum-magnesium hydroxide-simethicone (MAALOX) 516-963-33 MG/5ML SUSP; Take 15 mL by mouth 4 (four) times a day (before meals and at bedtime)         Cindy Pagan is a 24 y o  female who presented to the office today today with a two week h/o of sore throat  Today for the past 2 days she also has pain in her ear into her left ear  Patient also has headache which is mostly frontal sinus gait positive congestion in the nose and runny nose denies fever chills nausea vomiting but has had diarrhea for the past 3 days  The following portions of the patient's history were reviewed and updated as appropriate: allergies, current medications, past family history, past medical history, past social history, past surgical history and problem list       Review of Systems   Constitutional: Negative for chills and fever  HENT: Positive for congestion, postnasal drip, rhinorrhea, sinus pressure, sinus pain and sore throat  Respiratory: Positive for cough and shortness of breath  Cardiovascular: Negative for chest pain  Gastrointestinal: Negative for diarrhea, nausea and vomiting  Skin: Negative for rash  Neurological: Negative for dizziness and headaches         Current Outpatient Medications on File Prior to Visit   Medication Sig   • Abilify Maintena 300 MG injection INJECT 1 EVERY 4 WEEKS   • Abilify Maintena 300 MG SRER INJECT INTRAMUSCULARLY EVERY 4 WEEKS   • albuterol (Ventolin HFA) 90 mcg/act inhaler Inhale 2 puffs every 4 (four) hours as needed for wheezing   • azelastine (ASTELIN) 0 1 % nasal spray 1-2 sprays into each nostril 2 (two) times a day as needed for rhinitis Use in each nostril as directed   • azelastine (OPTIVAR) 0 05 % ophthalmic solution Administer 1 drop to both eyes 2 (two) times a day   • cetirizine (ZyrTEC) 10 mg tablet Take 1 tablet (10 mg total) by mouth daily   • dicyclomine (BENTYL) 20 mg tablet Take 1 tablet (20 mg total) by mouth every 6 (six) hours as needed (urgency, abdominal pain)   • divalproex sodium (DEPAKOTE ER) 250 mg 24 hr tablet Take 250 mg by mouth 2 (two) times a day   • Erenumab-aooe 140 MG/ML SOAJ Inject 140 mg under the skin every 30 (thirty) days   • famotidine (PEPCID) 40 MG tablet Take 1 tablet (40 mg total) by mouth daily at bedtime   • fluticasone (FLONASE) 50 mcg/act nasal spray 2 sprays into each nostril daily   • fluticasone-salmeterol (Advair HFA) 115-21 MCG/ACT inhaler Inhale 2 puffs 2 (two) times a day Rinse mouth after use  • levalbuterol (XOPENEX) 1 25 mg/3 mL nebulizer solution Take 3 mL (1 25 mg total) by nebulization every 8 (eight) hours as needed for wheezing or shortness of breath   • mometasone (ELOCON) 0 1 % cream Apply topically daily   • ondansetron (ZOFRAN-ODT) 4 mg disintegrating tablet Take 1 tablet (4 mg total) by mouth every 8 (eight) hours as needed (nausea) for up to 10 days (Patient not taking: Reported on 4/4/2023)   • PARoxetine (PAXIL) 40 MG tablet Take 40 mg by mouth every morning   • Spacer/Aero-Holding Chambers (Vortex Valved Holding Chamber) FROYLAN Use 2 (two) times a day       Objective     /80 (BP Location: Left arm, Patient Position: Sitting, Cuff Size: Standard)   Pulse 82   Temp 97 8 °F (36 6 °C) (Temporal)   Resp 20   Wt 91 6 kg (202 lb)   LMP 03/10/2023   SpO2 97%   BMI 35 78 kg/m²     Physical Exam  Vitals and nursing note reviewed  Constitutional:       Appearance: She is well-developed  She is obese  HENT:      Head: Normocephalic and atraumatic  Right Ear: Tympanic membrane and external ear normal       Left Ear: Tympanic membrane, ear canal and external ear normal       Nose: Congestion present  Mouth/Throat:      Mouth: Mucous membranes are moist       Pharynx: Posterior oropharyngeal erythema present     Eyes:      Extraocular Movements: Extraocular movements intact  Conjunctiva/sclera: Conjunctivae normal    Cardiovascular:      Rate and Rhythm: Normal rate and regular rhythm  Heart sounds: Normal heart sounds  No murmur heard  Pulmonary:      Effort: Pulmonary effort is normal  Prolonged expiration present  No respiratory distress  Breath sounds: Examination of the right-lower field reveals decreased breath sounds  Examination of the left-lower field reveals decreased breath sounds  Decreased breath sounds present  Musculoskeletal:      Right lower leg: No edema  Left lower leg: No edema  Skin:     General: Skin is warm  Neurological:      General: No focal deficit present  Mental Status: She is alert and oriented to person, place, and time     Psychiatric:         Mood and Affect: Mood normal          Behavior: Behavior normal        61 Syd Betancur MD

## 2023-03-29 LAB
DME PARACHUTE DELIVERY DATE ACTUAL: NORMAL
DME PARACHUTE DELIVERY DATE EXPECTED: NORMAL
DME PARACHUTE DELIVERY DATE REQUESTED: NORMAL
DME PARACHUTE ITEM DESCRIPTION: NORMAL
DME PARACHUTE ORDER STATUS: NORMAL
DME PARACHUTE SUPPLIER NAME: NORMAL
DME PARACHUTE SUPPLIER PHONE: NORMAL

## 2023-04-03 ENCOUNTER — TELEPHONE (OUTPATIENT)
Dept: HEMATOLOGY ONCOLOGY | Facility: CLINIC | Age: 23
End: 2023-04-03
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
## 2023-04-04 ENCOUNTER — PROCEDURE VISIT (OUTPATIENT)
Dept: UROLOGY | Facility: CLINIC | Age: 23
End: 2023-04-04

## 2023-04-04 VITALS
DIASTOLIC BLOOD PRESSURE: 76 MMHG | OXYGEN SATURATION: 97 % | WEIGHT: 202.2 LBS | HEIGHT: 63 IN | HEART RATE: 83 BPM | BODY MASS INDEX: 35.83 KG/M2 | SYSTOLIC BLOOD PRESSURE: 122 MMHG

## 2023-04-04 DIAGNOSIS — R39.14 FEELING OF INCOMPLETE BLADDER EMPTYING: ICD-10-CM

## 2023-04-04 DIAGNOSIS — N32.89 BLADDER WALL THICKENING: ICD-10-CM

## 2023-04-04 DIAGNOSIS — N39.41 URGE INCONTINENCE: Primary | ICD-10-CM

## 2023-04-04 PROBLEM — J01.00 ACUTE NON-RECURRENT MAXILLARY SINUSITIS: Status: ACTIVE | Noted: 2023-04-04

## 2023-04-04 LAB
SL AMB  POCT GLUCOSE, UA: ABNORMAL
SL AMB LEUKOCYTE ESTERASE,UA: ABNORMAL
SL AMB POCT BILIRUBIN,UA: ABNORMAL
SL AMB POCT BLOOD,UA: ABNORMAL
SL AMB POCT CLARITY,UA: CLEAR
SL AMB POCT COLOR,UA: YELLOW
SL AMB POCT KETONES,UA: ABNORMAL
SL AMB POCT NITRITE,UA: ABNORMAL
SL AMB POCT PH,UA: 8
SL AMB POCT SPECIFIC GRAVITY,UA: 1
SL AMB POCT URINE PROTEIN: ABNORMAL
SL AMB POCT UROBILINOGEN: 0.2

## 2023-04-04 NOTE — ASSESSMENT & PLAN NOTE
Start doxycycline for 7 days  May take over-the-counter ibuprofen or Tylenol as needed for fever and pain

## 2023-04-04 NOTE — PROGRESS NOTES
Cystoscopy     Date/Time 4/4/2023 1:39 PM     Performed by  Reji Davis MD     Authorized by KAHLIL Coelho      Universal Protocol:  Stanislav Sharif called at: 4/4/2023 1:39 PM       Procedure Details:  Procedure type: cystoscopy    Patient tolerance: Patient tolerated the procedure well with no immediate complications    Additional Procedure Details:   Cystoscopy Procedure note  Risk and benefits of flexible cystoscopy were discussed  Informed consent was obtained  A urine dip is adequate for cystoscopy  The patient was placed into the modified supine position  Her genitalia was prepped with Betadine and draped in a sterile fashion  Viscous 2% lidocaine was instilled into the urethra and allowed dwell time for local anesthesia  Flexible cystoscopy was then performed with a 16F scope  Urethra was inspected on entrance and exit of the scope  The bladder was thoroughly inspected in a 360 degree fashion  Both ureteral orifices were visualized in their orthotopic location with clear efflux of urine  The bladder mucosa was thoroughly inspected  There was no evidence of mucosal abnormalities, stones, or lesions  Retroflexion for evaluation of the bladder neck was normal   Overall this was a negative cystoscopy  A female office staff member was present throughout the entire procedure
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
## 2023-04-04 NOTE — ASSESSMENT & PLAN NOTE
Counseled for greater than 3 minutes on smoking cessation  Patient willing to try Nicorette gum and nicotine inhaler  Referred to tobacco cessation program

## 2023-05-26 ENCOUNTER — TELEPHONE (OUTPATIENT)
Dept: NEUROLOGY | Facility: CLINIC | Age: 23
End: 2023-05-26

## 2023-05-26 NOTE — TELEPHONE ENCOUNTER
Patient called and stated that she needs forms filled out and was wondering where she can drop them off      Please call patient back at [phone]

## 2023-05-30 NOTE — TELEPHONE ENCOUNTER
LMOM for pt in regards to paperwork she has that needs to be filled out  Informed pt she could drop them off at the  at the [facility] or [facility] office or upload them to her chart to be completed   Provided pt main call back number for any further questions or concerns

## 2023-06-03 PROBLEM — J01.00 ACUTE NON-RECURRENT MAXILLARY SINUSITIS: Status: RESOLVED | Noted: 2023-04-04 | Resolved: 2023-06-03

## 2023-06-12 ENCOUNTER — OFFICE VISIT (OUTPATIENT)
Dept: FAMILY MEDICINE CLINIC | Facility: CLINIC | Age: 23
End: 2023-06-12

## 2023-06-12 ENCOUNTER — TELEPHONE (OUTPATIENT)
Dept: FAMILY MEDICINE CLINIC | Facility: CLINIC | Age: 23
End: 2023-06-12

## 2023-06-12 VITALS
HEIGHT: 63 IN | RESPIRATION RATE: 16 BRPM | DIASTOLIC BLOOD PRESSURE: 70 MMHG | WEIGHT: 203 LBS | OXYGEN SATURATION: 99 % | BODY MASS INDEX: 35.97 KG/M2 | HEART RATE: 86 BPM | SYSTOLIC BLOOD PRESSURE: 120 MMHG | TEMPERATURE: 97.6 F

## 2023-06-12 DIAGNOSIS — F44.5 PSYCHOGENIC NONEPILEPTIC SEIZURE: Primary | ICD-10-CM

## 2023-06-12 DIAGNOSIS — Z72.0 TOBACCO ABUSE: ICD-10-CM

## 2023-06-12 PROCEDURE — 99214 OFFICE O/P EST MOD 30 MIN: CPT | Performed by: FAMILY MEDICINE

## 2023-06-12 RX ORDER — NICOTINE 21 MG/24HR
1 PATCH, TRANSDERMAL 24 HOURS TRANSDERMAL EVERY 24 HOURS
Qty: 30 PATCH | Refills: 0 | Status: SHIPPED | OUTPATIENT
Start: 2023-06-12

## 2023-06-12 NOTE — PROGRESS NOTES
Name: Chi Ambrocio      : 2000      MRN: [id number]  Encounter Provider: Arti Moulton MD  Encounter Date: 2023   Encounter department: [facility]     1  Psychogenic nonepileptic seizure  Assessment & Plan:  Letter written patient is advised not to drive until further evaluation by her Neurologist       2  Tobacco abuse  -     nicotine (NICODERM CQ) 21 mg/24 hr TD 24 hr patch; Place 1 patch on the skin over 24 hours every 24 hours  -     Ambulatory referral to Smoking Cessation Program; Future  -     nicotine polacrilex (NICORETTE) 4 mg gum; Chew 4mg every 1-2 hours as needed  Maximum 24 pieces/day  Subjective      Chi Ambrocio is a 24 y o  female who presented to the office today to follow up regarding her seizures/psychogenic spells  She states she had had them since 2019  They were stable and she used to have them every other week, but now more recently over the past few weeks she has started to have them every other day  The spells last 1-2 minutes, she does not recall them, when the spells is about to start she has a sharp pain that shoots through her first   The only new change she recalls is her Psychiatrist discontinued Depakote  which may have increased the frequency of the spells  The Depakote was dced because it was not helping her mood swings  Also pt continues to smoke more than 10 cigs/day  She also smoked marijuana daily to help with her anxiety and chronic nausea  The following portions of the patient's history were reviewed and updated as appropriate: allergies, current medications, past family history, past medical history, past social history, past surgical history and problem list       Review of Systems   Constitutional: Negative for chills and fever  HENT: Positive for congestion  Negative for rhinorrhea and sore throat  Respiratory: Negative for cough and shortness of breath  Cardiovascular: Negative for chest pain  Gastrointestinal: Positive for abdominal pain and nausea  Negative for diarrhea and vomiting  Genitourinary: Positive for frequency  Negative for dysuria  Musculoskeletal: Positive for back pain  Skin: Negative for rash  Allergic/Immunologic: Positive for environmental allergies  Neurological: Positive for dizziness, seizures, light-headedness and headaches  Psychiatric/Behavioral: Positive for sleep disturbance  The patient is nervous/anxious          Current Outpatient Medications on File Prior to Visit   Medication Sig   • Abilify Maintena 300 MG injection INJECT 1 EVERY 4 WEEKS   • Abilify Maintena 300 MG SRER INJECT INTRAMUSCULARLY EVERY 4 WEEKS   • albuterol (Ventolin HFA) 90 mcg/act inhaler Inhale 2 puffs every 4 (four) hours as needed for wheezing   • aluminum-magnesium hydroxide-simethicone (MAALOX) 200-200-20 MG/5ML SUSP Take 15 mL by mouth 4 (four) times a day (before meals and at bedtime)   • azelastine (ASTELIN) 0 1 % nasal spray 1-2 sprays into each nostril 2 (two) times a day as needed for rhinitis Use in each nostril as directed   • azelastine (OPTIVAR) 0 05 % ophthalmic solution Administer 1 drop to both eyes 2 (two) times a day   • cetirizine (ZyrTEC) 10 mg tablet Take 1 tablet (10 mg total) by mouth daily   • dicyclomine (BENTYL) 20 mg tablet Take 1 tablet (20 mg total) by mouth every 6 (six) hours as needed (urgency, abdominal pain)   • dicyclomine (BENTYL) 20 mg tablet Take 1 tablet (20 mg total) by mouth 2 (two) times a day   • divalproex sodium (DEPAKOTE ER) 250 mg 24 hr tablet Take 250 mg by mouth 2 (two) times a day   • EPINEPHrine (EPIPEN) 0 3 mg/0 3 mL SOAJ Inject 0 3 mL (0 3 mg total) into a muscle once for 1 dose   • Erenumab-aooe 140 MG/ML SOAJ Inject 140 mg under the skin every 30 (thirty) days   • famotidine (PEPCID) 40 MG tablet Take 1 tablet (40 mg total) by mouth daily at bedtime   • fluticasone (FLONASE) 50 mcg/act nasal spray 2 sprays into each nostril daily   • fluticasone-salmeterol (Advair HFA) 115-21 MCG/ACT inhaler Inhale 2 puffs 2 (two) times a day Rinse mouth after use  • levalbuterol (XOPENEX) 1 25 mg/3 mL nebulizer solution Take 3 mL (1 25 mg total) by nebulization every 8 (eight) hours as needed for wheezing or shortness of breath   • mometasone (ELOCON) 0 1 % cream Apply topically daily   • norelgestromin-ethinyl estradiol (ORTHO EVRA) 150-35 MCG/24HR Place 1 patch on the skin over 7 days once a week   • ondansetron (Zofran ODT) 4 mg disintegrating tablet Take 1 tablet (4 mg total) by mouth every 6 (six) hours as needed for nausea or vomiting (Patient not taking: Reported on 4/18/2023)   • ondansetron (ZOFRAN) 4 mg tablet Take 1 tablet (4 mg total) by mouth every 6 (six) hours (Patient not taking: Reported on 4/18/2023)   • ondansetron (ZOFRAN-ODT) 4 mg disintegrating tablet Take 1 tablet (4 mg total) by mouth every 8 (eight) hours as needed (nausea) for up to 10 days (Patient not taking: Reported on 4/4/2023)   • pantoprazole (PROTONIX) 40 mg tablet Take 1 tablet (40 mg total) by mouth daily before breakfast   • PARoxetine (PAXIL) 40 MG tablet Take 40 mg by mouth every morning   • promethazine (PHENERGAN) 25 mg suppository Insert 1 suppository (25 mg total) into the rectum every 6 (six) hours as needed for nausea or vomiting   • rizatriptan (MAXALT-MLT) 10 mg disintegrating tablet Take 1 tablet (10 mg total) by mouth once as needed for migraine for up to 1 dose May repeat in 2 hours if needed   • Spacer/Aero-Holding Chambers (Vortex Valved Holding Chamber) FROYLAN Use 2 (two) times a day   • [DISCONTINUED] nicotine (NICOTROL) 10 MG inhaler 1-2 puffs by inhalation as needed daily  Use 6-16 cartridges/day (Patient not taking: Reported on 4/4/2023)   • [DISCONTINUED] nicotine polacrilex (NICORETTE) 4 mg gum Chew 4mg every 1-2 hours as needed  Maximum 24 pieces/day         Objective     /70 (BP Location: Left arm, "Patient Position: Sitting, Cuff Size: Large)   Pulse 86   Temp 97 6 °F (36 4 °C) (Temporal)   Resp 16   Ht 5' 3\" (1 6 m)   Wt 92 1 kg (203 lb)   LMP 06/03/2023 (Approximate)   SpO2 99%   BMI 35 96 kg/m²     Physical Exam  Vitals and nursing note reviewed  Constitutional:       Appearance: She is well-developed  HENT:      Head: Normocephalic and atraumatic  Mouth/Throat:      Mouth: Mucous membranes are moist    Cardiovascular:      Rate and Rhythm: Normal rate and regular rhythm  Heart sounds: Normal heart sounds  No murmur heard  Pulmonary:      Effort: Pulmonary effort is normal  No respiratory distress  Breath sounds: Normal breath sounds  No wheezing, rhonchi or rales  Musculoskeletal:      Right lower leg: No edema  Left lower leg: No edema  Neurological:      General: No focal deficit present  Mental Status: She is alert and oriented to person, place, and time     Psychiatric:         Mood and Affect: Mood normal          Behavior: Behavior normal        Ananth Garibay MD    "

## 2023-06-12 NOTE — LETTER
June 12, 2023     Patient: Britt Mclaughlin   YOB: 2000   Date of Visit: 6/12/2023       To Whom it May Concern:    Britt Mclaughlin was seen in my clinic on 6/12/2023  She has a history of seizures/psychogenic spells  These has increased in frequency over the past 4-6 weeks  I would advise her not to drive at this time until she is further evaluated by her Neurologist      If you have any questions or concerns, please don't hesitate to call           Sincerely,          Jorge Alicea MD        CC: Referral Self

## 2023-06-20 ENCOUNTER — OFFICE VISIT (OUTPATIENT)
Dept: FAMILY MEDICINE CLINIC | Facility: CLINIC | Age: 23
End: 2023-06-20

## 2023-06-20 VITALS
SYSTOLIC BLOOD PRESSURE: 124 MMHG | HEART RATE: 84 BPM | TEMPERATURE: 97.8 F | DIASTOLIC BLOOD PRESSURE: 80 MMHG | OXYGEN SATURATION: 98 % | BODY MASS INDEX: 35.79 KG/M2 | RESPIRATION RATE: 16 BRPM | HEIGHT: 63 IN | WEIGHT: 202 LBS

## 2023-06-20 DIAGNOSIS — N92.1 MENORRHAGIA WITH IRREGULAR CYCLE: ICD-10-CM

## 2023-06-20 DIAGNOSIS — J06.9 VIRAL URI WITH COUGH: ICD-10-CM

## 2023-06-20 DIAGNOSIS — J02.9 PHARYNGITIS, UNSPECIFIED ETIOLOGY: Primary | ICD-10-CM

## 2023-06-20 DIAGNOSIS — R11.2 NAUSEA AND VOMITING: ICD-10-CM

## 2023-06-20 LAB
SARS-COV-2 AG UPPER RESP QL IA: NEGATIVE
VALID CONTROL: NORMAL

## 2023-06-20 PROCEDURE — 87811 SARS-COV-2 COVID19 W/OPTIC: CPT | Performed by: FAMILY MEDICINE

## 2023-06-20 PROCEDURE — 99214 OFFICE O/P EST MOD 30 MIN: CPT | Performed by: FAMILY MEDICINE

## 2023-06-20 PROCEDURE — 87070 CULTURE OTHR SPECIMN AEROBIC: CPT | Performed by: FAMILY MEDICINE

## 2023-06-20 PROCEDURE — 87147 CULTURE TYPE IMMUNOLOGIC: CPT | Performed by: FAMILY MEDICINE

## 2023-06-20 RX ORDER — ONDANSETRON 4 MG/1
4 TABLET, FILM COATED ORAL EVERY 6 HOURS
Qty: 30 TABLET | Refills: 1 | Status: SHIPPED | OUTPATIENT
Start: 2023-06-20

## 2023-06-20 RX ORDER — DIPHENHYDRAMINE HYDROCHLORIDE AND LIDOCAINE HYDROCHLORIDE AND ALUMINUM HYDROXIDE AND MAGNESIUM HYDRO
10 KIT EVERY 4 HOURS PRN
Qty: 119 ML | Refills: 0 | Status: SHIPPED | OUTPATIENT
Start: 2023-06-20

## 2023-06-20 RX ORDER — OXYMETAZOLINE HYDROCHLORIDE 0.05 G/100ML
2 SPRAY NASAL 2 TIMES DAILY
Qty: 30 ML | Refills: 0 | Status: SHIPPED | OUTPATIENT
Start: 2023-06-20 | End: 2023-06-23

## 2023-06-20 NOTE — ASSESSMENT & PLAN NOTE
-History of recurrent strep pharyngitis at a young age  -We will check throat culture  -Rapid COVID-negative  -Supportive care measures discussed with patient

## 2023-06-20 NOTE — PROGRESS NOTES
Name: Ardean Rubinstein      : 2000      MRN: [id number]  Encounter Provider: Nir Sr Tyrone  Encounter Date: 2023   Encounter department: [address] [place] [address]     1  Pharyngitis, unspecified etiology  Assessment & Plan:  -History of recurrent strep pharyngitis at a young age  -We will check throat culture  -Rapid COVID-negative  -Supportive care measures discussed with patient    Orders:  -     Throat culture  -     POCT Rapid Covid Ag  -     diphenhydramine, lidocaine, Al/Mg hydroxide, simethicone (Magic Mouthwash) SUSP; Swish and spit 10 mL every 4 (four) hours as needed for mouth pain or discomfort    2  Nausea and vomiting  Assessment & Plan:  -Chronic issue  -Differentials include cannabinoid hyperemesis syndrome  -Patient had EGD back in   which was unremarkable  -She had a gastric emptying study which was inconclusive  -Recommend outpatient follow-up with GI  -Recommend cessation of marijuana  -Recommend increasing fluid intake  -Zofran as needed for symptom relief    Orders:  -     ondansetron (ZOFRAN) 4 mg tablet; Take 1 tablet (4 mg total) by mouth every 6 (six) hours    3  Viral URI with cough  -     oxymetazoline (AFRIN) 0 05 % nasal spray; 2 sprays by Each Nare route 2 (two) times a day for 3 days         Subjective      62-year-old female with a past medical history of asthma and allergic rhinitis who presents with 2 weeks onset of sore throat, nasal congestion, and odynophagia     She reports that her little sister recently had strep throat infection  She reports she also had a history of strep throat infection in the past   She otherwise denies any fever, chills, or chest pain/shortness of breath or diarrhea  She also endorses intermittent nausea with vomiting  This has been an ongoing issue for her  Her nausea and vomiting has been worsening for the past couple of days  She is able to tolerate some oral intake  She reports that she has had multiple GI evaluation in the past   Her last EGD was unremarkable -She does smoke marijuana occasionally  Review of Systems   Constitutional: Negative for appetite change, chills, diaphoresis, fatigue and fever  HENT: Positive for congestion and sore throat  Negative for postnasal drip, sneezing, trouble swallowing and voice change  Eyes: Negative for visual disturbance  Respiratory: Positive for cough  Negative for shortness of breath and wheezing  Cardiovascular: Negative for chest pain and palpitations  Gastrointestinal: Positive for nausea and vomiting  Negative for abdominal pain, diarrhea and rectal pain  Musculoskeletal: Negative for back pain  Skin: Negative for rash  Neurological: Negative for dizziness, seizures, syncope, weakness and headaches  Psychiatric/Behavioral: Negative for confusion         Current Outpatient Medications on File Prior to Visit   Medication Sig   • Abilify Maintena 300 MG injection INJECT 1 EVERY 4 WEEKS   • Abilify Maintena 300 MG SRER INJECT INTRAMUSCULARLY EVERY 4 WEEKS   • albuterol (Ventolin HFA) 90 mcg/act inhaler Inhale 2 puffs every 4 (four) hours as needed for wheezing   • aluminum-magnesium hydroxide-simethicone (MAALOX) 200-200-20 MG/5ML SUSP Take 15 mL by mouth 4 (four) times a day (before meals and at bedtime)   • azelastine (ASTELIN) 0 1 % nasal spray 1-2 sprays into each nostril 2 (two) times a day as needed for rhinitis Use in each nostril as directed   • azelastine (OPTIVAR) 0 05 % ophthalmic solution Administer 1 drop to both eyes 2 (two) times a day   • cetirizine (ZyrTEC) 10 mg tablet Take 1 tablet (10 mg total) by mouth daily   • dicyclomine (BENTYL) 20 mg tablet Take 1 tablet (20 mg total) by mouth every 6 (six) hours as needed (urgency, abdominal pain)   • dicyclomine (BENTYL) 20 mg tablet Take 1 tablet (20 mg total) by mouth 2 (two) times a day   • divalproex sodium (DEPAKOTE ER) 250 mg 24 hr tablet Take 250 mg by mouth 2 (two) times a day   • EPINEPHrine (EPIPEN) 0 3 mg/0 3 mL SOAJ Inject 0 3 mL (0 3 mg total) into a muscle once for 1 dose   • Erenumab-aooe 140 MG/ML SOAJ Inject 140 mg under the skin every 30 (thirty) days   • famotidine (PEPCID) 40 MG tablet Take 1 tablet (40 mg total) by mouth daily at bedtime   • fluticasone (FLONASE) 50 mcg/act nasal spray 2 sprays into each nostril daily   • fluticasone-salmeterol (Advair HFA) 115-21 MCG/ACT inhaler Inhale 2 puffs 2 (two) times a day Rinse mouth after use  • levalbuterol (XOPENEX) 1 25 mg/3 mL nebulizer solution Take 3 mL (1 25 mg total) by nebulization every 8 (eight) hours as needed for wheezing or shortness of breath   • mometasone (ELOCON) 0 1 % cream Apply topically daily   • nicotine (NICODERM CQ) 21 mg/24 hr TD 24 hr patch Place 1 patch on the skin over 24 hours every 24 hours   • nicotine polacrilex (NICORETTE) 4 mg gum Chew 4mg every 1-2 hours as needed  Maximum 24 pieces/day     • norelgestromin-ethinyl estradiol (ORTHO EVRA) 150-35 MCG/24HR Place 1 patch on the skin over 7 days once a week   • ondansetron (Zofran ODT) 4 mg disintegrating tablet Take 1 tablet (4 mg total) by mouth every 6 (six) hours as needed for nausea or vomiting (Patient not taking: Reported on 4/18/2023)   • ondansetron (ZOFRAN-ODT) 4 mg disintegrating tablet Take 1 tablet (4 mg total) by mouth every 8 (eight) hours as needed (nausea) for up to 10 days (Patient not taking: Reported on 4/4/2023)   • pantoprazole (PROTONIX) 40 mg tablet Take 1 tablet (40 mg total) by mouth daily before breakfast   • PARoxetine (PAXIL) 40 MG tablet Take 40 mg by mouth every morning   • promethazine (PHENERGAN) 25 mg suppository Insert 1 suppository (25 mg total) into the rectum every 6 (six) hours as needed for nausea or vomiting   • rizatriptan (MAXALT-MLT) 10 mg disintegrating tablet Take 1 tablet (10 mg total) by mouth once as needed for migraine for up to 1 dose May repeat in 2 hours if "needed   • Spacer/Aero-Holding Chambers (Vortex Valved Holding Chamber) FROYLAN Use 2 (two) times a day   • [DISCONTINUED] ondansetron (ZOFRAN) 4 mg tablet Take 1 tablet (4 mg total) by mouth every 6 (six) hours (Patient not taking: Reported on 4/18/2023)       Objective     /80 (BP Location: Right arm, Patient Position: Sitting, Cuff Size: Standard)   Pulse 84   Temp 97 8 °F (36 6 °C) (Temporal)   Resp 16   Ht 5' 3\" (1 6 m)   Wt 91 6 kg (202 lb)   LMP 06/03/2023 (Approximate)   SpO2 98%   BMI 35 78 kg/m²     Physical Exam  Constitutional:       General: She is not in acute distress  Appearance: Normal appearance  She is well-developed  She is not ill-appearing, toxic-appearing or diaphoretic  HENT:      Head: Normocephalic  Right Ear: External ear normal       Left Ear: External ear normal       Nose: Congestion and rhinorrhea present  Mouth/Throat:      Mouth: Mucous membranes are moist       Pharynx: Posterior oropharyngeal erythema present  No pharyngeal swelling, oropharyngeal exudate or uvula swelling  Tonsils: No tonsillar exudate or tonsillar abscesses  Eyes:      General: No scleral icterus  Right eye: No discharge  Left eye: No discharge  Extraocular Movements: Extraocular movements intact  Pupils: Pupils are equal, round, and reactive to light  Neck:      Thyroid: No thyromegaly  Vascular: No JVD  Trachea: No tracheal deviation  Cardiovascular:      Rate and Rhythm: Normal rate and regular rhythm  Heart sounds: Normal heart sounds  No murmur heard  No friction rub  No gallop  Pulmonary:      Effort: Pulmonary effort is normal  No respiratory distress  Breath sounds: Normal breath sounds  No stridor  No wheezing  Abdominal:      General: Bowel sounds are normal  There is no distension  Palpations: Abdomen is soft  There is no mass  Tenderness: There is no abdominal tenderness  There is no guarding   " Musculoskeletal:         General: Normal range of motion  Cervical back: Normal range of motion  Lymphadenopathy:      Cervical: No cervical adenopathy  Skin:     General: Skin is warm  Capillary Refill: Capillary refill takes less than 2 seconds  Neurological:      General: No focal deficit present  Mental Status: She is alert and oriented to person, place, and time  Cranial Nerves: No cranial nerve deficit  Motor: No weakness or abnormal muscle tone        Gait: Gait normal    Psychiatric:         Behavior: Behavior normal        [address] [place] [address]

## 2023-06-20 NOTE — ASSESSMENT & PLAN NOTE
-Chronic issue  -Differentials include cannabinoid hyperemesis syndrome  -Patient had EGD back in 2022  which was unremarkable  -She had a gastric emptying study which was inconclusive  -Recommend outpatient follow-up with GI  -Recommend cessation of marijuana  -Recommend increasing fluid intake  -Zofran as needed for symptom relief

## 2023-06-22 DIAGNOSIS — J02.0 PHARYNGITIS DUE TO STREPTOCOCCUS SPECIES: Primary | ICD-10-CM

## 2023-06-22 RX ORDER — AZITHROMYCIN 500 MG/1
500 TABLET, FILM COATED ORAL DAILY
Qty: 5 TABLET | Refills: 0 | Status: SHIPPED | OUTPATIENT
Start: 2023-06-22 | End: 2023-06-27

## 2023-06-23 LAB — BACTERIA THROAT CULT: ABNORMAL

## 2023-07-03 ENCOUNTER — APPOINTMENT (OUTPATIENT)
Dept: RADIOLOGY | Facility: MEDICAL CENTER | Age: 23
End: 2023-07-03
Attending: PHYSICIAN ASSISTANT
Payer: COMMERCIAL

## 2023-07-03 ENCOUNTER — OFFICE VISIT (OUTPATIENT)
Dept: URGENT CARE | Facility: MEDICAL CENTER | Age: 23
End: 2023-07-03
Payer: COMMERCIAL

## 2023-07-03 VITALS
BODY MASS INDEX: 35.97 KG/M2 | RESPIRATION RATE: 17 BRPM | DIASTOLIC BLOOD PRESSURE: 59 MMHG | SYSTOLIC BLOOD PRESSURE: 102 MMHG | HEART RATE: 71 BPM | TEMPERATURE: 98 F | OXYGEN SATURATION: 99 % | WEIGHT: 203 LBS | HEIGHT: 63 IN

## 2023-07-03 DIAGNOSIS — M25.531 ACUTE PAIN OF RIGHT WRIST: Primary | ICD-10-CM

## 2023-07-03 DIAGNOSIS — M25.531 ACUTE PAIN OF RIGHT WRIST: ICD-10-CM

## 2023-07-03 PROCEDURE — 73110 X-RAY EXAM OF WRIST: CPT

## 2023-07-03 PROCEDURE — 99213 OFFICE O/P EST LOW 20 MIN: CPT

## 2023-07-03 NOTE — PROGRESS NOTES
[facility]        NAME: Tiffanie Navarrete is a 24 y.o. female  : 2000    MRN: [id number]  DATE: July 3, 2023  TIME: 5:35 PM    Assessment and Plan   Acute pain of right wrist [M25.531]  1. Acute pain of right wrist  XR wrist 3+ vw right    Ambulatory Referral to Orthopedic Surgery            Patient Instructions     X-ray read as a distal radius fracture by me. Follow up with orthopedics. Splint applied by nursing to right wrist.    Chief Complaint     Chief Complaint   Patient presents with   • Wrist Pain     Two days ago fixing bed and her hand hit the wall hard. Possibly landed on it the wrong way. History of Present Illness       Patient hit her right dominant hand against a wall fixing her bed 2 days ago. Wrist Pain   The pain is present in the right wrist. This is a new problem. There has been a history of trauma. The problem occurs constantly. The problem has been gradually worsening. The quality of the pain is described as aching. The pain is moderate. Associated symptoms include a limited range of motion ( Secondary to pain). Pertinent negatives include no fever, itching, joint locking, joint swelling, numbness, stiffness or tingling. The symptoms are aggravated by activity. She has tried nothing for the symptoms. The treatment provided no relief. Review of Systems   Review of Systems   Constitutional: Negative for fever. Musculoskeletal: Negative for stiffness. Skin: Negative for itching. Neurological: Negative for tingling and numbness. All other systems reviewed and are negative.         Current Medications       Current Outpatient Medications:   •  Abilify Maintena 300 MG injection, INJECT 1 EVERY 4 WEEKS, Disp: , Rfl:   •  Abilify Maintena 300 MG SRER, INJECT INTRAMUSCULARLY EVERY 4 WEEKS, Disp: , Rfl:   •  albuterol (Ventolin HFA) 90 mcg/act inhaler, Inhale 2 puffs every 4 (four) hours as needed for wheezing (Patient taking differently: Inhale 2 puffs if needed for wheezing), Disp: 18 g, Rfl: 3  •  aluminum-magnesium hydroxide-simethicone (MAALOX) 802-862-51 MG/5ML SUSP, Take 15 mL by mouth 4 (four) times a day (before meals and at bedtime) (Patient taking differently: Take 15 mL by mouth if needed), Disp: 150 mL, Rfl: 0  •  azelastine (OPTIVAR) 0.05 % ophthalmic solution, Administer 1 drop to both eyes 2 (two) times a day, Disp: 18 mL, Rfl: 5  •  cetirizine (ZyrTEC) 10 mg tablet, Take 1 tablet (10 mg total) by mouth daily, Disp: 90 tablet, Rfl: 2  •  dicyclomine (BENTYL) 20 mg tablet, Take 1 tablet (20 mg total) by mouth every 6 (six) hours as needed (urgency, abdominal pain), Disp: 120 tablet, Rfl: 2  •  dicyclomine (BENTYL) 20 mg tablet, Take 1 tablet (20 mg total) by mouth 2 (two) times a day, Disp: 20 tablet, Rfl: 0  •  EPINEPHrine (EPIPEN) 0.3 mg/0.3 mL SOAJ, Inject 0.3 mL (0.3 mg total) into a muscle once for 1 dose (Patient taking differently: Inject 0.3 mg into a muscle if needed), Disp: 4 each, Rfl: 3  •  famotidine (PEPCID) 40 MG tablet, Take 1 tablet (40 mg total) by mouth daily at bedtime, Disp: 30 tablet, Rfl: 3  •  fluticasone-salmeterol (Advair HFA) 115-21 MCG/ACT inhaler, Inhale 2 puffs 2 (two) times a day Rinse mouth after use., Disp: 36 g, Rfl: 5  •  levalbuterol (XOPENEX) 1.25 mg/3 mL nebulizer solution, Take 3 mL (1.25 mg total) by nebulization every 8 (eight) hours as needed for wheezing or shortness of breath (Patient taking differently: Take 1.25 mg by nebulization if needed for wheezing or shortness of breath), Disp: 150 mL, Rfl: 3  •  mometasone (ELOCON) 0.1 % cream, Apply topically daily, Disp: 45 g, Rfl: 1  •  nicotine (NICODERM CQ) 21 mg/24 hr TD 24 hr patch, Place 1 patch on the skin over 24 hours every 24 hours, Disp: 30 patch, Rfl: 0  •  nicotine polacrilex (NICORETTE) 4 mg gum, Chew 4mg every 1-2 hours as needed.  Maximum 24 pieces/day., Disp: 220 each, Rfl: 0  •  norelgestromin-ethinyl estradiol (ORTHO EVRA) 150-35 MCG/24HR, Place 1 patch on the skin over 7 days once a week, Disp: 3 patch, Rfl: 11  •  ondansetron (Zofran ODT) 4 mg disintegrating tablet, Take 1 tablet (4 mg total) by mouth every 6 (six) hours as needed for nausea or vomiting (Patient taking differently: Take 4 mg by mouth if needed for nausea or vomiting), Disp: 20 tablet, Rfl: 0  •  pantoprazole (PROTONIX) 40 mg tablet, Take 1 tablet (40 mg total) by mouth daily before breakfast (Patient taking differently: Take 40 mg by mouth if needed before breakfast), Disp: 30 tablet, Rfl: 3  •  PARoxetine (PAXIL) 40 MG tablet, Take 40 mg by mouth every morning, Disp: , Rfl:   •  promethazine (PHENERGAN) 25 mg suppository, Insert 1 suppository (25 mg total) into the rectum every 6 (six) hours as needed for nausea or vomiting (Patient taking differently: Insert 25 mg into the rectum if needed for nausea or vomiting), Disp: 12 suppository, Rfl: 0  •  rizatriptan (MAXALT-MLT) 10 mg disintegrating tablet, Take 1 tablet (10 mg total) by mouth once as needed for migraine for up to 1 dose May repeat in 2 hours if needed (Patient taking differently: Take 10 mg by mouth if needed for migraine May repeat in 2 hours if needed), Disp: 10 tablet, Rfl: 3  •  Spacer/Aero-Holding Chambers (Vortex Valved Holding Chamber) FROYLAN, Use 2 (two) times a day, Disp: 1 each, Rfl: 0  •  azelastine (ASTELIN) 0.1 % nasal spray, 1-2 sprays into each nostril 2 (two) times a day as needed for rhinitis Use in each nostril as directed (Patient not taking: Reported on 7/3/2023), Disp: 90 mL, Rfl: 5  •  diphenhydramine, lidocaine, Al/Mg hydroxide, simethicone (Magic Mouthwash) SUSP, Swish and spit 10 mL every 4 (four) hours as needed for mouth pain or discomfort (Patient not taking: Reported on 7/3/2023), Disp: 119 mL, Rfl: 0  •  divalproex sodium (DEPAKOTE ER) 250 mg 24 hr tablet, Take 250 mg by mouth 2 (two) times a day (Patient not taking: Reported on 7/3/2023), Disp: , Rfl:   •  Erenumab-aooe 140 MG/ML SOAJ, Inject 140 mg under the skin every 30 (thirty) days (Patient not taking: Reported on 7/3/2023), Disp: 1 mL, Rfl: 11  •  fluticasone (FLONASE) 50 mcg/act nasal spray, 2 sprays into each nostril daily (Patient not taking: Reported on 7/3/2023), Disp: 54.6 mL, Rfl: 3  •  ondansetron (ZOFRAN) 4 mg tablet, Take 1 tablet (4 mg total) by mouth every 6 (six) hours (Patient not taking: Reported on 7/3/2023), Disp: 30 tablet, Rfl: 1  •  ondansetron (ZOFRAN-ODT) 4 mg disintegrating tablet, Take 1 tablet (4 mg total) by mouth every 8 (eight) hours as needed (nausea) for up to 10 days (Patient not taking: Reported on 4/4/2023), Disp: 20 tablet, Rfl: 0  •  oxymetazoline (AFRIN) 0.05 % nasal spray, 2 sprays by Each Nare route 2 (two) times a day for 3 days (Patient not taking: Reported on 7/3/2023), Disp: 30 mL, Rfl: 0    Current Allergies     Allergies as of 07/03/2023 - Reviewed 07/03/2023   Allergen Reaction Noted   • Bee venom Swelling 03/09/2022   • Penicillins Hives 07/15/2016   • Blueberry flavor - food allergy Rash 03/09/2022   • Pork-derived products - food allergy Rash 03/09/2022   • Shiitake mushroom - food allergy Rash 03/09/2022            The following portions of the patient's history were reviewed and updated as appropriate: allergies, current medications, past family history, past medical history, past social history, past surgical history and problem list.     Past Medical History:   Diagnosis Date   • Anaphylaxis     apricot   • Anxiety    • Asthma    • Cluster headache    • Eczema    • Headache, tension-type    • Lymphadenitis     Last assessed 12/30/14   • Lymphadenopathy     Last assessed 10/16/13   • 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[address])    • Migraine    • 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• Psychiatric disorder    • Psychiatric illness    • Psychosis ([address])    • Seizures ([address])        Past Surgical History:   Procedure Laterality Date   • COLONOSCOPY N/A 05/03/2019    Procedure: COLONOSCOPY;  Surgeon: Flo Lugo MD;  Location: [facility]; Service: Gastroenterology   • CYSTOSCOPY  04/04/2023    Dr. Marcelle Cisneros   • ESOPHAGOGASTRODUODENOSCOPY N/A 05/03/2019    Procedure: ESOPHAGOGASTRODUODENOSCOPY (EGD); Surgeon: Randy Kennedy MD;  Location:  [facility]; Service: Gastroenterology       Family History   Problem Relation Age of Onset   • Migraines Mother    • Other Mother         Neck pain   • Depression Mother    • Drug abuse Mother    • Cervical cancer Mother    • Drug abuse Father    • Schizophrenia Father    • No Known Problems Sister    • No Known Problems Brother    • Thyroid disease Maternal Grandmother    • Diabetes Maternal Grandfather    • No Known Problems Paternal Grandmother    • No Known Problems Paternal Grandfather    • Febrile seizures Maternal Uncle    • Stroke Maternal Uncle    • Ovarian cancer Paternal Aunt          Medications have been verified. Objective   /59   Pulse 71   Temp 98 °F (36.7 °C)   Resp 17   Ht 5' 3" (1.6 m)   Wt 92.1 kg (203 lb)   LMP 06/22/2023 (Approximate)   SpO2 99%   BMI 35.96 kg/m²   Patient's last menstrual period was 06/22/2023 (approximate). Physical Exam     Physical Exam  Vitals and nursing note reviewed. Cardiovascular:      Rate and Rhythm: Normal rate and regular rhythm. Pulses: Normal pulses. Heart sounds: Normal heart sounds. Pulmonary:      Effort: Pulmonary effort is normal.      Breath sounds: Normal breath sounds. Left wrist neurovascularly intact. Tender over the distal ulna with decreased pronation and pronation secondary to pain. +1 edema.

## 2023-07-11 ENCOUNTER — OFFICE VISIT (OUTPATIENT)
Dept: OBGYN CLINIC | Facility: CLINIC | Age: 23
End: 2023-07-11
Payer: COMMERCIAL

## 2023-07-11 VITALS
HEIGHT: 63 IN | DIASTOLIC BLOOD PRESSURE: 65 MMHG | SYSTOLIC BLOOD PRESSURE: 99 MMHG | HEART RATE: 80 BPM | BODY MASS INDEX: 35.61 KG/M2 | WEIGHT: 201 LBS

## 2023-07-11 DIAGNOSIS — M25.531 ACUTE PAIN OF RIGHT WRIST: ICD-10-CM

## 2023-07-11 PROCEDURE — 99203 OFFICE O/P NEW LOW 30 MIN: CPT | Performed by: ORTHOPAEDIC SURGERY

## 2023-07-11 RX ORDER — MELOXICAM 15 MG/1
TABLET ORAL
COMMUNITY
Start: 2023-06-29

## 2023-07-11 RX ORDER — LIDOCAINE HYDROCHLORIDE 20 MG/ML
SOLUTION OROPHARYNGEAL
COMMUNITY
Start: 2023-06-21

## 2023-07-11 RX ORDER — PAROXETINE 30 MG/1
TABLET, FILM COATED ORAL
COMMUNITY
Start: 2023-06-02 | End: 2023-07-11

## 2023-07-11 RX ORDER — CYCLOBENZAPRINE HCL 5 MG
TABLET ORAL
COMMUNITY
Start: 2023-07-10

## 2023-07-11 RX ORDER — TOPIRAMATE 50 MG/1
50 TABLET, FILM COATED ORAL 2 TIMES DAILY
COMMUNITY
Start: 2023-06-02

## 2023-07-11 NOTE — PROGRESS NOTES
CHIEF COMPLAIN/REASON FOR VISIT  Chief Complaint   Patient presents with   • Right Wrist - Pain       HISTORY OF PRESENT ILLNESS  Apurva Payne is a RHD 24 y.o. female who presents for evaluation of their right wrist pain. Patient said 2 weeks ago she went to jump to put her bed covers on and slammed her right wrist into the wall and then she landed on top of her wrist while it was flexed. Patient reports pain on the radial side of her right wrist.  She said she has pain with moving her thumb up. She reports difficulty with flexion and extension of the right wrist.  She has tried Advil and Tylenol and had no success with this. REVIEW OF SYSTEMS  Review of systems was performed and, woutside that mentioned in the HPI, it was negative for symptomology related to the integumentary, hematologic, immunologic, allergic, neurologic, cardiovascular, respiratory, GI or  systems.      MEDICAL HISTORY  Patient Active Problem List   Diagnosis   • Acid reflux   • Allergic rhinitis   • Asthma   • Bipolar disorder ([address])   • Breast pain   • Common migraine without aura   • Cyst of right ovary   • Fibrocystic breast   • Hyperlipidemia   • Overweight   • Vertigo   • Lymphadenopathy   • Functional neurological symptom disorder with attacks or seizures   • Epigastric pain   • Loose stools   • Change in bowel habits   • Nausea and vomiting   • Psychogenic nonepileptic seizure   • Lumbar radiculopathy   • Tobacco abuse   • COVID-19 virus infection   • Bipolar I disorder, most recent episode mixed, severe with psychotic features ([address])   • Schizophrenia ([address])   • Pharyngitis   • Encounter for autism screening   • Hiatal hernia   • Bladder wall thickening   • Facial numbness   • Tinnitus of right ear   • Current every day nicotine vaping   • Generalized abdominal pain   • Urge incontinence   • Feeling of incomplete bladder emptying       SURGICAL HISTORY  Past Surgical History:   Procedure Laterality Date   • COLONOSCOPY N/A 05/03/2019    Procedure: COLONOSCOPY;  Surgeon: Erickson Hewitt MD;  Location:  GI LAB; Service: Gastroenterology   • CYSTOSCOPY  04/04/2023    Dr. Ricardo Cisneros   • ESOPHAGOGASTRODUODENOSCOPY N/A 05/03/2019    Procedure: ESOPHAGOGASTRODUODENOSCOPY (EGD); Surgeon: Erickson Hewitt MD;  Location:  GI LAB;   Service: Gastroenterology       CURRENT MEDICATIONS    Current Outpatient Medications:   •  Abilify Maintena 300 MG injection, INJECT 1 EVERY 4 WEEKS, Disp: , Rfl:   •  Abilify Maintena 300 MG SRER, INJECT INTRAMUSCULARLY EVERY 4 WEEKS, Disp: , Rfl:   •  albuterol (Ventolin HFA) 90 mcg/act inhaler, Inhale 2 puffs every 4 (four) hours as needed for wheezing (Patient taking differently: Inhale 2 puffs if needed for wheezing), Disp: 18 g, Rfl: 3  •  aluminum-magnesium hydroxide-simethicone (MAALOX) 200-200-20 MG/5ML SUSP, Take 15 mL by mouth 4 (four) times a day (before meals and at bedtime) (Patient taking differently: Take 15 mL by mouth if needed), Disp: 150 mL, Rfl: 0  •  azelastine (OPTIVAR) 0.05 % ophthalmic solution, Administer 1 drop to both eyes 2 (two) times a day, Disp: 18 mL, Rfl: 5  •  cetirizine (ZyrTEC) 10 mg tablet, Take 1 tablet (10 mg total) by mouth daily, Disp: 90 tablet, Rfl: 2  •  cyclobenzaprine (FLEXERIL) 5 mg tablet, , Disp: , Rfl:   •  dicyclomine (BENTYL) 20 mg tablet, Take 1 tablet (20 mg total) by mouth every 6 (six) hours as needed (urgency, abdominal pain), Disp: 120 tablet, Rfl: 2  •  famotidine (PEPCID) 40 MG tablet, Take 1 tablet (40 mg total) by mouth daily at bedtime, Disp: 30 tablet, Rfl: 3  •  fluticasone-salmeterol (Advair HFA) 115-21 MCG/ACT inhaler, Inhale 2 puffs 2 (two) times a day Rinse mouth after use., Disp: 36 g, Rfl: 5  •  levalbuterol (XOPENEX) 1.25 mg/3 mL nebulizer solution, Take 3 mL (1.25 mg total) by nebulization every 8 (eight) hours as needed for wheezing or shortness of breath (Patient taking differently: Take 1.25 mg by nebulization if needed for wheezing or shortness of breath), Disp: 150 mL, Rfl: 3  •  Lidocaine Viscous HCl (XYLOCAINE) 2 % mucosal solution, , Disp: , Rfl:   •  meloxicam (MOBIC) 15 mg tablet, , Disp: , Rfl:   •  mometasone (ELOCON) 0.1 % cream, Apply topically daily, Disp: 45 g, Rfl: 1  •  nicotine (NICODERM CQ) 21 mg/24 hr TD 24 hr patch, Place 1 patch on the skin over 24 hours every 24 hours, Disp: 30 patch, Rfl: 0  •  nicotine polacrilex (NICORETTE) 4 mg gum, Chew 4mg every 1-2 hours as needed.  Maximum 24 pieces/day., Disp: 220 each, Rfl: 0  •  norelgestromin-ethinyl estradiol (ORTHO EVRA) 150-35 MCG/24HR, Place 1 patch on the skin over 7 days once a week, Disp: 3 patch, Rfl: 11  •  ondansetron (Zofran ODT) 4 mg disintegrating tablet, Take 1 tablet (4 mg total) by mouth every 6 (six) hours as needed for nausea or vomiting (Patient taking differently: Take 4 mg by mouth if needed for nausea or vomiting), Disp: 20 tablet, Rfl: 0  •  pantoprazole (PROTONIX) 40 mg tablet, Take 1 tablet (40 mg total) by mouth daily before breakfast (Patient taking differently: Take 40 mg by mouth if needed before breakfast), Disp: 30 tablet, Rfl: 3  •  PARoxetine (PAXIL) 40 MG tablet, Take 40 mg by mouth every morning, Disp: , Rfl:   •  rizatriptan (MAXALT-MLT) 10 mg disintegrating tablet, Take 1 tablet (10 mg total) by mouth once as needed for migraine for up to 1 dose May repeat in 2 hours if needed (Patient taking differently: Take 10 mg by mouth if needed for migraine May repeat in 2 hours if needed), Disp: 10 tablet, Rfl: 3  •  Spacer/Aero-Holding Chambers (Vortex Valved Holding Chamber) FROYLAN, Use 2 (two) times a day, Disp: 1 each, Rfl: 0  •  topiramate (TOPAMAX) 50 MG tablet, Take 50 mg by mouth 2 (two) times a day, Disp: , Rfl:   •  azelastine (ASTELIN) 0.1 % nasal spray, 1-2 sprays into each nostril 2 (two) times a day as needed for rhinitis Use in each nostril as directed (Patient not taking: Reported on 7/3/2023), Disp: 90 mL, Rfl: 5  • diphenhydramine, lidocaine, Al/Mg hydroxide, simethicone (Magic Mouthwash) SUSP, Swish and spit 10 mL every 4 (four) hours as needed for mouth pain or discomfort (Patient not taking: Reported on 7/3/2023), Disp: 119 mL, Rfl: 0  •  divalproex sodium (DEPAKOTE ER) 250 mg 24 hr tablet, Take 250 mg by mouth 2 (two) times a day (Patient not taking: Reported on 7/3/2023), Disp: , Rfl:   •  EPINEPHrine (EPIPEN) 0.3 mg/0.3 mL SOAJ, Inject 0.3 mL (0.3 mg total) into a muscle once for 1 dose (Patient taking differently: Inject 0.3 mg into a muscle if needed), Disp: 4 each, Rfl: 3  •  Erenumab-aooe 140 MG/ML SOAJ, Inject 140 mg under the skin every 30 (thirty) days (Patient not taking: Reported on 7/3/2023), Disp: 1 mL, Rfl: 11  •  fluticasone (FLONASE) 50 mcg/act nasal spray, 2 sprays into each nostril daily (Patient not taking: Reported on 7/3/2023), Disp: 54.6 mL, Rfl: 3  •  ondansetron (ZOFRAN) 4 mg tablet, Take 1 tablet (4 mg total) by mouth every 6 (six) hours (Patient not taking: Reported on 7/3/2023), Disp: 30 tablet, Rfl: 1  •  ondansetron (ZOFRAN-ODT) 4 mg disintegrating tablet, Take 1 tablet (4 mg total) by mouth every 8 (eight) hours as needed (nausea) for up to 10 days (Patient not taking: Reported on 4/4/2023), Disp: 20 tablet, Rfl: 0  •  oxymetazoline (AFRIN) 0.05 % nasal spray, 2 sprays by Each Nare route 2 (two) times a day for 3 days (Patient not taking: Reported on 7/3/2023), Disp: 30 mL, Rfl: 0  •  promethazine (PHENERGAN) 25 mg suppository, Insert 1 suppository (25 mg total) into the rectum every 6 (six) hours as needed for nausea or vomiting (Patient taking differently: Insert 25 mg into the rectum if needed for nausea or vomiting), Disp: 12 suppository, Rfl: 0    SOCIAL HISTORY  Social History     Socioeconomic History   • Marital status: Single     Spouse name: Not on file   • Number of children: 0   • Years of education: Not on file   • Highest education level: High school graduate   Occupational History   • Occupation: unemployed   Tobacco Use   • Smoking status: Some Days     Packs/day: 0.00     Years: 0.00     Total pack years: 0.00     Types: Cigarettes   • Smokeless tobacco: Never   • Tobacco comments:     I stopped smoking cigarettes but started vaping   Vaping Use   • Vaping Use: Every day   • Substances: Nicotine, Flavoring   Substance and Sexual Activity   • Alcohol use: Yes     Alcohol/week: 2.0 standard drinks of alcohol     Types: 2 Shots of liquor per week     Comment: Occasionally   • Drug use: Yes     Frequency: 3.0 times per week     Types: Marijuana     Comment: socially   • Sexual activity: Yes     Partners: Female     Birth control/protection: None   Other Topics Concern   • Not on file   Social History Narrative    Lives in [facility] with her girlfriend, mother, siblings, step-father    Pt has Dog - Not allowed in bedroom     Social Determinants of Health     Financial Resource Strain: Low Risk  (4/18/2023)    Overall Financial Resource Strain (CARDIA)    • Difficulty of Paying Living Expenses: Not hard at all   Food Insecurity: No Food Insecurity (4/18/2023)    Hunger Vital Sign    • Worried About Running Out of Food in the Last Year: Never true    • Ran Out of Food in the Last Year: Never true   Transportation Needs: No Transportation Needs (4/18/2023)    PRAPARE - Transportation    • Lack of Transportation (Medical): No    • Lack of Transportation (Non-Medical):  No   Physical Activity: Sufficiently Active (3/16/2022)    Exercise Vital Sign    • Days of Exercise per Week: 3 days    • Minutes of Exercise per Session: 60 min   Stress: Not on file   Social Connections: Not on file   Intimate Partner Violence: Not on file   Housing Stability: Low Risk  (3/16/2022)    Housing Stability Vital Sign    • Unable to Pay for Housing in the Last Year: No    • Number of Places Lived in the Last Year: 1    • Unstable Housing in the Last Year: No       Objective     VITAL SIGNS  BP 99/65   Pulse 80 Ht 5' 3" (1.6 m) Comment: verbal  Wt 91.2 kg (201 lb)   LMP 06/22/2023 (Approximate)   BMI 35.61 kg/m²      PHYSICAL EXAM  General:   Well-appearing  No acute distress  Appears stated age    Inspection of the right wrist reveals moderate swelling diffusely. Patient is tender along the radial and ulnar aspects of the right wrist.  She does have range of motion at the wrist joint but appears limited due to pain. She had range of motion of her right fingers as well but also this was limited due to pain. NVID in the radial, median and ulnar nerve distribution. BCR      RADIOGRAPHIC EXAMINATION/DIAGNOSTICS:  Procedure: XR wrist 3+ vw right    Result Date: 7/4/2023  Narrative: RIGHT WRIST INDICATION:   M25.531: Pain in right wrist. COMPARISON: 7/11/2018. VIEWS:  XR WRIST 3+ VW RIGHT Images: 4 FINDINGS: There is no acute fracture or dislocation. No significant degenerative changes. No lytic or blastic osseous lesion. Soft tissues are unremarkable. Impression: No acute osseous abnormality. Workstation performed: [id number]       ASSESSMENT/PLAN:  1. Right wrist pain, likely sprained    Today, we discussed the non-operative treatment options that include, but are not limited to: rest, ice, activity modification, anti-inflammatory medication, and splinting Patient with like to initially proceed with these conservative measures. After discussion of options for anti-inflammatories and bracing with other conservative treatments, patient opted to trial these initially. It was discussed with the patient that should her symptoms not improve in 6-8 weeks or her pain worsened to please let us know and we would place MRI to evaluate this further. she will plan on following up  for recheck p.r.n., they voiced their understanding of this plan and were in agreement with it. All questions answered today.     If any issues, questions, or concerns arise between now and the next appointment, we have encouraged the patient to contact our team.

## 2023-10-31 ENCOUNTER — OFFICE VISIT (OUTPATIENT)
Dept: UROLOGY | Facility: CLINIC | Age: 23
End: 2023-10-31
Payer: MEDICARE

## 2023-10-31 VITALS
HEIGHT: 63 IN | BODY MASS INDEX: 37.74 KG/M2 | WEIGHT: 213 LBS | HEART RATE: 100 BPM | SYSTOLIC BLOOD PRESSURE: 122 MMHG | DIASTOLIC BLOOD PRESSURE: 70 MMHG | OXYGEN SATURATION: 98 %

## 2023-10-31 DIAGNOSIS — E66.01 OBESITY, MORBID (HCC): ICD-10-CM

## 2023-10-31 DIAGNOSIS — N39.41 URGE INCONTINENCE: Primary | ICD-10-CM

## 2023-10-31 LAB
POST-VOID RESIDUAL VOLUME, ML POC: 16 ML
SL AMB  POCT GLUCOSE, UA: ABNORMAL
SL AMB LEUKOCYTE ESTERASE,UA: ABNORMAL
SL AMB POCT BILIRUBIN,UA: ABNORMAL
SL AMB POCT BLOOD,UA: ABNORMAL
SL AMB POCT CLARITY,UA: ABNORMAL
SL AMB POCT COLOR,UA: YELLOW
SL AMB POCT KETONES,UA: ABNORMAL
SL AMB POCT NITRITE,UA: ABNORMAL
SL AMB POCT PH,UA: 6.5
SL AMB POCT SPECIFIC GRAVITY,UA: 1.01
SL AMB POCT URINE PROTEIN: ABNORMAL
SL AMB POCT UROBILINOGEN: 0.2

## 2023-10-31 PROCEDURE — 51798 US URINE CAPACITY MEASURE: CPT | Performed by: PHYSICIAN ASSISTANT

## 2023-10-31 PROCEDURE — 81002 URINALYSIS NONAUTO W/O SCOPE: CPT | Performed by: PHYSICIAN ASSISTANT

## 2023-10-31 PROCEDURE — 99214 OFFICE O/P EST MOD 30 MIN: CPT | Performed by: PHYSICIAN ASSISTANT

## 2023-10-31 NOTE — PROGRESS NOTES
10/31/2023      Chief Complaint   Patient presents with    Follow-up     6 month         Assessment and Plan    24 y.o. female managed by Dr Trenton Rogers    1. Urinary urgency  2. Mixed urinary incontinence  3. Constipation    Urologic workup complete. She can f/u after PT or with her gyn team for annuals. History of Present Illness  Apurva Hudson is a 24 y.o. female here for evaluation of 6-month follow-up after cystoscopy performed for incidental bladder wall thickening on a previous ultrasound, cystoscopy was completely benign urethra bladder there was some dermatitis to the vulva at the time. She has not had any change or concerns since then. She endorses urinary urgency for her entire life she was always the kid to wet her pants in school. She states she has been like this for as long as she can remember this is not an adult onset problem. She does not wet the bed at night. She does not have leakage every day but occurs randomly. She continues to struggle with constipation. She has sensation of urgency with low volumes of urine, for example PVR of 12 today. She is sexually active both vaginal and anal intercourse denies pain or bleeding. She has not had urinary tract infection this year. She is interested in pelvic PT but has not seen them yet due to some current living situation constraints. Review of Systems   Constitutional: Negative. Respiratory: Negative. Negative for cough and shortness of breath. Cardiovascular: Negative. Genitourinary:  Positive for urgency. Negative for decreased urine volume, difficulty urinating, dyspareunia, dysuria, flank pain, frequency, hematuria and pelvic pain. Musculoskeletal: Negative.                  Vitals  Vitals:    10/31/23 1323   BP: 122/70   BP Location: Left arm   Patient Position: Sitting   Cuff Size: Standard   Pulse: 100   SpO2: 98%   Weight: 96.6 kg (213 lb)   Height: 5' 3" (1.6 m)       Physical Exam  Vitals and nursing note reviewed. Constitutional:       General: She is not in acute distress. Appearance: Normal appearance. She is well-developed. She is not diaphoretic. Comments: strong smell of marijuana   HENT:      Head: Normocephalic and atraumatic. Pulmonary:      Effort: Pulmonary effort is normal.   Musculoskeletal:      Right lower leg: No edema. Left lower leg: No edema. Skin:     General: Skin is warm and dry. Neurological:      General: No focal deficit present. Mental Status: She is alert and oriented to person, place, and time. Psychiatric:         Mood and Affect: Mood normal.         Speech: Speech normal.         Behavior: Behavior normal.           Past History  Past Medical History:   Diagnosis Date    Anaphylaxis     apricot    Anxiety     Asthma     Cluster headache     Eczema     Headache, tension-type     Lymphadenitis     Last assessed 12/30/14    Lymphadenopathy     Last assessed 10/16/13    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[address])     Migraine     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Psychiatric disorder     Psychiatric illness     Psychosis ([address])     Seizures ([address])      Social History     Socioeconomic History    Marital status: Single     Spouse name: None    Number of children: 0    Years of education: None    Highest education level: High school graduate   Occupational History    Occupation: unemployed   Tobacco Use    Smoking status: Some Days     Packs/day: 0.50     Years: 0.00     Total pack years: 0.00     Types: Cigarettes    Smokeless tobacco: Never    Tobacco comments:     I stopped smoking cigarettes but started vaping   Vaping Use    Vaping Use: Every day    Substances: Nicotine, Flavoring   Substance and Sexual Activity    Alcohol use:  Yes     Alcohol/week: 2.0 standard drinks of alcohol     Types: 2 Shots of liquor per week     Comment: Occasionally    Drug use: Yes     Frequency: 3.0 times per week     Types: Marijuana     Comment: socially    Sexual activity: Yes     Partners: Female     Birth control/protection: None   Other Topics Concern    None   Social History Narrative    Lives in [place] in [place] with her girlfriend, mother, siblings, step-father    Pt has Dog - Not allowed in bedroom     Social Determinants of Health     Financial Resource Strain: Low Risk  (4/18/2023)    Overall Financial Resource Strain (CARDIA)     Difficulty of Paying Living Expenses: Not hard at all   Food Insecurity: No Food Insecurity (4/18/2023)    Hunger Vital Sign     Worried About Running Out of Food in the Last Year: Never true     Ran Out of Food in the Last Year: Never true   Transportation Needs: No Transportation Needs (4/18/2023)    PRAPARE - Transportation     Lack of Transportation (Medical): No     Lack of Transportation (Non-Medical):  No   Physical Activity: Sufficiently Active (3/16/2022)    Exercise Vital Sign     Days of Exercise per Week: 3 days     Minutes of Exercise per Session: 60 min   Stress: Not on file   Social Connections: Not on file   Intimate Partner Violence: Not on file   Housing Stability: Low Risk  (3/16/2022)    Housing Stability Vital Sign     Unable to Pay for Housing in the Last Year: No     Number of [address] in the Last Year: 1     Unstable Housing in the Last Year: No     Social History     Tobacco Use   Smoking Status Some Days    Packs/day: 0.50    Years: 0.00    Total pack years: 0.00    Types: Cigarettes   Smokeless Tobacco Never   Tobacco Comments    I stopped smoking cigarettes but started vaping     Family History   Problem Relation Age of Onset    Migraines Mother     Other Mother         Neck pain    Depression Mother     Drug abuse Mother     Cervical cancer Mother     Drug abuse Father     Schizophrenia Father     No Known Problems Sister     No Known Problems Brother     Thyroid disease Maternal Grandmother     Diabetes Maternal Grandfather     No Known Problems Paternal Grandmother     No Known Problems Paternal Grandfather     Febrile seizures Maternal Uncle     Stroke Maternal Uncle     Ovarian cancer Paternal Aunt        The following portions of the patient's history were reviewed and updated as appropriate: allergies, current medications, past medical history, past social history, past surgical history and problem list.    Results  No results found for this or any previous visit (from the past 1 hour(s)). ]  No results found for: "PSA"  Lab Results   Component Value Date    GLUCOSE 88 11/11/2015    CALCIUM 8.9 04/10/2023     11/11/2015    K 3.3 (L) 04/10/2023    CO2 26 04/10/2023     04/10/2023    BUN 11 04/10/2023    CREATININE 0.67 04/10/2023     Lab Results   Component Value Date    WBC 15.42 (H) 04/10/2023    HGB 14.3 04/10/2023    HCT 41.8 04/10/2023    MCV 84 04/10/2023     04/10/2023

## 2023-11-09 ENCOUNTER — HOSPITAL ENCOUNTER (EMERGENCY)
Facility: HOSPITAL | Age: 23
Discharge: HOME | End: 2023-11-09
Attending: EMERGENCY MEDICINE
Payer: MEDICARE

## 2023-11-09 ENCOUNTER — APPOINTMENT (EMERGENCY)
Dept: ULTRASOUND IMAGING | Facility: HOSPITAL | Age: 23
End: 2023-11-09
Payer: MEDICARE

## 2023-11-09 VITALS
SYSTOLIC BLOOD PRESSURE: 112 MMHG | TEMPERATURE: 97.8 F | RESPIRATION RATE: 18 BRPM | DIASTOLIC BLOOD PRESSURE: 74 MMHG | OXYGEN SATURATION: 99 % | HEART RATE: 74 BPM

## 2023-11-09 DIAGNOSIS — R11.10 VOMITING: ICD-10-CM

## 2023-11-09 DIAGNOSIS — R10.9 ABDOMINAL PAIN: Primary | ICD-10-CM

## 2023-11-09 LAB
ALBUMIN SERPL BCP-MCNC: 4.7 G/DL (ref 3.5–5)
ALP SERPL-CCNC: 116 U/L (ref 34–104)
ALT SERPL W P-5'-P-CCNC: 20 U/L (ref 7–52)
ANION GAP SERPL CALCULATED.3IONS-SCNC: 10 MMOL/L
AST SERPL W P-5'-P-CCNC: 20 U/L (ref 13–39)
BASOPHILS # BLD AUTO: 0.06 THOUSANDS/ÂΜL (ref 0–0.1)
BASOPHILS NFR BLD AUTO: 1 % (ref 0–1)
BILIRUB SERPL-MCNC: 0.39 MG/DL (ref 0.2–1)
BUN SERPL-MCNC: 9 MG/DL (ref 5–25)
CALCIUM SERPL-MCNC: 9.5 MG/DL (ref 8.4–10.2)
CHLORIDE SERPL-SCNC: 104 MMOL/L (ref 96–108)
CO2 SERPL-SCNC: 24 MMOL/L (ref 21–32)
CREAT SERPL-MCNC: 0.73 MG/DL (ref 0.6–1.3)
EOSINOPHIL # BLD AUTO: 0.25 THOUSAND/ÂΜL (ref 0–0.61)
EOSINOPHIL NFR BLD AUTO: 2 % (ref 0–6)
ERYTHROCYTE [DISTWIDTH] IN BLOOD BY AUTOMATED COUNT: 12.5 % (ref 11.6–15.1)
EXT PREGNANCY TEST URINE: NEGATIVE
EXT. CONTROL: NORMAL
GFR SERPL CREATININE-BSD FRML MDRD: 115 ML/MIN/1.73SQ M
GLUCOSE SERPL-MCNC: 123 MG/DL (ref 65–140)
HCT VFR BLD AUTO: 47.7 % (ref 34.8–46.1)
HGB BLD-MCNC: 16.3 G/DL (ref 11.5–15.4)
IMM GRANULOCYTES # BLD AUTO: 0.04 THOUSAND/UL (ref 0–0.2)
IMM GRANULOCYTES NFR BLD AUTO: 0 % (ref 0–2)
LIPASE SERPL-CCNC: 10 U/L (ref 11–82)
LYMPHOCYTES # BLD AUTO: 1.38 THOUSANDS/ÂΜL (ref 0.6–4.47)
LYMPHOCYTES NFR BLD AUTO: 13 % (ref 14–44)
MCH RBC QN AUTO: 29.2 PG (ref 26.8–34.3)
MCHC RBC AUTO-ENTMCNC: 34.2 G/DL (ref 31.4–37.4)
MCV RBC AUTO: 86 FL (ref 82–98)
MONOCYTES # BLD AUTO: 0.45 THOUSAND/ÂΜL (ref 0.17–1.22)
MONOCYTES NFR BLD AUTO: 4 % (ref 4–12)
NEUTROPHILS # BLD AUTO: 8.47 THOUSANDS/ÂΜL (ref 1.85–7.62)
NEUTS SEG NFR BLD AUTO: 80 % (ref 43–75)
NRBC BLD AUTO-RTO: 0 /100 WBCS
PLATELET # BLD AUTO: 406 THOUSANDS/UL (ref 149–390)
PMV BLD AUTO: 9.1 FL (ref 8.9–12.7)
POTASSIUM SERPL-SCNC: 3.4 MMOL/L (ref 3.5–5.3)
PROT SERPL-MCNC: 8.2 G/DL (ref 6.4–8.4)
RBC # BLD AUTO: 5.58 MILLION/UL (ref 3.81–5.12)
SODIUM SERPL-SCNC: 138 MMOL/L (ref 135–147)
WBC # BLD AUTO: 10.65 THOUSAND/UL (ref 4.31–10.16)

## 2023-11-09 PROCEDURE — 99284 EMERGENCY DEPT VISIT MOD MDM: CPT | Performed by: EMERGENCY MEDICINE

## 2023-11-09 PROCEDURE — 96374 THER/PROPH/DIAG INJ IV PUSH: CPT

## 2023-11-09 PROCEDURE — 36415 COLL VENOUS BLD VENIPUNCTURE: CPT

## 2023-11-09 PROCEDURE — 96372 THER/PROPH/DIAG INJ SC/IM: CPT

## 2023-11-09 PROCEDURE — 81025 URINE PREGNANCY TEST: CPT

## 2023-11-09 PROCEDURE — 85025 COMPLETE CBC W/AUTO DIFF WBC: CPT

## 2023-11-09 PROCEDURE — 80053 COMPREHEN METABOLIC PANEL: CPT

## 2023-11-09 PROCEDURE — 83690 ASSAY OF LIPASE: CPT

## 2023-11-09 PROCEDURE — 96375 TX/PRO/DX INJ NEW DRUG ADDON: CPT

## 2023-11-09 PROCEDURE — 96361 HYDRATE IV INFUSION ADD-ON: CPT

## 2023-11-09 PROCEDURE — 76705 ECHO EXAM OF ABDOMEN: CPT

## 2023-11-09 PROCEDURE — 99285 EMERGENCY DEPT VISIT HI MDM: CPT

## 2023-11-09 RX ORDER — FAMOTIDINE 10 MG/ML
20 INJECTION, SOLUTION INTRAVENOUS ONCE
Status: COMPLETED | OUTPATIENT
Start: 2023-11-09 | End: 2023-11-09

## 2023-11-09 RX ORDER — ONDANSETRON 2 MG/ML
4 INJECTION INTRAMUSCULAR; INTRAVENOUS ONCE
Status: COMPLETED | OUTPATIENT
Start: 2023-11-09 | End: 2023-11-09

## 2023-11-09 RX ORDER — METOCLOPRAMIDE 10 MG/1
10 TABLET ORAL EVERY 6 HOURS
Qty: 30 TABLET | Refills: 0 | Status: SHIPPED | OUTPATIENT
Start: 2023-11-09

## 2023-11-09 RX ORDER — HALOPERIDOL 5 MG/ML
5 INJECTION INTRAMUSCULAR ONCE
Status: COMPLETED | OUTPATIENT
Start: 2023-11-09 | End: 2023-11-09

## 2023-11-09 RX ORDER — METOCLOPRAMIDE HYDROCHLORIDE 5 MG/ML
10 INJECTION INTRAMUSCULAR; INTRAVENOUS ONCE
Status: COMPLETED | OUTPATIENT
Start: 2023-11-09 | End: 2023-11-09

## 2023-11-09 RX ORDER — MAGNESIUM HYDROXIDE/ALUMINUM HYDROXICE/SIMETHICONE 120; 1200; 1200 MG/30ML; MG/30ML; MG/30ML
30 SUSPENSION ORAL ONCE
Status: COMPLETED | OUTPATIENT
Start: 2023-11-09 | End: 2023-11-09

## 2023-11-09 RX ORDER — KETOROLAC TROMETHAMINE 30 MG/ML
15 INJECTION, SOLUTION INTRAMUSCULAR; INTRAVENOUS ONCE
Status: COMPLETED | OUTPATIENT
Start: 2023-11-09 | End: 2023-11-09

## 2023-11-09 RX ADMIN — METOCLOPRAMIDE 10 MG: 5 INJECTION, SOLUTION INTRAMUSCULAR; INTRAVENOUS at 17:19

## 2023-11-09 RX ADMIN — KETOROLAC TROMETHAMINE 15 MG: 30 INJECTION, SOLUTION INTRAMUSCULAR; INTRAVENOUS at 17:08

## 2023-11-09 RX ADMIN — SODIUM CHLORIDE 1000 ML: 0.9 INJECTION, SOLUTION INTRAVENOUS at 15:17

## 2023-11-09 RX ADMIN — HALOPERIDOL LACTATE 5 MG: 5 INJECTION, SOLUTION INTRAMUSCULAR at 16:35

## 2023-11-09 RX ADMIN — ALUMINUM HYDROXIDE, MAGNESIUM HYDROXIDE, AND DIMETHICONE 30 ML: 200; 20; 200 SUSPENSION ORAL at 15:16

## 2023-11-09 RX ADMIN — ONDANSETRON 4 MG: 2 INJECTION INTRAMUSCULAR; INTRAVENOUS at 15:17

## 2023-11-09 RX ADMIN — FAMOTIDINE 20 MG: 10 INJECTION INTRAVENOUS at 15:18

## 2023-11-09 NOTE — ED ATTENDING ATTESTATION
11/9/2023  I, [facility], saw and evaluated the patient. I have discussed the patient with the resident/non-physician practitioner and agree with the resident's/non-physician practitioner's findings, Plan of Care, and MDM as documented in the resident's/non-physician practitioner's note, except where noted. All available labs and Radiology studies were reviewed. I was present for key portions of any procedure(s) performed by the resident/non-physician practitioner and I was immediately available to provide assistance. At this point I agree with the current assessment done in the Emergency Department. I have conducted an independent evaluation of this patient a history and physical is as follows:    ED Course     24 y.o. F w/h/o bipolar disorder, psychogenic nonepileptic seizures, GERD, IBS p/w abd pain x 3 days. Epigastric area, nonradiating, twisting pain, seems to be getting worse. Worse with eating. Reports multiple episodes of vomiting and then started with blood steaks yesterday. Denies F/C, CP, SOB, diarrhea, urinary complaints, abd surgeries. Abd soft. TTP to RUQ/epigastrium. Plan: Labs, US to r/o cholecystitis.     Critical Care Time  Procedures

## 2023-11-09 NOTE — DISCHARGE INSTRUCTIONS
You were seen in the Emergency Department today for abdominal pain and vomiting. Please follow up with GI in 1-2 days. Please return to the Emergency Department if you experience worsening of your current symptoms, fever, chest pain, shortness of breath, or any other concerning symptoms.

## 2023-11-09 NOTE — ED PROVIDER NOTES
History  Chief Complaint   Patient presents with    Abdominal Pain    Vomiting Blood     Pt reports abd pain and vomiting blood for 2 days. HPI    Patient is a 24year old female with a history of GERD and IBS who presents with epigastric pain and vomiting. Patient states that the pain began about 3 days ago along with 3-4 episodes of NBNB vomiting. The pain has progressively worsened and she is now vomiting about 8 times per day and it is bloody. The pain worsens when she eats. She has been unable to tolerate fluids. Her LMP was about 4 weeks ago. She has never had abdominal surgery before. She denies alcohol use, chest pain, and shortness of breath. Prior to Admission Medications   Prescriptions Last Dose Informant Patient Reported? Taking?    Abilify Maintena 300 MG SRER  Self Yes No   Sig: INJECT INTRAMUSCULARLY EVERY 4 WEEKS   Abilify Maintena 300 MG injection  Self Yes No   Sig: INJECT 1 EVERY 4 WEEKS   EPINEPHrine (EPIPEN) 0.3 mg/0.3 mL SOAJ  Self No No   Sig: Inject 0.3 mL (0.3 mg total) into a muscle once for 1 dose   Patient taking differently: Inject 0.3 mg into a muscle if needed   Erenumab-aooe 140 MG/ML SOAJ  Self No No   Sig: Inject 140 mg under the skin every 30 (thirty) days   Patient not taking: Reported on 7/3/2023   Lidocaine Viscous HCl (XYLOCAINE) 2 % mucosal solution   Yes No   PARoxetine (PAXIL) 40 MG tablet  Self Yes No   Sig: Take 40 mg by mouth every morning   Spacer/Aero-Holding Chambers (Vortex Valved Holding Chamber) FROYLAN  Self No No   Sig: Use 2 (two) times a day   albuterol (Ventolin HFA) 90 mcg/act inhaler  Self No No   Sig: Inhale 2 puffs every 4 (four) hours as needed for wheezing   Patient taking differently: Inhale 2 puffs if needed for wheezing   aluminum-magnesium hydroxide-simethicone (MAALOX) 200-200-20 MG/5ML SUSP  Self No No   Sig: Take 15 mL by mouth 4 (four) times a day (before meals and at bedtime)   Patient taking differently: Take 15 mL by mouth if needed azelastine (ASTELIN) 0.1 % nasal spray  Self No No   Si-2 sprays into each nostril 2 (two) times a day as needed for rhinitis Use in each nostril as directed   azelastine (OPTIVAR) 0.05 % ophthalmic solution  Self No No   Sig: Administer 1 drop to both eyes 2 (two) times a day   cetirizine (ZyrTEC) 10 mg tablet  Self No No   Sig: Take 1 tablet (10 mg total) by mouth daily   cyclobenzaprine (FLEXERIL) 5 mg tablet   Yes No   dicyclomine (BENTYL) 20 mg tablet  Self No No   Sig: Take 1 tablet (20 mg total) by mouth every 6 (six) hours as needed (urgency, abdominal pain)   diphenhydramine, lidocaine, Al/Mg hydroxide, simethicone (Magic Mouthwash) SUSP   No No   Sig: Swish and spit 10 mL every 4 (four) hours as needed for mouth pain or discomfort   Patient not taking: Reported on 7/3/2023   divalproex sodium (DEPAKOTE ER) 250 mg 24 hr tablet  Self Yes No   Sig: Take 250 mg by mouth 2 (two) times a day   famotidine (PEPCID) 40 MG tablet  Self No No   Sig: Take 1 tablet (40 mg total) by mouth daily at bedtime   fluticasone (FLONASE) 50 mcg/act nasal spray  Self No No   Si sprays into each nostril daily   fluticasone-salmeterol (Advair HFA) 115-21 MCG/ACT inhaler  Self No No   Sig: Inhale 2 puffs 2 (two) times a day Rinse mouth after use. levalbuterol (XOPENEX) 1.25 mg/3 mL nebulizer solution  Self No No   Sig: Take 3 mL (1.25 mg total) by nebulization every 8 (eight) hours as needed for wheezing or shortness of breath   Patient taking differently: Take 1.25 mg by nebulization if needed for wheezing or shortness of breath   meloxicam (MOBIC) 15 mg tablet   Yes No   mometasone (ELOCON) 0.1 % cream  Self No No   Sig: Apply topically daily   nicotine (NICODERM CQ) 21 mg/24 hr TD 24 hr patch   No No   Sig: Place 1 patch on the skin over 24 hours every 24 hours   Patient not taking: Reported on 10/31/2023   nicotine polacrilex (NICORETTE) 4 mg gum   No No   Sig: Chew 4mg every 1-2 hours as needed.  Maximum 24 pieces/day.    Patient not taking: Reported on 10/31/2023   norelgestromin-ethinyl estradiol (ORTHO EVRA) 150-35 MCG/24HR   No No   Sig: Place 1 patch on the skin over 7 days once a week   Patient not taking: Reported on 10/31/2023   ondansetron (ZOFRAN) 4 mg tablet   No No   Sig: Take 1 tablet (4 mg total) by mouth every 6 (six) hours   Patient not taking: Reported on 7/3/2023   ondansetron (ZOFRAN-ODT) 4 mg disintegrating tablet  Self No No   Sig: Take 1 tablet (4 mg total) by mouth every 8 (eight) hours as needed (nausea) for up to 10 days   Patient not taking: Reported on 2023   ondansetron (Zofran ODT) 4 mg disintegrating tablet   No No   Sig: Take 1 tablet (4 mg total) by mouth every 6 (six) hours as needed for nausea or vomiting   Patient not taking: Reported on 10/31/2023   oxymetazoline (AFRIN) 0.05 % nasal spray   No No   Si sprays by Each Nare route 2 (two) times a day for 3 days   Patient not taking: Reported on 7/3/2023   pantoprazole (PROTONIX) 40 mg tablet  Self No No   Sig: Take 1 tablet (40 mg total) by mouth daily before breakfast   Patient taking differently: Take 40 mg by mouth if needed before breakfast   promethazine (PHENERGAN) 25 mg suppository  Self No No   Sig: Insert 1 suppository (25 mg total) into the rectum every 6 (six) hours as needed for nausea or vomiting   Patient taking differently: Insert 25 mg into the rectum if needed for nausea or vomiting   rizatriptan (MAXALT-MLT) 10 mg disintegrating tablet  Self No No   Sig: Take 1 tablet (10 mg total) by mouth once as needed for migraine for up to 1 dose May repeat in 2 hours if needed   Patient not taking: Reported on 10/31/2023   topiramate (TOPAMAX) 50 MG tablet   Yes No   Sig: Take 50 mg by mouth 2 (two) times a day      Facility-Administered Medications: None       Past Medical History:   Diagnosis Date    Anaphylaxis     apricot    Anxiety     Asthma     Cluster headache     Eczema     Headache, tension-type     Lymphadenitis Last assessed 12/30/14    Lymphadenopathy     Last assessed 10/16/13    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[address])     Migraine     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Psychiatric disorder     Psychiatric illness     Psychosis ([address])     Seizures ([address])        Past Surgical History:   Procedure Laterality Date    COLONOSCOPY N/A 05/03/2019    Procedure: COLONOSCOPY;  Surgeon: Basil Whatley MD;  Location:  GI LAB; Service: Gastroenterology    CYSTOSCOPY  04/04/2023    Dr. Stephanie Guerrero    ESOPHAGOGASTRODUODENOSCOPY N/A 05/03/2019    Procedure: ESOPHAGOGASTRODUODENOSCOPY (EGD); Surgeon: Basil Whatley MD;  Location:  GI LAB; Service: Gastroenterology       Family History   Problem Relation Age of Onset    Migraines Mother     Other Mother         Neck pain    Depression Mother     Drug abuse Mother     Cervical cancer Mother     Drug abuse Father     Schizophrenia Father     No Known Problems Sister     No Known Problems Brother     Thyroid disease Maternal Grandmother     Diabetes Maternal Grandfather     No Known Problems Paternal Grandmother     No Known Problems Paternal Grandfather     Febrile seizures Maternal Uncle     Stroke Maternal Uncle     Ovarian cancer Paternal Aunt      I have reviewed and agree with the history as documented. E-Cigarette/Vaping    E-Cigarette Use Current Every Day User      E-Cigarette/Vaping Substances    Nicotine Yes     THC No     CBD No     Flavoring Yes     Other No     Unknown No      Social History     Tobacco Use    Smoking status: Some Days     Packs/day: 0.50     Years: 0.00     Total pack years: 0.00     Types: Cigarettes    Smokeless tobacco: Never    Tobacco comments:     I stopped smoking cigarettes but started vaping   Vaping Use    Vaping Use: Every day    Substances: Nicotine, Flavoring   Substance Use Topics    Alcohol use:  Yes     Alcohol/week: 2.0 standard drinks of alcohol     Types: 2 Shots of liquor per week     Comment: Occasionally    Drug use: Yes     Frequency: 3.0 times per week     Types: Marijuana     Comment: socially        Review of Systems   Constitutional:  Negative for chills and fever. HENT:  Negative for sore throat and trouble swallowing. Eyes:  Negative for photophobia and visual disturbance. Respiratory:  Negative for cough and shortness of breath. Cardiovascular:  Negative for chest pain and palpitations. Gastrointestinal:  Positive for abdominal pain and vomiting. Genitourinary:  Negative for dysuria and hematuria. Musculoskeletal:  Negative for back pain and neck pain. Skin:  Negative for rash and wound. Neurological:  Negative for syncope and headaches. All other systems reviewed and are negative. Physical Exam  ED Triage Vitals [11/09/23 1445]   Temperature Pulse Respirations Blood Pressure SpO2   97.8 °F (36.6 °C) 74 18 112/74 99 %      Temp Source Heart Rate Source Patient Position - Orthostatic VS BP Location FiO2 (%)   Oral Monitor -- Right arm --      Pain Score       10 - Worst Possible Pain             Orthostatic Vital Signs  Vitals:    11/09/23 1445   BP: 112/74   Pulse: 74       Physical Exam  Vitals and nursing note reviewed. Constitutional:       General: She is not in acute distress. Appearance: She is well-developed. HENT:      Head: Normocephalic and atraumatic. Nose: No congestion or rhinorrhea. Mouth/Throat:      Mouth: Mucous membranes are moist.   Eyes:      Extraocular Movements: Extraocular movements intact. Conjunctiva/sclera: Conjunctivae normal.   Cardiovascular:      Rate and Rhythm: Normal rate and regular rhythm. Heart sounds: No murmur heard. Pulmonary:      Effort: Pulmonary effort is normal. No respiratory distress. Breath sounds: Normal breath sounds. Abdominal:      Palpations: Abdomen is soft. Tenderness: There is abdominal tenderness. Comments: TTP in epigastrium and RUQ. Soft, non-distended. Musculoskeletal:      Cervical back: Neck supple.       Right lower leg: No edema. Left lower leg: No edema. Skin:     General: Skin is warm and dry. Capillary Refill: Capillary refill takes less than 2 seconds. Neurological:      Mental Status: She is alert.    Psychiatric:         Mood and Affect: Mood normal.         ED Medications  Medications   ondansetron (ZOFRAN) injection 4 mg (4 mg Intravenous Given 11/9/23 1517)   Famotidine (PF) (PEPCID) injection 20 mg (20 mg Intravenous Given 11/9/23 1518)   aluminum-magnesium hydroxide-simethicone (MAALOX) oral suspension 30 mL (30 mL Oral Given 11/9/23 1516)   sodium chloride 0.9 % bolus 1,000 mL (0 mL Intravenous Stopped 11/9/23 1754)   haloperidol lactate (HALDOL) injection 5 mg (5 mg Intramuscular Given 11/9/23 1635)   ketorolac (TORADOL) injection 15 mg (15 mg Intravenous Given 11/9/23 1708)   metoclopramide (REGLAN) injection 10 mg (10 mg Intravenous Given 11/9/23 1719)       Diagnostic Studies  Results Reviewed       Procedure Component Value Units Date/Time    POCT pregnancy, urine [[id number]]  (Normal) Resulted: 11/09/23 1551    Lab Status: Final result Updated: 11/09/23 1551     EXT Preg Test, Ur Negative     Control Valid    Comprehensive metabolic panel [[id number]]  (Abnormal) Collected: 11/09/23 1515    Lab Status: Final result Specimen: Blood from Arm, Left Updated: 11/09/23 1546     Sodium 138 mmol/L      Potassium 3.4 mmol/L      Chloride 104 mmol/L      CO2 24 mmol/L      ANION GAP 10 mmol/L      BUN 9 mg/dL      Creatinine 0.73 mg/dL      Glucose 123 mg/dL      Calcium 9.5 mg/dL      AST 20 U/L      ALT 20 U/L      Alkaline Phosphatase 116 U/L      Total Protein 8.2 g/dL      Albumin 4.7 g/dL      Total Bilirubin 0.39 mg/dL      eGFR 115 ml/min/1.73sq m     Narrative:      [place] guidelines for Chronic Kidney Disease (CKD):     Stage 1 with normal or high GFR (GFR > 90 mL/min/1.73 square meters)    Stage 2 Mild CKD (GFR = 60-89 mL/min/1.73 square meters)    Stage 3A Moderate CKD (GFR = 45-59 mL/min/1.73 square meters)    Stage 3B Moderate CKD (GFR = 30-44 mL/min/1.73 square meters)    Stage 4 Severe CKD (GFR = 15-29 mL/min/1.73 square meters)    Stage 5 End Stage CKD (GFR <15 mL/min/1.73 square meters)  Note: GFR calculation is accurate only with a steady state creatinine    Lipase [[id number]]  (Abnormal) Collected: 11/09/23 1515    Lab Status: Final result Specimen: Blood from Arm, Left Updated: 11/09/23 1546     Lipase 10 u/L     CBC and differential [[id number]]  (Abnormal) Collected: 11/09/23 1515    Lab Status: Final result Specimen: Blood from Arm, Left Updated: 11/09/23 1528     WBC 10.65 Thousand/uL      RBC 5.58 Million/uL      Hemoglobin 16.3 g/dL      Hematocrit 47.7 %      MCV 86 fL      MCH 29.2 pg      MCHC 34.2 g/dL      RDW 12.5 %      MPV 9.1 fL      Platelets 548 Thousands/uL      nRBC 0 /100 WBCs      Neutrophils Relative 80 %      Immat GRANS % 0 %      Lymphocytes Relative 13 %      Monocytes Relative 4 %      Eosinophils Relative 2 %      Basophils Relative 1 %      Neutrophils Absolute 8.47 Thousands/µL      Immature Grans Absolute 0.04 Thousand/uL      Lymphocytes Absolute 1.38 Thousands/µL      Monocytes Absolute 0.45 Thousand/µL      Eosinophils Absolute 0.25 Thousand/µL      Basophils Absolute 0.06 Thousands/µL                    US right upper quadrant   Final Result by Alba Wren MD (11/09 1623)      Within normal limits. Workstation performed: [id number]               Procedures  Procedures      ED Course  ED Course as of 11/09/23 1756   Thu Nov 09, 2023   1533 WBC(!): 10.65   1548 Lipase(!): 10   1549 AST: 20   1549 ALT: 20   1549 TOTAL BILIRUBIN: 0.39   1610 PREGNANCY TEST URINE: Negative   1612 Patient still vomiting following Zofran. Will give a dose of Haldol   1629 RUQ ultrasound normal. Will re-evaluate patient after Haldol    1645 Pt still endorsing abdominal pain. Will order Toradol and re-evaluate    1708 Nausea improved.  Will PO challenge                             SBIRT 22yo+      Flowsheet Row Most Recent Value   Initial Alcohol Screen: US AUDIT-C     1. How often do you have a drink containing alcohol? 0 Filed at: 11/09/2023 1527   2. How many drinks containing alcohol do you have on a typical day you are drinking? 0 Filed at: 11/09/2023 1527   3b. FEMALE Any Age, or MALE 65+: How often do you have 4 or more drinks on one occassion? 0 Filed at: 11/09/2023 1527   Audit-C Score 0 Filed at: 11/09/2023 1527   DASHAWN: How many times in the past year have you. .. Used an illegal drug or used a prescription medication for non-medical reasons? Never Filed at: 11/09/2023 1527                  Medical Decision Making  Patient is a 24year old female with a history of GERD and IBS who presents with epigastric pain and vomiting. Patient's differential includes gastritis, viral syndrome, pancreatitis, acute cholecystitis. Will order CBC, CMP, lipase, urine pregnancy, and RUQ ultrasound. WBC, Hgb, and platelets elevated likely in setting of dehydration. Patient is receiving fluids. Other labs unremarkable. Urine pregnancy is negative. RUQ is normal. Patient's symptoms did not resolve following Zofran and Haloperidol so she was given Reglan and Toradol. Patient reported improvement of symptoms and was able to tolerate PO. She was told to follow-up with GI given her recurrent vomiting. She was given return precautions and discharged from the ED. Amount and/or Complexity of Data Reviewed  Labs: ordered. Decision-making details documented in ED Course. Radiology: ordered. ECG/medicine tests: ordered. Risk  OTC drugs. Prescription drug management.           Disposition  Final diagnoses:   Abdominal pain   Vomiting     Time reflects when diagnosis was documented in both MDM as applicable and the Disposition within this note       Time User Action Codes Description Comment    11/9/2023  5:42 PM Vicki Mccormick Add [R10.9] Abdominal pain 11/9/2023  5:43 PM Radha Bal Add [R11.10] Vomiting           ED Disposition       ED Disposition   Discharge    Condition   Stable    Date/Time   Thu Nov 9, 2023 [address] [facility] Jason Singh discharge to home/self care.                    Follow-up Information       Follow up With Specialties Details Why [id number] [facility]   [address] [facility] [address] [phone]            Discharge Medication List as of 11/9/2023  5:44 PM        START taking these medications    Details   metoclopramide (Reglan) 10 mg tablet Take 1 tablet (10 mg total) by mouth every 6 (six) hours, Starting Thu 11/9/2023, Normal           CONTINUE these medications which have NOT CHANGED    Details   Abilify Maintena 300 MG injection INJECT 1 EVERY 4 WEEKS, Historical Med      Abilify Maintena 300 MG SRER INJECT INTRAMUSCULARLY EVERY 4 WEEKS, Historical Med      albuterol (Ventolin HFA) 90 mcg/act inhaler Inhale 2 puffs every 4 (four) hours as needed for wheezing, Starting Wed 3/9/2022, Normal      aluminum-magnesium hydroxide-simethicone (MAALOX) 200-200-20 MG/5ML SUSP Take 15 mL by mouth 4 (four) times a day (before meals and at bedtime), Starting Mon 3/27/2023, Normal      azelastine (ASTELIN) 0.1 % nasal spray 1-2 sprays into each nostril 2 (two) times a day as needed for rhinitis Use in each nostril as directed, Starting Wed 1/11/2023, Normal      azelastine (OPTIVAR) 0.05 % ophthalmic solution Administer 1 drop to both eyes 2 (two) times a day, Starting Wed 1/11/2023, Normal      cetirizine (ZyrTEC) 10 mg tablet Take 1 tablet (10 mg total) by mouth daily, Starting Wed 1/11/2023, Normal      cyclobenzaprine (FLEXERIL) 5 mg tablet Starting Mon 7/10/2023, Historical Med      dicyclomine (BENTYL) 20 mg tablet Take 1 tablet (20 mg total) by mouth every 6 (six) hours as needed (urgency, abdominal pain), Starting Mon 11/14/2022, Normal diphenhydramine, lidocaine, Al/Mg hydroxide, simethicone (Magic Mouthwash) SUSP Swish and spit 10 mL every 4 (four) hours as needed for mouth pain or discomfort, Starting Tue 6/20/2023, Normal      divalproex sodium (DEPAKOTE ER) 250 mg 24 hr tablet Take 250 mg by mouth 2 (two) times a day, Starting Mon 12/5/2022, Historical Med      EPINEPHrine (EPIPEN) 0.3 mg/0.3 mL SOAJ Inject 0.3 mL (0.3 mg total) into a muscle once for 1 dose, Starting Mon 3/27/2023, Normal      Erenumab-aooe 140 MG/ML SOAJ Inject 140 mg under the skin every 30 (thirty) days, Starting Fri 10/28/2022, Normal      famotidine (PEPCID) 40 MG tablet Take 1 tablet (40 mg total) by mouth daily at bedtime, Starting Mon 11/14/2022, Normal      fluticasone (FLONASE) 50 mcg/act nasal spray 2 sprays into each nostril daily, Starting Wed 1/11/2023, Normal      fluticasone-salmeterol (Advair HFA) 115-21 MCG/ACT inhaler Inhale 2 puffs 2 (two) times a day Rinse mouth after use., Starting Wed 1/11/2023, Normal      levalbuterol (XOPENEX) 1.25 mg/3 mL nebulizer solution Take 3 mL (1.25 mg total) by nebulization every 8 (eight) hours as needed for wheezing or shortness of breath, Starting Wed 1/11/2023, Normal      Lidocaine Viscous HCl (XYLOCAINE) 2 % mucosal solution Starting Wed 6/21/2023, Historical Med      meloxicam (MOBIC) 15 mg tablet Starting Thu 6/29/2023, Historical Med      mometasone (ELOCON) 0.1 % cream Apply topically daily, Starting Wed 7/13/2022, Normal      nicotine (NICODERM CQ) 21 mg/24 hr TD 24 hr patch Place 1 patch on the skin over 24 hours every 24 hours, Starting Mon 6/12/2023, Normal      nicotine polacrilex (NICORETTE) 4 mg gum Chew 4mg every 1-2 hours as needed.  Maximum 24 pieces/day., Normal      norelgestromin-ethinyl estradiol (ORTHO EVRA) 150-35 MCG/24HR Place 1 patch on the skin over 7 days once a week, Starting Wed 6/21/2023, Normal      ondansetron (Zofran ODT) 4 mg disintegrating tablet Take 1 tablet (4 mg total) by mouth every 6 (six) hours as needed for nausea or vomiting, Starting Mon 4/10/2023, Normal      ondansetron (ZOFRAN) 4 mg tablet Take 1 tablet (4 mg total) by mouth every 6 (six) hours, Starting Tue 6/20/2023, Normal      oxymetazoline (AFRIN) 0.05 % nasal spray 2 sprays by Each Nare route 2 (two) times a day for 3 days, Starting Tue 6/20/2023, Until Fri 6/23/2023, Normal      pantoprazole (PROTONIX) 40 mg tablet Take 1 tablet (40 mg total) by mouth daily before breakfast, Starting Mon 3/27/2023, Normal      PARoxetine (PAXIL) 40 MG tablet Take 40 mg by mouth every morning, Starting Mon 12/5/2022, Historical Med      promethazine (PHENERGAN) 25 mg suppository Insert 1 suppository (25 mg total) into the rectum every 6 (six) hours as needed for nausea or vomiting, Starting Mon 3/27/2023, Normal      rizatriptan (MAXALT-MLT) 10 mg disintegrating tablet Take 1 tablet (10 mg total) by mouth once as needed for migraine for up to 1 dose May repeat in 2 hours if needed, Starting Mon 3/27/2023, Normal      Spacer/Aero-Holding Chambers ([employer]) FROYLAN Use 2 (two) times a day, Starting Wed 3/9/2022, Normal      topiramate (TOPAMAX) 50 MG tablet Take 50 mg by mouth 2 (two) times a day, Starting Fri 6/2/2023, Historical Med           No discharge procedures on file. PDMP Review         Value Time User    PDMP Reviewed  Yes 11/25/2021 10:36 PM Christiana Falk MD             ED Provider  Attending physically available and evaluated Spring Walker. I managed the patient along with the ED Attending.     Electronically Signed by           Saleem Hair MD  11/09/23 6202

## 2023-11-12 ENCOUNTER — HOSPITAL ENCOUNTER (EMERGENCY)
Facility: HOSPITAL | Age: 23
Discharge: HOME | End: 2023-11-12
Attending: EMERGENCY MEDICINE | Admitting: EMERGENCY MEDICINE
Payer: MEDICARE

## 2023-11-12 ENCOUNTER — APPOINTMENT (EMERGENCY)
Dept: RADIOLOGY | Facility: HOSPITAL | Age: 23
End: 2023-11-12
Payer: MEDICARE

## 2023-11-12 VITALS
DIASTOLIC BLOOD PRESSURE: 60 MMHG | OXYGEN SATURATION: 98 % | HEART RATE: 92 BPM | TEMPERATURE: 97.4 F | RESPIRATION RATE: 16 BRPM | SYSTOLIC BLOOD PRESSURE: 125 MMHG

## 2023-11-12 DIAGNOSIS — R11.2 NAUSEA AND VOMITING: Primary | ICD-10-CM

## 2023-11-12 LAB
ALBUMIN SERPL BCP-MCNC: 4.4 G/DL (ref 3.5–5)
ALP SERPL-CCNC: 109 U/L (ref 34–104)
ALT SERPL W P-5'-P-CCNC: 19 U/L (ref 7–52)
ANION GAP SERPL CALCULATED.3IONS-SCNC: 11 MMOL/L
AST SERPL W P-5'-P-CCNC: 17 U/L (ref 13–39)
ATRIAL RATE: 81 BPM
BACTERIA UR QL AUTO: ABNORMAL /HPF
BASOPHILS # BLD AUTO: 0.07 THOUSANDS/ÂΜL (ref 0–0.1)
BASOPHILS NFR BLD AUTO: 1 % (ref 0–1)
BILIRUB SERPL-MCNC: 0.43 MG/DL (ref 0.2–1)
BILIRUB UR QL STRIP: NEGATIVE
BUN SERPL-MCNC: 12 MG/DL (ref 5–25)
CALCIUM SERPL-MCNC: 9.2 MG/DL (ref 8.4–10.2)
CHLORIDE SERPL-SCNC: 101 MMOL/L (ref 96–108)
CLARITY UR: ABNORMAL
CO2 SERPL-SCNC: 23 MMOL/L (ref 21–32)
COLOR UR: YELLOW
CREAT SERPL-MCNC: 0.67 MG/DL (ref 0.6–1.3)
EOSINOPHIL # BLD AUTO: 0.17 THOUSAND/ÂΜL (ref 0–0.61)
EOSINOPHIL NFR BLD AUTO: 1 % (ref 0–6)
ERYTHROCYTE [DISTWIDTH] IN BLOOD BY AUTOMATED COUNT: 12.6 % (ref 11.6–15.1)
EXT PREGNANCY TEST URINE: NEGATIVE
EXT PREGNANCY TEST URINE: NEGATIVE
EXT. CONTROL: NORMAL
EXT. CONTROL: NORMAL
GFR SERPL CREATININE-BSD FRML MDRD: 123 ML/MIN/1.73SQ M
GLUCOSE SERPL-MCNC: 94 MG/DL (ref 65–140)
GLUCOSE UR STRIP-MCNC: NEGATIVE MG/DL
HCT VFR BLD AUTO: 43.7 % (ref 34.8–46.1)
HGB BLD-MCNC: 15 G/DL (ref 11.5–15.4)
HGB UR QL STRIP.AUTO: ABNORMAL
IMM GRANULOCYTES # BLD AUTO: 0.03 THOUSAND/UL (ref 0–0.2)
IMM GRANULOCYTES NFR BLD AUTO: 0 % (ref 0–2)
KETONES UR STRIP-MCNC: ABNORMAL MG/DL
LEUKOCYTE ESTERASE UR QL STRIP: NEGATIVE
LIPASE SERPL-CCNC: 10 U/L (ref 11–82)
LYMPHOCYTES # BLD AUTO: 2 THOUSANDS/ÂΜL (ref 0.6–4.47)
LYMPHOCYTES NFR BLD AUTO: 17 % (ref 14–44)
MCH RBC QN AUTO: 29.1 PG (ref 26.8–34.3)
MCHC RBC AUTO-ENTMCNC: 34.3 G/DL (ref 31.4–37.4)
MCV RBC AUTO: 85 FL (ref 82–98)
MONOCYTES # BLD AUTO: 0.58 THOUSAND/ÂΜL (ref 0.17–1.22)
MONOCYTES NFR BLD AUTO: 5 % (ref 4–12)
MUCOUS THREADS UR QL AUTO: ABNORMAL
NEUTROPHILS # BLD AUTO: 8.9 THOUSANDS/ÂΜL (ref 1.85–7.62)
NEUTS SEG NFR BLD AUTO: 76 % (ref 43–75)
NITRITE UR QL STRIP: NEGATIVE
NON-SQ EPI CELLS URNS QL MICRO: ABNORMAL /HPF
NRBC BLD AUTO-RTO: 0 /100 WBCS
P AXIS: 75 DEGREES
PH UR STRIP.AUTO: 6 [PH]
PLATELET # BLD AUTO: 367 THOUSANDS/UL (ref 149–390)
PMV BLD AUTO: 9.1 FL (ref 8.9–12.7)
POTASSIUM SERPL-SCNC: 3.5 MMOL/L (ref 3.5–5.3)
PR INTERVAL: 120 MS
PROT SERPL-MCNC: 7.8 G/DL (ref 6.4–8.4)
PROT UR STRIP-MCNC: ABNORMAL MG/DL
QRS AXIS: 90 DEGREES
QRSD INTERVAL: 90 MS
QT INTERVAL: 384 MS
QTC INTERVAL: 446 MS
RBC # BLD AUTO: 5.15 MILLION/UL (ref 3.81–5.12)
RBC #/AREA URNS AUTO: ABNORMAL /HPF
SODIUM SERPL-SCNC: 135 MMOL/L (ref 135–147)
SP GR UR STRIP.AUTO: 1.04 (ref 1–1.03)
T WAVE AXIS: 61 DEGREES
UROBILINOGEN UR STRIP-ACNC: 4 MG/DL
VENTRICULAR RATE: 81 BPM
WBC # BLD AUTO: 11.75 THOUSAND/UL (ref 4.31–10.16)
WBC #/AREA URNS AUTO: ABNORMAL /HPF

## 2023-11-12 PROCEDURE — G1004 CDSM NDSC: HCPCS

## 2023-11-12 PROCEDURE — 96376 TX/PRO/DX INJ SAME DRUG ADON: CPT

## 2023-11-12 PROCEDURE — 93010 ELECTROCARDIOGRAM REPORT: CPT | Performed by: INTERNAL MEDICINE

## 2023-11-12 PROCEDURE — 96361 HYDRATE IV INFUSION ADD-ON: CPT

## 2023-11-12 PROCEDURE — 99285 EMERGENCY DEPT VISIT HI MDM: CPT | Performed by: EMERGENCY MEDICINE

## 2023-11-12 PROCEDURE — 81001 URINALYSIS AUTO W/SCOPE: CPT

## 2023-11-12 PROCEDURE — 36415 COLL VENOUS BLD VENIPUNCTURE: CPT

## 2023-11-12 PROCEDURE — 80053 COMPREHEN METABOLIC PANEL: CPT | Performed by: EMERGENCY MEDICINE

## 2023-11-12 PROCEDURE — 99284 EMERGENCY DEPT VISIT MOD MDM: CPT

## 2023-11-12 PROCEDURE — 85025 COMPLETE CBC W/AUTO DIFF WBC: CPT | Performed by: EMERGENCY MEDICINE

## 2023-11-12 PROCEDURE — 96375 TX/PRO/DX INJ NEW DRUG ADDON: CPT

## 2023-11-12 PROCEDURE — 93005 ELECTROCARDIOGRAM TRACING: CPT

## 2023-11-12 PROCEDURE — 83690 ASSAY OF LIPASE: CPT | Performed by: EMERGENCY MEDICINE

## 2023-11-12 PROCEDURE — 74177 CT ABD & PELVIS W/CONTRAST: CPT

## 2023-11-12 PROCEDURE — 81025 URINE PREGNANCY TEST: CPT | Performed by: EMERGENCY MEDICINE

## 2023-11-12 PROCEDURE — 96374 THER/PROPH/DIAG INJ IV PUSH: CPT

## 2023-11-12 RX ORDER — METOCLOPRAMIDE HYDROCHLORIDE 5 MG/ML
10 INJECTION INTRAMUSCULAR; INTRAVENOUS ONCE
Status: COMPLETED | OUTPATIENT
Start: 2023-11-12 | End: 2023-11-12

## 2023-11-12 RX ORDER — DROPERIDOL 2.5 MG/ML
0.62 INJECTION, SOLUTION INTRAMUSCULAR; INTRAVENOUS ONCE
Status: COMPLETED | OUTPATIENT
Start: 2023-11-12 | End: 2023-11-12

## 2023-11-12 RX ORDER — ONDANSETRON 4 MG/1
4 TABLET, ORALLY DISINTEGRATING ORAL ONCE
Status: COMPLETED | OUTPATIENT
Start: 2023-11-12 | End: 2023-11-12

## 2023-11-12 RX ORDER — ONDANSETRON 4 MG/1
4 TABLET, FILM COATED ORAL EVERY 6 HOURS
Qty: 16 TABLET | Refills: 0 | Status: SHIPPED | OUTPATIENT
Start: 2023-11-12 | End: 2023-11-16

## 2023-11-12 RX ADMIN — DROPERIDOL 0.62 MG: 2.5 INJECTION, SOLUTION INTRAMUSCULAR; INTRAVENOUS at 19:07

## 2023-11-12 RX ADMIN — ONDANSETRON 4 MG: 4 TABLET, ORALLY DISINTEGRATING ORAL at 15:44

## 2023-11-12 RX ADMIN — IOHEXOL 100 ML: 350 INJECTION, SOLUTION INTRAVENOUS at 19:25

## 2023-11-12 RX ADMIN — SODIUM CHLORIDE 1000 ML: 0.9 INJECTION, SOLUTION INTRAVENOUS at 17:38

## 2023-11-12 RX ADMIN — DROPERIDOL 0.62 MG: 2.5 INJECTION, SOLUTION INTRAMUSCULAR; INTRAVENOUS at 17:37

## 2023-11-12 RX ADMIN — METOCLOPRAMIDE 10 MG: 5 INJECTION, SOLUTION INTRAMUSCULAR; INTRAVENOUS at 18:26

## 2023-11-12 NOTE — ED PROVIDER NOTES
History  Chief Complaint   Patient presents with    Vomiting     For 1 week having abdominal pain, emesis, dizziness. Pt states she cannot eat or drink anything due to vomiting. Pt went to [facility] 4 days for symptoms. Seizure - Prior Hx Of     Pt reports she has not taken her depakote for over a month since she is out of medication. Reports having increased seizures lately. At 1640 had a seizure in triage for about 5 seconds. Pt immediately snapped out of it and was not disoriented, able to maintain airway, no oral secretions. SpO2 98% HR 87 RR 17     Patient is a 79-year-old female with past medical history of migraine, psychogenic nonepileptic seizures, anxiety, asthma presenting for nausea and vomiting. Patient states that this has been going on for about a week, and she was seen at another emergency department about 4 days ago for the symptoms. At that time, she was treated and felt better, and was discharged with a prescription for Reglan which was no longer helping. She has been able to drink fluids, but is not able to eat much of anything. She states that no one around her has similar symptoms. She states that she smokes marijuana on occasion, but typically not more than once a week. She denies any chance of pregnancy. She also states having abdominal pain with this nausea and vomiting. She denies fever, chills, diaphoresis, chest pain, shortness of breath, numbness, tingling, or weakness. Prior to Admission Medications   Prescriptions Last Dose Informant Patient Reported? Taking?    Abilify Maintena 300 MG SRER  Self Yes No   Sig: INJECT INTRAMUSCULARLY EVERY 4 WEEKS   Abilify Maintena 300 MG injection  Self Yes No   Sig: INJECT 1 EVERY 4 WEEKS   EPINEPHrine (EPIPEN) 0.3 mg/0.3 mL SOAJ  Self No No   Sig: Inject 0.3 mL (0.3 mg total) into a muscle once for 1 dose   Patient taking differently: Inject 0.3 mg into a muscle if needed   Erenumab-aooe 140 MG/ML SOAJ  Self No No   Sig: Inject 140 mg under the skin every 30 (thirty) days   Patient not taking: Reported on 7/3/2023   Lidocaine Viscous HCl (XYLOCAINE) 2 % mucosal solution   Yes No   PARoxetine (PAXIL) 40 MG tablet  Self Yes No   Sig: Take 40 mg by mouth every morning   Spacer/Aero-Holding Chambers (Vortex Valved Holding Chamber) FROYLAN  Self No No   Sig: Use 2 (two) times a day   albuterol (Ventolin HFA) 90 mcg/act inhaler  Self No No   Sig: Inhale 2 puffs every 4 (four) hours as needed for wheezing   Patient taking differently: Inhale 2 puffs if needed for wheezing   aluminum-magnesium hydroxide-simethicone (MAALOX) 200-200-20 MG/5ML SUSP  Self No No   Sig: Take 15 mL by mouth 4 (four) times a day (before meals and at bedtime)   Patient taking differently: Take 15 mL by mouth if needed   azelastine (ASTELIN) 0.1 % nasal spray  Self No No   Si-2 sprays into each nostril 2 (two) times a day as needed for rhinitis Use in each nostril as directed   azelastine (OPTIVAR) 0.05 % ophthalmic solution  Self No No   Sig: Administer 1 drop to both eyes 2 (two) times a day   cetirizine (ZyrTEC) 10 mg tablet  Self No No   Sig: Take 1 tablet (10 mg total) by mouth daily   cyclobenzaprine (FLEXERIL) 5 mg tablet   Yes No   dicyclomine (BENTYL) 20 mg tablet  Self No No   Sig: Take 1 tablet (20 mg total) by mouth every 6 (six) hours as needed (urgency, abdominal pain)   diphenhydramine, lidocaine, Al/Mg hydroxide, simethicone (Magic Mouthwash) SUSP   No No   Sig: Swish and spit 10 mL every 4 (four) hours as needed for mouth pain or discomfort   Patient not taking: Reported on 7/3/2023   divalproex sodium (DEPAKOTE ER) 250 mg 24 hr tablet  Self Yes No   Sig: Take 250 mg by mouth 2 (two) times a day   famotidine (PEPCID) 40 MG tablet  Self No No   Sig: Take 1 tablet (40 mg total) by mouth daily at bedtime   fluticasone (FLONASE) 50 mcg/act nasal spray  Self No No   Si sprays into each nostril daily   fluticasone-salmeterol (Advair HFA) 115-21 MCG/ACT inhaler  Self No No   Sig: Inhale 2 puffs 2 (two) times a day Rinse mouth after use. levalbuterol (XOPENEX) 1.25 mg/3 mL nebulizer solution  Self No No   Sig: Take 3 mL (1.25 mg total) by nebulization every 8 (eight) hours as needed for wheezing or shortness of breath   Patient taking differently: Take 1.25 mg by nebulization if needed for wheezing or shortness of breath   meloxicam (MOBIC) 15 mg tablet   Yes No   metoclopramide (Reglan) 10 mg tablet   No No   Sig: Take 1 tablet (10 mg total) by mouth every 6 (six) hours   mometasone (ELOCON) 0.1 % cream  Self No No   Sig: Apply topically daily   nicotine (NICODERM CQ) 21 mg/24 hr TD 24 hr patch   No No   Sig: Place 1 patch on the skin over 24 hours every 24 hours   Patient not taking: Reported on 10/31/2023   nicotine polacrilex (NICORETTE) 4 mg gum   No No   Sig: Chew 4mg every 1-2 hours as needed. Maximum 24 pieces/day.    Patient not taking: Reported on 10/31/2023   norelgestromin-ethinyl estradiol (ORTHO EVRA) 150-35 MCG/24HR   No No   Sig: Place 1 patch on the skin over 7 days once a week   Patient not taking: Reported on 10/31/2023   ondansetron (ZOFRAN) 4 mg tablet   No No   Sig: Take 1 tablet (4 mg total) by mouth every 6 (six) hours   Patient not taking: Reported on 7/3/2023   ondansetron (ZOFRAN-ODT) 4 mg disintegrating tablet  Self No No   Sig: Take 1 tablet (4 mg total) by mouth every 8 (eight) hours as needed (nausea) for up to 10 days   Patient not taking: Reported on 2023   ondansetron (Zofran ODT) 4 mg disintegrating tablet   No No   Sig: Take 1 tablet (4 mg total) by mouth every 6 (six) hours as needed for nausea or vomiting   Patient not taking: Reported on 10/31/2023   oxymetazoline (AFRIN) 0.05 % nasal spray   No No   Si sprays by Each Nare route 2 (two) times a day for 3 days   Patient not taking: Reported on 7/3/2023   pantoprazole (PROTONIX) 40 mg tablet  Self No No   Sig: Take 1 tablet (40 mg total) by mouth daily before breakfast Patient taking differently: Take 40 mg by mouth if needed before breakfast   promethazine (PHENERGAN) 25 mg suppository  Self No No   Sig: Insert 1 suppository (25 mg total) into the rectum every 6 (six) hours as needed for nausea or vomiting   Patient taking differently: Insert 25 mg into the rectum if needed for nausea or vomiting   rizatriptan (MAXALT-MLT) 10 mg disintegrating tablet  Self No No   Sig: Take 1 tablet (10 mg total) by mouth once as needed for migraine for up to 1 dose May repeat in 2 hours if needed   Patient not taking: Reported on 10/31/2023   topiramate (TOPAMAX) 50 MG tablet   Yes No   Sig: Take 50 mg by mouth 2 (two) times a day      Facility-Administered Medications: None       Past Medical History:   Diagnosis Date    Anaphylaxis     apricot    Anxiety     Asthma     Cluster headache     Eczema     Headache, tension-type     Lymphadenitis     Last assessed 12/30/14    Lymphadenopathy     Last assessed 10/16/13    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[address])     Migraine     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Psychiatric disorder     Psychiatric illness     Psychosis ([address])     Seizures ([address])        Past Surgical History:   Procedure Laterality Date    COLONOSCOPY N/A 05/03/2019    Procedure: COLONOSCOPY;  Surgeon: Eveline Lockhart MD;  Location: [facility]; Service: Gastroenterology    CYSTOSCOPY  04/04/2023    Dr. Corie Butt    ESOPHAGOGASTRODUODENOSCOPY N/A 05/03/2019    Procedure: ESOPHAGOGASTRODUODENOSCOPY (EGD); Surgeon: Eveline Lockhart MD;  Location: [facility];   Service: Gastroenterology       Family History   Problem Relation Age of Onset    Migraines Mother     Other Mother         Neck pain    Depression Mother     Drug abuse Mother     Cervical cancer Mother     Drug abuse Father     Schizophrenia Father     No Known Problems Sister     No Known Problems Brother     Thyroid disease Maternal Grandmother     Diabetes Maternal Grandfather     No Known Problems Paternal Grandmother     No Known Problems Paternal Grandfather     Febrile seizures Maternal Uncle     Stroke Maternal Uncle     Ovarian cancer Paternal Aunt      I have reviewed and agree with the history as documented. E-Cigarette/Vaping    E-Cigarette Use Current Every Day User      E-Cigarette/Vaping Substances    Nicotine Yes     THC No     CBD No     Flavoring Yes     Other No     Unknown No      Social History     Tobacco Use    Smoking status: Some Days     Packs/day: 0.50     Years: 0.00     Total pack years: 0.00     Types: Cigarettes    Smokeless tobacco: Never    Tobacco comments:     I stopped smoking cigarettes but started vaping   Vaping Use    Vaping Use: Every day    Substances: Nicotine, Flavoring   Substance Use Topics    Alcohol use: Yes     Alcohol/week: 2.0 standard drinks of alcohol     Types: 2 Shots of liquor per week     Comment: Occasionally    Drug use: Yes     Frequency: 3.0 times per week     Types: Marijuana     Comment: socially        Review of Systems    Physical Exam  ED Triage Vitals   Temperature Pulse Respirations Blood Pressure SpO2   11/12/23 1532 11/12/23 1532 11/12/23 1532 11/12/23 1532 11/12/23 1532   (!) 97.4 °F (36.3 °C) (!) 106 16 127/77 98 %      Temp Source Heart Rate Source Patient Position - Orthostatic VS BP Location FiO2 (%)   11/12/23 1532 11/12/23 1532 11/12/23 1842 11/12/23 1532 --   Oral Monitor Lying Right arm       Pain Score       11/12/23 1532       9             Orthostatic Vital Signs  Vitals:    11/12/23 1532 11/12/23 1830 11/12/23 1842 11/12/23 1900   BP: 127/77 117/67 117/67 125/60   Pulse: (!) 106 55 65 92   Patient Position - Orthostatic VS:   Lying        Physical Exam  Vitals and nursing note reviewed. Constitutional:       General: She is not in acute distress. Appearance: She is not ill-appearing, toxic-appearing or diaphoretic. Comments: Patient vomiting upon entering the room   HENT:      Head: Normocephalic and atraumatic.       Mouth/Throat:      Mouth: Mucous membranes are moist.      Pharynx: Oropharynx is clear. Eyes:      Extraocular Movements: Extraocular movements intact. Cardiovascular:      Rate and Rhythm: Normal rate and regular rhythm. Heart sounds: Normal heart sounds. Pulmonary:      Effort: Pulmonary effort is normal. No respiratory distress. Breath sounds: Normal breath sounds. Abdominal:      General: Abdomen is flat. Palpations: Abdomen is soft. Tenderness: There is abdominal tenderness. Musculoskeletal:         General: Normal range of motion. Cervical back: Normal range of motion and neck supple. No tenderness. Skin:     General: Skin is warm and dry. Neurological:      General: No focal deficit present. Mental Status: She is alert and oriented to person, place, and time. Cranial Nerves: No cranial nerve deficit. Sensory: No sensory deficit. Motor: No weakness.          ED Medications  Medications   ondansetron (ZOFRAN-ODT) dispersible tablet 4 mg (4 mg Oral Given 11/12/23 1544)   sodium chloride 0.9 % bolus 1,000 mL (0 mL Intravenous Stopped 11/12/23 1906)   droperidol (INAPSINE) injection 0.625 mg (0.625 mg Intravenous Given 11/12/23 1737)   metoclopramide (REGLAN) injection 10 mg (10 mg Intravenous Given 11/12/23 1826)   droperidol (INAPSINE) injection 0.625 mg (0.625 mg Intravenous Given 11/12/23 1907)   iohexol (OMNIPAQUE) 350 MG/ML injection (MULTI-DOSE) 100 mL (100 mL Intravenous Given 11/12/23 1925)       Diagnostic Studies  Results Reviewed       Procedure Component Value Units Date/Time    POCT pregnancy, urine [[id number]]  (Normal) Resulted: 11/12/23 1834    Lab Status: Final result Specimen: Urine Updated: 11/12/23 1834     EXT Preg Test, Ur Negative     Control Valid    POCT pregnancy, urine [[id number]]  (Normal) Resulted: 11/12/23 1834    Lab Status: Final result Updated: 11/12/23 1834     EXT Preg Test, Ur Negative     Control Valid    Urine Microscopic [[id number]]  (Abnormal) Collected: 11/12/23 1740    Lab Status: Final result Specimen: Urine, Clean Catch Updated: 11/12/23 1804     RBC, UA None Seen /hpf      WBC, UA 2-4 /hpf      Epithelial Cells Moderate /hpf      Bacteria, UA None Seen /hpf      MUCUS THREADS Innumerable    UA w Reflex to Microscopic w Reflex to Culture [[id number]]  (Abnormal) Collected: 11/12/23 1740    Lab Status: Final result Specimen: Urine, Clean Catch Updated: 11/12/23 1759     Color, UA Yellow     Clarity, UA Turbid     Specific Gravity, UA 1.037     pH, UA 6.0     Leukocytes, UA Negative     Nitrite, UA Negative     Protein, UA 70 (1+) mg/dl      Glucose, UA Negative mg/dl      Ketones, UA >=150 (4+) mg/dl      Urobilinogen, UA 4.0 mg/dl      Bilirubin, UA Negative     Occult Blood, UA Trace    Comprehensive metabolic panel [[id number]]  (Abnormal) Collected: 11/12/23 1546    Lab Status: Final result Specimen: Blood from Arm, Left Updated: 11/12/23 1616     Sodium 135 mmol/L      Potassium 3.5 mmol/L      Chloride 101 mmol/L      CO2 23 mmol/L      ANION GAP 11 mmol/L      BUN 12 mg/dL      Creatinine 0.67 mg/dL      Glucose 94 mg/dL      Calcium 9.2 mg/dL      AST 17 U/L      ALT 19 U/L      Alkaline Phosphatase 109 U/L      Total Protein 7.8 g/dL      Albumin 4.4 g/dL      Total Bilirubin 0.43 mg/dL      eGFR 123 ml/min/1.73sq m     Narrative:      [facility] guidelines for Chronic Kidney Disease (CKD):     Stage 1 with normal or high GFR (GFR > 90 mL/min/1.73 square meters)    Stage 2 Mild CKD (GFR = 60-89 mL/min/1.73 square meters)    Stage 3A Moderate CKD (GFR = 45-59 mL/min/1.73 square meters)    Stage 3B Moderate CKD (GFR = 30-44 mL/min/1.73 square meters)    Stage 4 Severe CKD (GFR = 15-29 mL/min/1.73 square meters)    Stage 5 End Stage CKD (GFR <15 mL/min/1.73 square meters)  Note: GFR calculation is accurate only with a steady state creatinine    Lipase [[id number]]  (Abnormal) Collected: 11/12/23 1546    Lab Status: Final result Specimen: Blood from Arm, Left Updated: 11/12/23 1616     Lipase 10 u/L     CBC and differential [[id number]]  (Abnormal) Collected: 11/12/23 1546    Lab Status: Final result Specimen: Blood from Arm, Left Updated: 11/12/23 1553     WBC 11.75 Thousand/uL      RBC 5.15 Million/uL      Hemoglobin 15.0 g/dL      Hematocrit 43.7 %      MCV 85 fL      MCH 29.1 pg      MCHC 34.3 g/dL      RDW 12.6 %      MPV 9.1 fL      Platelets 469 Thousands/uL      nRBC 0 /100 WBCs      Neutrophils Relative 76 %      Immat GRANS % 0 %      Lymphocytes Relative 17 %      Monocytes Relative 5 %      Eosinophils Relative 1 %      Basophils Relative 1 %      Neutrophils Absolute 8.90 Thousands/µL      Immature Grans Absolute 0.03 Thousand/uL      Lymphocytes Absolute 2.00 Thousands/µL      Monocytes Absolute 0.58 Thousand/µL      Eosinophils Absolute 0.17 Thousand/µL      Basophils Absolute 0.07 Thousands/µL                    CT abdomen pelvis with contrast   Final Result by Osiris Lu MD (11/12 1959)      1) Collapsed descending and sigmoid colon with circumferential wall thickening, which may be related to underdistention versus colitis. 2) Otherwise no acute abdominal or pelvic pathology. 3) No bowel obstruction. Normal appendix. No convincing inflammation elsewhere in the gastrointestinal tract. Small hiatal hernia. 4) Collapsed urinary bladder, limiting evaluation. No findings to suggest pyelonephritis. 5) No acute cholecystitis or pancreatitis. No biliary ductal dilation. 6) Additional findings as above. Workstation performed: [id number]               Procedures  Procedures      ED Course                             SBIRT 20yo+      Flowsheet Row Most Recent Value   Initial Alcohol Screen: US AUDIT-C     1. How often do you have a drink containing alcohol? 0 Filed at: 11/12/2023 1925   2. How many drinks containing alcohol do you have on a typical day you are drinking?   0 Filed at: 11/12/2023 1925   3a. Male UNDER 65: How often do you have five or more drinks on one occasion? 0 Filed at: 11/12/2023 1925   3b. FEMALE Any Age, or MALE 65+: How often do you have 4 or more drinks on one occassion? 0 Filed at: 11/12/2023 1925   Audit-C Score 0 Filed at: 11/12/2023 1925   DASHAWN: How many times in the past year have you. .. Used an illegal drug or used a prescription medication for non-medical reasons? Never Filed at: 11/12/2023 1925                  Medical Decision Making  Patient is a 76-year-old female presenting with nausea vomiting. Due to patient being seen in the emergency department a few days ago with a negative work-up, and the length of the symptoms, patient will get a CT scan looking for possible bowel obstruction or other intra-abdominal pathology. UA will also be ordered to rule out UTI/pyelonephritis. UA shows no concern for UTI and CT scan does possible colitis versus underdistention. CBC, CMP, lipase checked with no concerns. Patient was treated symptomatically and felt much better. She was cleared for discharge with conservative management and given a prescription for Zofran. Patient was cleared for discharge with PCP follow-up and return precautions. Amount and/or Complexity of Data Reviewed  Labs: ordered. Radiology: ordered. Risk  Prescription drug management. Disposition  Final diagnoses:   Nausea and vomiting     Time reflects when diagnosis was documented in both MDM as applicable and the Disposition within this note       Time User Action Codes Description Comment    11/12/2023  8:27 PM Alexx Kenney Add [R11.2] Nausea and vomiting           ED Disposition       ED Disposition   Discharge    Condition   Stable    Date/Time   Sun Nov 12, 2023 2027    [address] discharge to home/self care.                    Follow-up Information       Follow up With Specialties Details Why Contact Info Additional Information    Stacey Alatorre, MD Family Medicine   [facility] [address]       [address] [place] [address] Emergency Department Emergency Medicine   [address] [id number]  [facility], [address], [place], [facility]            Discharge Medication List as of 11/12/2023  8:29 PM        START taking these medications    Details   !! ondansetron (ZOFRAN) 4 mg tablet Take 1 tablet (4 mg total) by mouth every 6 (six) hours for 4 days, Starting Sun 11/12/2023, Until u 11/16/2023, Normal       !! - Potential duplicate medications found. Please discuss with provider.         CONTINUE these medications which have NOT CHANGED    Details   Abilify Maintena 300 MG injection INJECT 1 EVERY 4 WEEKS, Historical Med      Abilify Maintena 300 MG SRER INJECT INTRAMUSCULARLY EVERY 4 WEEKS, Historical Med      albuterol (Ventolin HFA) 90 mcg/act inhaler Inhale 2 puffs every 4 (four) hours as needed for wheezing, Starting Wed 3/9/2022, Normal      aluminum-magnesium hydroxide-simethicone (MAALOX) 200-200-20 MG/5ML SUSP Take 15 mL by mouth 4 (four) times a day (before meals and at bedtime), Starting Mon 3/27/2023, Normal      azelastine (ASTELIN) 0.1 % nasal spray 1-2 sprays into each nostril 2 (two) times a day as needed for rhinitis Use in each nostril as directed, Starting Wed 1/11/2023, Normal      azelastine (OPTIVAR) 0.05 % ophthalmic solution Administer 1 drop to both eyes 2 (two) times a day, Starting Wed 1/11/2023, Normal      cetirizine (ZyrTEC) 10 mg tablet Take 1 tablet (10 mg total) by mouth daily, Starting Wed 1/11/2023, Normal      cyclobenzaprine (FLEXERIL) 5 mg tablet Starting Mon 7/10/2023, Historical Med      dicyclomine (BENTYL) 20 mg tablet Take 1 tablet (20 mg total) by mouth every 6 (six) hours as needed (urgency, abdominal pain), Starting Mon 11/14/2022, Normal      diphenhydramine, lidocaine, Al/Mg hydroxide, simethicone (Magic Mouthwash) SUSP Swish and spit 10 mL every 4 (four) hours as needed for mouth pain or discomfort, Starting Tue 6/20/2023, Normal      divalproex sodium (DEPAKOTE ER) 250 mg 24 hr tablet Take 250 mg by mouth 2 (two) times a day, Starting Mon 12/5/2022, Historical Med      EPINEPHrine (EPIPEN) 0.3 mg/0.3 mL SOAJ Inject 0.3 mL (0.3 mg total) into a muscle once for 1 dose, Starting Mon 3/27/2023, Normal      Erenumab-aooe 140 MG/ML SOAJ Inject 140 mg under the skin every 30 (thirty) days, Starting Fri 10/28/2022, Normal      famotidine (PEPCID) 40 MG tablet Take 1 tablet (40 mg total) by mouth daily at bedtime, Starting Mon 11/14/2022, Normal      fluticasone (FLONASE) 50 mcg/act nasal spray 2 sprays into each nostril daily, Starting Wed 1/11/2023, Normal      fluticasone-salmeterol (Advair HFA) 115-21 MCG/ACT inhaler Inhale 2 puffs 2 (two) times a day Rinse mouth after use., Starting Wed 1/11/2023, Normal      levalbuterol (XOPENEX) 1.25 mg/3 mL nebulizer solution Take 3 mL (1.25 mg total) by nebulization every 8 (eight) hours as needed for wheezing or shortness of breath, Starting Wed 1/11/2023, Normal      Lidocaine Viscous HCl (XYLOCAINE) 2 % mucosal solution Starting Wed 6/21/2023, Historical Med      meloxicam (MOBIC) 15 mg tablet Starting Thu 6/29/2023, Historical Med      metoclopramide (Reglan) 10 mg tablet Take 1 tablet (10 mg total) by mouth every 6 (six) hours, Starting Thu 11/9/2023, Normal      mometasone (ELOCON) 0.1 % cream Apply topically daily, Starting Wed 7/13/2022, Normal      nicotine (NICODERM CQ) 21 mg/24 hr TD 24 hr patch Place 1 patch on the skin over 24 hours every 24 hours, Starting Mon 6/12/2023, Normal      nicotine polacrilex (NICORETTE) 4 mg gum Chew 4mg every 1-2 hours as needed.  Maximum 24 pieces/day., Normal      norelgestromin-ethinyl estradiol (ORTHO EVRA) 150-35 MCG/24HR Place 1 patch on the skin over 7 days once a week, Starting Wed 6/21/2023, Normal      ondansetron (Zofran ODT) 4 mg disintegrating tablet Take 1 tablet (4 mg total) by mouth every 6 (six) hours as needed for nausea or vomiting, Starting Mon 4/10/2023, Normal      !! ondansetron (ZOFRAN) 4 mg tablet Take 1 tablet (4 mg total) by mouth every 6 (six) hours, Starting Tue 6/20/2023, Normal      oxymetazoline (AFRIN) 0.05 % nasal spray 2 sprays by Each Nare route 2 (two) times a day for 3 days, Starting Tue 6/20/2023, Until Fri 6/23/2023, Normal      pantoprazole (PROTONIX) 40 mg tablet Take 1 tablet (40 mg total) by mouth daily before breakfast, Starting Mon 3/27/2023, Normal      PARoxetine (PAXIL) 40 MG tablet Take 40 mg by mouth every morning, Starting Mon 12/5/2022, Historical Med      promethazine (PHENERGAN) 25 mg suppository Insert 1 suppository (25 mg total) into the rectum every 6 (six) hours as needed for nausea or vomiting, Starting Mon 3/27/2023, Normal      rizatriptan (MAXALT-MLT) 10 mg disintegrating tablet Take 1 tablet (10 mg total) by mouth once as needed for migraine for up to 1 dose May repeat in 2 hours if needed, Starting Mon 3/27/2023, Normal      Spacer/Aero-Holding Chambers ([employer]) FROYLAN Use 2 (two) times a day, Starting Wed 3/9/2022, Normal      topiramate (TOPAMAX) 50 MG tablet Take 50 mg by mouth 2 (two) times a day, Starting Fri 6/2/2023, Historical Med       !! - Potential duplicate medications found. Please discuss with provider. No discharge procedures on file. PDMP Review         Value Time User    PDMP Reviewed  Yes 11/25/2021 10:36 PM Charlett Ahumada, MD             ED Provider  Attending physically available and evaluated Ralph Triana. I managed the patient along with the ED Attending.     Electronically Signed by           Beata Manjarrez MD  11/14/23 [id number]

## 2023-11-13 NOTE — DISCHARGE INSTRUCTIONS
Please follow-up with your primary care provider. Please return to ED with new or worsening symptoms-see attached.

## 2023-11-17 NOTE — ED ATTENDING ATTESTATION
11/12/2023  ILeticia MD, saw and evaluated the patient. I have discussed the patient with the resident/non-physician practitioner and agree with the resident's/non-physician practitioner's findings, Plan of Care, and MDM as documented in the resident's/non-physician practitioner's note, except where noted. All available labs and Radiology studies were reviewed. I was present for key portions of any procedure(s) performed by the resident/non-physician practitioner and I was immediately available to provide assistance. At this point I agree with the current assessment done in the Emergency Department. I have conducted an independent evaluation of this patient a history and physical is as follows:    ED Course       Impression: Nausea and vomiting  Differential diagnosis: Cyclical vomiting, cannabinoid hyperemesis syndrome, appendicitis colitis, pancreatitis biliary colic,    Plan to check labs CTAP pregnancy treat symptoms with Zofran droperidol Reglan IV fluids reassess    Labs reviewed: Urine pregnancy negative. Urinalysis remarkable for protein and ketones. CMP unremarkable CBC remarkable for mild leukocytosis may be reactive. CT Abdo pelvis reviewed report: Possible colitis versus underdistention otherwise no acute abnormalities.     Patient reassessed feels better will discharge to home follow-up PCP as outpatient return precautions given    Critical Care Time  Procedures

## 2023-12-07 ENCOUNTER — APPOINTMENT (EMERGENCY)
Dept: RADIOLOGY | Facility: HOSPITAL | Age: 23
End: 2023-12-07
Payer: MEDICARE

## 2023-12-07 ENCOUNTER — HOSPITAL ENCOUNTER (EMERGENCY)
Facility: HOSPITAL | Age: 23
Discharge: HOME | End: 2023-12-07
Attending: EMERGENCY MEDICINE
Payer: MEDICARE

## 2023-12-07 VITALS
SYSTOLIC BLOOD PRESSURE: 131 MMHG | TEMPERATURE: 97.8 F | RESPIRATION RATE: 18 BRPM | OXYGEN SATURATION: 98 % | DIASTOLIC BLOOD PRESSURE: 85 MMHG | HEART RATE: 108 BPM

## 2023-12-07 DIAGNOSIS — E86.0 DEHYDRATION: ICD-10-CM

## 2023-12-07 DIAGNOSIS — N39.0 UTI (URINARY TRACT INFECTION): ICD-10-CM

## 2023-12-07 DIAGNOSIS — J06.9 URI (UPPER RESPIRATORY INFECTION): Primary | ICD-10-CM

## 2023-12-07 LAB
ANION GAP SERPL CALCULATED.3IONS-SCNC: 13 MMOL/L
BACTERIA UR QL AUTO: ABNORMAL /HPF
BASOPHILS # BLD AUTO: 0.1 THOUSANDS/ÂΜL (ref 0–0.1)
BASOPHILS NFR BLD AUTO: 1 % (ref 0–1)
BILIRUB UR QL STRIP: ABNORMAL
BUN SERPL-MCNC: 11 MG/DL (ref 5–25)
CALCIUM SERPL-MCNC: 9.5 MG/DL (ref 8.4–10.2)
CHLORIDE SERPL-SCNC: 100 MMOL/L (ref 96–108)
CLARITY UR: CLEAR
CO2 SERPL-SCNC: 22 MMOL/L (ref 21–32)
COLOR UR: YELLOW
CREAT SERPL-MCNC: 0.67 MG/DL (ref 0.6–1.3)
EOSINOPHIL # BLD AUTO: 0.98 THOUSAND/ÂΜL (ref 0–0.61)
EOSINOPHIL NFR BLD AUTO: 6 % (ref 0–6)
ERYTHROCYTE [DISTWIDTH] IN BLOOD BY AUTOMATED COUNT: 12.6 % (ref 11.6–15.1)
EXT PREGNANCY TEST URINE: NEGATIVE
EXT. CONTROL: NORMAL
FLUAV RNA RESP QL NAA+PROBE: NEGATIVE
FLUBV RNA RESP QL NAA+PROBE: NEGATIVE
GFR SERPL CREATININE-BSD FRML MDRD: 123 ML/MIN/1.73SQ M
GLUCOSE SERPL-MCNC: 94 MG/DL (ref 65–140)
GLUCOSE UR STRIP-MCNC: ABNORMAL MG/DL
HCT VFR BLD AUTO: 42.4 % (ref 34.8–46.1)
HGB BLD-MCNC: 15.2 G/DL (ref 11.5–15.4)
HGB UR QL STRIP.AUTO: ABNORMAL
IMM GRANULOCYTES # BLD AUTO: 0.1 THOUSAND/UL (ref 0–0.2)
IMM GRANULOCYTES NFR BLD AUTO: 1 % (ref 0–2)
KETONES UR STRIP-MCNC: ABNORMAL MG/DL
LEUKOCYTE ESTERASE UR QL STRIP: NEGATIVE
LYMPHOCYTES # BLD AUTO: 1.68 THOUSANDS/ÂΜL (ref 0.6–4.47)
LYMPHOCYTES NFR BLD AUTO: 10 % (ref 14–44)
MCH RBC QN AUTO: 30.1 PG (ref 26.8–34.3)
MCHC RBC AUTO-ENTMCNC: 35.8 G/DL (ref 31.4–37.4)
MCV RBC AUTO: 84 FL (ref 82–98)
MONOCYTES # BLD AUTO: 1.11 THOUSAND/ÂΜL (ref 0.17–1.22)
MONOCYTES NFR BLD AUTO: 7 % (ref 4–12)
MUCOUS THREADS UR QL AUTO: ABNORMAL
NEUTROPHILS # BLD AUTO: 12.87 THOUSANDS/ÂΜL (ref 1.85–7.62)
NEUTS SEG NFR BLD AUTO: 75 % (ref 43–75)
NITRITE UR QL STRIP: NEGATIVE
NON-SQ EPI CELLS URNS QL MICRO: ABNORMAL /HPF
NRBC BLD AUTO-RTO: 0 /100 WBCS
PH UR STRIP.AUTO: 6 [PH] (ref 4.5–8)
PLATELET # BLD AUTO: 397 THOUSANDS/UL (ref 149–390)
PMV BLD AUTO: 9.3 FL (ref 8.9–12.7)
POTASSIUM SERPL-SCNC: 3.7 MMOL/L (ref 3.5–5.3)
PROT UR STRIP-MCNC: ABNORMAL MG/DL
RBC # BLD AUTO: 5.05 MILLION/UL (ref 3.81–5.12)
RBC #/AREA URNS AUTO: ABNORMAL /HPF
RSV RNA RESP QL NAA+PROBE: NEGATIVE
SARS-COV-2 RNA RESP QL NAA+PROBE: NEGATIVE
SODIUM SERPL-SCNC: 135 MMOL/L (ref 135–147)
SP GR UR STRIP.AUTO: >=1.03 (ref 1–1.03)
UROBILINOGEN UR QL STRIP.AUTO: 4 E.U./DL
WBC # BLD AUTO: 16.84 THOUSAND/UL (ref 4.31–10.16)
WBC #/AREA URNS AUTO: ABNORMAL /HPF

## 2023-12-07 PROCEDURE — 81001 URINALYSIS AUTO W/SCOPE: CPT

## 2023-12-07 PROCEDURE — 36415 COLL VENOUS BLD VENIPUNCTURE: CPT | Performed by: EMERGENCY MEDICINE

## 2023-12-07 PROCEDURE — 71046 X-RAY EXAM CHEST 2 VIEWS: CPT

## 2023-12-07 PROCEDURE — 96361 HYDRATE IV INFUSION ADD-ON: CPT

## 2023-12-07 PROCEDURE — 85025 COMPLETE CBC W/AUTO DIFF WBC: CPT | Performed by: EMERGENCY MEDICINE

## 2023-12-07 PROCEDURE — 81025 URINE PREGNANCY TEST: CPT | Performed by: EMERGENCY MEDICINE

## 2023-12-07 PROCEDURE — 99284 EMERGENCY DEPT VISIT MOD MDM: CPT | Performed by: EMERGENCY MEDICINE

## 2023-12-07 PROCEDURE — 99284 EMERGENCY DEPT VISIT MOD MDM: CPT

## 2023-12-07 PROCEDURE — 80048 BASIC METABOLIC PNL TOTAL CA: CPT | Performed by: EMERGENCY MEDICINE

## 2023-12-07 PROCEDURE — 0241U HB NFCT DS VIR RESP RNA 4 TRGT: CPT | Performed by: EMERGENCY MEDICINE

## 2023-12-07 PROCEDURE — 96374 THER/PROPH/DIAG INJ IV PUSH: CPT

## 2023-12-07 RX ORDER — CEPHALEXIN 500 MG/1
500 CAPSULE ORAL EVERY 8 HOURS SCHEDULED
Qty: 9 CAPSULE | Refills: 0 | Status: SHIPPED | OUTPATIENT
Start: 2023-12-07 | End: 2023-12-10

## 2023-12-07 RX ORDER — ALBUTEROL SULFATE 90 UG/1
2 AEROSOL, METERED RESPIRATORY (INHALATION) ONCE
Status: COMPLETED | OUTPATIENT
Start: 2023-12-07 | End: 2023-12-07

## 2023-12-07 RX ORDER — KETOROLAC TROMETHAMINE 30 MG/ML
15 INJECTION, SOLUTION INTRAMUSCULAR; INTRAVENOUS ONCE
Status: COMPLETED | OUTPATIENT
Start: 2023-12-07 | End: 2023-12-07

## 2023-12-07 RX ADMIN — SODIUM CHLORIDE 1000 ML: 0.9 INJECTION, SOLUTION INTRAVENOUS at 11:37

## 2023-12-07 RX ADMIN — KETOROLAC TROMETHAMINE 15 MG: 30 INJECTION, SOLUTION INTRAMUSCULAR at 11:25

## 2023-12-07 RX ADMIN — ALBUTEROL SULFATE 2 PUFF: 90 AEROSOL, METERED RESPIRATORY (INHALATION) at 11:26

## 2023-12-07 NOTE — ED PROVIDER NOTES
Emergency Department Note- Spring Walker 24 y.o. female MRN: [id number]    Unit/Bed#: ED 16 Encounter: [id number]        History of Present Illness   HPI:  Spring Walker is a 24 y.o. female who presents with cough body aches diarrhea. Patient states for the last 2 weeks she has been having a cough with some chest discomfort worse with coughing sweats but no measured fevers. Patient also having some diarrhea and discomfort with urinating. Patient with a history of asthma but has not been wheezing and has not required her albuterol. Patient has been taking NyQuil which causes her to vomit but otherwise no nausea or vomiting. Patient is also with a history of schizophrenia and nonepileptic seizures and is compliant with her medications. Patient with no abdominal pain. Patient with no headache no lightheadedness but does feel she has a stuffy nose. REVIEW OF SYSTEMS    Constitutional: Negative for chills, fatigue and fever. HENT: Negative for ear pain, sore throat and trouble swallowing. Eyes: Negative for photophobia, pain and visual disturbance. Respiratory:  for cough, chest tightness and shortness of breath. Cardiovascular: Negative for chest pain and palpitations. Gastrointestinal: Negative for abdominal pain, constipation, positive diarrhea, nausea and vomiting. Genitourinary: Positive for dysuria, negative flank pain, frequency and hematuria. Musculoskeletal: Negative for back pain and neck pain. Skin: Negative for color change and rash. Neurological: Negative for dizziness, weakness, light-headedness and headaches. Psychiatric/Behavioral: Negative for confusion. The patient is not nervous/anxious.     All systems reviewed and negative except as noted above or in HPI         Historical Information   Past Medical History:   Diagnosis Date    Anaphylaxis     apricot    Anxiety     Asthma     Cluster headache     Eczema     Headache, tension-type     Lymphadenitis     Last assessed 12/30/14    Lymphadenopathy     Last assessed 10/16/13    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[address])     Migraine     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Psychiatric disorder     Psychiatric illness     Psychosis ([address])     Seizures ([address])      Past Surgical History:   Procedure Laterality Date    COLONOSCOPY N/A 05/03/2019    Procedure: COLONOSCOPY;  Surgeon: Lizeth Harris MD;  Location:  GI LAB; Service: Gastroenterology    CYSTOSCOPY  04/04/2023    Dr. Radha Hernandez    ESOPHAGOGASTRODUODENOSCOPY N/A 05/03/2019    Procedure: ESOPHAGOGASTRODUODENOSCOPY (EGD); Surgeon: Lizeth Harris MD;  Location:  GI LAB;   Service: Gastroenterology     Social History   Social History     Substance and Sexual Activity   Alcohol Use Yes    Alcohol/week: 2.0 standard drinks of alcohol    Types: 2 Shots of liquor per week    Comment: Occasionally     Social History     Substance and Sexual Activity   Drug Use Yes    Frequency: 3.0 times per week    Types: Marijuana    Comment: socially     Social History     Tobacco Use   Smoking Status Some Days    Packs/day: 0.50    Years: 0.00    Total pack years: 0.00    Types: Cigarettes   Smokeless Tobacco Never   Tobacco Comments    I stopped smoking cigarettes but started vaping     Family History:   Family History   Problem Relation Age of Onset    Migraines Mother     Other Mother         Neck pain    Depression Mother     Drug abuse Mother     Cervical cancer Mother     Drug abuse Father     Schizophrenia Father     No Known Problems Sister     No Known Problems Brother     Thyroid disease Maternal Grandmother     Diabetes Maternal Grandfather     No Known Problems Paternal Grandmother     No Known Problems Paternal Grandfather     Febrile seizures Maternal Uncle     Stroke Maternal Uncle     Ovarian cancer Paternal Aunt        Meds/Allergies   (Not in a hospital admission)    Allergies   Allergen Reactions    Bee Venom Swelling    Penicillins Hives    Blueberry Flavor - Food Allergy Rash    Pork-Derived Products - Food Allergy Rash    Shiitake Mushroom - Food Allergy Rash       Objective   Vitals: Blood pressure 131/85, pulse (!) 108, temperature 97.8 °F (36.6 °C), temperature source Temporal, resp. rate 18, last menstrual period 12/03/2023, SpO2 98 %, not currently breastfeeding. PHYSICAL EXAM     General Appearance: alert and oriented, nad, non toxic appearing  Skin:  Warm, dry, intact  HEENT: atraumatic, normocephalic, eomi, perll   Neck: Supple, no JVD, no lymphadenopathy, trachea midline, no bruit  Cardiac: rrr, no murmurs, rub, gallops  Pulmonary: lungs cta, no wheezes, rales, rhonchi  Gastrointestinal: abdomen soft nontender, good bs, no mass or bruits, no cva tenderness  Extremities: no pedal edema, good pulses, no calf tenderness, no clubbing, no cyanosis  Neuro:  no focal motor or sensory deficits, cn intact  Psych:  Normal mood and affect, normal judgement and insight      Lab Results: Lab Results: I have personally reviewed pertinent lab results. Imaging: I have personally reviewed pertinent reports. Assessment/Plan     ED Medical Decision Making:  Patient likely with viral illness. Will rule out pneumonia and UTI with chest x-ray and urine dip. Due to the patient's ongoing diarrhea we will check labs including electrolytes and give the patient IV fluids. Also check for COVID and flu. She is not wheezing on exam so do not think this is an asthma exacerbation.     Time reflects when diagnosis was documented in both MDM as applicable and the Disposition within this note       Time User Action Codes Description Comment    12/7/2023  1:06 PM Alida Causey Add [J06.9] URI (upper respiratory infection)     12/7/2023  1:06 PM Jas Causey0 [facility] Add [N39.0] UTI (urinary tract infection)     12/7/2023  1:06 PM Shania Haider Add [E86.0] Dehydration           ED Disposition       ED Disposition   Discharge    Condition   Stable    Date/Time   Thu Dec 7, 2023  1:05 PM    Comment   Apurva Mendoza discharge to home/self care.                    Follow-up Information       Follow up With Specialties Details Why Toña1 [facility] MD [facility] Call  As needed Kevin Kay  [address] [phone]             Modified Medications    No medications on file      Previous Medications    ABILIFY MAINTENA 300 MG INJECTION    INJECT 1 EVERY 4 WEEKS    ABILIFY MAINTENA 300 MG SRER    INJECT INTRAMUSCULARLY EVERY 4 WEEKS    ALBUTEROL (VENTOLIN HFA) 90 MCG/ACT INHALER    Inhale 2 puffs every 4 (four) hours as needed for wheezing    ALUMINUM-MAGNESIUM HYDROXIDE-SIMETHICONE (MAALOX) 200-200-20 MG/5ML SUSP    Take 15 mL by mouth 4 (four) times a day (before meals and at bedtime)    AZELASTINE (ASTELIN) 0.1 % NASAL SPRAY    1-2 sprays into each nostril 2 (two) times a day as needed for rhinitis Use in each nostril as directed    AZELASTINE (OPTIVAR) 0.05 % OPHTHALMIC SOLUTION    Administer 1 drop to both eyes 2 (two) times a day    CETIRIZINE (ZYRTEC) 10 MG TABLET    Take 1 tablet (10 mg total) by mouth daily    CYCLOBENZAPRINE (FLEXERIL) 5 MG TABLET        DICYCLOMINE (BENTYL) 20 MG TABLET    Take 1 tablet (20 mg total) by mouth every 6 (six) hours as needed (urgency, abdominal pain)    DIPHENHYDRAMINE, LIDOCAINE, AL/MG HYDROXIDE, SIMETHICONE (MAGIC MOUTHWASH) SUSP    Swish and spit 10 mL every 4 (four) hours as needed for mouth pain or discomfort    DIVALPROEX SODIUM (DEPAKOTE ER) 250 MG 24 HR TABLET    Take 250 mg by mouth 2 (two) times a day    EPINEPHRINE (EPIPEN) 0.3 MG/0.3 ML SOAJ    Inject 0.3 mL (0.3 mg total) into a muscle once for 1 dose    ERENUMAB-AOOE 140 MG/ML SOAJ    Inject 140 mg under the skin every 30 (thirty) days    FAMOTIDINE (PEPCID) 40 MG TABLET    Take 1 tablet (40 mg total) by mouth daily at bedtime    FLUTICASONE (FLONASE) 50 MCG/ACT NASAL SPRAY    2 sprays into each nostril daily    FLUTICASONE-SALMETEROL (ADVAIR HFA) 115-21 MCG/ACT INHALER    Inhale 2 puffs 2 (two) times a day Rinse mouth after use. LEVALBUTEROL (XOPENEX) 1.25 MG/3 ML NEBULIZER SOLUTION    Take 3 mL (1.25 mg total) by nebulization every 8 (eight) hours as needed for wheezing or shortness of breath    LIDOCAINE VISCOUS HCL (XYLOCAINE) 2 % MUCOSAL SOLUTION        MELOXICAM (MOBIC) 15 MG TABLET        METOCLOPRAMIDE (REGLAN) 10 MG TABLET    Take 1 tablet (10 mg total) by mouth every 6 (six) hours    MOMETASONE (ELOCON) 0.1 % CREAM    Apply topically daily    NICOTINE (NICODERM CQ) 21 MG/24 HR TD 24 HR PATCH    Place 1 patch on the skin over 24 hours every 24 hours    NICOTINE POLACRILEX (NICORETTE) 4 MG GUM    Chew 4mg every 1-2 hours as needed. Maximum 24 pieces/day.     NORELGESTROMIN-ETHINYL ESTRADIOL (ORTHO EVRA) 150-35 MCG/24HR    Place 1 patch on the skin over 7 days once a week    ONDANSETRON (ZOFRAN ODT) 4 MG DISINTEGRATING TABLET    Take 1 tablet (4 mg total) by mouth every 6 (six) hours as needed for nausea or vomiting    ONDANSETRON (ZOFRAN) 4 MG TABLET    Take 1 tablet (4 mg total) by mouth every 6 (six) hours    ONDANSETRON (ZOFRAN) 4 MG TABLET    Take 1 tablet (4 mg total) by mouth every 6 (six) hours for 4 days    ONDANSETRON (ZOFRAN-ODT) 4 MG DISINTEGRATING TABLET    Take 1 tablet (4 mg total) by mouth every 8 (eight) hours as needed (nausea) for up to 10 days    OXYMETAZOLINE (AFRIN) 0.05 % NASAL SPRAY    2 sprays by Each Nare route 2 (two) times a day for 3 days    PANTOPRAZOLE (PROTONIX) 40 MG TABLET    Take 1 tablet (40 mg total) by mouth daily before breakfast    PAROXETINE (PAXIL) 40 MG TABLET    Take 40 mg by mouth every morning    PROMETHAZINE (PHENERGAN) 25 MG SUPPOSITORY    Insert 1 suppository (25 mg total) into the rectum every 6 (six) hours as needed for nausea or vomiting    RIZATRIPTAN (MAXALT-MLT) 10 MG DISINTEGRATING TABLET    Take 1 tablet (10 mg total) by mouth once as needed for migraine for up to 1 dose May repeat in 2 hours if needed    SPACER/AERO-HOLDING CHAMBERS (VORTEX VALVED HOLDING CHAMBER) FROYLAN    Use 2 (two) times a day    TOPIRAMATE (TOPAMAX) 50 MG TABLET    Take 50 mg by mouth 2 (two) times a day      New Prescriptions    CEPHALEXIN (KEFLEX) 500 MG CAPSULE    Take 1 capsule (500 mg total) by mouth every 8 (eight) hours for 3 days      Discontinued Medications    No medications on file        Portions of the record may have been created with voice recognition software. Occasional wrong word or "sound a like" substitutions may have occurred due to the inherent limitations of voice recognition software. Read the chart carefully and recognize, using context, where substitutions have occurred. Snehal Nichols MD  12/07/23 3741    Patient's urine shows possible infection as well as ketones which may be due to dehydration. Patient is getting IV fluids feels better after Toradol. Patient's viral swab was negative chest x-ray with no evidence of pneumonia. Will give the patient 3 days of Keflex for presumed UTI and encourage hydration and OTC meds for her upper respiratory symptoms.      Snehal Nichols MD  12/07/23 4575

## 2023-12-29 ENCOUNTER — OFFICE VISIT (OUTPATIENT)
Dept: FAMILY MEDICINE CLINIC | Facility: CLINIC | Age: 23
End: 2023-12-29

## 2023-12-29 VITALS
RESPIRATION RATE: 16 BRPM | SYSTOLIC BLOOD PRESSURE: 100 MMHG | WEIGHT: 208 LBS | BODY MASS INDEX: 36.86 KG/M2 | TEMPERATURE: 98 F | HEIGHT: 63 IN | HEART RATE: 94 BPM | OXYGEN SATURATION: 97 % | DIASTOLIC BLOOD PRESSURE: 60 MMHG

## 2023-12-29 DIAGNOSIS — F44.5 PSYCHOGENIC NONEPILEPTIC SEIZURE: Primary | ICD-10-CM

## 2023-12-29 DIAGNOSIS — K21.9 GASTROESOPHAGEAL REFLUX DISEASE WITHOUT ESOPHAGITIS: ICD-10-CM

## 2023-12-29 DIAGNOSIS — G43.019 INTRACTABLE MIGRAINE WITHOUT AURA AND WITHOUT STATUS MIGRAINOSUS: ICD-10-CM

## 2023-12-29 DIAGNOSIS — T63.441A ANAPHYLACTIC REACTION TO BEE STING, ACCIDENTAL OR UNINTENTIONAL, INITIAL ENCOUNTER: ICD-10-CM

## 2023-12-29 DIAGNOSIS — F31.60 BIPOLAR AFFECTIVE DISORDER, CURRENT EPISODE MIXED, CURRENT EPISODE SEVERITY UNSPECIFIED (HCC): ICD-10-CM

## 2023-12-29 DIAGNOSIS — R11.10 VOMITING: ICD-10-CM

## 2023-12-29 DIAGNOSIS — J45.40 MODERATE PERSISTENT ASTHMA, UNSPECIFIED WHETHER COMPLICATED: ICD-10-CM

## 2023-12-29 PROCEDURE — 99214 OFFICE O/P EST MOD 30 MIN: CPT | Performed by: PHYSICIAN ASSISTANT

## 2023-12-29 RX ORDER — PANTOPRAZOLE SODIUM 40 MG/1
40 TABLET, DELAYED RELEASE ORAL DAILY
Qty: 30 TABLET | Refills: 3 | Status: SHIPPED | OUTPATIENT
Start: 2023-12-29

## 2023-12-29 RX ORDER — LEVALBUTEROL INHALATION SOLUTION 1.25 MG/3ML
1.25 SOLUTION RESPIRATORY (INHALATION) EVERY 8 HOURS PRN
Qty: 150 ML | Refills: 3 | Status: SHIPPED | OUTPATIENT
Start: 2023-12-29

## 2023-12-29 RX ORDER — METOCLOPRAMIDE 10 MG/1
10 TABLET ORAL EVERY 6 HOURS PRN
Qty: 30 TABLET | Refills: 2 | Status: SHIPPED | OUTPATIENT
Start: 2023-12-29

## 2023-12-29 RX ORDER — ALBUTEROL SULFATE 90 UG/1
2 AEROSOL, METERED RESPIRATORY (INHALATION) EVERY 4 HOURS PRN
Qty: 18 G | Refills: 3 | Status: SHIPPED | OUTPATIENT
Start: 2023-12-29

## 2023-12-29 RX ORDER — FLUTICASONE PROPIONATE AND SALMETEROL XINAFOATE 115; 21 UG/1; UG/1
2 AEROSOL, METERED RESPIRATORY (INHALATION) 2 TIMES DAILY
Qty: 36 G | Refills: 5 | Status: SHIPPED | OUTPATIENT
Start: 2023-12-29

## 2023-12-29 NOTE — PATIENT INSTRUCTIONS
[facility] Neurology   [phone]      Outpatient Mental Health Resources     [place] Suicide Prevention Lifeline: Dial #468  If you prefer to text, you can reach the Crisis Text Line by texting “[place]” to 485769    ¿Te encuentras en crisis? Envía un mensaje de texto con la palabra AYUDA al [phone] para comunicarte de manera gratuita con un Consejero de Crisis  Apoyo gratuito las 24 horas del día, los 7 días de la semana, al alcance de tu mano.    [facility] CRISIS for mental health emergencies and/or please go to your local Emergency Department Call [phone] if you or a loved one are in a mental health crisis.  You can Visit https://www.dhs.[place].gov/Services/Mental-Health-In[place]/Documents/Suicide_Prevention_Hotlines.pdf to find 24/7 crisis phone line for other [facility].    [facility]   ? Location 1: [address] [place] [address] [phone]   ? Location 2: [address] [address] [phone]        ? English only* Serves ages 4+          ? Accepts Medicare and some commercial plans    [facility]   ? [address] [place] [address] [phone]; [phone]  ? Bilingual English/Yemeni Serves ages 5+   ? Accepts Medical Assistance     [facility]   ? [address] [place] [address] [phone]   ? Bilingual English/Yemeni Serves ages 14+   ? Accepts Medical Assistance, (Not currently accepting Medicare), & Major Commercial Insurances     [facility]   ? [address] [place] [address] [phone]        ? Bilingual English/Yemeni Serves ages 6+   ? Accepts Medical Assistance & Commercial Insurance     [facility]   ? Previous [facility] location is closed  ? [address], [address] [phone]   ? Bilingual English/Yemeni Serves ages 3+   ? Accepts Medical Assistance & Some Commercial Insurance     [facility]   ? [address] [place] [address] [phone]   ? Bilingual English/Yemeni Serves ages 5+   ? Accepts Medical Assistance      [facility] FAMILY ANSWERS   ? [address] [place] [address] [phone]  ? Bilingual English/Colombian Serves ages 3+   ? Accepts Medical Assistance & Some Commercial Insurance     [facility]   ? Location 1: [address] [place] [address] [phone]        ? Location 2: [address] [place] [address]   ? Bilingual English/Colombian Serves ages 18+  ? Accepts Medical Assistance    [facility], [facility]  ? [address], [place] [address] [phone]  ? Bilingual English/Colombian  ? Accepts Aetna, Cigna, Optum/[place] Better [employer] [facility], [facility] Blue [facility], [employer], Medicare, [employer]/[facility], [facility]. No Medical Assistance    [facility] ([phone])  Medicare/Medicare Advantage, Medical Assistance/HealthChoices, Commercial, and self-pay as payment.    TEEN HELP LINE  ? 3-616-091-[phone]    CRIME VICTIMS [place]         ? [phone]       ? 24/7 [facility] Hotline    [facility] [facility]         ? [phone]       ? 24/7 Advocate Hotline    www.psychologytoday.[facility] is a resource to find psychotherapy providers, patients can filter therapist search list based on a number of criteria including language, specialty, gender, insurance, etc. Individuals seeking will need to reach out to perspective providers through information in the directory. You are encouraged to contact multiple providers to given that many providers have a significant wait list for services as well as to find a provider is a good fit for you!    [facility] is an organization of families, friends and individuals whose lives have been affected by mental illness. Together, we advocate for better lives for those individuals who have a m)ental illness. Visit: [facility].org to learn more or to search for local support resources including qualified mental health providers.

## 2023-12-29 NOTE — PROGRESS NOTES
Assessment/Plan:    Psychogenic nonepileptic seizure  -Patient last seen by neurology for seizures in May 2022.  - Patient underwent EMU and events were characterized as nonepileptic psychogenic spells.  Patient was advised to be sure to continue follow-up with mental health for ongoing management of her spells and continue taking Depakote as prescribed by psychiatrist.  -Patient does not wish to continue with Depakote at this time due to side effect of weight gain.  - Recommend patient schedule follow-up with outpatient psychiatry and along with follow-up with neurology.  - ED precautions discussed with patient.     Bipolar disorder (HCC)  - Currently following with outpatient psychiatry, [facility], however has not been able to see them recently due to moving to [place]. Consequently, patient has not been able to receive her medication refills.   -Highly encourage patient to start looking for a new outpatient mental health provider to establish with closer and outpatient to her house.      Diagnoses and all orders for this visit:    Psychogenic nonepileptic seizure  -     Ambulatory Referral to Neurology; Future    Intractable migraine without aura and without status migrainosus  -     Ambulatory Referral to Neurology; Future    Anaphylactic reaction to bee sting, accidental or unintentional, initial encounter  -     fluticasone-salmeterol (Advair HFA) 115-21 MCG/ACT inhaler; Inhale 2 puffs 2 (two) times a day Rinse mouth after use.  -     albuterol (Ventolin HFA) 90 mcg/act inhaler; Inhale 2 puffs every 4 (four) hours as needed for wheezing    Moderate persistent asthma, unspecified whether complicated  -     fluticasone-salmeterol (Advair HFA) 115-21 MCG/ACT inhaler; Inhale 2 puffs 2 (two) times a day Rinse mouth after use.  -     albuterol (Ventolin HFA) 90 mcg/act inhaler; Inhale 2 puffs every 4 (four) hours as needed for wheezing  -     levalbuterol (XOPENEX) 1.25 mg/3 mL nebulizer solution; Take 3 mL (1.25 mg  total) by nebulization every 8 (eight) hours as needed for wheezing or shortness of breath    Gastroesophageal reflux disease without esophagitis  -     pantoprazole (PROTONIX) 40 mg tablet; Take 1 tablet (40 mg total) by mouth daily before breakfast    Vomiting  -     metoclopramide (Reglan) 10 mg tablet; Take 1 tablet (10 mg total) by mouth every 6 (six) hours as needed (vomiting)    Bipolar affective disorder, current episode mixed, current episode severity unspecified (HCC)        All of patients questions were answered. Patient understands and agrees with the above plan.     Return in about 4 weeks (around 1/26/2024) for Next scheduled follow up AWV.    Oksana Escobar PA-C  12/29/23  [facility]          Subjective:     Patient ID: Apurva Dumont  is a 23 y.o. female with known PMH of allergic rhinitis, acid reflux, asthma, bipolar disorder, migraines, hyperlipidemia, vertigo, psychogenic nonepileptic seizures, schizophrenia who presents today in office for seizures follow up.     - Patient is a 23 y.o. female who presents today for seizures follow up.  Patient she has been following with outpatient psychiatry, [facility], but since she is now moved to [facility], she has not been able to see them for the past few months.  Patient notes she is unable to receive refills of her medications unless she has a follow-up appointment.  Patient notes she has been taking Depakote, Paxil, Topamax, Abilify maintena.  Patient notes she is going to look for a new psychiatrist closer to her new house.  Patient notes she has been on Depakote for about 3 years now.  Patient notes it was helping both with her mood and with controlling her seizures.  However, patient notes it was causing her to gain weight so she does not wish to restart this.    -Patient notes while taking Depakote she would experience seizures once every 2-3 weeks, but now without her medication she has been experiencing to use about 2-3 times a week.      The  "following portions of the patient's history were reviewed and updated as appropriate: allergies, current medications, past family history, past medical history, past social history, past surgical history, and problem list.        Review of Systems   Constitutional:  Negative for chills and fever.   Neurological:  Positive for seizures. Negative for dizziness, syncope, facial asymmetry, speech difficulty, weakness and headaches.   Psychiatric/Behavioral:  Negative for self-injury and suicidal ideas.    All other systems reviewed and are negative.         BMI Counseling: Body mass index is 36.85 kg/m². The BMI is above normal. Nutrition recommendations include decreasing portion sizes, encouraging healthy choices of fruits and vegetables, consuming healthier snacks, limiting drinks that contain sugar, moderation in carbohydrate intake, increasing intake of lean protein and reducing intake of cholesterol. Exercise recommendations include moderate physical activity 150 minutes/week. No pharmacotherapy was ordered. Rationale for BMI follow-up plan is due to patient being overweight or obese.           Objective:   Vitals:    12/29/23 1557   BP: 100/60   BP Location: Right arm   Patient Position: Sitting   Cuff Size: Standard   Pulse: 94   Resp: 16   Temp: 98 °F (36.7 °C)   TempSrc: Temporal   SpO2: 97%   Weight: 94.3 kg (208 lb)   Height: 5' 3\" (1.6 m)         Physical Exam  Vitals and nursing note reviewed.   Constitutional:       General: She is not in acute distress.     Appearance: She is well-developed.   HENT:      Head: Normocephalic and atraumatic.      Right Ear: External ear normal.      Left Ear: External ear normal.      Nose: Nose normal.   Eyes:      Conjunctiva/sclera: Conjunctivae normal.   Cardiovascular:      Rate and Rhythm: Normal rate and regular rhythm.      Pulses: Normal pulses.      Heart sounds: Normal heart sounds.   Pulmonary:      Effort: Pulmonary effort is normal. No respiratory distress. "      Breath sounds: Normal breath sounds. No wheezing.   Musculoskeletal:      Cervical back: Normal range of motion and neck supple.   Skin:     General: Skin is warm and dry.   Neurological:      General: No focal deficit present.      Mental Status: She is alert and oriented to person, place, and time.      Cranial Nerves: No cranial nerve deficit.      Sensory: No sensory deficit.      Motor: No weakness.      Coordination: Coordination normal.   Psychiatric:         Behavior: Behavior normal.

## 2023-12-30 PROBLEM — F44.5 PSYCHOGENIC NONEPILEPTIC SEIZURE: Chronic | Status: ACTIVE | Noted: 2019-06-06

## 2023-12-31 NOTE — ASSESSMENT & PLAN NOTE
- Currently following with outpatient psychiatry, [facility], however has not been able to see them recently due to moving to [place]. Consequently, patient has not been able to receive her medication refills.   -Highly encourage patient to start looking for a new outpatient mental health provider to establish with closer and outpatient to her house.

## 2023-12-31 NOTE — ASSESSMENT & PLAN NOTE
-Patient last seen by neurology for seizures in May 2022.  - Patient underwent EMU and events were characterized as nonepileptic psychogenic spells.  Patient was advised to be sure to continue follow-up with mental health for ongoing management of her spells and continue taking Depakote as prescribed by psychiatrist.  -Patient does not wish to continue with Depakote at this time due to side effect of weight gain.  - Recommend patient schedule follow-up with outpatient psychiatry and along with follow-up with neurology.  - ED precautions discussed with patient.

## 2024-02-07 ENCOUNTER — TELEPHONE (OUTPATIENT)
Age: 24
End: 2024-02-07

## 2024-02-07 NOTE — TELEPHONE ENCOUNTER
Pt called to R/S appt - her ride fell through     Patients GI provider:  Dr. Hogan     Number to return call: [phone]    Reason for call: F/U appt  GI hx: acid reflux     Scheduled procedure/appointment date if applicable: Appt 7/19/24

## 2024-03-07 ENCOUNTER — HOSPITAL ENCOUNTER (EMERGENCY)
Facility: HOSPITAL | Age: 24
Discharge: HOME | End: 2024-03-07
Attending: EMERGENCY MEDICINE
Payer: MEDICARE

## 2024-03-07 ENCOUNTER — APPOINTMENT (EMERGENCY)
Dept: CT IMAGING | Facility: HOSPITAL | Age: 24
End: 2024-03-07
Payer: MEDICARE

## 2024-03-07 VITALS
RESPIRATION RATE: 16 BRPM | SYSTOLIC BLOOD PRESSURE: 119 MMHG | DIASTOLIC BLOOD PRESSURE: 87 MMHG | OXYGEN SATURATION: 99 % | HEART RATE: 69 BPM | TEMPERATURE: 98.2 F

## 2024-03-07 DIAGNOSIS — T14.8XXA BRUISE: Primary | ICD-10-CM

## 2024-03-07 PROCEDURE — 96372 THER/PROPH/DIAG INJ SC/IM: CPT

## 2024-03-07 PROCEDURE — 99284 EMERGENCY DEPT VISIT MOD MDM: CPT | Performed by: EMERGENCY MEDICINE

## 2024-03-07 PROCEDURE — 99284 EMERGENCY DEPT VISIT MOD MDM: CPT

## 2024-03-07 PROCEDURE — 70486 CT MAXILLOFACIAL W/O DYE: CPT

## 2024-03-07 RX ORDER — KETOROLAC TROMETHAMINE 30 MG/ML
15 INJECTION, SOLUTION INTRAMUSCULAR; INTRAVENOUS ONCE
Status: COMPLETED | OUTPATIENT
Start: 2024-03-07 | End: 2024-03-07

## 2024-03-07 RX ADMIN — KETOROLAC TROMETHAMINE 15 MG: 30 INJECTION, SOLUTION INTRAMUSCULAR; INTRAVENOUS at 14:34

## 2024-03-13 ENCOUNTER — HOSPITAL ENCOUNTER (EMERGENCY)
Facility: HOSPITAL | Age: 24
Discharge: HOME | End: 2024-03-13
Payer: MEDICARE

## 2024-03-13 VITALS
TEMPERATURE: 97.6 F | OXYGEN SATURATION: 98 % | RESPIRATION RATE: 18 BRPM | HEART RATE: 88 BPM | SYSTOLIC BLOOD PRESSURE: 122 MMHG | DIASTOLIC BLOOD PRESSURE: 61 MMHG

## 2024-03-13 LAB
ALBUMIN SERPL BCP-MCNC: 4.7 G/DL (ref 3.5–5)
ALP SERPL-CCNC: 117 U/L (ref 34–104)
ALT SERPL W P-5'-P-CCNC: 23 U/L (ref 7–52)
ANION GAP SERPL CALCULATED.3IONS-SCNC: 18 MMOL/L (ref 4–13)
AST SERPL W P-5'-P-CCNC: 25 U/L (ref 13–39)
BASOPHILS # BLD AUTO: 0.09 THOUSANDS/ÂΜL (ref 0–0.1)
BASOPHILS NFR BLD AUTO: 1 % (ref 0–1)
BILIRUB SERPL-MCNC: 0.9 MG/DL (ref 0.2–1)
BUN SERPL-MCNC: 14 MG/DL (ref 5–25)
CALCIUM SERPL-MCNC: 9.9 MG/DL (ref 8.4–10.2)
CHLORIDE SERPL-SCNC: 94 MMOL/L (ref 96–108)
CO2 SERPL-SCNC: 20 MMOL/L (ref 21–32)
CREAT SERPL-MCNC: 0.69 MG/DL (ref 0.6–1.3)
EOSINOPHIL # BLD AUTO: 0.04 THOUSAND/ÂΜL (ref 0–0.61)
EOSINOPHIL NFR BLD AUTO: 0 % (ref 0–6)
ERYTHROCYTE [DISTWIDTH] IN BLOOD BY AUTOMATED COUNT: 13.5 % (ref 11.6–15.1)
GFR SERPL CREATININE-BSD FRML MDRD: 121 ML/MIN/1.73SQ M
GLUCOSE SERPL-MCNC: 90 MG/DL (ref 65–140)
HCT VFR BLD AUTO: 44.6 % (ref 34.8–46.1)
HGB BLD-MCNC: 16 G/DL (ref 11.5–15.4)
IMM GRANULOCYTES # BLD AUTO: 0.07 THOUSAND/UL (ref 0–0.2)
IMM GRANULOCYTES NFR BLD AUTO: 0 % (ref 0–2)
LIPASE SERPL-CCNC: 11 U/L (ref 11–82)
LYMPHOCYTES # BLD AUTO: 2.78 THOUSANDS/ÂΜL (ref 0.6–4.47)
LYMPHOCYTES NFR BLD AUTO: 17 % (ref 14–44)
MCH RBC QN AUTO: 28.7 PG (ref 26.8–34.3)
MCHC RBC AUTO-ENTMCNC: 35.9 G/DL (ref 31.4–37.4)
MCV RBC AUTO: 80 FL (ref 82–98)
MONOCYTES # BLD AUTO: 0.94 THOUSAND/ÂΜL (ref 0.17–1.22)
MONOCYTES NFR BLD AUTO: 6 % (ref 4–12)
NEUTROPHILS # BLD AUTO: 12.38 THOUSANDS/ÂΜL (ref 1.85–7.62)
NEUTS SEG NFR BLD AUTO: 76 % (ref 43–75)
NRBC BLD AUTO-RTO: 0 /100 WBCS
PLATELET # BLD AUTO: 422 THOUSANDS/UL (ref 149–390)
PMV BLD AUTO: 9.3 FL (ref 8.9–12.7)
POTASSIUM SERPL-SCNC: 2.8 MMOL/L (ref 3.5–5.3)
PROT SERPL-MCNC: 8.4 G/DL (ref 6.4–8.4)
RBC # BLD AUTO: 5.57 MILLION/UL (ref 3.81–5.12)
SODIUM SERPL-SCNC: 132 MMOL/L (ref 135–147)
WBC # BLD AUTO: 16.3 THOUSAND/UL (ref 4.31–10.16)

## 2024-03-13 PROCEDURE — NC001 PR NO CHARGE: Performed by: EMERGENCY MEDICINE

## 2024-03-13 PROCEDURE — 83690 ASSAY OF LIPASE: CPT

## 2024-03-13 PROCEDURE — 85025 COMPLETE CBC W/AUTO DIFF WBC: CPT

## 2024-03-13 PROCEDURE — 36415 COLL VENOUS BLD VENIPUNCTURE: CPT

## 2024-03-13 PROCEDURE — 80053 COMPREHEN METABOLIC PANEL: CPT

## 2024-03-13 NOTE — ED NOTES
Pt walked outside to smoke a cigarette even against this nurse saying she could not do so     Virginie Raygoza RN  03/13/24 2408

## 2024-03-13 NOTE — ED PROVIDER NOTES
2:01 PM   Patient was located in the waiting room and had a first nurse protocol initiated with noted potassium of 2.8 with noted multiple episodes of vomiting and signs of dehydration on lab work the patient was not seen by provider, I called the patient at this time and left voice message to come back to the emergency department or call back and discuss abnormal lab results.  Message left on voicemail.     Michael Santo,   03/13/24 1409

## 2024-03-13 NOTE — ED NOTES
made aware abnormal lab work and called patient to come back to the ED or call regarding significant findings     Marilyn Anthony RN  03/13/24 [id number]

## 2024-03-14 ENCOUNTER — HOSPITAL ENCOUNTER (EMERGENCY)
Facility: HOSPITAL | Age: 24
Discharge: HOME | End: 2024-03-14
Attending: EMERGENCY MEDICINE
Payer: MEDICARE

## 2024-03-14 VITALS
HEART RATE: 82 BPM | RESPIRATION RATE: 18 BRPM | OXYGEN SATURATION: 99 % | DIASTOLIC BLOOD PRESSURE: 70 MMHG | SYSTOLIC BLOOD PRESSURE: 129 MMHG | TEMPERATURE: 98 F

## 2024-03-14 DIAGNOSIS — R11.2 NAUSEA AND VOMITING: Primary | ICD-10-CM

## 2024-03-14 DIAGNOSIS — R10.9 ABDOMINAL PAIN: ICD-10-CM

## 2024-03-14 LAB
ALBUMIN SERPL BCP-MCNC: 4.5 G/DL (ref 3.5–5)
ALP SERPL-CCNC: 108 U/L (ref 34–104)
ALT SERPL W P-5'-P-CCNC: 31 U/L (ref 7–52)
ANION GAP SERPL CALCULATED.3IONS-SCNC: 15 MMOL/L (ref 4–13)
AST SERPL W P-5'-P-CCNC: 30 U/L (ref 13–39)
ATRIAL RATE: 70 BPM
BASOPHILS # BLD AUTO: 0.1 THOUSANDS/ÂΜL (ref 0–0.1)
BASOPHILS NFR BLD AUTO: 1 % (ref 0–1)
BILIRUB SERPL-MCNC: 0.78 MG/DL (ref 0.2–1)
BUN SERPL-MCNC: 13 MG/DL (ref 5–25)
CALCIUM SERPL-MCNC: 9.5 MG/DL (ref 8.4–10.2)
CHLORIDE SERPL-SCNC: 95 MMOL/L (ref 96–108)
CO2 SERPL-SCNC: 23 MMOL/L (ref 21–32)
CREAT SERPL-MCNC: 0.72 MG/DL (ref 0.6–1.3)
EOSINOPHIL # BLD AUTO: 0.08 THOUSAND/ÂΜL (ref 0–0.61)
EOSINOPHIL NFR BLD AUTO: 1 % (ref 0–6)
ERYTHROCYTE [DISTWIDTH] IN BLOOD BY AUTOMATED COUNT: 13.3 % (ref 11.6–15.1)
EXT PREGNANCY TEST URINE: NEGATIVE
EXT. CONTROL: NORMAL
GFR SERPL CREATININE-BSD FRML MDRD: 116 ML/MIN/1.73SQ M
GLUCOSE SERPL-MCNC: 76 MG/DL (ref 65–140)
HCT VFR BLD AUTO: 46.6 % (ref 34.8–46.1)
HGB BLD-MCNC: 16.2 G/DL (ref 11.5–15.4)
IMM GRANULOCYTES # BLD AUTO: 0.09 THOUSAND/UL (ref 0–0.2)
IMM GRANULOCYTES NFR BLD AUTO: 1 % (ref 0–2)
LIPASE SERPL-CCNC: 11 U/L (ref 11–82)
LYMPHOCYTES # BLD AUTO: 3.12 THOUSANDS/ÂΜL (ref 0.6–4.47)
LYMPHOCYTES NFR BLD AUTO: 22 % (ref 14–44)
MCH RBC QN AUTO: 28.8 PG (ref 26.8–34.3)
MCHC RBC AUTO-ENTMCNC: 34.8 G/DL (ref 31.4–37.4)
MCV RBC AUTO: 83 FL (ref 82–98)
MONOCYTES # BLD AUTO: 1.21 THOUSAND/ÂΜL (ref 0.17–1.22)
MONOCYTES NFR BLD AUTO: 8 % (ref 4–12)
NEUTROPHILS # BLD AUTO: 9.94 THOUSANDS/ÂΜL (ref 1.85–7.62)
NEUTS SEG NFR BLD AUTO: 67 % (ref 43–75)
NRBC BLD AUTO-RTO: 0 /100 WBCS
P AXIS: 76 DEGREES
PLATELET # BLD AUTO: 448 THOUSANDS/UL (ref 149–390)
PMV BLD AUTO: 9.3 FL (ref 8.9–12.7)
POTASSIUM SERPL-SCNC: 3.3 MMOL/L (ref 3.5–5.3)
PR INTERVAL: 116 MS
PROT SERPL-MCNC: 7.9 G/DL (ref 6.4–8.4)
QRS AXIS: 84 DEGREES
QRSD INTERVAL: 88 MS
QT INTERVAL: 438 MS
QTC INTERVAL: 473 MS
RBC # BLD AUTO: 5.63 MILLION/UL (ref 3.81–5.12)
SODIUM SERPL-SCNC: 133 MMOL/L (ref 135–147)
T WAVE AXIS: 62 DEGREES
VENTRICULAR RATE: 70 BPM
WBC # BLD AUTO: 14.54 THOUSAND/UL (ref 4.31–10.16)

## 2024-03-14 PROCEDURE — 84703 CHORIONIC GONADOTROPIN ASSAY: CPT

## 2024-03-14 PROCEDURE — 93005 ELECTROCARDIOGRAM TRACING: CPT

## 2024-03-14 PROCEDURE — 80053 COMPREHEN METABOLIC PANEL: CPT

## 2024-03-14 PROCEDURE — 96365 THER/PROPH/DIAG IV INF INIT: CPT

## 2024-03-14 PROCEDURE — 81025 URINE PREGNANCY TEST: CPT

## 2024-03-14 PROCEDURE — 96375 TX/PRO/DX INJ NEW DRUG ADDON: CPT

## 2024-03-14 PROCEDURE — 93010 ELECTROCARDIOGRAM REPORT: CPT | Performed by: INTERNAL MEDICINE

## 2024-03-14 PROCEDURE — 85025 COMPLETE CBC W/AUTO DIFF WBC: CPT

## 2024-03-14 PROCEDURE — 83690 ASSAY OF LIPASE: CPT

## 2024-03-14 PROCEDURE — 36415 COLL VENOUS BLD VENIPUNCTURE: CPT

## 2024-03-14 PROCEDURE — 96366 THER/PROPH/DIAG IV INF ADDON: CPT

## 2024-03-14 PROCEDURE — 99284 EMERGENCY DEPT VISIT MOD MDM: CPT

## 2024-03-14 PROCEDURE — 99284 EMERGENCY DEPT VISIT MOD MDM: CPT | Performed by: EMERGENCY MEDICINE

## 2024-03-14 RX ORDER — ONDANSETRON 4 MG/1
4 TABLET, FILM COATED ORAL EVERY 6 HOURS
Qty: 12 TABLET | Refills: 0 | Status: SHIPPED | OUTPATIENT
Start: 2024-03-14

## 2024-03-14 RX ORDER — POTASSIUM CHLORIDE 20 MEQ/1
40 TABLET, EXTENDED RELEASE ORAL ONCE
Status: COMPLETED | OUTPATIENT
Start: 2024-03-14 | End: 2024-03-14

## 2024-03-14 RX ORDER — POTASSIUM CHLORIDE 14.9 MG/ML
20 INJECTION INTRAVENOUS
Qty: 200 ML | Refills: 0 | Status: DISCONTINUED | OUTPATIENT
Start: 2024-03-14 | End: 2024-03-14 | Stop reason: HOSPADM

## 2024-03-14 RX ORDER — DROPERIDOL 2.5 MG/ML
0.62 INJECTION, SOLUTION INTRAMUSCULAR; INTRAVENOUS ONCE
Status: COMPLETED | OUTPATIENT
Start: 2024-03-14 | End: 2024-03-14

## 2024-03-14 RX ADMIN — SODIUM CHLORIDE 1000 ML: 0.9 INJECTION, SOLUTION INTRAVENOUS at 14:32

## 2024-03-14 RX ADMIN — DROPERIDOL 0.62 MG: 2.5 INJECTION, SOLUTION INTRAMUSCULAR; INTRAVENOUS at 14:32

## 2024-03-14 RX ADMIN — POTASSIUM CHLORIDE 20 MEQ: 14.9 INJECTION, SOLUTION INTRAVENOUS at 14:42

## 2024-03-14 RX ADMIN — POTASSIUM CHLORIDE 40 MEQ: 1500 TABLET, EXTENDED RELEASE ORAL at 17:03

## 2024-03-14 NOTE — ED PROVIDER NOTES
History  Chief Complaint   Patient presents with    Vomiting     Pt c/o vomiting for past few days, had blood work done here while in waiting room, left before being seen, told to come back in due to potassium 2.8, c/o weakness and dizziness      CHELLE Dumont is a 24 y.o. female who presents to the emergency department with epigastric pain and vomiting.  Patient reports for the past 5 days she has had constant epigastric pain that is worse when trying to eat.  She has also had vomiting with inability to tolerate solids, she has been only taking sips of water.  She reports a history of similar pain in the past which she has seen GI for and has been attributed to GERD, although the current episode is more persistent.  She denies fevers, dysuria, or hematuria.  She is currently on her menstrual period.  Her last bowel movement was 3 days ago but she has been able to pass flatus.  She denies prior history of abdominal surgeries.  She states she only drinks socially and her last drink of alcohol was approximately 2 weeks ago.  She smokes marijuana.  She presented to the ER yesterday and had blood work drawn although she left prior to being roomed.  She was called back today due to a potassium of 2.8.    Prior to Admission Medications   Prescriptions Last Dose Informant Patient Reported? Taking?   Abilify Maintena 300 MG SRER  Self Yes No   Sig: INJECT INTRAMUSCULARLY EVERY 4 WEEKS   Abilify Maintena 300 MG injection  Self Yes No   Sig: INJECT 1 EVERY 4 WEEKS   EPINEPHrine (EPIPEN) 0.3 mg/0.3 mL SOAJ  Self No No   Sig: Inject 0.3 mL (0.3 mg total) into a muscle once for 1 dose   Patient taking differently: Inject 0.3 mg into a muscle if needed   Erenumab-aooe 140 MG/ML SOAJ  Self No No   Sig: Inject 140 mg under the skin every 30 (thirty) days   Patient not taking: Reported on 7/3/2023   Lidocaine Viscous HCl (XYLOCAINE) 2 % mucosal solution   Yes No   PARoxetine (PAXIL) 40 MG tablet  Self Yes No   Sig: Take 40  mg by mouth every morning   Spacer/Aero-Holding Chambers (Vortex Valved Holding Chamber) FROYLAN  Self No No   Sig: Use 2 (two) times a day   albuterol (Ventolin HFA) 90 mcg/act inhaler   No No   Sig: Inhale 2 puffs every 4 (four) hours as needed for wheezing   aluminum-magnesium hydroxide-simethicone (MAALOX) 200-200-20 MG/5ML SUSP  Self No No   Sig: Take 15 mL by mouth 4 (four) times a day (before meals and at bedtime)   Patient taking differently: Take 15 mL by mouth if needed   azelastine (ASTELIN) 0.1 % nasal spray  Self No No   Si-2 sprays into each nostril 2 (two) times a day as needed for rhinitis Use in each nostril as directed   azelastine (OPTIVAR) 0.05 % ophthalmic solution  Self No No   Sig: Administer 1 drop to both eyes 2 (two) times a day   cetirizine (ZyrTEC) 10 mg tablet  Self No No   Sig: Take 1 tablet (10 mg total) by mouth daily   cyclobenzaprine (FLEXERIL) 5 mg tablet   Yes No   dicyclomine (BENTYL) 20 mg tablet  Self No No   Sig: Take 1 tablet (20 mg total) by mouth every 6 (six) hours as needed (urgency, abdominal pain)   diphenhydramine, lidocaine, Al/Mg hydroxide, simethicone (Magic Mouthwash) SUSP   No No   Sig: Swish and spit 10 mL every 4 (four) hours as needed for mouth pain or discomfort   Patient not taking: Reported on 7/3/2023   divalproex sodium (DEPAKOTE ER) 250 mg 24 hr tablet  Self Yes No   Sig: Take 250 mg by mouth 2 (two) times a day   famotidine (PEPCID) 40 MG tablet  Self No No   Sig: Take 1 tablet (40 mg total) by mouth daily at bedtime   fluticasone (FLONASE) 50 mcg/act nasal spray  Self No No   Si sprays into each nostril daily   fluticasone-salmeterol (Advair HFA) 115-21 MCG/ACT inhaler   No No   Sig: Inhale 2 puffs 2 (two) times a day Rinse mouth after use.   levalbuterol (XOPENEX) 1.25 mg/3 mL nebulizer solution   No No   Sig: Take 3 mL (1.25 mg total) by nebulization every 8 (eight) hours as needed for wheezing or shortness of breath   meloxicam (MOBIC) 15 mg  tablet   Yes No   metoclopramide (Reglan) 10 mg tablet   No No   Sig: Take 1 tablet (10 mg total) by mouth every 6 (six) hours as needed (vomiting)   mometasone (ELOCON) 0.1 % cream  Self No No   Sig: Apply topically daily   nicotine (NICODERM CQ) 21 mg/24 hr TD 24 hr patch   No No   Sig: Place 1 patch on the skin over 24 hours every 24 hours   Patient not taking: Reported on 10/31/2023   nicotine polacrilex (NICORETTE) 4 mg gum   No No   Sig: Chew 4mg every 1-2 hours as needed. Maximum 24 pieces/day.   Patient not taking: Reported on 10/31/2023   norelgestromin-ethinyl estradiol (ORTHO EVRA) 150-35 MCG/24HR   No No   Sig: Place 1 patch on the skin over 7 days once a week   Patient not taking: Reported on 10/31/2023   ondansetron (ZOFRAN) 4 mg tablet   No No   Sig: Take 1 tablet (4 mg total) by mouth every 6 (six) hours   Patient not taking: Reported on 7/3/2023   ondansetron (ZOFRAN) 4 mg tablet   No No   Sig: Take 1 tablet (4 mg total) by mouth every 6 (six) hours for 4 days   ondansetron (Zofran ODT) 4 mg disintegrating tablet   No No   Sig: Take 1 tablet (4 mg total) by mouth every 6 (six) hours as needed for nausea or vomiting   Patient not taking: Reported on 10/31/2023   oxymetazoline (AFRIN) 0.05 % nasal spray   No No   Si sprays by Each Nare route 2 (two) times a day for 3 days   Patient not taking: Reported on 7/3/2023   pantoprazole (PROTONIX) 40 mg tablet   No No   Sig: Take 1 tablet (40 mg total) by mouth daily before breakfast   promethazine (PHENERGAN) 25 mg suppository  Self No No   Sig: Insert 1 suppository (25 mg total) into the rectum every 6 (six) hours as needed for nausea or vomiting   Patient taking differently: Insert 25 mg into the rectum if needed for nausea or vomiting   rizatriptan (MAXALT-MLT) 10 mg disintegrating tablet  Self No No   Sig: Take 1 tablet (10 mg total) by mouth once as needed for migraine for up to 1 dose May repeat in 2 hours if needed   Patient not taking: Reported  on 10/31/2023   topiramate (TOPAMAX) 50 MG tablet   Yes No   Sig: Take 50 mg by mouth 2 (two) times a day      Facility-Administered Medications: None       Past Medical History:   Diagnosis Date    Anaphylaxis     apricot    Anxiety     Asthma     Cluster headache     Eczema     Headache, tension-type     Lymphadenitis     Last assessed 12/30/14    Lymphadenopathy     Last assessed 10/16/13    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HCC)     Migraine     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Psychiatric disorder     Psychiatric illness     Psychosis (HCC)     Seizures (HCC)        Past Surgical History:   Procedure Laterality Date    COLONOSCOPY N/A 05/03/2019    Procedure: COLONOSCOPY;  Surgeon: Jean Carlos Hogan MD;  Location: [facility];  Service: Gastroenterology    CYSTOSCOPY  04/04/2023    Dr. Bueno    ESOPHAGOGASTRODUODENOSCOPY N/A 05/03/2019    Procedure: ESOPHAGOGASTRODUODENOSCOPY (EGD);  Surgeon: Jean Carlos Hogan MD;  Location: [facility];  Service: Gastroenterology       Family History   Problem Relation Age of Onset    Migraines Mother     Other Mother         Neck pain    Depression Mother     Drug abuse Mother     Cervical cancer Mother     Drug abuse Father     Schizophrenia Father     No Known Problems Sister     No Known Problems Brother     Thyroid disease Maternal Grandmother     Diabetes Maternal Grandfather     No Known Problems Paternal Grandmother     No Known Problems Paternal Grandfather     Febrile seizures Maternal Uncle     Stroke Maternal Uncle     Ovarian cancer Paternal Aunt      I have reviewed and agree with the history as documented.    E-Cigarette/Vaping    E-Cigarette Use Current Every Day User      E-Cigarette/Vaping Substances    Nicotine Yes     THC No     CBD No     Flavoring Yes     Other No     Unknown No      Social History     Tobacco Use    Smoking status: Every Day     Current packs/day: 0.50     Types: Cigarettes    Smokeless tobacco: Never    Tobacco comments:     I stopped smoking cigarettes but  started vaping   Vaping Use    Vaping status: Every Day    Substances: Nicotine, Flavoring   Substance Use Topics    Alcohol use: Yes     Alcohol/week: 2.0 standard drinks of alcohol     Types: 2 Shots of liquor per week     Comment: Occasionally    Drug use: Yes     Frequency: 3.0 times per week     Types: Marijuana     Comment: socially       Home medications:  Prior to Admission Medications   Prescriptions Last Dose Informant Patient Reported? Taking?   Abilify Maintena 300 MG SRER  Self Yes No   Sig: INJECT INTRAMUSCULARLY EVERY 4 WEEKS   Abilify Maintena 300 MG injection  Self Yes No   Sig: INJECT 1 EVERY 4 WEEKS   EPINEPHrine (EPIPEN) 0.3 mg/0.3 mL SOAJ  Self No No   Sig: Inject 0.3 mL (0.3 mg total) into a muscle once for 1 dose   Patient taking differently: Inject 0.3 mg into a muscle if needed   Erenumab-aooe 140 MG/ML SOAJ  Self No No   Sig: Inject 140 mg under the skin every 30 (thirty) days   Patient not taking: Reported on 7/3/2023   Lidocaine Viscous HCl (XYLOCAINE) 2 % mucosal solution   Yes No   PARoxetine (PAXIL) 40 MG tablet  Self Yes No   Sig: Take 40 mg by mouth every morning   Spacer/Aero-Holding Chambers (Vortex Valved Holding Chamber) FROYLAN  Self No No   Sig: Use 2 (two) times a day   albuterol (Ventolin HFA) 90 mcg/act inhaler   No No   Sig: Inhale 2 puffs every 4 (four) hours as needed for wheezing   aluminum-magnesium hydroxide-simethicone (MAALOX) 200-200-20 MG/5ML SUSP  Self No No   Sig: Take 15 mL by mouth 4 (four) times a day (before meals and at bedtime)   Patient taking differently: Take 15 mL by mouth if needed   azelastine (ASTELIN) 0.1 % nasal spray  Self No No   Si-2 sprays into each nostril 2 (two) times a day as needed for rhinitis Use in each nostril as directed   azelastine (OPTIVAR) 0.05 % ophthalmic solution  Self No No   Sig: Administer 1 drop to both eyes 2 (two) times a day   cetirizine (ZyrTEC) 10 mg tablet  Self No No   Sig: Take 1 tablet (10 mg total) by mouth daily    cyclobenzaprine (FLEXERIL) 5 mg tablet   Yes No   dicyclomine (BENTYL) 20 mg tablet  Self No No   Sig: Take 1 tablet (20 mg total) by mouth every 6 (six) hours as needed (urgency, abdominal pain)   diphenhydramine, lidocaine, Al/Mg hydroxide, simethicone (Magic Mouthwash) SUSP   No No   Sig: Swish and spit 10 mL every 4 (four) hours as needed for mouth pain or discomfort   Patient not taking: Reported on 7/3/2023   divalproex sodium (DEPAKOTE ER) 250 mg 24 hr tablet  Self Yes No   Sig: Take 250 mg by mouth 2 (two) times a day   famotidine (PEPCID) 40 MG tablet  Self No No   Sig: Take 1 tablet (40 mg total) by mouth daily at bedtime   fluticasone (FLONASE) 50 mcg/act nasal spray  Self No No   Si sprays into each nostril daily   fluticasone-salmeterol (Advair HFA) 115-21 MCG/ACT inhaler   No No   Sig: Inhale 2 puffs 2 (two) times a day Rinse mouth after use.   levalbuterol (XOPENEX) 1.25 mg/3 mL nebulizer solution   No No   Sig: Take 3 mL (1.25 mg total) by nebulization every 8 (eight) hours as needed for wheezing or shortness of breath   meloxicam (MOBIC) 15 mg tablet   Yes No   metoclopramide (Reglan) 10 mg tablet   No No   Sig: Take 1 tablet (10 mg total) by mouth every 6 (six) hours as needed (vomiting)   mometasone (ELOCON) 0.1 % cream  Self No No   Sig: Apply topically daily   nicotine (NICODERM CQ) 21 mg/24 hr TD 24 hr patch   No No   Sig: Place 1 patch on the skin over 24 hours every 24 hours   Patient not taking: Reported on 10/31/2023   nicotine polacrilex (NICORETTE) 4 mg gum   No No   Sig: Chew 4mg every 1-2 hours as needed. Maximum 24 pieces/day.   Patient not taking: Reported on 10/31/2023   norelgestromin-ethinyl estradiol (ORTHO EVRA) 150-35 MCG/24HR   No No   Sig: Place 1 patch on the skin over 7 days once a week   Patient not taking: Reported on 10/31/2023   ondansetron (ZOFRAN) 4 mg tablet   No No   Sig: Take 1 tablet (4 mg total) by mouth every 6 (six) hours   Patient not taking: Reported on  7/3/2023   ondansetron (ZOFRAN) 4 mg tablet   No No   Sig: Take 1 tablet (4 mg total) by mouth every 6 (six) hours for 4 days   ondansetron (Zofran ODT) 4 mg disintegrating tablet   No No   Sig: Take 1 tablet (4 mg total) by mouth every 6 (six) hours as needed for nausea or vomiting   Patient not taking: Reported on 10/31/2023   oxymetazoline (AFRIN) 0.05 % nasal spray   No No   Si sprays by Each Nare route 2 (two) times a day for 3 days   Patient not taking: Reported on 7/3/2023   pantoprazole (PROTONIX) 40 mg tablet   No No   Sig: Take 1 tablet (40 mg total) by mouth daily before breakfast   promethazine (PHENERGAN) 25 mg suppository  Self No No   Sig: Insert 1 suppository (25 mg total) into the rectum every 6 (six) hours as needed for nausea or vomiting   Patient taking differently: Insert 25 mg into the rectum if needed for nausea or vomiting   rizatriptan (MAXALT-MLT) 10 mg disintegrating tablet  Self No No   Sig: Take 1 tablet (10 mg total) by mouth once as needed for migraine for up to 1 dose May repeat in 2 hours if needed   Patient not taking: Reported on 10/31/2023   topiramate (TOPAMAX) 50 MG tablet   Yes No   Sig: Take 50 mg by mouth 2 (two) times a day      Facility-Administered Medications: None     Allergies:  Allergies   Allergen Reactions    Bee Venom Swelling    Penicillins Hives    Blueberry Flavor - Food Allergy Rash    Pork-Derived Products - Food Allergy Rash    Shiitake Mushroom - Food Allergy Rash        Review of Systems   Constitutional:  Negative for fever.   Respiratory:  Negative for cough and shortness of breath.    Cardiovascular:  Negative for chest pain.   Gastrointestinal:  Positive for abdominal pain, nausea and vomiting. Negative for diarrhea.   Genitourinary:  Negative for dysuria and hematuria.   All other systems reviewed and are negative.      Physical Exam  ED Triage Vitals [24 1355]   Temperature Pulse Respirations Blood Pressure SpO2   98 °F (36.7 °C) (!) 110 18  116/83 98 %      Temp Source Heart Rate Source Patient Position - Orthostatic VS BP Location FiO2 (%)   Oral Monitor Sitting Left arm --      Pain Score       --             Orthostatic Vital Signs  Vitals:    03/14/24 1355 03/14/24 1710   BP: 116/83 129/70   Pulse: (!) 110 82   Patient Position - Orthostatic VS: Sitting        Physical Exam  Vitals and nursing note reviewed.   Constitutional:       General: She is not in acute distress.     Appearance: She is not toxic-appearing or diaphoretic.   HENT:      Head: Normocephalic.      Mouth/Throat:      Mouth: Mucous membranes are dry.      Pharynx: Oropharynx is clear.   Eyes:      Pupils: Pupils are equal, round, and reactive to light.   Cardiovascular:      Rate and Rhythm: Normal rate and regular rhythm.   Pulmonary:      Effort: Pulmonary effort is normal. No respiratory distress.      Breath sounds: No wheezing, rhonchi or rales.   Abdominal:      General: Abdomen is flat. There is no distension.      Palpations: Abdomen is soft.      Tenderness: There is abdominal tenderness in the epigastric area. There is no right CVA tenderness, left CVA tenderness, guarding or rebound. Negative signs include Mcrae's sign and McBurney's sign.   Musculoskeletal:      Right lower leg: No edema.      Left lower leg: No edema.   Skin:     General: Skin is warm and dry.   Neurological:      Mental Status: She is alert.         ED Medications  Medications   sodium chloride 0.9 % bolus 1,000 mL (0 mL Intravenous Stopped 3/14/24 1532)   droperidol (INAPSINE) injection 0.625 mg (0.625 mg Intravenous Given 3/14/24 1432)   potassium chloride (Klor-Con M20) CR tablet 40 mEq (40 mEq Oral Given 3/14/24 1703)       Diagnostic Studies  Results Reviewed       Procedure Component Value Units Date/Time    POCT pregnancy, urine [[id number]]  (Normal) Resulted: 03/14/24 1701    Lab Status: Final result Updated: 03/14/24 1701     EXT Preg Test, Ur Negative     Control Valid    Comprehensive  metabolic panel [[id number]]  (Abnormal) Collected: 03/14/24 1525    Lab Status: Final result Specimen: Blood from Arm, Left Updated: 03/14/24 1557     Sodium 133 mmol/L      Potassium 3.3 mmol/L      Chloride 95 mmol/L      CO2 23 mmol/L      ANION GAP 15 mmol/L      BUN 13 mg/dL      Creatinine 0.72 mg/dL      Glucose 76 mg/dL      Calcium 9.5 mg/dL      AST 30 U/L      ALT 31 U/L      Alkaline Phosphatase 108 U/L      Total Protein 7.9 g/dL      Albumin 4.5 g/dL      Total Bilirubin 0.78 mg/dL      eGFR 116 ml/min/1.73sq m     Narrative:      National Kid[employer] guidelines for Chronic Kidney Disease (CKD):     Stage 1 with normal or high GFR (GFR > 90 mL/min/1.73 square meters)    Stage 2 Mild CKD (GFR = 60-89 mL/min/1.73 square meters)    Stage 3A Moderate CKD (GFR = 45-59 mL/min/1.73 square meters)    Stage 3B Moderate CKD (GFR = 30-44 mL/min/1.73 square meters)    Stage 4 Severe CKD (GFR = 15-29 mL/min/1.73 square meters)    Stage 5 End Stage CKD (GFR <15 mL/min/1.73 square meters)  Note: GFR calculation is accurate only with a steady state creatinine    Lipase [[id number]]  (Normal) Collected: 03/14/24 1525    Lab Status: Final result Specimen: Blood from Arm, Left Updated: 03/14/24 1557     Lipase 11 u/L     CBC and differential [[id number]]  (Abnormal) Collected: 03/14/24 1429    Lab Status: Final result Specimen: Blood from Arm, Left Updated: 03/14/24 1444     WBC 14.54 Thousand/uL      RBC 5.63 Million/uL      Hemoglobin 16.2 g/dL      Hematocrit 46.6 %      MCV 83 fL      MCH 28.8 pg      MCHC 34.8 g/dL      RDW 13.3 %      MPV 9.3 fL      Platelets 448 Thousands/uL      nRBC 0 /100 WBCs      Neutrophils Relative 67 %      Immature Grans % 1 %      Lymphocytes Relative 22 %      Monocytes Relative 8 %      Eosinophils Relative 1 %      Basophils Relative 1 %      Neutrophils Absolute 9.94 Thousands/µL      Absolute Immature Grans 0.09 Thousand/uL      Absolute Lymphocytes 3.12 Thousands/µL       Absolute Monocytes 1.21 Thousand/µL      Eosinophils Absolute 0.08 Thousand/µL      Basophils Absolute 0.10 Thousands/µL     hCG, qualitative pregnancy [[id number]] Collected: 03/14/24 1429    Lab Status: No result Specimen: Blood from Arm, Left                    No orders to display         Procedures  Procedures      ED Course                                       MDM  Medical Decision Making  Amount and/or Complexity of Data Reviewed  Labs: ordered.    Risk  Prescription drug management.      Apurva Dumont is a 24 y.o. female who presents to the emergency department with vomiting and epigastric pain. Workup including vital signs, physical exam, labs and pregnancy test. pregnancy test negative.  K 3.3 on repeat check today.  Patient given IVF and droperidol with complete resolution of symptoms, tolerating p.o. without any pain or nausea.  Patient requesting discharge prior to receiving full dose of IV K, patient tolerated p.o. K repletion.  Stable for discharge home with primary care follow up, discharge instructions and return precautions given.       Disposition  Final diagnoses:   Nausea and vomiting   Abdominal pain     Time reflects when diagnosis was documented in both MDM as applicable and the Disposition within this note       Time User Action Codes Description Comment    3/14/2024  5:06 PM Albert Ferguson [R11.2] Nausea and vomiting     3/14/2024  5:06 PM Albert Ferguson [R10.9] Abdominal pain           ED Disposition       ED Disposition   Discharge    Condition   Stable    Date/Time   Thu Mar 14, 2024  5:20 PM    Comment   Apurva Dumont discharge to home/self care.                   Follow-up Information       Follow up With Specialties Details Why Contact Info    Stacey Alatorre MD Family Medicine   [address] [place] [id number]  [phone]              Discharge Medication List as of 3/14/2024  5:21 PM        CONTINUE these medications which have CHANGED     Details   !! ondansetron (ZOFRAN) 4 mg tablet Take 1 tablet (4 mg total) by mouth every 6 (six) hours, Starting Thu 3/14/2024, Normal       !! - Potential duplicate medications found. Please discuss with provider.        CONTINUE these medications which have NOT CHANGED    Details   Abilify Maintena 300 MG injection INJECT 1 EVERY 4 WEEKS, Historical Med      Abilify Maintena 300 MG SRER INJECT INTRAMUSCULARLY EVERY 4 WEEKS, Historical Med      albuterol (Ventolin HFA) 90 mcg/act inhaler Inhale 2 puffs every 4 (four) hours as needed for wheezing, Starting Fri 12/29/2023, Normal      aluminum-magnesium hydroxide-simethicone (MAALOX) 200-200-20 MG/5ML SUSP Take 15 mL by mouth 4 (four) times a day (before meals and at bedtime), Starting Mon 3/27/2023, Normal      azelastine (ASTELIN) 0.1 % nasal spray 1-2 sprays into each nostril 2 (two) times a day as needed for rhinitis Use in each nostril as directed, Starting Wed 1/11/2023, Normal      azelastine (OPTIVAR) 0.05 % ophthalmic solution Administer 1 drop to both eyes 2 (two) times a day, Starting Wed 1/11/2023, Normal      cetirizine (ZyrTEC) 10 mg tablet Take 1 tablet (10 mg total) by mouth daily, Starting Wed 1/11/2023, Normal      cyclobenzaprine (FLEXERIL) 5 mg tablet Starting Mon 7/10/2023, Historical Med      dicyclomine (BENTYL) 20 mg tablet Take 1 tablet (20 mg total) by mouth every 6 (six) hours as needed (urgency, abdominal pain), Starting Mon 11/14/2022, Normal      diphenhydramine, lidocaine, Al/Mg hydroxide, simethicone (Magic Mouthwash) SUSP Swish and spit 10 mL every 4 (four) hours as needed for mouth pain or discomfort, Starting Tue 6/20/2023, Normal      divalproex sodium (DEPAKOTE ER) 250 mg 24 hr tablet Take 250 mg by mouth 2 (two) times a day, Starting Mon 12/5/2022, Historical Med      EPINEPHrine (EPIPEN) 0.3 mg/0.3 mL SOAJ Inject 0.3 mL (0.3 mg total) into a muscle once for 1 dose, Starting Mon 3/27/2023, Normal      Erenumab-aooe 140 MG/ML  SOAJ Inject 140 mg under the skin every 30 (thirty) days, Starting Fri 10/28/2022, Normal      famotidine (PEPCID) 40 MG tablet Take 1 tablet (40 mg total) by mouth daily at bedtime, Starting Mon 11/14/2022, Normal      fluticasone (FLONASE) 50 mcg/act nasal spray 2 sprays into each nostril daily, Starting Wed 1/11/2023, Normal      fluticasone-salmeterol (Advair HFA) 115-21 MCG/ACT inhaler Inhale 2 puffs 2 (two) times a day Rinse mouth after use., Starting Fri 12/29/2023, Normal      levalbuterol (XOPENEX) 1.25 mg/3 mL nebulizer solution Take 3 mL (1.25 mg total) by nebulization every 8 (eight) hours as needed for wheezing or shortness of breath, Starting Fri 12/29/2023, Normal      Lidocaine Viscous HCl (XYLOCAINE) 2 % mucosal solution Starting Wed 6/21/2023, Historical Med      meloxicam (MOBIC) 15 mg tablet Starting Thu 6/29/2023, Historical Med      metoclopramide (Reglan) 10 mg tablet Take 1 tablet (10 mg total) by mouth every 6 (six) hours as needed (vomiting), Starting Fri 12/29/2023, Normal      mometasone (ELOCON) 0.1 % cream Apply topically daily, Starting Wed 7/13/2022, Normal      nicotine (NICODERM CQ) 21 mg/24 hr TD 24 hr patch Place 1 patch on the skin over 24 hours every 24 hours, Starting Mon 6/12/2023, Normal      nicotine polacrilex (NICORETTE) 4 mg gum Chew 4mg every 1-2 hours as needed. Maximum 24 pieces/day., Normal      norelgestromin-ethinyl estradiol (ORTHO EVRA) 150-35 MCG/24HR Place 1 patch on the skin over 7 days once a week, Starting Wed 6/21/2023, Normal      ondansetron (Zofran ODT) 4 mg disintegrating tablet Take 1 tablet (4 mg total) by mouth every 6 (six) hours as needed for nausea or vomiting, Starting Mon 4/10/2023, Normal      !! ondansetron (ZOFRAN) 4 mg tablet Take 1 tablet (4 mg total) by mouth every 6 (six) hours, Starting Tue 6/20/2023, Normal      oxymetazoline (AFRIN) 0.05 % nasal spray 2 sprays by Each Nare route 2 (two) times a day for 3 days, Starting Tue 6/20/2023,  "Until Fri 6/23/2023, Normal      pantoprazole (PROTONIX) 40 mg tablet Take 1 tablet (40 mg total) by mouth daily before breakfast, Starting Fri 12/29/2023, Normal      PARoxetine (PAXIL) 40 MG tablet Take 40 mg by mouth every morning, Starting Mon 12/5/2022, Historical Med      promethazine (PHENERGAN) 25 mg suppository Insert 1 suppository (25 mg total) into the rectum every 6 (six) hours as needed for nausea or vomiting, Starting Mon 3/27/2023, Normal      rizatriptan (MAXALT-MLT) 10 mg disintegrating tablet Take 1 tablet (10 mg total) by mouth once as needed for migraine for up to 1 dose May repeat in 2 hours if needed, Starting Mon 3/27/2023, Normal      Spacer/Aero-Holding Chambers (Vortex Valved Holding Chamber) FROYLAN Use 2 (two) times a day, Starting Wed 3/9/2022, Normal      topiramate (TOPAMAX) 50 MG tablet Take 50 mg by mouth 2 (two) times a day, Starting Fri 6/2/2023, Historical Med       !! - Potential duplicate medications found. Please discuss with provider.          No discharge procedures on file.    PDMP Review         Value Time User    PDMP Reviewed  Yes 11/25/2021 10:36 PM Oswald Peace MD             ED Provider  Attending physically available and evaluated Apurva Dumont. I managed the patient along with the ED Attending.    Electronically Signed by    Portions of the record may have been created with voice recognition software.  Occasional wrong word or \"sound a like\" substitutions may have occurred due to the inherent limitations of voice recognition software.  Read the chart carefully and recognize, using context, where substitutions have occurred       Albert Ferguson MD  03/14/24 2010    "

## 2024-03-14 NOTE — DISCHARGE INSTRUCTIONS
A prescription was sent to the pharmacy for nausea medication.    Follow-up with your scheduled GI appointment.    Return to the ER if symptoms worsen or if you have any other concerns.

## 2024-03-14 NOTE — ED NOTES
Patient PO challenge with crackers and gingerale, no complaints of nausea or episodes of vomiting at this time.     Susannah Richardson RN  03/14/24 1735

## 2024-03-15 LAB — HCG SERPL QL: NEGATIVE

## 2024-03-15 NOTE — ED ATTENDING ATTESTATION
3/14/2024  IBrisa MD, saw and evaluated the patient. I have discussed the patient with the resident/non-physician practitioner and agree with the resident's/non-physician practitioner's findings, Plan of Care, and MDM as documented in the resident's/non-physician practitioner's note, except where noted. All available labs and Radiology studies were reviewed.  I was present for key portions of any procedure(s) performed by the resident/non-physician practitioner and I was immediately available to provide assistance.       At this point I agree with the current assessment done in the Emergency Department.  I have conducted an independent evaluation of this patient a history and physical is as follows:    24-year-old female presenting with epigastric abdominal pain, nausea, and vomiting that have been present over the past 5 days.  No prior abdominal surgeries.  No fevers.  No chest pain.  Last movement was 3 days ago, patient has been able to pass flatus.  Patient only drinks socially.  She occasionally smokes marijuana, no daily use.  Patient underwent labs yesterday and her labs revealed potassium of 2.8.    ED Triage Vitals [03/14/24 1355]   Temperature Pulse Respirations Blood Pressure SpO2   98 °F (36.7 °C) (!) 110 18 116/83 98 %      Temp Source Heart Rate Source Patient Position - Orthostatic VS BP Location FiO2 (%)   Oral Monitor Sitting Left arm --      Pain Score       --           Constitutional:  Awake, alert, oriented.  No acute distress.  HEENT:  Normocephalic, atraumatic.  Sclera anicteric, conjunctiva not injected.  Moist oral mucosa.  Cardiac:  Appears well-perfused, regular rate and rhythm, no murmurs  Respiratory:  Breathing comfortably on room air, lungs clear to auscultation  Abdomen:  Nondistended, bowel sounds present, normoactive.  There is tenderness to palpation in epigastrium.  There is some tenderness in right upper quadrant but Mcrae's is negative.  No tenderness over  McBurney's point.  No rebound or guarding.  Extremities:  No deformities, no edema  Integument:  No rashes over exposed areas, cap refill less than 2 seconds  Neurologic:  Awake, alert, and oriented x3.  Nonfocal exam.  Psychiatric:  Normal affect    Labs Reviewed   CBC AND DIFFERENTIAL - Abnormal       Result Value Ref Range Status    WBC 14.54 (*) 4.31 - 10.16 Thousand/uL Final    RBC 5.63 (*) 3.81 - 5.12 Million/uL Final    Hemoglobin 16.2 (*) 11.5 - 15.4 g/dL Final    Hematocrit 46.6 (*) 34.8 - 46.1 % Final    MCV 83  82 - 98 fL Final    MCH 28.8  26.8 - 34.3 pg Final    MCHC 34.8  31.4 - 37.4 g/dL Final    RDW 13.3  11.6 - 15.1 % Final    MPV 9.3  8.9 - 12.7 fL Final    Platelets 448 (*) 149 - 390 Thousands/uL Final    nRBC 0  /100 WBCs Final    Neutrophils Relative 67  43 - 75 % Final    Immature Grans % 1  0 - 2 % Final    Lymphocytes Relative 22  14 - 44 % Final    Monocytes Relative 8  4 - 12 % Final    Eosinophils Relative 1  0 - 6 % Final    Basophils Relative 1  0 - 1 % Final    Neutrophils Absolute 9.94 (*) 1.85 - 7.62 Thousands/µL Final    Absolute Immature Grans 0.09  0.00 - 0.20 Thousand/uL Final    Absolute Lymphocytes 3.12  0.60 - 4.47 Thousands/µL Final    Absolute Monocytes 1.21  0.17 - 1.22 Thousand/µL Final    Eosinophils Absolute 0.08  0.00 - 0.61 Thousand/µL Final    Basophils Absolute 0.10  0.00 - 0.10 Thousands/µL Final   COMPREHENSIVE METABOLIC PANEL - Abnormal    Sodium 133 (*) 135 - 147 mmol/L Final    Potassium 3.3 (*) 3.5 - 5.3 mmol/L Final    Chloride 95 (*) 96 - 108 mmol/L Final    CO2 23  21 - 32 mmol/L Final    ANION GAP 15 (*) 4 - 13 mmol/L Final    BUN 13  5 - 25 mg/dL Final    Creatinine 0.72  0.60 - 1.30 mg/dL Final    Comment: Standardized to IDMS reference method    Glucose 76  65 - 140 mg/dL Final    Comment: If the patient is fasting, the ADA then defines impaired fasting glucose as > 100 mg/dL and diabetes as > or equal to 123 mg/dL.    Calcium 9.5  8.4 - 10.2 mg/dL Final     AST 30  13 - 39 U/L Final    ALT 31  7 - 52 U/L Final    Comment: Specimen collection should occur prior to Sulfasalazine administration due to the potential for falsely depressed results.     Alkaline Phosphatase 108 (*) 34 - 104 U/L Final    Total Protein 7.9  6.4 - 8.4 g/dL Final    Albumin 4.5  3.5 - 5.0 g/dL Final    Total Bilirubin 0.78  0.20 - 1.00 mg/dL Final    Comment: Use of this assay is not recommended for patients undergoing treatment with eltrombopag due to the potential for falsely elevated results.  N-acetyl-p-benzoquinone imine (metabolite of Acetaminophen) will generate erroneously low results in samples for patients that have taken an overdose of Acetaminophen.    eGFR 116  ml/min/1.73sq m Final    Narrative:     National Kidney Disease Foundation guidelines for Chronic Kidney Disease (CKD):     Stage 1 with normal or high GFR (GFR > 90 mL/min/1.73 square meters)    Stage 2 Mild CKD (GFR = 60-89 mL/min/1.73 square meters)    Stage 3A Moderate CKD (GFR = 45-59 mL/min/1.73 square meters)    Stage 3B Moderate CKD (GFR = 30-44 mL/min/1.73 square meters)    Stage 4 Severe CKD (GFR = 15-29 mL/min/1.73 square meters)    Stage 5 End Stage CKD (GFR <15 mL/min/1.73 square meters)  Note: GFR calculation is accurate only with a steady state creatinine   LIPASE - Normal    Lipase 11  11 - 82 u/L Final   POCT PREGNANCY, URINE - Normal    EXT Preg Test, Ur Negative   Final    Control Valid   Final   PREGNANCY TEST (HCG QUALITATIVE)     No orders to display         ED Course     Medications   sodium chloride 0.9 % bolus 1,000 mL (0 mL Intravenous Stopped 3/14/24 1532)   droperidol (INAPSINE) injection 0.625 mg (0.625 mg Intravenous Given 3/14/24 1432)   potassium chloride (Klor-Con M20) CR tablet 40 mEq (40 mEq Oral Given 3/14/24 1703)     24-year-old female presenting with epigastric abdominal pain, nausea, and vomiting.  Vital signs reviewed, initially tachycardic, within normal limits on my evaluation.   Differential diagnosis includes gastritis, viral illness, pancreatitis, cholecystitis, versus another etiology of symptoms.  Labs obtained, as above.  Patient treated symptomatically.  Abdominal exam is nonsurgical, no indication for emergent imaging today, though if patient's symptoms persist/recur, she may benefit from further evaluation including cross-sectional imaging of her abdomen and pelvis.  Patient has follow-up with GI in 1 week.  Recommend keeping this appointment.  Prescription for Zofran provided.  Patient has Bentyl at home.  Patient discharged to home with recommendations for symptom control, return precautions, and plan for follow up.

## 2024-05-17 ENCOUNTER — HOSPITAL ENCOUNTER (EMERGENCY)
Facility: HOSPITAL | Age: 24
End: 2024-05-17
Attending: EMERGENCY MEDICINE
Payer: COMMERCIAL

## 2024-05-17 VITALS
SYSTOLIC BLOOD PRESSURE: 126 MMHG | HEART RATE: 99 BPM | RESPIRATION RATE: 20 BRPM | TEMPERATURE: 98.4 F | DIASTOLIC BLOOD PRESSURE: 73 MMHG | OXYGEN SATURATION: 99 % | WEIGHT: 199.3 LBS | BODY MASS INDEX: 35.3 KG/M2

## 2024-05-17 DIAGNOSIS — G43.009 COMMON MIGRAINE WITHOUT AURA: ICD-10-CM

## 2024-05-17 DIAGNOSIS — J45.909 ASTHMA: ICD-10-CM

## 2024-05-17 DIAGNOSIS — E78.5 HYPERLIPIDEMIA: ICD-10-CM

## 2024-05-17 DIAGNOSIS — R45.851 SUICIDAL IDEATION: ICD-10-CM

## 2024-05-17 DIAGNOSIS — Z00.8 MEDICAL CLEARANCE FOR PSYCHIATRIC ADMISSION: Primary | ICD-10-CM

## 2024-05-17 DIAGNOSIS — T74.21XA SEXUAL ASSAULT OF ADULT, INITIAL ENCOUNTER: ICD-10-CM

## 2024-05-17 LAB
AMPHETAMINES SERPL QL SCN: NEGATIVE
BARBITURATES UR QL: NEGATIVE
BENZODIAZ UR QL: NEGATIVE
BILIRUB UR QL STRIP: NEGATIVE
CLARITY UR: CLEAR
COCAINE UR QL: NEGATIVE
COLOR UR: NORMAL
ETHANOL EXG-MCNC: 0 MG/DL
EXT PREGNANCY TEST URINE: NEGATIVE
EXT. CONTROL: NORMAL
FENTANYL UR QL SCN: NEGATIVE
GLUCOSE UR STRIP-MCNC: NEGATIVE MG/DL
HGB UR QL STRIP.AUTO: NEGATIVE
HYDROCODONE UR QL SCN: NEGATIVE
KETONES UR STRIP-MCNC: NEGATIVE MG/DL
LEUKOCYTE ESTERASE UR QL STRIP: NEGATIVE
METHADONE UR QL: NEGATIVE
NITRITE UR QL STRIP: NEGATIVE
OPIATES UR QL SCN: NEGATIVE
OXYCODONE+OXYMORPHONE UR QL SCN: NEGATIVE
PCP UR QL: NEGATIVE
PH UR STRIP.AUTO: 6 [PH]
PROT UR STRIP-MCNC: NEGATIVE MG/DL
SP GR UR STRIP.AUTO: 1.02 (ref 1–1.03)
THC UR QL: POSITIVE
UROBILINOGEN UR STRIP-ACNC: <2 MG/DL

## 2024-05-17 PROCEDURE — 80307 DRUG TEST PRSMV CHEM ANLYZR: CPT

## 2024-05-17 PROCEDURE — 99285 EMERGENCY DEPT VISIT HI MDM: CPT

## 2024-05-17 PROCEDURE — 99245 OFF/OP CONSLTJ NEW/EST HI 55: CPT | Performed by: PSYCHIATRY & NEUROLOGY

## 2024-05-17 PROCEDURE — 82075 ASSAY OF BREATH ETHANOL: CPT

## 2024-05-17 PROCEDURE — 81025 URINE PREGNANCY TEST: CPT

## 2024-05-17 PROCEDURE — 81003 URINALYSIS AUTO W/O SCOPE: CPT | Performed by: EMERGENCY MEDICINE

## 2024-05-17 RX ORDER — LEVONORGESTREL 1.5 MG/1
1.5 TABLET ORAL ONCE
Status: COMPLETED | OUTPATIENT
Start: 2024-05-17 | End: 2024-05-17

## 2024-05-17 RX ORDER — BENZTROPINE MESYLATE 1 MG/ML
1 INJECTION INTRAMUSCULAR; INTRAVENOUS 2 TIMES DAILY PRN
Status: CANCELLED | OUTPATIENT
Start: 2024-05-17

## 2024-05-17 RX ORDER — MAGNESIUM HYDROXIDE/ALUMINUM HYDROXICE/SIMETHICONE 120; 1200; 1200 MG/30ML; MG/30ML; MG/30ML
30 SUSPENSION ORAL EVERY 4 HOURS PRN
Status: CANCELLED | OUTPATIENT
Start: 2024-05-17

## 2024-05-17 RX ORDER — ACETAMINOPHEN 325 MG/1
975 TABLET ORAL EVERY 6 HOURS PRN
Status: CANCELLED | OUTPATIENT
Start: 2024-05-17

## 2024-05-17 RX ORDER — MINERAL OIL AND PETROLATUM 150; 830 MG/G; MG/G
OINTMENT OPHTHALMIC 4 TIMES DAILY PRN
Status: CANCELLED | OUTPATIENT
Start: 2024-05-17

## 2024-05-17 RX ORDER — LORAZEPAM 2 MG/ML
1 INJECTION INTRAMUSCULAR EVERY 4 HOURS PRN
Status: CANCELLED | OUTPATIENT
Start: 2024-05-17

## 2024-05-17 RX ORDER — BENZTROPINE MESYLATE 1 MG/ML
1 INJECTION INTRAMUSCULAR; INTRAVENOUS
Status: CANCELLED | OUTPATIENT
Start: 2024-05-17

## 2024-05-17 RX ORDER — ACETAMINOPHEN 325 MG/1
650 TABLET ORAL EVERY 6 HOURS PRN
Status: CANCELLED | OUTPATIENT
Start: 2024-05-17

## 2024-05-17 RX ORDER — HALOPERIDOL 5 MG/1
5 TABLET ORAL
Status: CANCELLED | OUTPATIENT
Start: 2024-05-17

## 2024-05-17 RX ORDER — HALOPERIDOL 1 MG/1
2 TABLET ORAL
Status: CANCELLED | OUTPATIENT
Start: 2024-05-17

## 2024-05-17 RX ORDER — HALOPERIDOL 5 MG/ML
5 INJECTION INTRAMUSCULAR
Status: CANCELLED | OUTPATIENT
Start: 2024-05-17

## 2024-05-17 RX ORDER — HYDROXYZINE HYDROCHLORIDE 25 MG/1
25 TABLET, FILM COATED ORAL
Status: CANCELLED | OUTPATIENT
Start: 2024-05-17

## 2024-05-17 RX ORDER — LORAZEPAM 2 MG/ML
2 INJECTION INTRAMUSCULAR
Status: CANCELLED | OUTPATIENT
Start: 2024-05-17

## 2024-05-17 RX ORDER — LORAZEPAM 1 MG/1
1 TABLET ORAL
Status: CANCELLED | OUTPATIENT
Start: 2024-05-17

## 2024-05-17 RX ORDER — HYDROXYZINE HYDROCHLORIDE 25 MG/1
50 TABLET, FILM COATED ORAL
Status: CANCELLED | OUTPATIENT
Start: 2024-05-17

## 2024-05-17 RX ORDER — AZITHROMYCIN 250 MG/1
1000 TABLET, FILM COATED ORAL ONCE
Status: COMPLETED | OUTPATIENT
Start: 2024-05-17 | End: 2024-05-17

## 2024-05-17 RX ORDER — AMOXICILLIN 250 MG
1 CAPSULE ORAL DAILY PRN
Status: CANCELLED | OUTPATIENT
Start: 2024-05-17

## 2024-05-17 RX ORDER — LORAZEPAM 1 MG/1
1 TABLET ORAL ONCE
Status: COMPLETED | OUTPATIENT
Start: 2024-05-17 | End: 2024-05-17

## 2024-05-17 RX ORDER — LORAZEPAM 2 MG/ML
1 INJECTION INTRAMUSCULAR
Status: CANCELLED | OUTPATIENT
Start: 2024-05-17

## 2024-05-17 RX ORDER — TRAZODONE HYDROCHLORIDE 50 MG/1
50 TABLET ORAL
Status: CANCELLED | OUTPATIENT
Start: 2024-05-17

## 2024-05-17 RX ORDER — BENZTROPINE MESYLATE 1 MG/1
1 TABLET ORAL 2 TIMES DAILY PRN
Status: CANCELLED | OUTPATIENT
Start: 2024-05-17

## 2024-05-17 RX ORDER — HALOPERIDOL 5 MG/ML
2.5 INJECTION INTRAMUSCULAR
Status: CANCELLED | OUTPATIENT
Start: 2024-05-17

## 2024-05-17 RX ORDER — ACETAMINOPHEN 325 MG/1
650 TABLET ORAL EVERY 4 HOURS PRN
Status: CANCELLED | OUTPATIENT
Start: 2024-05-17

## 2024-05-17 RX ORDER — DOXYCYCLINE HYCLATE 100 MG/1
100 CAPSULE ORAL ONCE
Status: COMPLETED | OUTPATIENT
Start: 2024-05-17 | End: 2024-05-17

## 2024-05-17 RX ORDER — NICOTINE 21 MG/24HR
1 PATCH, TRANSDERMAL 24 HOURS TRANSDERMAL DAILY
Status: CANCELLED | OUTPATIENT
Start: 2024-05-17

## 2024-05-17 RX ORDER — BENZTROPINE MESYLATE 1 MG/ML
0.5 INJECTION INTRAMUSCULAR; INTRAVENOUS
Status: CANCELLED | OUTPATIENT
Start: 2024-05-17

## 2024-05-17 RX ORDER — BISACODYL 10 MG
10 SUPPOSITORY, RECTAL RECTAL DAILY PRN
Status: CANCELLED | OUTPATIENT
Start: 2024-05-17

## 2024-05-17 RX ADMIN — LEVONORGESTREL 1.5 MG: 1.5 TABLET ORAL at 16:39

## 2024-05-17 RX ADMIN — Medication 1 BOTTLE: at 19:01

## 2024-05-17 RX ADMIN — AZITHROMYCIN DIHYDRATE 1000 MG: 250 TABLET ORAL at 16:39

## 2024-05-17 RX ADMIN — DOXYCYCLINE HYCLATE 100 MG: 100 CAPSULE ORAL at 16:39

## 2024-05-17 RX ADMIN — LORAZEPAM 1 MG: 1 TABLET ORAL at 12:28

## 2024-05-17 NOTE — DISCHARGE INSTRUCTIONS
This writer discussed the patients current presentation and recommended discharge plan with SYLVIA Koo.  They agree with the patient being discharged at this time with referrals and/or information about [facility].     The patient was Instructed to follow up with their PCP.   The patient was provided with referral information for:   [facility].     This writer and the patient completed a safety plan.  The patient was provided with a copy of their safety plan with encouragement to utilize the plan following discharge.     In addition, the patient was instructed to call [facility] crisis, other crisis services, 1 or to go to the nearest ER immediately if their situation changes at any time.     This writer discussed discharge plans with the patient and family, who agrees with and understands the discharge plans.         SAFETY PLAN  Warning Signs (thoughts, images, mood, behavior, situations) of a potential crisis: Increasing thoughts of self harm or taking your life      Coping Skills (what can I do to take my mind off the problem, or to keep myself safe): Spend time with your girlfriend, spend time with family, take a walk, listen to music, deep breathing exercises      Outside Support (who can I reach out to for support and help): Girlfriend, Family        National Suicide Prevention Hotline:  [address] [place] [address] [phone] - Crisis   [facility] [phone] - [facility] Crisis/Mobile Crisis   [phone] - [facility] Crisis   [facility]: [phone]  [facility]: [phone]   [facility] [phone] - Crisis   [facility] [phone] - Crisis     [facility] [phone] - Crisis   [facility] [phone] - Crisis   [phone] - Peer Support Talk Line (1-9pm daily)  [phone] - Teen Support Talk Line (1-9pm daily)  [phone] - [facility] [phone]- Crisis    [facility] [phone] - Crisis   [facility]  [phone] - Crisis    [facility]) [phone] - [facility]
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
## 2024-05-17 NOTE — ED NOTES
"Patient requested to speak with CIS.    CIS introduced self, and offered opportunity for patient to ask questions.    Patient acknowledges that she stated earlier that she was suicidal without a plan, but now states that she does not feel as if she is, and said those things out of being overwhelmed. Patient is requesting discharge and referral to a Partial Program.    It was explained to the patient that due to what she told the previous Crisis Interventionist and SYLVIA Olivera, it would be up to the current provider to determine if the patient should be evaluated by [facility]. Patient informed that even if she is evaluated by psychiatry, inpatient may still be indicated.    Patient is frustrated and states \"I won't come here with my problems again\". Family member at bedside requested CIS get the process started.    SYLVIA Koo and KRISTINA Skaggs notified.    Usman Adame  Crisis Intervention Specialist II  05/17/24   "
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
## 2024-05-22 NOTE — PSYCH
Group Psychotherapy    Subjective:     Patient ID: Apurva Dumont 24 y.o.    This group was facilitated in a private office.  ([id number])Apurva Dumont was not present for this session. The skill helps to create effective work relationships. The group discovered strategies that help them to manage work relationships on a short term and long term basis. The group talked about understanding the purpose, and meaning of work stressors  in intellectual and emotional expression, regulation , and recognition, and how it affects themselves and others.. Teaching on the emphasis of self monitoring , suggestive solutions, verbal and non-verbal communication,and keeping commitments, strategies were discusses to reduce work stressors.  Group was encouraged to ask questions in an open forum at the end of group. .  Treatment Plan Objective 1.1, 1.2, 1.3, 1.4 Therapist: Jose PLATT Ed.
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
## 2024-05-22 NOTE — PSYCH
Subjective:     Patient ID: Apurva Dumont is a 24 y.o. female and uses she/her pronouns     [facility] Clinical Progress Notes      Specialized Services Documentation  Therapist must complete separate progress note for each specific clinical activity in which the individual participated during the day.     Other:     Pre-Authorization: Apurva Dumont has primary medicare insurance. Apurva is required to attend in full 5 day increments in order for insurance to be billed/paid. She is aware and  reviewed this upon intake. ESTHER Cody.     Case Management Note    ESTHER Yap     Current suicide risk : Low    [id number] Met with Apurva Dumont at [facility] Reviewed program, initial paperwork reviewed: Consent for Treatment, PHP handbook, HIPPA, General Consent, Client Bill of Rights, and Smoking/Drug and Alcohol Policy. Release of Information obtained for emergency contact - Brandy Ahuja ([phone] ) and PCP and Health Care Coordination Form. Apurva Dumont received hard copies of all paperwork and verbally gave consent. Reviewed and given on call number. PCP notified of admission and health care coordination form sent. Completed initial psycho-social evaluation and initial treatment goals discussed.    Medications changes/added/denied? See Dr. Colleen Singh's Note     Treatment session number: 1    Individual Case Management Visit provided today? yes    [facility] follow up physician's orders: Admit to PHP - See Dr. Colleen Cárdenas's note

## 2024-05-23 ENCOUNTER — OFFICE VISIT (OUTPATIENT)
Dept: PSYCHOLOGY | Facility: CLINIC | Age: 24
End: 2024-05-23
Payer: MEDICARE

## 2024-05-23 ENCOUNTER — OFFICE VISIT (OUTPATIENT)
Dept: PSYCHIATRY | Facility: CLINIC | Age: 24
End: 2024-05-23

## 2024-05-23 VITALS
DIASTOLIC BLOOD PRESSURE: 71 MMHG | HEART RATE: 85 BPM | WEIGHT: 204.6 LBS | HEIGHT: 63 IN | BODY MASS INDEX: 36.25 KG/M2 | SYSTOLIC BLOOD PRESSURE: 118 MMHG | RESPIRATION RATE: 16 BRPM

## 2024-05-23 DIAGNOSIS — F25.1 SCHIZOAFFECTIVE DISORDER, DEPRESSIVE TYPE (HCC): Primary | ICD-10-CM

## 2024-05-23 DIAGNOSIS — Z79.899 HIGH RISK MEDICATION USE: ICD-10-CM

## 2024-05-23 PROCEDURE — 90834 PSYTX W PT 45 MINUTES: CPT

## 2024-05-23 PROCEDURE — 90791 PSYCH DIAGNOSTIC EVALUATION: CPT

## 2024-05-23 RX ORDER — CELECOXIB 200 MG/1
200 CAPSULE ORAL DAILY
COMMUNITY
Start: 2023-11-21 | End: 2024-11-20

## 2024-05-23 RX ORDER — ARIPIPRAZOLE 5 MG/1
5 TABLET ORAL DAILY
Qty: 30 TABLET | Refills: 0 | Status: SHIPPED | OUTPATIENT
Start: 2024-05-23 | End: 2024-05-31 | Stop reason: SDUPTHER
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
## 2024-05-23 NOTE — PSYCH
Subjective:     Patient ID: Apurva Dumont 24 y.o. Female    [facility] Clinical Progress Notes      Specialized Services Documentation  Therapist must complete separate progress note for each specific clinical activity in which the individual participated during the day.     Education Therapy     [id number] Apurva Dumont was not present in Wrap Up.    Tx Plan Objective: 1.1, 1.2, 1.4, Therapist:  KAREN Millan

## 2024-05-23 NOTE — PSYCH
Subjective:     Patient ID: Apurva Dumont 24 y.o. {Gender Identity:[id number]}    [facility] Clinical Progress Notes      Specialized Services Documentation  Therapist must complete separate progress note for each specific clinical activity in which the individual participated during the day.     Case Management    (***)Spoke with Apurva Dumont for case management. ***. Apurva Dumont is aware of next scheduled 1:1 meeting.     Current suicide risk : {CURRENT SUICIDE RISK:99570}     Medications changes/added/denied? ***     Treatment session number: ***     Individual Case Management Visit provided today? Yes      [facility] follow up physician's orders: Continue to follow orders.     Therapist: ESTHER Yap

## 2024-05-23 NOTE — PSYCH
Group Psychotherapy    Subjective:     Patient ID: Apurva Dumont 24 y.o.    This group was facilitated in a private office.  (***)Apurva Dumont actively engaged in psychoeducational group about motivation. The skill helps to explore purpose of motivation and the limiting beliefs that hold back motivation The group discovered strategies that help them to develop motivation and the potential barriers on a short term and long term basis. The group talked about understanding the purpose, and meaning of motivation in intellectual and emotional expression, regulation , and recognition, and how it affects themselves and others. Teaching on the emphasis of self monitoring , suggestive solutions, verbal and non-verbal communication, keeping commitments, and strategies were discusses to reduce limited motivation.  Group was encouraged to ask questions in an open forum at the end of group. *** progress displayed through engagement in topic. Apurva Dumont will continue to engage in psychotherapy to encourage positive self realization.  Treatment Plan Objective 1.1, 1.2, 1.3, 1.4 Therapist: Jose PLATT Ed.
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
## 2024-05-23 NOTE — PSYCH
Subjective:    Patient ID: Apurva Dumont is a 24 y.o. female      [facility] Clinical Progress Notes      Specialized Services Documentation  Therapist must complete separate progress note for each specific clinical activity in which the individual participated during the day.       Allied Therapy (0930-1030) Apurva Dumont was not present for group due to completing intake process with . Tx Plan Objective: n/a, Therapist:  TIM Gould    Education Therapy   [id number] Apurva Dumont was excused from morning assessment due to psychiatric evaluation.  Tx Plan Objective: n/a, Therapist:  TIM Gould

## 2024-05-24 ENCOUNTER — DOCUMENTATION (OUTPATIENT)
Dept: PSYCHOLOGY | Facility: CLINIC | Age: 24
End: 2024-05-24

## 2024-05-24 ENCOUNTER — APPOINTMENT (OUTPATIENT)
Dept: PSYCHOLOGY | Facility: CLINIC | Age: 24
End: 2024-05-24
Payer: MEDICARE

## 2024-05-24 NOTE — PSYCH
Subjective:    Patient ID: Apurva Dumont is a 24 y.o. female      [facility] Clinical Progress Notes      Specialized Services Documentation  Therapist must complete separate progress note for each specific clinical activity in which the individual participated during the day.       Allied Therapy *** Tx Plan Objective: ***, Therapist:  {THERAPIST:29969}    Education Therapy   [id number] Apurva Dumont {SL AMB PSYCH ACTIVELY:37919} shared in check in and goal review. Presented as {Readiness to Learin} related to readiness to learn.  Apurva Dumont  {DID/DID NOT:44152} complete goal from last treatment day identifying gaining ***. {DID/DID NOT:95003} present with any barriers to learning.     [id number] Apurva Dumont engaged throughout the treatment day. Was engaged in learning related to {Education Completed:90486}. Staff utilized {Teaching Method:91392} teaching methods.  Apurva Dumont shared area of learning and set a goal for outside of program to ***.      Tx Plan Objective: ***, Therapist:  {THERAPIST:48179}

## 2024-05-24 NOTE — PROGRESS NOTES
"Subjective:     Patient ID: Apurva Dumont 24 y.o. Female    [facility] Clinical Progress Notes      Specialized Services Documentation  Therapist must complete separate progress note for each specific clinical activity in which the individual participated during the day.     Case Management     Apurva Dumont called out of program 05/24/24 due to family emergency. This writer called pAurva and briefly spoke to her. She was doing \"alright.\" Apurva did not yet  her prescription, but stated she was going to pick it up today along with her younger sister's prescription.  Apurva Dumont is schedule to return on Tuesday 5/28/2024    Current suicide risk: unable to assess      Medications changes/added/denied? No     Treatment session number: 1     Individual Case Management Visit provided today? No      [facility] follow up physician's orders: Continue to follow orders.    "

## 2024-05-28 ENCOUNTER — OFFICE VISIT (OUTPATIENT)
Dept: PSYCHOLOGY | Facility: CLINIC | Age: 24
End: 2024-05-28
Payer: MEDICARE

## 2024-05-28 DIAGNOSIS — Z79.899 HIGH RISK MEDICATION USE: ICD-10-CM

## 2024-05-28 DIAGNOSIS — F25.1 SCHIZOAFFECTIVE DISORDER, DEPRESSIVE TYPE (HCC): Primary | ICD-10-CM

## 2024-05-28 PROCEDURE — G0410 GRP PSYCH PARTIAL HOSP 45-50: HCPCS

## 2024-05-28 PROCEDURE — G0177 OPPS/PHP; TRAIN & EDUC SERV: HCPCS

## 2024-05-28 PROCEDURE — G0176 OPPS/PHP;ACTIVITY THERAPY: HCPCS

## 2024-05-28 NOTE — PSYCH
Subjective:    Patient ID: Apurva Dumont is a 24 y.o. female      [facility] Clinical Progress Notes      Specialized Services Documentation  Therapist must complete separate progress note for each specific clinical activity in which the individual participated during the day.       Allied Therapy (***) *** was involved in follow-up skills group focused on effectively coping with anger situations.  reviewed keys points from yesterday's group which included recognizing physical, behavioral, and emotional symptoms associated with anger as well as identifying personal anger styles. Group then transitioned into discussion, explanation, and demonstration of effective ways to manage anger in an assertive manner. *** identified *** could work on managing by ***. *** effort noted towards treatment plan goals. Continue to involve in psychotherapy and life skills groups to increase wellness tools to manage overwhelming emotions.  Tx Plan Objective: ***, Therapist:  {THERAPIST:27287}    Education Therapy   [id number] Apurva Dumont {SL AMB PSYCH ACTIVELY:50560} shared in check in and goal review. Presented as {Readiness to Learin} related to readiness to learn.  Apurva Dumont  {DID/DID NOT:83523} complete goal from last treatment day identifying gaining ***. {DID/DID NOT:98598} present with any barriers to learning.     Tx Plan Objective: ***, Therapist:  {THERAPIST:69382}

## 2024-05-28 NOTE — PSYCH
"Visit Time    Visit Start Time: 1230   Visit Stop Time: 1330  Total Visit Duration: 60 minutes    Subjective:     Patient ID: Apurva Dumont is a 24 y.o. y.o. female.    [facility] Clinical Progress Notes      Specialized Services Documentation  Therapist must complete separate progress note for each specific clinical activity in which the individual participated during the day.     Psychotherapy  Apurva Dumont actively shared in MTH group focused on Letting Go. Group discussed various things that make them feel stuck or held back in life, and the things that they want to feel and explore. The topic was discussed and and alternative coping mechanisms were presented. The group then participated in a breathing/visualization exercise to practice acknowledgement of \"weights\" that hold us back. The feeling of \"releasing\" the balloon was discussed. The group then participated in a back-to-back paulina analysis of \"Letting Go\" by Edison Herrera and \"Let it Go\" by Sachin Cueto. Themes were discussed. The final activity was a a worksheet designed to help visualize our \"weights\" vs. Our \"balloons.\" The group wrote down the things that were holding them back and ripped it up. The group wrote down the things that they wanted to experience in life, and put lists into balloons. The group activities were:  Back-to-back Paulina analysis of \"Letting Go\" by Edison Herrera & \"Let it Go\" by Sachin Cueto  Breathing/Visualization of Balloons and weights  Discussion  Letting Go worksheet  Ripping paper/Writing on Balloons  This group proved to be generally positive for  Apurva Dumont. Apurva reported that one of their balloons was acknowledgement and validation of her own experience. She stated that when people validate her experiences, she feels \"seen\" and lighter. During this group, Apurva also shared that she feels the difference between listening and active listening. It should be noted that Apurva was absent for the first portion of this " session. Unknown reason. Group ended with open discussion about the music exercise that happened in the group. Continue with psychotherapy to practice mindfulness and continue to explore music making.      Tx Plan Objective: 1.1, 1.2, 1.4 Therapist: KAREN Millan & DEANNE Dwyer

## 2024-05-28 NOTE — PSYCH
Group Psychotherapy  Subjective:     Patient ID: Apurva Dumont 24 y.o.     [facility] Clinical Progress Notes      This group was facilitated in a private office.  (***)Apurva Dumont actively  engaged in psychoeducational group about distress tolerances. The skills introduced help to maintain and regulate emotional fear and distress. Group discovered strategies that help them to manage emotional fear and distress on a short term and long term basis. The group talked about understanding the purpose, and meaning of emotional fear and distress in intellectual and emotional expression, regulation , and recognition, and how it affects themselves and others. Teaching on the skill process of ACCEPTS and DBT self-care, members of the group are able to go  for help or how to manage situations were discussed. Group was encouraged to ask questions in an open forum at the end of group. *** progress displayed through engagement in topic. Apurva Dumont will continue to engage in psychotherapy to encourage positive self realization.  Treatment Plan Objective 1.1, 1.2, 1.3, 1.4 Therapist: Jose PLATT Ed.

## 2024-05-28 NOTE — PSYCH
"Subjective:    Patient ID: Apurva Dumont is a 24 y.o. female      [facility] Clinical Progress Notes      Specialized Services Documentation  Therapist must complete separate progress note for each specific clinical activity in which the individual participated during the day.       Allied Therapy ([id number]) Apurva Dumont actively participated in group focused on getting to know your anger.  opened by explaining that anger is a normal, human emotion and “getting to know your anger” and confronting it is the first step in effective anger management. Apurva engaged in self-reflection discussion to identify physical, behavioral, and emotional anger symptoms. Afterwards, group went over personal anger styles.  introduced “stuffing” as a passive style of coping with anger, “escalating” as an aggressive style of coping with anger, and “managing” as the most effective method in coping with anger situations. Group transitioned into discussion, explanation, and demonstration of being open, honest, and direct to process the benefits of being assertive with anger situations. Apurva identified that her personal anger style has changed overtime and is more passive currently. Positive initial effort noted towards treatment goals through active participation and personal disclosure. Continue to involve in psychotherapy and life skills groups to increase wellness tools to manage overwhelming emotions.  Tx Plan Objective: 1.1, 1.2, 1.4, Therapist:  TIM Gould    Education Therapy   [id number] Apurva Dumont actively shared in check in and goal review. Presented as Receptive related to readiness to learn.  Apurva Dumont  did complete goal from last treatment day identifying gaining support and hope. did present with barriers to learning as Apurva shared she has been in plenty of treatment and planned to \"see if I like it here\". Apurva was receptive to feedback from this writer to " give program a few days.    Tx Plan Objective: 1.1, 1.2, 1.4, Therapist:  TIM Gould

## 2024-05-29 ENCOUNTER — APPOINTMENT (OUTPATIENT)
Dept: PSYCHOLOGY | Facility: CLINIC | Age: 24
End: 2024-05-29
Payer: MEDICARE

## 2024-05-29 ENCOUNTER — DOCUMENTATION (OUTPATIENT)
Dept: PSYCHOLOGY | Facility: CLINIC | Age: 24
End: 2024-05-29

## 2024-05-29 NOTE — PROGRESS NOTES
Subjective:     Patient ID: Apurva Dumont 24 y.o. Female    [facility] Clinical Progress Notes      Specialized Services Documentation  Therapist must complete separate progress note for each specific clinical activity in which the individual participated during the day.     Case Management     Apurva Dumont called out of program 05/29/24 due to stating that her mother's car won't start. This writer called Apurva at 1215 and LVM.  also offered Apurva to apply for [employer] transportation services in . Invited Apurva to call back.. Apurva Dumont is schedule to return on Thursday 5/30/2024    Current suicide risk: unable to assess      Medications changes/added/denied? No     Treatment session number: 2     Individual Case Management Visit provided today? No      [facility] follow up physician's orders: Continue to follow orders.

## 2024-05-30 ENCOUNTER — OFFICE VISIT (OUTPATIENT)
Dept: PSYCHOLOGY | Facility: CLINIC | Age: 24
End: 2024-05-30
Payer: MEDICARE

## 2024-05-30 DIAGNOSIS — F25.1 SCHIZOAFFECTIVE DISORDER, DEPRESSIVE TYPE (HCC): Primary | ICD-10-CM

## 2024-05-30 DIAGNOSIS — Z79.899 HIGH RISK MEDICATION USE: ICD-10-CM

## 2024-05-30 PROCEDURE — G0410 GRP PSYCH PARTIAL HOSP 45-50: HCPCS

## 2024-05-30 PROCEDURE — G0176 OPPS/PHP;ACTIVITY THERAPY: HCPCS

## 2024-05-30 PROCEDURE — G0177 OPPS/PHP; TRAIN & EDUC SERV: HCPCS
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
## 2024-05-30 NOTE — PSYCH
Subjective:     Patient ID: Apurva Dumont 24 y.o. Female    [facility] Progress Notes      Specialized Services Documentation  Therapist must complete separate progress note for each specific clinical activity in which the individual participated during the day.     Education Therapy     [id number] Apurva Dumont engaged throughout the treatment day. Was engaged in learning related to Illness, Medication, Aftercare and Wellness Tools.  Staff utilized verbal, written, and demonstration teaching methods. Apurva Dumont shared area of learning and set a goal for outside of program to actively cope by getting some sleep.     Tx Plan Objective: 1.1, 1.2, 1.4, Therapist:  KAREN Millan

## 2024-05-30 NOTE — PSYCH
Subjective:    Patient ID: Apurva Dumont is a 24 y.o. female      [facility] Progress Notes      Specialized Services Documentation  Therapist must complete separate progress note for each specific clinical activity in which the individual participated during the day.       Allied Therapy (0930-1030) Apurva Dumont actively engaged in group focused on saying a polite but firm “NO” to all situations, habits, and people who waste our time and drain our energy. Group started by taking some time to think about “What am I doing that's unnecessary, wasting my time, no longer bringing me brody, or purpose?” and “What/who is draining my energy and causing me stress?” After identifying these life-drainers, group members were asked to create a list challenging them to practice saying “NO” in an assertive manner. Group discussed ideas to practice outside of program to include making healthy swaps, identifying supports, tracking habits, monitor progress, and not expect perfection. Positive beginning effort noted towards treatment plan. Continue to involve in life skills group to improve overall wellness through self-practice of establishing boundaries. Tx Plan Objective: 1.1, 1.2, 1.4, Therapist:  TIM Gould    Education Therapy   3768-0111 Apurva Dumont with prompts shared in check in and goal review. Presented as Receptive related to readiness to learn.  Apurva Dumont  did complete goal from last treatment day identifying gaining advocacy and support. did not present with any barriers to learning.     Tx Plan Objective: 1.1, 1.2 ,1.4, Therapist:  TIM Gould

## 2024-05-30 NOTE — PSYCH
Group Psychotherapy        Group Therapy    Subjective:     Patient ID: Apurva Dumont 24 y.o.       This group was facilitated in a private office.  ([id number])Apurva Dumont was not present for this session. The skill helps to maintain and regulate positive self affirmations and positive self image. Group discovered strategies and statements that help them to manage positive well being on a short term and long term basis. The group talked about understanding the purpose, and meaning of self affirmation in intellectual and emotional expression, regulation , and recognition, and how it affects themselves and others. Teaching on the emphasis of positive self affirmation and self-care, who the group can go to for help was brought up as well. Group was encouraged to ask questions in an open forum at the end of group.   Treatment Plan Objective 1.1, 1.2, 1.3, 1.4 Therapist: Jose PLATT Ed.

## 2024-05-30 NOTE — PSYCH
Subjective:      Patient ID:Apurva Dumont 24 y.o. female     [facility] Progress Notes       Specialized Services Documentation  Therapist must complete separate progress note for each specific clinical activity in which the individual participated during the day.      Education Group     0820 to 1581 - Apurva Dumont attended the psychoeducation group on Medication Management . Group members were educated by this writer on the medication management, medication tips, strategies to help them remember to take their medications and developed ideas to make it easier in the future. Information was given on how to prepare for a doctor appointment and how to advocate for self and work as a team with their doctor to promote positive outcomes and better understanding of medications and uses.  Provided education on classes of  psychotropic medications, mechanisms of action, common side effects, and expectations of treatment with psychotropic medications.  Group was encouraged to ask questions in an open forum.    Apurva Dumont displayed understanding through engagement in the topic with the group . For Apurva Dumont,  good progress toward goals was noted, through their engagement in group. Continue psychotherapy to encourage self-awareness and home practice of skills to support wellness.    Treatment Plan Objective 1.1, 1.2, 1.3, 1.4  led by KAHLIL Sosa

## 2024-05-30 NOTE — PSYCH
"Visit Start Time: 1045  Visit Stop Time: 1145  Total Visit Duration: 60 minutes    Subjective:     Patient ID: Apurva Dumont is a 24 y.o. female.    [facility] Progress Notes      Specialized Services Documentation  Therapist must complete separate progress note for each specific clinical activity in which the individual participated during the day.       Group Psychotherapy    Apurva Dumont actively shared in psychotherapy group focused on DBT mindfulness strategies “what” skills.  Group tasks and discussions explored practice of “what” skills through observing, describing and participating.  Apurva appeared engaged during progressive muscle relaxation. Exercise then briefly introduced the \"how\" skills. She was able to share prior learning and ways she implements mindfulness. She identified she could practice mindfulness today through listening to music.   Some positive effort noted toward treatment goal.  Continue psychotherapy to encourage the development and practice of mindfulness strategies to alleviate symptoms and support wellness.   Tx Plan Objective: 1.1, Therapist:  Sierra JONES & ESTHER Cody    "

## 2024-05-31 ENCOUNTER — OFFICE VISIT (OUTPATIENT)
Dept: PSYCHOLOGY | Facility: CLINIC | Age: 24
End: 2024-05-31
Payer: MEDICARE

## 2024-05-31 ENCOUNTER — OFFICE VISIT (OUTPATIENT)
Dept: PSYCHIATRY | Facility: CLINIC | Age: 24
End: 2024-05-31

## 2024-05-31 DIAGNOSIS — F25.1 SCHIZOAFFECTIVE DISORDER, DEPRESSIVE TYPE (HCC): Primary | ICD-10-CM

## 2024-05-31 DIAGNOSIS — F25.1 SCHIZOAFFECTIVE DISORDER, DEPRESSIVE TYPE (HCC): ICD-10-CM

## 2024-05-31 DIAGNOSIS — Z91.199 NO-SHOW FOR APPOINTMENT: Primary | ICD-10-CM

## 2024-05-31 DIAGNOSIS — Z79.899 HIGH RISK MEDICATION USE: ICD-10-CM

## 2024-05-31 RX ORDER — ARIPIPRAZOLE 5 MG/1
5 TABLET ORAL DAILY
Qty: 30 TABLET | Refills: 1 | Status: SHIPPED | OUTPATIENT
Start: 2024-05-31

## 2024-05-31 NOTE — PSYCH
Group Psychotherapy (0693 - 9763)  Wellness Inventory  Apurva Dumont was not present in this group due to being sick and  leaving program early. Scheduled to return 6/3/2024.    Open Song Share  Apurva Dumont was not present in this group due to being sick and  leaving program early. Scheduled to return 6/3/2024.    Tx Plan Objective: 1.1,1.2, 1.4   Therapist: KAREN Millan

## 2024-05-31 NOTE — PSYCH
Patient did come to the partial program this morning.  However tells me she left the group due to nausea and vomited.  I saw her in the reception area as she was leaving the program.  She did show me her 2 negative pregnancy test results.  However, was feeling too ill to continue the partial program.  We did not have her medication management visit.  She did tell me she has not yet received the Abilify.  I did pass onto her, that the pharmacy needs updated insurance information for prescription coverage.  The information they have on file is .

## 2024-05-31 NOTE — PSYCH
Subjective:    Patient ID: Apurva Dumont is a 24 y.o. female      [facility] Progress Notes      Specialized Services Documentation  Therapist must complete separate progress note for each specific clinical activity in which the individual participated during the day.       Allied Therapy (8409-7324) Apurva Dumont excused herself from group and briefly returned stating she was not feeling well physically and would be leaving for the day. Tx Plan Objective: n/a, Therapist:  TIM Gould    Education Therapy   0733-5678 Apurva Dumont with prompts shared in check in and goal review. Presented as No Interest related to readiness to learn.  Apurva Dumont  did not complete goal from last treatment day identifying hoping to gain support and hope. did not present with any barriers to learning.     [id number] Apurva Dumont was not present for wrap-up due to leaving program early.    Tx Plan Objective: 1.1, 1.2, 1.4, Therapist:  TIM Gould

## 2024-05-31 NOTE — PSYCH
Subjective:     Patient ID: Apurva Dumont 24 y.o. Female    [facility] Clinical Progress Notes      Specialized Services Documentation  Therapist must complete separate progress note for each specific clinical activity in which the individual participated during the day.     OTHER: This writer was notified that Apurva Dumont left early due to physically feeling unwell.     Case Management Note    ESTHER Yap     Current suicide risk : Low     A case management session is not scheduled today with Apurva Dumont ; additionally, they did not request a CM meeting.  Apurva Dumont is aware of next scheduled 1:1.    Medications changes/added/denied? N/a      Treatment session number: 3    Individual Case Management Visit provided today? No    [facility] follow up physician's orders: None at this time

## 2024-06-03 ENCOUNTER — DOCUMENTATION (OUTPATIENT)
Dept: PSYCHOLOGY | Facility: CLINIC | Age: 24
End: 2024-06-03

## 2024-06-03 ENCOUNTER — APPOINTMENT (OUTPATIENT)
Dept: PSYCHOLOGY | Facility: CLINIC | Age: 24
End: 2024-06-03
Payer: MEDICARE

## 2024-06-03 NOTE — PROGRESS NOTES
Subjective:     Patient ID: Apurva Dumont 24 y.o. Female    [facility] Clinical Progress Notes      Specialized Services Documentation  Therapist must complete separate progress note for each specific clinical activity in which the individual participated during the day.     Case Management     Apurva Dumont called out of program 06/03/24 due to illness. Apurva Dumont is schedule to return on Tuesday 6/4/2024.    Current suicide risk: unable to assess      Medications changes/added/denied? No     Treatment session number: 3     Individual Case Management Visit provided today? No      [facility] follow up physician's orders: Continue to follow orders.

## 2024-06-03 NOTE — PSYCH
Subjective:     Patient ID: Apurva Dumont is a 24 y.o. {Gender:94969}    [facility] Clinical Progress Notes      Specialized Services Documentation  Therapist must complete separate progress note for each specific clinical activity in which the individual participated during the day.     Education Therapy     8359-1216 Apurva Dumont {SL AMB PSYCH ACTIVELY:52932} shared in morning assessment and goal review. Presented as {Readiness to Learin} related to readiness to learn.  Apurva Dumont  {DID/DID NOT:58412} complete goal from last treatment day identifying gaining ***. {DID/DID NOT:25361} present with any barriers to learning.     Tx Plan Objective: 1.1, 1.2, 1.4, Therapist:  ESTHER Yap     Group Psychotherapy - DBT House    {LSTIMES:93370} Apurva Dumont *** participated in the creation of their own “DBT House” activity. The aim of this group is to give group members a visual tool to help Apurva Dumont learn and practice skills their taught in DBT. The DBT House visually represent an emotional landscape and progress so they can better understand their emotions, thoughts, and behaviors.  discussed two important goals of the DBT House:   Enhanced Self-Awareness   Improved Emotional Regulation and Coping skills.    After discussion, the group engaged in a “Color of the Twain” guided meditation to center selves, guide them in giving them power to influence outcomes and build self-confidence. Next,  led them in the build of their DBT House. The DBT House consists of:  Foundation - core values, beliefs, traditions  The Walls - gives structure.  Basement (Level 1) - areas of one's life or behaviors they are trying to gain control over  Level 2 - what emotions and feelings would they like to cultivate more of?  Level 3 - things they feel happy about or want to feel happy about.   Level 4 - what a life worth living looks like  [place]imney - ways in which they blow off  steam.   Billboard - accomplishments, skills or characteristics they want to share with the world/what they are most proud of.     Throughout each build, group facilitator asked group participants to share and discuss.  also discussed various aspects of the activity such as what was the purpose of this activity and what did they learn about themselves? Apurva Dumont ***. *** progress noted towards goals. Continue with integration of skills into Apurva Dumont's daily life to increase personal growth and mental well-being.     Therapist: ESTHER Yap  Tx Plan Objective: 1.1, 1.2, 1.4     Case Management    ***Called/Spoke with Apurva Dumont for case management. ***. ***. ***. Apurva Dumont is aware of next scheduled 1:1 meeting.     Current suicide risk : {CURRENT SUICIDE RISK:49268}     Medications changes/added/denied? ***     Treatment session number: ***     Individual Case Management Visit provided today? Yes      [facility] follow up physician's orders: Continue to follow orders. ***

## 2024-06-03 NOTE — PSYCH
Subjective:    Patient ID: Apurva Dumont is a 24 y.o. female      [facility] Progress Notes      Specialized Services Documentation  Therapist must complete separate progress note for each specific clinical activity in which the individual participated during the day.       Allied Therapy (***) *** verbally engaged and interacted in emotion regulation skills group. Group began by discussing how they currently take care of their physical needs before being introduced to the PLEASE skill. PLEASE stands for treat Physical iLlness, Eat healthy, avoid mood Altering substances, Sleep well, and Exercise. *** was observed engaged in self-reflection worksheet to determine how to apply the PLEASE skill to reduce vulnerability to negative emotions. *** identified *** could improve ***. *** progress displayed towards treatment plan. Continue with group therapy to explore wellness strategies and encourage self-practice. Tx Plan Objective: ***, Therapist:  {THERAPIST:21954}

## 2024-06-04 ENCOUNTER — DOCUMENTATION (OUTPATIENT)
Dept: PSYCHOLOGY | Facility: CLINIC | Age: 24
End: 2024-06-04

## 2024-06-04 NOTE — PROGRESS NOTES
Subjective:     Patient ID: Apurva Dumont 24 y.o. Female    [facility] Progress Notes      Specialized Services Documentation  Therapist must complete separate progress note for each specific clinical activity in which the individual participated during the day.     Case Management     Apurva Dumont No Call No Showed Program for 6/4/2024 Apurva Dumont is schedule to return on 6/5/24. This writer called Apurva Dumont and DUY. Invited call back. Would like to discuss best way to support Apurva Dumont and if thinking about discharge.     Tx plan unable to be updated due to no call no show. Will update on next treatment day     Current suicide risk: unable to assess      Medications changes/added/denied? No     Treatment session number: 3     Individual Case Management Visit provided today? N/A      [facility] follow up physician's orders: Continue to follow orders.

## 2024-06-04 NOTE — PSYCH
Group Psychotherapy      Subjective:     Patient ID: Apurva Dumont 24 y.o.     This group was facilitated in a private office.  (***)Apurva Dumont actively engaged in psychoeducational group about positive self soothing activities. These activities help to self-soothe an individual and decrease decrease feelings of loneliness,anxiety, and boredom. Positive behavior change is focused toward a specified goal. Group explored the identifying factors that create loneliness,anxiety, and boredom. This process can help them to take care of emotional and intellectual moments of anxiety on a short term and long term basis. The group talked about understanding the meaning of self-soothing in regards to physical, intellectual, and emotional expression, regulation , and recognition, and how it affects themselves and others. Teaching on the emphasis of self monitoring and relapse, the group explored who they can go to for help was brought up as well. Group was encouraged to ask questions in an open forum at the end of group. *** progress displayed through engagement in topic.Apurva Dumont will continue to engage in psychotherapy to encourage positive self realization.  Treatment Plan Objective 1.1, 1.2, 1.3, 1.4 Therapist: Jose PLATT Ed.

## 2024-06-05 ENCOUNTER — OFFICE VISIT (OUTPATIENT)
Dept: FAMILY MEDICINE CLINIC | Facility: CLINIC | Age: 24
End: 2024-06-05

## 2024-06-05 ENCOUNTER — APPOINTMENT (OUTPATIENT)
Dept: PSYCHOLOGY | Facility: CLINIC | Age: 24
End: 2024-06-05
Payer: MEDICARE

## 2024-06-05 ENCOUNTER — DOCUMENTATION (OUTPATIENT)
Dept: PSYCHOLOGY | Facility: CLINIC | Age: 24
End: 2024-06-05

## 2024-06-05 VITALS
HEART RATE: 72 BPM | BODY MASS INDEX: 35.7 KG/M2 | RESPIRATION RATE: 18 BRPM | WEIGHT: 201.5 LBS | TEMPERATURE: 98.3 F | OXYGEN SATURATION: 98 % | SYSTOLIC BLOOD PRESSURE: 107 MMHG | HEIGHT: 63 IN | DIASTOLIC BLOOD PRESSURE: 71 MMHG

## 2024-06-05 DIAGNOSIS — R11.10 VOMITING: ICD-10-CM

## 2024-06-05 DIAGNOSIS — J02.9 PHARYNGITIS, UNSPECIFIED ETIOLOGY: ICD-10-CM

## 2024-06-05 DIAGNOSIS — J45.40 MODERATE PERSISTENT ASTHMA, UNSPECIFIED WHETHER COMPLICATED: ICD-10-CM

## 2024-06-05 DIAGNOSIS — J06.9 VIRAL UPPER RESPIRATORY TRACT INFECTION: Primary | ICD-10-CM

## 2024-06-05 DIAGNOSIS — L20.89 OTHER ATOPIC DERMATITIS: ICD-10-CM

## 2024-06-05 DIAGNOSIS — G43.019 INTRACTABLE MIGRAINE WITHOUT AURA AND WITHOUT STATUS MIGRAINOSUS: ICD-10-CM

## 2024-06-05 DIAGNOSIS — R10.11 RIGHT UPPER QUADRANT ABDOMINAL PAIN: ICD-10-CM

## 2024-06-05 PROBLEM — R10.13 EPIGASTRIC PAIN: Status: RESOLVED | Noted: 2019-05-01 | Resolved: 2024-06-05

## 2024-06-05 PROBLEM — R11.2 NAUSEA AND VOMITING: Status: RESOLVED | Noted: 2019-05-01 | Resolved: 2024-06-05

## 2024-06-05 PROBLEM — R10.84 GENERALIZED ABDOMINAL PAIN: Status: RESOLVED | Noted: 2022-11-16 | Resolved: 2024-06-05

## 2024-06-05 LAB — S PYO AG THROAT QL: NEGATIVE

## 2024-06-05 PROCEDURE — 99213 OFFICE O/P EST LOW 20 MIN: CPT | Performed by: FAMILY MEDICINE

## 2024-06-05 PROCEDURE — 87880 STREP A ASSAY W/OPTIC: CPT | Performed by: FAMILY MEDICINE

## 2024-06-05 RX ORDER — PSEUDOEPHEDRINE HCL 30 MG
60 TABLET ORAL EVERY 6 HOURS PRN
Qty: 30 TABLET | Refills: 0 | Status: SHIPPED | OUTPATIENT
Start: 2024-06-05

## 2024-06-05 RX ORDER — TOPIRAMATE 50 MG/1
50 TABLET, FILM COATED ORAL 2 TIMES DAILY
Qty: 180 TABLET | Refills: 1 | Status: SHIPPED | OUTPATIENT
Start: 2024-06-05

## 2024-06-05 RX ORDER — FLUTICASONE PROPIONATE AND SALMETEROL XINAFOATE 115; 21 UG/1; UG/1
2 AEROSOL, METERED RESPIRATORY (INHALATION) 2 TIMES DAILY
Qty: 36 G | Refills: 5 | Status: SHIPPED | OUTPATIENT
Start: 2024-06-05

## 2024-06-05 RX ORDER — MOMETASONE FUROATE 1 MG/G
CREAM TOPICAL DAILY
Qty: 45 G | Refills: 1 | Status: SHIPPED | OUTPATIENT
Start: 2024-06-05

## 2024-06-05 RX ORDER — ALBUTEROL SULFATE 90 UG/1
2 AEROSOL, METERED RESPIRATORY (INHALATION) EVERY 4 HOURS PRN
Qty: 18 G | Refills: 3 | Status: SHIPPED | OUTPATIENT
Start: 2024-06-05

## 2024-06-05 RX ORDER — METOCLOPRAMIDE 10 MG/1
10 TABLET ORAL EVERY 6 HOURS PRN
Qty: 30 TABLET | Refills: 2 | Status: SHIPPED | OUTPATIENT
Start: 2024-06-05

## 2024-06-05 NOTE — PROGRESS NOTES
Subjective:     Patient ID: Apurva Dumont 24 y.o. Female    [facility] Progress Notes      Specialized Services Documentation  Therapist must complete separate progress note for each specific clinical activity in which the individual participated during the day.     Case Management     Apurva Dumont called out of program 06/05/24 due to being sick. She went to the doctor for follow up. Apurva Dumont is schedule to return on Thursday 6/6/2024. CM LVM at 1324.    Tx plan unable to be updated again due to call out. Will update on next treatment day.     Current suicide risk: unable to assess      Medications changes/added/denied? No     Treatment session number: 3     Individual Case Management Visit provided today? No      [facility] follow up physician's orders: Continue to follow orders.

## 2024-06-05 NOTE — LETTER
June 6, 2024     Patient: Apurva Dumont  YOB: 2000  Date of Visit: 6/5/2024      To Whom it May Concern:    Apurva Dumont is under my professional care. Apurva was seen in my office on 6/5/2024. Apurva may return to school on 6/10 .    If you have any questions or concerns, please don't hesitate to call.         Sincerely,          Elias Schoen, MD        CC: No Recipients
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
## 2024-06-05 NOTE — PSYCH
Subjective:    Patient ID: Apurva Dumont is a 24 y.o. female      [facility] Clinical Progress Notes      Specialized Services Documentation  Therapist must complete separate progress note for each specific clinical activity in which the individual participated during the day.       Allied Therapy (***) *** engaged in life skills group focused on increasing self-esteem by presenting oneself in a positive light. Group discussed the value in recognizing and identifying their strengths and assets to compensate for possible deficits. *** then participated in a self-esteem worksheet by responding with a positive statement to finish the phrase “I am someone who” ten times. After all sheets were completed,  read them aloud and the group took turns sharing who they thought paired with each statement. *** shared the difficulty or barrier of writing and talking about positive self-statements. *** effort made towards treatment plan. Continue skills group to enhance self-esteem by recognizing and identifying their strengths.  Tx Plan Objective: ***, Therapist:  {THERAPIST:82587}    Education Therapy   [id number] Apurva Dumont {SL AMB PSYCH ACTIVELY:14958} shared in check in and goal review. Presented as {Readiness to Learin} related to readiness to learn.  Apurva Dumont  {DID/DID NOT:69949} complete goal from last treatment day identifying gaining ***. {DID/DID NOT:72438} present with any barriers to learning.     Tx Plan Objective: ***, Therapist:  {THERAPIST:18309}

## 2024-06-06 ENCOUNTER — DOCUMENTATION (OUTPATIENT)
Dept: PSYCHOLOGY | Facility: CLINIC | Age: 24
End: 2024-06-06

## 2024-06-06 PROBLEM — R10.11 RIGHT UPPER QUADRANT ABDOMINAL PAIN: Status: ACTIVE | Noted: 2024-06-06

## 2024-06-06 NOTE — ASSESSMENT & PLAN NOTE
Likely related to patient's migraines, patient history consistent with IBS.  She notes her symptoms were better controlled when she was on sertraline.  Will consider restarting sertraline at future visits.  For now symptomatic management with Reglan, Zofran, pain medications and control of migraines.    --Patient informed of red flag symptoms and reasons to seek emergent medical care, low suspicion for developing acute abdominal pathology

## 2024-06-06 NOTE — ASSESSMENT & PLAN NOTE
Symptoms consistent with upper respiratory viral infection.  No concerning features at this time.  Supportive care and symptomatic management with medications as below.  - Rapid strep negative  - Follow-up as needed regarding current symptoms

## 2024-06-06 NOTE — PROGRESS NOTES
Subjective:     Patient ID: Apurva Dumont 24 y.o. Female    [facility] Clinical Progress Notes      Specialized Services Documentation  Therapist must complete separate progress note for each specific clinical activity in which the individual participated during the day.     Case Management     Apurva Dumont called out of program 06/06/24 due to illness. Apurva Dumont is schedule to return on Monday 6/10/2024. Apurva Dumont called the office this morning informing the  that the doctor's told her to stay out of program for the remainder of the week and to return on Monday 6/10/2024. This writer called Apurva Dumont at [phone] and LVM. As a team, the team discussed that if Apurva does not return on Monday proceed with discharge. In the Voicemail CM stated such and invited a call back if necessary.    Current suicide risk: unable to assess      Medications changes/added/denied? No     Treatment session number: 3     Individual Case Management Visit provided today? No      [facility] follow up physician's orders: Continue to follow orders.

## 2024-06-07 ENCOUNTER — DOCUMENTATION (OUTPATIENT)
Dept: PSYCHOLOGY | Facility: CLINIC | Age: 24
End: 2024-06-07

## 2024-06-07 NOTE — PROGRESS NOTES
Subjective:     Patient ID: Apurva Dumont 24 y.o. Female     [facility] Clinical Progress Notes      Specialized Services Documentation  Therapist must complete separate progress note for each specific clinical activity in which the individual participated during the day.     Case Management     Apurva Dumont is excused from program 06/07/24 per her doctor due to illness. Apurva Dumont will return to program on Monday 6/10/2024. This writer left a voicemail for Apurva Dumont on Thursday 6/6/2024 and stated that if she does not come in Monday, she we will have to proceed with discharge. Tx Plan unable to be updated due to non-attendance. Tx plan will be updated on next treatment day.     Current Suicide Risk: unable to assess    Treatment Session: 3     Medication changes/added/denied? N/a     Individual Case Management Visit Provided Today? N/a      [facility] follow up physician's orders: None at this time.

## 2024-06-10 ENCOUNTER — DOCUMENTATION (OUTPATIENT)
Dept: PSYCHOLOGY | Facility: CLINIC | Age: 24
End: 2024-06-10

## 2024-06-10 NOTE — PROGRESS NOTES
"Subjective:      Patient ID: Apurva Dumont  is a 24 y.o. female     Assessment/Plan:      Diagnoses and all orders for this visit:     Schizoaffective disorder, depressive type (HCC)     High risk medication use           [facility] Treatment Plan      AREAS OF NEED: Apurva Dumont is experiencing symptoms related to her diagnosis of Schizoaffective Disorder, depressive type as evidenced by depressed mood, sadness, anhedonia, decreased interest, hopelessness, low energy, low motivation, poor concentration, increased appetite, negative thoughts, irritability. She also reports apathy and increased dissociation after the sexual and frequent nightmares     Date Initiated: 05/23/24     Strengths: \"\"I'm really good at art,\" \"I'm there for everyone,\" - Apurva Dumont has also played the [employer] and [employer] \"       LONG TERM GOAL:   Date Initiated: 05/23/24  1.0 While at [facility], I will engage in psychoeducational groups and practice skills that are aimed at improving my mental health, ability to cope and manage emotions, gain insights and skills to increase my quality of life.  Target Date: 06/20/2024   Completion Date: 06/10/2024 - ADMINISTRATIVE DISCHARGE         SHORT TERM OBJECTIVES:      Date Initiated: 05/23/24  1.1 I will engage in trauma psychoeducation and its impact on my overall mental health and well being and implement post traumatic exercises to engage in self-compassion through mindfulness, self-acceptance, and affirmations in order to process and grieve.   Revision Date: 06/10/2024 - ADMINISTRATIVE DISCHARGE   Target Date: 06/04/2024   Completion Date: 06/10/2024 - ADMINISTRATIVE DISCHARGE      Date Initiated: 05/23/24  1.2 I will identify and discuss with my  and during group at least 3 coping skills that I have learned to manage my symptoms and how I plan to build them into my schedule.  Revision Date: 06/10/2024 - ADMINISTRATIVE DISCHARGE   Target Date: 06/04/2024  Completion " Date:06/10/2024 - ADMINISTRATIVE DISCHARGE      Date Initiated: 05/23/24  1.3 I will take medications as prescribed and share questions and concerns if arise.    Revision Date:06/10/2024 - ADMINISTRATIVE DISCHARGE   Target Date: 06/04/2024   Completion Date: 06/10/2024 - ADMINISTRATIVE DISCHARGE      Date Initiated: 05/23/24  1.4 I will identify 3 ways my supports can assist in my wellness journey and use them if/when needed.    Revision Date:06/10/2024 - ADMINISTRATIVE DISCHARGE   Target Date: 06/04/2024   Completion Date:06/10/2024 - ADMINISTRATIVE DISCHARGE             7 DAY REVISION:  N/A - 06/10/2024 - ADMINISTRATIVE DISCHARGE   Date Initiated:  Revision Date:   Target Date:   Completion Date:        PSYCHIATRY:  Date Initiated:  05/23/24  Medication Management and Education      Revision Date:       The person(s) responsible for carrying out the plan is Dr. Colleen Singh MD & KAHLIL Sosa      NURSING/SYMPTOM EDUCATION:  Date Initiated: 05/23/24       1.1, 1.2. 1.3, 1.4 Provide wellness/symptoms and skill education groups three to five days weekly to educate Apurva Dumont on signs and symptoms of diagnoses, skills to manage stressors, and medication questions that will be addressed by the treatment team.        Revision date:NA       The person(s) responsible for carrying out the plan is Cristian Obrien MS & Charito Yancey      PSYCHOLOGY:   Date Initiated: 05/23/24       1.1, 1.2, 1.4 Provide psychotherapy group 5 times per week to allow opportunity for Apurva Dumont  to explore stressors and ways of coping.   Revision Date: NA  The person(s) responsible for carrying out the plan is ESTHER Cody      ALLIED THERAPY:   Date Initiated: 05/23/24  1.1,1.2 Engage Apurva Dumont in AT group 5 times daily to encourage development and use of wellness tools to decrease symptoms and promote recovery through meaningful activity.  Revision Date: NA      The person(s) responsible for  carrying out the plan is KAREN Millan, KAREN Valentin and TIM Gould     CASE MANAGEMENT:   Date Initiated: 05/23/24      1.0 This  will meet with Apurva Dumont  3-4 times weekly to assess treatment progress, discharge planning, connection to community    supports and UR as indicated.  Revision Date: NA  The person(s) responsible for carrying out the plan is ESTHER Yap      TREATMENT REVIEW/COMMENTS: 6/10/2024 - TX PLAN WAS UNABLE TO BE UPDATED DUE TO ATTENDANCE. TX PLAN CLOSED 6/10/2024      DISCHARGE CRITERIA: Identify 3 signs of progress and complete relapse prevention plan.    DISCHARGE PLAN: Connect with identified outpatient providers.   Estimated Length of Stay: 10 treatment days       CLIENT COMMENTS / Please share your thoughts, feelings, need and/or experiences regarding your treatment plan with Staff.  Please see follow up note with comments.        Signatures can be found on [facility] Treatment plan consent form.
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
## 2024-06-10 NOTE — PROGRESS NOTES
DISCHARGE ORDER:   Non-routine, administrative discharge due to attendance  Today, 6/10/2024 - Dr. Colleen Singh MD        Subjective:     Patient ID: Apurva Dumont 24 y.o. Female    [facility] Clinical Progress Notes      Specialized Services Documentation  Therapist must complete separate progress note for each specific clinical activity in which the individual participated during the day.     Case Management     Apurva Dumont called out of program 06/10/24 due to a family emergency. This writer called Apurva and M. Due to attendance issues, Apurva has been non-routinely discharged from [facility] on Monday 6/10/2024. In VM,  stated that she is welcome to come back to [facility] when she is ready. Provided office phone number to re-refer if necessary.. This writer received a call back and answered the phone, but no one spoke on the other line. Phone call hung up.     Due to lack of attendance tx plan was unable to be updated upon first review date and was subsequently unable to be reviewed and updated.     Current suicide risk: unable to assess      Medications changes/added/denied? No     Treatment session number: 3     Individual Case Management Visit provided today? No      [facility] follow up physician's orders: Proceed with Administrative Discharge - Dr. Colleen Singh MD

## 2024-07-02 ENCOUNTER — OFFICE VISIT (OUTPATIENT)
Dept: FAMILY MEDICINE CLINIC | Facility: CLINIC | Age: 24
End: 2024-07-02

## 2024-07-02 VITALS
HEART RATE: 81 BPM | BODY MASS INDEX: 35.22 KG/M2 | WEIGHT: 198.8 LBS | DIASTOLIC BLOOD PRESSURE: 67 MMHG | RESPIRATION RATE: 20 BRPM | OXYGEN SATURATION: 99 % | HEIGHT: 63 IN | TEMPERATURE: 97.8 F | SYSTOLIC BLOOD PRESSURE: 103 MMHG

## 2024-07-02 DIAGNOSIS — F12.10 MARIJUANA ABUSE: ICD-10-CM

## 2024-07-02 DIAGNOSIS — F20.9 SCHIZOPHRENIA, UNSPECIFIED TYPE (HCC): ICD-10-CM

## 2024-07-02 DIAGNOSIS — R56.9 CONVULSIONS, UNSPECIFIED CONVULSION TYPE (HCC): ICD-10-CM

## 2024-07-02 DIAGNOSIS — F31.64 BIPOLAR I DISORDER, MOST RECENT EPISODE MIXED, SEVERE WITH PSYCHOTIC FEATURES (HCC): Chronic | ICD-10-CM

## 2024-07-02 DIAGNOSIS — M54.16 LUMBAR RADICULOPATHY: Primary | ICD-10-CM

## 2024-07-02 DIAGNOSIS — J30.1 SEASONAL ALLERGIC RHINITIS DUE TO POLLEN: ICD-10-CM

## 2024-07-02 DIAGNOSIS — J45.40 MODERATE PERSISTENT ASTHMA WITHOUT COMPLICATION: ICD-10-CM

## 2024-07-02 DIAGNOSIS — F44.5 PSYCHOGENIC NONEPILEPTIC SEIZURE: Chronic | ICD-10-CM

## 2024-07-02 DIAGNOSIS — E66.01 OBESITY, MORBID (HCC): ICD-10-CM

## 2024-07-02 PROBLEM — R39.14 FEELING OF INCOMPLETE BLADDER EMPTYING: Status: RESOLVED | Noted: 2023-03-08 | Resolved: 2024-07-02

## 2024-07-02 PROBLEM — R20.0 FACIAL NUMBNESS: Status: RESOLVED | Noted: 2022-09-02 | Resolved: 2024-07-02

## 2024-07-02 PROCEDURE — 99214 OFFICE O/P EST MOD 30 MIN: CPT | Performed by: FAMILY MEDICINE

## 2024-07-02 PROCEDURE — G2211 COMPLEX E/M VISIT ADD ON: HCPCS | Performed by: FAMILY MEDICINE

## 2024-07-02 RX ORDER — LIDOCAINE 50 MG/G
1 PATCH TOPICAL DAILY
Qty: 10 PATCH | Refills: 0 | Status: SHIPPED | OUTPATIENT
Start: 2024-07-02

## 2024-07-02 RX ORDER — CYCLOBENZAPRINE HCL 10 MG
10 TABLET ORAL 3 TIMES DAILY PRN
Qty: 30 TABLET | Refills: 1 | Status: SHIPPED | OUTPATIENT
Start: 2024-07-02

## 2024-07-02 NOTE — ASSESSMENT & PLAN NOTE
Recommend increasing intake of fruits and vegetables  Recommend exercise 30 minutes 5 days a week
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
## 2024-07-02 NOTE — ASSESSMENT & PLAN NOTE
On waiting list for neuro  - diagnosed at 18  - Was previously on keppra (last used a year ago- could not get refills)  - last event yesterday  -Does not drive
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
## 2024-07-02 NOTE — ASSESSMENT & PLAN NOTE
[facility]: Cancer Care                                                                                          Situation: Chart reviewed by RN Care Coordinator.    Background: Camilla was in clinic today for follow up regarding breast cancer.     Assessment: She is on anastrozole and doing fairly well.     Plan/Recommendations: Continue on medication. Return to clinic in 6 months with a DEXA prior.       Signature:  Deja Odom  RN Care Coordinator  [facility]     Follow-up closely with psychiatry outpatient

## 2024-07-02 NOTE — ASSESSMENT & PLAN NOTE
Chronic back pain  -Ongoing for years  -Back pain flaring up lately  -Hx of disc herniation from prior MRI  -Trial of muscle relaxant, NSAID, Tylenol and topical lidocaine patch  -Referral to pain management

## 2024-07-05 PROBLEM — J06.9 VIRAL UPPER RESPIRATORY TRACT INFECTION: Status: RESOLVED | Noted: 2024-06-05 | Resolved: 2024-07-05

## 2024-07-11 ENCOUNTER — TELEPHONE (OUTPATIENT)
Age: 24
End: 2024-07-11

## 2024-07-11 NOTE — TELEPHONE ENCOUNTER
Pt calling in to schedule appt . Pt no longer seeing psychiatrist who was handling seizure medications. Pt last seen by KENNEDY Jauregui on 8/24/22 whose note states pt should see Dr Nieto next office visit.  Pt states seizures have worsened and are daily now.     Last appt with Dr Nieto was 5/16/22. Dr Nieto schedule has no availability for OVL appts.     Can pt see an epilepsy AP ? Please Contact pt or advise on how / who to schedule this pt with. Thank you.

## 2024-07-26 ENCOUNTER — CONSULT (OUTPATIENT)
Dept: PAIN MEDICINE | Facility: CLINIC | Age: 24
End: 2024-07-26
Payer: MEDICARE

## 2024-07-26 VITALS — BODY MASS INDEX: 35.08 KG/M2 | HEIGHT: 63 IN | WEIGHT: 198 LBS

## 2024-07-26 DIAGNOSIS — M99.03 LUMBAR REGION SOMATIC DYSFUNCTION: ICD-10-CM

## 2024-07-26 DIAGNOSIS — M54.9 DISCOGENIC PAIN: ICD-10-CM

## 2024-07-26 DIAGNOSIS — M54.16 LUMBAR RADICULOPATHY: Primary | ICD-10-CM

## 2024-07-26 PROCEDURE — 99204 OFFICE O/P NEW MOD 45 MIN: CPT | Performed by: ANESTHESIOLOGY

## 2024-07-26 PROCEDURE — G2211 COMPLEX E/M VISIT ADD ON: HCPCS | Performed by: ANESTHESIOLOGY

## 2024-07-26 NOTE — PROGRESS NOTES
Assessment  1. Lumbar radiculopathy    2. Discogenic pain    3. Lumbar region somatic dysfunction      Patient presenting with chronic back pain radiating to the bilateral legs for greater than 1 year, worsening over the past several months.  Pain is consistent with lumbar radicular pain, discogenic pain, lumbar somatic dysfunction accompanied by pain >7/10 on the pain scale with inability to participate in IADLs for >6 weeks. Patient has participated with physical therapy, chiropractic therapy as well as home exercises and stretches.  Patient has tried Tylenol, NSAIDs, lidocaine patches, gabapentin, Flexeril with limited benefit.  Denies any bowel or bladder incontinence, saddle anesthesia.    In regards to the patient's pathology, we discussed the various treatment options including physical therapy, chiropractic treatment, medication management, activity modifications, interventional spine procedures.  Given that patient has not had any benefit with conservative treatments, I think patient would benefit from targeted interventional treatment modalities.    Independently reviewed and interpreted lumbar MRI from 2023 in 2019-this showed mild disc bulging with a small central protrusion at L5-S1.    Plan    Given failure to improve with exhaustive conservative therapy, I offered her a L5-S1 epidural steroid injection.  Patient agreeable to schedule this.  Risks, benefits, and alternatives to epidural steroid injections thoroughly discussed with patient.   Complete risks and benefits including bleeding, infection, tissue reaction, nerve injury and allergic reaction were discussed. The approach was demonstrated using models and literature was provided. Verbal consent was obtained.    Will place referral to chiropractic therapy for additional physical treatment modalities.    Will follow-up approximately 4 weeks after SHAHBAZ.    Reviewed external notes from physical medicine rehabilitation, chiropractic therapy, primary  care physician offices in regards to recent and prior relevant medical histories, treatment recommendations, medication and/or interventional treatment responses.    Reviewed hemoglobin A1c, renal function, CBC and/or PT/INR prior to discussing/offering interventional modalities.    [place] Prescription Drug Monitoring Program report was reviewed and was appropriate     My impressions and treatment recommendations were discussed in detail with the patient who verbalized understanding and had no further questions.  Discharge instructions were provided. I personally saw and examined the patient and I agree with the above discussed plan of care.    Orders Placed This Encounter   Procedures    FL spine and pain procedure     Standing Status:   Future     Standing Expiration Date:   7/26/2028     Order Specific Question:   Reason for Exam:     Answer:   L5- S1 LESI     Order Specific Question:   Is the patient pregnant?     Answer:   No     Order Specific Question:   Anticoagulant hold needed?     Answer:   no    Ambulatory referral to Chiropractic     Standing Status:   Future     Standing Expiration Date:   7/26/2025     Referral Priority:   Routine     Referral Type:   Chiropractic     Referral Reason:   Specialty Services Required     Requested Specialty:   Chiropractic Medicine     Number of Visits Requested:   1     Expiration Date:   7/26/2025     No orders of the defined types were placed in this encounter.      History of Present Illness    Apurva Dumont is a 24 y.o. female presenting for consultation (referred by Dr. Lima) at [facility] for exam and evaluation of chronic back and radiating leg pain for greater than 1 year, worsening over the past several months. Pain started without any precipitating injury or trauma. Over the past month, the intensity of pain has been Moderate. Pain is currently 8/10. Pain does interfere with age appropriate activities of daily living.  Pain is constant, with no typical pattern throughout the day. Pain is described as sharp, throbbing with pins-and-needles.. Patient endorses weakness in the lower extremities. Assistance device used: None.    Pain is increased with standing, bending, sitting, coughing, sneezing, exercise.  Pain is not decreased with any specific position or activity.    Treatments tried:   Heat/ice: yes  TENS: yes  PT: yes  Chiropractic therapy: yes  Injections: no   Previous spine surgery: No    Anticoagulation: no    Medications tried:   Lidocaine patch, Tylenol, gabapentin, Flexeril    I have personally reviewed and/or updated the patient's past medical history, past surgical history, family history, social history, current medications, allergies, and vital signs today.     Review of Systems   Constitutional:  Negative for chills and fever.   HENT:  Negative for ear pain and sore throat.    Eyes:  Negative for pain and visual disturbance.   Respiratory:  Negative for cough and shortness of breath.    Cardiovascular:  Negative for chest pain and palpitations.   Gastrointestinal:  Negative for abdominal pain and vomiting.   Genitourinary:  Negative for dysuria and hematuria.   Musculoskeletal:  Positive for back pain, gait problem and myalgias. Negative for arthralgias.   Skin:  Negative for color change and rash.   Neurological:  Positive for weakness and numbness. Negative for seizures and syncope.   All other systems reviewed and are negative.      Patient Active Problem List   Diagnosis    Allergic rhinitis    Asthma    Common migraine without aura    Cyst of right ovary    Fibrocystic breast    Hyperlipidemia    Vertigo    Lymphadenopathy    Functional neurological symptom disorder with attacks or seizures    Loose stools    Change in bowel habits    Psychogenic nonepileptic seizure    Lumbar radiculopathy    Tobacco abuse    COVID-19 virus infection    Bipolar I disorder, most recent episode mixed, severe with psychotic  features (HCC)    Schizophrenia (HCC)    Pharyngitis    Encounter for autism screening    Hiatal hernia    Bladder wall thickening    Tinnitus of right ear    Current every day nicotine vaping    Urge incontinence    Obesity, morbid (HCC)    Schizoaffective disorder, depressive type (HCC)    High risk medication use    Right upper quadrant abdominal pain    Convulsions, unspecified convulsion type (HCC)    Marijuana abuse       Past Medical History:   Diagnosis Date    Anaphylaxis     apricot    Anxiety     Asthma     Cluster headache     Eczema     Headache, tension-type     Lymphadenitis     Last assessed 12/30/14    Lymphadenopathy     Last assessed 10/16/13    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HCC)     Migraine     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Psychiatric disorder     Psychiatric illness     Psychosis (HCC)     Seizures (HCC)        Past Surgical History:   Procedure Laterality Date    COLONOSCOPY N/A 05/03/2019    Procedure: COLONOSCOPY;  Surgeon: Jean Carlos Hogan MD;  Location: [facility];  Service: Gastroenterology    CYSTOSCOPY  04/04/2023    Dr. Bueno    ESOPHAGOGASTRODUODENOSCOPY N/A 05/03/2019    Procedure: ESOPHAGOGASTRODUODENOSCOPY (EGD);  Surgeon: Jean Carlos Hogan MD;  Location: [facility];  Service: Gastroenterology       Family History   Problem Relation Age of Onset    Alcohol abuse Mother     Migraines Mother     Other Mother         Neck pain    Depression Mother     Drug abuse Mother     Cervical cancer Mother     Drug abuse Father     Schizophrenia Father     No Known Problems Sister     No Known Problems Brother     Ovarian cancer Paternal Aunt     Febrile seizures Maternal Uncle     Stroke Maternal Uncle     Diabetes Maternal Grandfather     Thyroid disease Maternal Grandmother     No Known Problems Paternal Grandfather     No Known Problems Paternal Grandmother     Completed Suicide  Neg Hx        Social History     Occupational History    Occupation: unemployed    Occupation: disable   Tobacco Use     Smoking status: Every Day     Current packs/day: 0.50     Types: Cigarettes    Smokeless tobacco: Never    Tobacco comments:     I stopped smoking cigarettes but started vaping   Vaping Use    Vaping status: Former    Substances: Nicotine, Flavoring   Substance and Sexual Activity    Alcohol use: Yes     Alcohol/week: 2.0 standard drinks of alcohol     Types: 2 Shots of liquor per week     Comment: Occasionally    Drug use: Yes     Frequency: 3.0 times per week     Types: Marijuana     Comment: socially    Sexual activity: Yes     Partners: Female     Birth control/protection: None       Current Outpatient Medications on File Prior to Visit   Medication Sig    albuterol (Ventolin HFA) 90 mcg/act inhaler Inhale 2 puffs every 4 (four) hours as needed for wheezing    ARIPiprazole (ABILIFY) 5 mg tablet Take 1 tablet (5 mg total) by mouth daily    cetirizine (ZyrTEC) 10 mg tablet Take 1 tablet (10 mg total) by mouth daily    cyclobenzaprine (FLEXERIL) 10 mg tablet Take 1 tablet (10 mg total) by mouth 3 (three) times a day as needed for muscle spasms    fluticasone-salmeterol (Advair HFA) 115-21 MCG/ACT inhaler Inhale 2 puffs 2 (two) times a day Rinse mouth after use.    lidocaine (Lidoderm) 5 % Apply 1 patch topically over 12 hours daily Remove & Discard patch within 12 hours or as directed by MD    metoclopramide (Reglan) 10 mg tablet Take 1 tablet (10 mg total) by mouth every 6 (six) hours as needed (vomiting)    mometasone (ELOCON) 0.1 % cream Apply topically daily    Spacer/Aero-Holding Chambers (Vortex Valved Holding Chamber) FROYLAN Use 2 (two) times a day    topiramate (TOPAMAX) 50 MG tablet Take 1 tablet (50 mg total) by mouth 2 (two) times a day    EPINEPHrine (EPIPEN) 0.3 mg/0.3 mL SOAJ Inject 0.3 mL (0.3 mg total) into a muscle once for 1 dose (Patient taking differently: Inject 0.3 mg into a muscle if needed)     No current facility-administered medications on file prior to visit.       Allergies   Allergen  "Reactions    Bee Venom Swelling    Penicillins Hives    Blueberry Flavor - Food Allergy Rash    Pork-Derived Products - Food Allergy Rash    Shiitake Mushroom - Food Allergy Rash       Physical Exam    Ht 5' 3\" (1.6 m)   Wt 89.8 kg (198 lb)   BMI 35.07 kg/m²     Constitutional: normal, well developed, well nourished, alert, in no distress and non-toxic and no overt pain behavior.  Eyes: anicteric  HEENT: grossly intact  Neck: supple, symmetric, trachea midline and no masses   Pulmonary:even and unlabored  Cardiovascular:No edema or pitting edema present  Skin:Normal without rashes or lesions and well hydrated  Psychiatric:Mood and affect appropriate  Neurologic: Motor function is grossly intact with no focal neurologic deficits    Musculoskeletal: Limited lumbar spine range of motion.  Pain elicited with forward flexion.    Imaging  MRI lumbar spine  Procedure: MRI of the lumbar spine was obtained with the following sequences:  Sagittal T1, sagittal T2, sagittal STIR and axial T2 weighted images.    Comparison: X-ray dated 8/3/2022    Findings: For the purposes of this dictation, the lumbar vertebrae are labeled  from a caudal to cranial direction, the first vertebra with lumbar morphology is  labeled as L5.    There is mild levoscoliosis of the lumbar spine. The lumbar lordosis is  preserved. Vertebral body heights are maintained. Marrow signal is unremarkable.  The conus terminates at the L2 level and appears normal in signal on the  sagittal sequences.    L1-L2: No spinal canal or neural foraminal narrowing.    L2-L3 : No spinal canal or neural foraminal narrowing.    L3-L4: Mild disc bulge, without spinal canal or neural foraminal narrowing.    L4-L5: Mild disc bulge without spinal canal or neural foraminal narrowing.    L5-S1: Small central disc protrusion. No spinal canal or neural foraminal  narrowing.  Exam End: 11/10/2     "

## 2024-08-08 ENCOUNTER — HOSPITAL ENCOUNTER (OUTPATIENT)
Dept: RADIOLOGY | Facility: MEDICAL CENTER | Age: 24
End: 2024-08-08
Payer: MEDICARE

## 2024-08-08 VITALS
TEMPERATURE: 97.2 F | HEART RATE: 77 BPM | DIASTOLIC BLOOD PRESSURE: 66 MMHG | SYSTOLIC BLOOD PRESSURE: 97 MMHG | RESPIRATION RATE: 18 BRPM | OXYGEN SATURATION: 99 %

## 2024-08-08 DIAGNOSIS — M54.16 LUMBAR RADICULOPATHY: ICD-10-CM

## 2024-08-08 PROCEDURE — 62323 NJX INTERLAMINAR LMBR/SAC: CPT | Performed by: ANESTHESIOLOGY

## 2024-08-08 RX ORDER — METHYLPREDNISOLONE ACETATE 80 MG/ML
80 INJECTION, SUSPENSION INTRA-ARTICULAR; INTRALESIONAL; INTRAMUSCULAR; PARENTERAL; SOFT TISSUE ONCE
Status: COMPLETED | OUTPATIENT
Start: 2024-08-08 | End: 2024-08-08

## 2024-08-08 RX ORDER — BUPIVACAINE HCL/PF 2.5 MG/ML
1 VIAL (ML) INJECTION ONCE
Status: COMPLETED | OUTPATIENT
Start: 2024-08-08 | End: 2024-08-08

## 2024-08-08 RX ADMIN — METHYLPREDNISOLONE ACETATE 80 MG: 80 INJECTION, SUSPENSION INTRA-ARTICULAR; INTRALESIONAL; INTRAMUSCULAR; PARENTERAL; SOFT TISSUE at 11:20

## 2024-08-08 RX ADMIN — BUPIVACAINE HYDROCHLORIDE 1 ML: 2.5 INJECTION, SOLUTION EPIDURAL; INFILTRATION; INTRACAUDAL at 11:20

## 2024-08-08 RX ADMIN — IOHEXOL 1 ML: 300 INJECTION, SOLUTION INTRAVENOUS at 11:20

## 2024-08-08 NOTE — H&P
History of Present Illness: The patient is a 24 y.o. female who presents with complaints of back and leg pain    Past Medical History:   Diagnosis Date    Anaphylaxis     apricot    Anxiety     Asthma     Cluster headache     Eczema     Headache, tension-type     Lymphadenitis     Last assessed 12/30/14    Lymphadenopathy     Last assessed 10/16/13    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HCC)     Migraine     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Psychiatric disorder     Psychiatric illness     Psychosis (HCC)     Seizures (HCC)        Past Surgical History:   Procedure Laterality Date    COLONOSCOPY N/A 05/03/2019    Procedure: COLONOSCOPY;  Surgeon: Jean Carlos Hogan MD;  [facility];  Service: Gastroenterology    CYSTOSCOPY  04/04/2023    Dr. Bueno    ESOPHAGOGASTRODUODENOSCOPY N/A 05/03/2019    Procedure: ESOPHAGOGASTRODUODENOSCOPY (EGD);  Surgeon: Jean Carlos Hogan MD;  Location:  [facility];  Service: Gastroenterology         Current Outpatient Medications:     albuterol (Ventolin HFA) 90 mcg/act inhaler, Inhale 2 puffs every 4 (four) hours as needed for wheezing, Disp: 18 g, Rfl: 3    ARIPiprazole (ABILIFY) 5 mg tablet, Take 1 tablet (5 mg total) by mouth daily, Disp: 30 tablet, Rfl: 1    cetirizine (ZyrTEC) 10 mg tablet, Take 1 tablet (10 mg total) by mouth daily, Disp: 90 tablet, Rfl: 2    cyclobenzaprine (FLEXERIL) 10 mg tablet, Take 1 tablet (10 mg total) by mouth 3 (three) times a day as needed for muscle spasms, Disp: 30 tablet, Rfl: 1    EPINEPHrine (EPIPEN) 0.3 mg/0.3 mL SOAJ, Inject 0.3 mL (0.3 mg total) into a muscle once for 1 dose (Patient taking differently: Inject 0.3 mg into a muscle if needed), Disp: 4 each, Rfl: 3    fluticasone-salmeterol (Advair HFA) 115-21 MCG/ACT inhaler, Inhale 2 puffs 2 (two) times a day Rinse mouth after use., Disp: 36 g, Rfl: 5    lidocaine (Lidoderm) 5 %, Apply 1 patch topically over 12 hours daily Remove & Discard patch within 12 hours or as directed by MD, Disp: 10 patch, Rfl: 0     metoclopramide (Reglan) 10 mg tablet, Take 1 tablet (10 mg total) by mouth every 6 (six) hours as needed (vomiting), Disp: 30 tablet, Rfl: 2    mometasone (ELOCON) 0.1 % cream, Apply topically daily, Disp: 45 g, Rfl: 1    Spacer/Aero-Holding Chambers (Vortex Valved Holding Chamber) FROYLAN, Use 2 (two) times a day, Disp: 1 each, Rfl: 0    topiramate (TOPAMAX) 50 MG tablet, Take 1 tablet (50 mg total) by mouth 2 (two) times a day, Disp: 180 tablet, Rfl: 1    Current Facility-Administered Medications:     bupivacaine (PF) (MARCAINE) 0.25 % injection 1 mL, 1 mL, Epidural, Once, Will Vishal Cohen MD    iohexol (OMNIPAQUE) 300 mg/mL injection 1 mL, 1 mL, Epidural, Once, Will Vishal Cohen MD    methylPREDNISolone acetate (DEPO-MEDROL) injection 80 mg, 80 mg, Epidural, Once, Will Vishal Cohen MD    Allergies   Allergen Reactions    Bee Venom Swelling    Penicillins Hives    Blueberry Flavor - Food Allergy Rash    Pork-Derived Products - Food Allergy Rash    Shiitake Mushroom - Food Allergy Rash       Physical Exam:   Vitals:    08/08/24 1103   BP: 104/68   Pulse: 76   Resp: 18   Temp: (!) 97.2 °F (36.2 °C)   SpO2: 99%     General: Awake, Alert, Oriented x 3, Mood and affect appropriate  Respiratory: Respirations even and unlabored  Cardiovascular: Peripheral pulses intact; no edema  Musculoskeletal Exam: back and leg pain    ASA Score: 3    Patient/Chart Verification  Patient ID Verified: Verbal  ID Band Applied: No  Consents Confirmed: Procedural, To be obtained in the Pre-Procedure area  H&P( within 30 days) Verified: To be obtained in the Pre-Procedure area  Allergies Reviewed: Yes  Anticoag/NSAID held?: No  Currently on antibiotics?: No  Pregnancy denied?: Yes    Assessment:   1. Lumbar radiculopathy        Plan: L5- S1 LESI

## 2024-08-08 NOTE — DISCHARGE INSTR - LAB
Epidural Steroid Injection   WHAT YOU NEED TO KNOW:   An epidural steroid injection (SHAHBAZ) is a procedure to inject steroid medicine into the epidural space. The epidural space is between your spinal cord and vertebrae. Steroids reduce inflammation and fluid buildup in your spine that may be causing pain. You may be given pain medicine along with the steroids.          ACTIVITY  Do not drive or operate machinery today.  No strenuous activity today - bending, lifting, etc.  You may resume normal activites starting tomorrow - start slowly and as tolerated.  You may shower today, but no tub baths or hot tubs.  You may have numbness for several hours from the local anesthetic. Please use caution and common sense, especially with weight-bearing activities.    CARE OF THE INJECTION SITE  If you have soreness or pain, apply ice to the area today (20 minutes on/20 minutes off).  Starting tomorrow, you may use warm, moist heat or ice if needed.  You may have an increase or change in your discomfort for 36-48 hours after your treatment.  Apply ice and continue with any pain medication you have been prescribed.  Notify the [facility] if you have any of the following: redness, drainage, swelling, headache, stiff neck or fever above 100°F.    SPECIAL INSTRUCTIONS  Our office will contact you in approximately 14 days for a progress report.    MEDICATIONS  Continue to take all routine medications.  Our office may have instructed you to hold some medications.    As no general anesthesia was used in today's procedure, you should not experience any side effects related to anesthesia.     If you are diabetic, the steroids used in today's injection may temporarily increase your blood sugar levels after the first few days after your injection. Please keep a close eye on your sugars and alert the doctor who manages your diabetes if your sugars are significantly high from your baseline or you are symptomatic.     If you have a  problem specifically related to your procedure, please call our office at [phone].  Problems not related to your procedure should be directed to your primary care physician.

## 2024-08-22 ENCOUNTER — TELEPHONE (OUTPATIENT)
Dept: PAIN MEDICINE | Facility: CLINIC | Age: 24
End: 2024-08-22

## 2024-08-26 ENCOUNTER — OFFICE VISIT (OUTPATIENT)
Age: 24
End: 2024-08-26
Payer: MEDICARE

## 2024-08-26 VITALS
HEART RATE: 90 BPM | OXYGEN SATURATION: 97 % | HEIGHT: 63 IN | SYSTOLIC BLOOD PRESSURE: 96 MMHG | BODY MASS INDEX: 35.08 KG/M2 | DIASTOLIC BLOOD PRESSURE: 54 MMHG | WEIGHT: 198 LBS

## 2024-08-26 DIAGNOSIS — M99.03 LUMBAR REGION SOMATIC DYSFUNCTION: ICD-10-CM

## 2024-08-26 DIAGNOSIS — M99.03 SEGMENTAL DYSFUNCTION OF LUMBAR REGION: Primary | ICD-10-CM

## 2024-08-26 DIAGNOSIS — M99.02 SEGMENTAL DYSFUNCTION OF THORACIC REGION: ICD-10-CM

## 2024-08-26 DIAGNOSIS — M54.16 LUMBAR RADICULOPATHY: ICD-10-CM

## 2024-08-26 PROCEDURE — 99202 OFFICE O/P NEW SF 15 MIN: CPT | Performed by: CHIROPRACTOR

## 2024-08-26 PROCEDURE — 98941 CHIROPRACT MANJ 3-4 REGIONS: CPT | Performed by: CHIROPRACTOR

## 2024-08-26 NOTE — PROGRESS NOTES
Initial date of service: 8/26/24    Diagnoses and all orders for this visit:    Segmental dysfunction of lumbar region    Lumbar region somatic dysfunction  -     Ambulatory referral to Chiropractic    Lumbar radiculopathy  -     Ambulatory referral to Chiropractic    Segmental dysfunction of thoracic region       ASSESSMENT:  No red flags, radiculopathy or neurologic deficit appreciated clinically. Pt's symptoms and exam findings consistent with mechanical lbp secondary to repetitive st/sp injury, exacerbated by postural/ergonomic stressors. Pt responded well to flexion biased stretches and manual mobilization of the affected spinal and myofascial tissues with increased ROM; trial of conservative tx recommended consisting of stretching, graded mobilization/manipulation of the affected spinal and myofascial jt dysfunction, postural/ergonomic education and take home stretches/exercises. If symptoms fail to improve with short trial of conservative care, appropriate imaging and referral will be coordinated.  Spent greater than 29 min c pt discussing hx, pe, ddx, tx options and reviewing notes/imaging    PROCEDURE CODES: 07641-IJ, 68021-81    TREATMENT:  Fear avoidance behavior discussion; encouraged and reassured pt that natural course of condition is to improve over time with adherence to tx plan and home care strategies. Home care recommendations: avoid bed rest, walk (but avoid trails and uneven surfaces), gradual return to activity to tolerance (avoid anything that peripheralizes symptoms), call if symptoms peripheralize, worsen, or neurologic deficit progresses. Ther-ex: IASTM; discussed post procedure soreness and/or ecchymosis for up to 36 hrs, applied to affected mm hypertonicities; supine hamstring stretch, supine gluteal stretch, side laying QL stretch, single knee to chest stretch, hip flexor pin-and-stretch, alternating prone hip extension, glute bridge, transitional mvmt education, abdominal bracing;  greater than 15 min spent performing above mentioned ther-ex to improve ROM/flexibility. Thoracic mobilization/manipulation: prone P-A mob; Lumbar mobilization/manipulation: diversified side laying graded HVLA, flexion-traction; SIJ Manipulation/Mobilization: R/L SIJ HVLA - long axis distraction, miller drop table maneuver to affected SIJ    HPI:  Apurva Dumont is a 24 y.o. female  Chief Complaint   Patient presents with   • Back Pain     Lower back pain-9   • Sciatica     The patient presents to the office with lower back pain that has been bothering her for years.  No specific trauma but mention she has seizures regularly of varying severity. At times absent seizures and at times convulsive since she was 17 years old. The patient reports pain whether standing and lying down. Stretches. Used to exercise but the stress of this increased frequency of seizures. Working- not working bc of seizures. 11/10/23- Lumbar MRI- reveals mild disc bulging in lower lumbar spine levels without stenosis. The patient mention she recently has treatment with Dr. Cohen with Pain management with some improvements. Dr. Escobar Cohen referred pt to our office for consult and co-management.       Past Medical History:   Diagnosis Date   • Anaphylaxis     apricot   • Anxiety    • Asthma    • Cluster headache    • Eczema    • Headache, tension-type    • Lymphadenitis     Last assessed 12/30/14   • Lymphadenopathy     Last assessed 10/16/13   • 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HCC)    • Migraine    • 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• Psychiatric disorder    • Psychiatric illness    • Psychosis (HCC)    • Seizures (HCC)       Past Surgical History:   Procedure Laterality Date   • COLONOSCOPY N/A 05/03/2019    Procedure: COLONOSCOPY;  Surgeon: Jean Carlos Hogan MD;  Location: [facility];  Service: Gastroenterology   • CYSTOSCOPY  04/04/2023    Dr. Bueno   • ESOPHAGOGASTRODUODENOSCOPY N/A 05/03/2019    Procedure: ESOPHAGOGASTRODUODENOSCOPY (EGD);  Surgeon: Jean Carlos  MD Edison;  Location: [facility];  Service: Gastroenterology     The following portions of the patient's history were reviewed and updated as appropriate: allergies, past family history, past medical history, past social history, past surgical history, and problem list.  Review of Systems   HENT:  Negative for ear pain, hearing loss, sinus pressure, sinus pain, sore throat and tinnitus.    Respiratory:  Negative for chest tightness, shortness of breath, wheezing and stridor.      Physical Exam  Constitutional:       General: She is not in acute distress.     Appearance: Normal appearance.   HENT:      Head: Normocephalic.      Mouth/Throat:      Mouth: Mucous membranes are moist.   Eyes:      Extraocular Movements: Extraocular movements intact.      Conjunctiva/sclera: Conjunctivae normal.      Pupils: Pupils are equal, round, and reactive to light.   Neck:      Vascular: No carotid bruit.   Pulmonary:      Effort: Pulmonary effort is normal.   Chest:      Chest wall: No tenderness.   Abdominal:      General: Abdomen is flat.      Palpations: Abdomen is soft.   Musculoskeletal:         General: Tenderness present. No swelling, deformity or signs of injury. Normal range of motion.        Arms:       Cervical back: Normal range of motion. No rigidity or tenderness.      Right lower leg: No edema.      Left lower leg: No edema.        Legs:    Lymphadenopathy:      Cervical: No cervical adenopathy.   Skin:     General: Skin is warm.      Coloration: Skin is not jaundiced or pale.      Findings: No bruising or erythema.   Neurological:      Mental Status: She is alert and oriented to person, place, and time.      Cranial Nerves: No cranial nerve deficit.      Sensory: No sensory deficit.      Motor: No weakness.      Gait: Gait is intact.      Deep Tendon Reflexes: Reflexes are normal and symmetric.   Psychiatric:         Attention and Perception: Attention normal.         Mood and Affect: Mood and affect normal.          Speech: Speech normal.         Behavior: Behavior normal. Behavior is cooperative.         Thought Content: Thought content normal.         Cognition and Memory: Cognition normal.         Judgment: Judgment normal.       SOFT TISSUE ASSESSMENT Hypertonicity and tenderness palpated B T10-S1 erector spinae, hip flexor, glute med/min, QL, hamstring JOINT RESTRICTIONS: T10-S1 and R/L SIJ ORTHO: SI jt point tenderness: +; Myah unremarkable for centralization/peripheralization; hai's, iliac compression, thigh thrust elicit lbp in R/L SIJ; prone femoral nerve stretch neg for upper lumbar neural tension, elicits R/L SIJ stiffness; sitting root elicits no lbp on R/L; slump test elicits no neural tension R/L    Return in about 1 week (around 9/2/2024) for Recheck.

## 2024-09-13 ENCOUNTER — OFFICE VISIT (OUTPATIENT)
Dept: PAIN MEDICINE | Facility: CLINIC | Age: 24
End: 2024-09-13
Payer: MEDICARE

## 2024-09-13 ENCOUNTER — PROCEDURE VISIT (OUTPATIENT)
Age: 24
End: 2024-09-13
Payer: MEDICARE

## 2024-09-13 VITALS
HEART RATE: 85 BPM | HEIGHT: 63 IN | DIASTOLIC BLOOD PRESSURE: 58 MMHG | OXYGEN SATURATION: 97 % | BODY MASS INDEX: 35.08 KG/M2 | WEIGHT: 198 LBS | SYSTOLIC BLOOD PRESSURE: 98 MMHG

## 2024-09-13 VITALS — BODY MASS INDEX: 35.08 KG/M2 | WEIGHT: 198 LBS | HEIGHT: 63 IN

## 2024-09-13 DIAGNOSIS — M54.16 LUMBAR RADICULOPATHY: ICD-10-CM

## 2024-09-13 DIAGNOSIS — M79.18 MYOFASCIAL PAIN SYNDROME: Primary | ICD-10-CM

## 2024-09-13 DIAGNOSIS — M99.03 LUMBAR REGION SOMATIC DYSFUNCTION: Primary | ICD-10-CM

## 2024-09-13 DIAGNOSIS — M99.03 SEGMENTAL DYSFUNCTION OF LUMBAR REGION: ICD-10-CM

## 2024-09-13 DIAGNOSIS — M99.02 SEGMENTAL DYSFUNCTION OF THORACIC REGION: ICD-10-CM

## 2024-09-13 DIAGNOSIS — M54.16 LUMBAR RADICULITIS: ICD-10-CM

## 2024-09-13 PROCEDURE — G2211 COMPLEX E/M VISIT ADD ON: HCPCS | Performed by: ANESTHESIOLOGY

## 2024-09-13 PROCEDURE — 98941 CHIROPRACT MANJ 3-4 REGIONS: CPT | Performed by: CHIROPRACTOR

## 2024-09-13 PROCEDURE — 99214 OFFICE O/P EST MOD 30 MIN: CPT | Performed by: ANESTHESIOLOGY

## 2024-09-13 NOTE — PROGRESS NOTES
Initial date of service: 8/26/24    Diagnoses and all orders for this visit:    Lumbar region somatic dysfunction    Lumbar radiculopathy    Segmental dysfunction of lumbar region    Segmental dysfunction of thoracic region       ASSESSMENT:  No red flags, radiculopathy or neurologic deficit appreciated clinically. Pt's symptoms and exam findings consistent with mechanical lbp secondary to repetitive st/sp injury, exacerbated by postural/ergonomic stressors. Pt responded well to flexion biased stretches and manual mobilization of the affected spinal and myofascial tissues with increased ROM; trial of conservative tx recommended consisting of stretching, graded mobilization/manipulation of the affected spinal and myofascial jt dysfunction, postural/ergonomic education and take home stretches/exercises. If symptoms fail to improve with short trial of conservative care, appropriate imaging and referral will be coordinated.  - The patient tolerated treatment well    PROCEDURE CODES: [id number]    TREATMENT:  Fear avoidance behavior discussion; encouraged and reassured pt that natural course of condition is to improve over time with adherence to tx plan and home care strategies. Home care recommendations: avoid bed rest, walk (but avoid trails and uneven surfaces), gradual return to activity to tolerance (avoid anything that peripheralizes symptoms), call if symptoms peripheralize, worsen, or neurologic deficit progresses. Ther-ex: IASTM; discussed post procedure soreness and/or ecchymosis for up to 36 hrs, applied to affected mm hypertonicities; supine hamstring stretch, supine gluteal stretch, side laying QL stretch, single knee to chest stretch, hip flexor pin-and-stretch, alternating prone hip extension, glute bridge, transitional mvmt education, abdominal bracing; greater than 15 min spent performing above mentioned ther-ex to improve ROM/flexibility. Thoracic mobilization/manipulation: prone P-A mob; Lumbar  mobilization/manipulation: diversified side laying graded HVLA, flexion-traction; SIJ Manipulation/Mobilization: R/L SIJ HVLA - long axis distraction, miller drop table maneuver to affected SIJ    HPI:  Apurva Dumont is a 24 y.o. female  Chief Complaint   Patient presents with    Back Pain     Lower back pain-7    Sciatica     The patient presents to the office with lower back pain that has been bothering her for years.  No specific trauma but mention she has seizures regularly of varying severity. At times absent seizures and at times convulsive since she was 17 years old. The patient reports pain whether standing and lying down. Stretches. Used to exercise but the stress of this increased frequency of seizures. Working- not working bc of seizures. 11/10/23- Lumbar MRI- reveals mild disc bulging in lower lumbar spine levels without stenosis. The patient mention she recently has treatment with Dr. Cohen with Pain management with some improvements. Dr. Escobar Cohen referred pt to our office for consult and co-management.   9/13- The patient is feeling a little better for a few days but then tightened up again.  7/10 today    Back Pain      Past Medical History:   Diagnosis Date    Anaphylaxis     apricot    Anxiety     Asthma     Cluster headache     Eczema     Headache, tension-type     Lymphadenitis     Last assessed 12/30/14    Lymphadenopathy     Last assessed 10/16/13    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HCC)     Migraine     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Psychiatric disorder     Psychiatric illness     Psychosis (HCC)     Seizures (HCC)       Past Surgical History:   Procedure Laterality Date    COLONOSCOPY N/A 05/03/2019    Procedure: COLONOSCOPY;  Surgeon: Jean Carlos Hogan MD;  Location: [facility];  Service: Gastroenterology    CYSTOSCOPY  04/04/2023    Dr. Bueno    ESOPHAGOGASTRODUODENOSCOPY N/A 05/03/2019    Procedure: ESOPHAGOGASTRODUODENOSCOPY (EGD);  Surgeon: Jean Carlos Hogan MD;  Location: [facility];  Service:  Gastroenterology     The following portions of the patient's history were reviewed and updated as appropriate: allergies, past family history, past medical history, past social history, past surgical history, and problem list.  Review of Systems  Physical Exam  Constitutional:       Appearance: Normal appearance.   HENT:      Head: Normocephalic.      Mouth/Throat:      Mouth: Mucous membranes are moist.   Eyes:      Extraocular Movements: Extraocular movements intact.      Conjunctiva/sclera: Conjunctivae normal.      Pupils: Pupils are equal, round, and reactive to light.   Musculoskeletal:         General: Tenderness present. Normal range of motion.        Arms:       Cervical back: Normal range of motion.        Legs:    Skin:     Findings: No bruising.   Neurological:      Mental Status: She is alert and oriented to person, place, and time.      Gait: Gait is intact.      Deep Tendon Reflexes: Reflexes are normal and symmetric.   Psychiatric:         Attention and Perception: Attention normal.         Mood and Affect: Affect normal.         Speech: Speech normal.         Behavior: Behavior is cooperative.         Cognition and Memory: Cognition normal.       SOFT TISSUE ASSESSMENT Hypertonicity and tenderness palpated B T10-S1 erector spinae, hip flexor, glute med/min, QL, hamstring JOINT RESTRICTIONS: T10-S1 and R/L SIJ ORTHO: SI jt point tenderness: +; Myah unremarkable for centralization/peripheralization; hai's, iliac compression, thigh thrust elicit lbp in R/L SIJ; prone femoral nerve stretch neg for upper lumbar neural tension, elicits R/L SIJ stiffness; sitting root elicits no lbp on R/L; slump test elicits no neural tension R/L    Return in about 1 week (around 9/20/2024) for Recheck.

## 2024-09-13 NOTE — PROGRESS NOTES
Assessment:  1. Myofascial pain syndrome    2. Lumbar radiculitis         Patient presenting for follow-up visit.  She has a history of chronic back pain radiating to the bilateral legs for greater than 1 year, worsening over the past several months.    Pain is consistent with lumbar radicular pain, discogenic pain, lumbar somatic dysfunction accompanied by pain >7/10 on the pain scale with inability to participate in IADLs for >6 weeks. Patient has participated with physical therapy, chiropractic therapy as well as home exercises and stretches.  Patient has tried Tylenol, NSAIDs, lidocaine patches, gabapentin, Flexeril with limited benefit.  Denies any bowel or bladder incontinence, saddle anesthesia.      Independently reviewed and interpreted lumbar MRI from 2023 in 2019-this showed mild disc bulging with a small central protrusion at L5-S1.     Plan     Given failure to improve with exhaustive conservative therapy, I offered her a L5-S1 epidural steroid injection.  This was performed on 8/8/2024 with significant improvement of her back and radiating leg pain symptoms.    Going forward discussed repeating L5-S1 LESI when symptoms return to a significant degree.    She does still have significant symptoms that are more consistent with myofascial pain syndrome, recommended to continue physical treatment modalities with chiropractic/physical therapy.    Will switch her muscle relaxant to tizanidine 4 mg twice daily as needed for muscle spasms.    Risks, benefits, and alternatives to epidural steroid injections discussed with patient.     Reviewed external notes from physical medicine rehabilitation, chiropractic therapy, primary care physician offices in regards to recent and prior relevant medical histories, treatment recommendations, medication and/or interventional treatment responses.     Reviewed hemoglobin A1c, renal function, CBC and/or PT/INR prior to discussing/offering interventional modalities.     My  impressions and treatment recommendations were discussed in detail with the patient who verbalized understanding and had no further questions.  Discharge instructions were provided. I personally saw and examined the patient and I agree with the above discussed plan of care.    No orders of the defined types were placed in this encounter.    New Medications Ordered This Visit   Medications    tiZANidine (ZANAFLEX) 4 mg tablet     Sig: Take 1 tablet (4 mg total) by mouth 2 (two) times a day as needed for muscle spasms     Dispense:  60 tablet     Refill:  0       History of Present Illness:  Apurva Dumont is a 24 y.o. female who presents for a follow up office visit in regards to Back Pain.   The patient’s current symptoms include continued back pain that radiates to the lower extremities. Pain is rated 4-7/10 at times and described as a constant throbbing, cramping, pressure-like, shooting pain with numbness and pins/needles throughout the entire day.    I have personally reviewed and/or updated the patient's past medical history, past surgical history, family history, social history, current medications, allergies, and vital signs today.     Review of Systems   Constitutional:  Negative for chills and fever.   HENT:  Negative for ear pain and sore throat.    Eyes:  Negative for pain and visual disturbance.   Respiratory:  Negative for cough and shortness of breath.    Cardiovascular:  Negative for chest pain and palpitations.   Gastrointestinal:  Negative for abdominal pain and vomiting.   Genitourinary:  Negative for dysuria and hematuria.   Musculoskeletal:  Positive for back pain, gait problem and myalgias. Negative for arthralgias.   Skin:  Negative for color change and rash.   Neurological:  Positive for weakness and numbness. Negative for seizures and syncope.   All other systems reviewed and are negative.      Patient Active Problem List   Diagnosis    Allergic rhinitis    Asthma    Common migraine without  aura    Cyst of right ovary    Fibrocystic breast    Hyperlipidemia    Vertigo    Lymphadenopathy    Functional neurological symptom disorder with attacks or seizures    Loose stools    Change in bowel habits    Psychogenic nonepileptic seizure    Lumbar radiculopathy    Tobacco abuse    COVID-19 virus infection    Bipolar I disorder, most recent episode mixed, severe with psychotic features (HCC)    Schizophrenia (HCC)    Pharyngitis    Encounter for autism screening    Hiatal hernia    Bladder wall thickening    Tinnitus of right ear    Current every day nicotine vaping    Urge incontinence    Obesity, morbid (HCC)    Schizoaffective disorder, depressive type ([facility])    High risk medication use    Right upper quadrant abdominal pain    Convulsions, unspecified convulsion type (HCC)    Marijuana abuse       Past Medical History:   Diagnosis Date    Anaphylaxis     apricot    Anxiety     Asthma     Cluster headache     Eczema     Headache, tension-type     Lymphadenitis     Last assessed 12/30/14    Lymphadenopathy     Last assessed 10/16/13    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[facility])     Migraine     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Psychiatric disorder     Psychiatric illness     Psychosis (HCC)     Seizures ([facility])        Past Surgical History:   Procedure Laterality Date    COLONOSCOPY N/A 05/03/2019    Procedure: COLONOSCOPY;  Surgeon: Jean Carlos Hogan MD;  Location: [facility];  Service: Gastroenterology    CYSTOSCOPY  04/04/2023    Dr. Bueno    ESOPHAGOGASTRODUODENOSCOPY N/A 05/03/2019    Procedure: ESOPHAGOGASTRODUODENOSCOPY (EGD);  Surgeon: Jean Carlos Hogan MD;  Location: [facility];  Service: Gastroenterology       Family History   Problem Relation Age of Onset    Alcohol abuse Mother     Migraines Mother     Other Mother         Neck pain    Depression Mother     Drug abuse Mother     Cervical cancer Mother     Drug abuse Father     Schizophrenia Father     No Known Problems Sister     No Known Problems Brother     Ovarian cancer  Paternal Aunt     Febrile seizures Maternal Uncle     Stroke Maternal Uncle     Diabetes Maternal Grandfather     Thyroid disease Maternal Grandmother     No Known Problems Paternal Grandfather     No Known Problems Paternal Grandmother     Completed Suicide  Neg Hx        Social History     Occupational History    Occupation: unemployed    Occupation: disable   Tobacco Use    Smoking status: Every Day     Current packs/day: 0.50     Types: Cigarettes    Smokeless tobacco: Never    Tobacco comments:     I stopped smoking cigarettes but started vaping   Vaping Use    Vaping status: Former    Substances: Nicotine, Flavoring   Substance and Sexual Activity    Alcohol use: Yes     Alcohol/week: 2.0 standard drinks of alcohol     Types: 2 Shots of liquor per week     Comment: Occasionally    Drug use: Yes     Frequency: 3.0 times per week     Types: Marijuana     Comment: socially    Sexual activity: Yes     Partners: Female     Birth control/protection: None       Current Outpatient Medications on File Prior to Visit   Medication Sig    albuterol (Ventolin HFA) 90 mcg/act inhaler Inhale 2 puffs every 4 (four) hours as needed for wheezing    ARIPiprazole (ABILIFY) 5 mg tablet Take 1 tablet (5 mg total) by mouth daily    cetirizine (ZyrTEC) 10 mg tablet Take 1 tablet (10 mg total) by mouth daily    fluticasone-salmeterol (Advair HFA) 115-21 MCG/ACT inhaler Inhale 2 puffs 2 (two) times a day Rinse mouth after use.    lidocaine (Lidoderm) 5 % Apply 1 patch topically over 12 hours daily Remove & Discard patch within 12 hours or as directed by MD    metoclopramide (Reglan) 10 mg tablet Take 1 tablet (10 mg total) by mouth every 6 (six) hours as needed (vomiting)    mometasone (ELOCON) 0.1 % cream Apply topically daily    Spacer/Aero-Holding Chambers (Vortex Valved Holding Chamber) FROYLAN Use 2 (two) times a day    topiramate (TOPAMAX) 50 MG tablet Take 1 tablet (50 mg total) by mouth 2 (two) times a day    [DISCONTINUED]  "cyclobenzaprine (FLEXERIL) 10 mg tablet Take 1 tablet (10 mg total) by mouth 3 (three) times a day as needed for muscle spasms    EPINEPHrine (EPIPEN) 0.3 mg/0.3 mL SOAJ Inject 0.3 mL (0.3 mg total) into a muscle once for 1 dose (Patient taking differently: Inject 0.3 mg into a muscle if needed)     No current facility-administered medications on file prior to visit.       Allergies   Allergen Reactions    Bee Venom Swelling    Penicillins Hives    Blueberry Flavor - Food Allergy Rash    Pork-Derived Products - Food Allergy Rash    Shiitake Mushroom - Food Allergy Rash       Physical Exam:    Ht 5' 3\" (1.6 m)   Wt 89.8 kg (198 lb)   BMI 35.07 kg/m²     Constitutional:normal, well developed, well nourished, alert, in no distress and non-toxic and no overt pain behavior.  Eyes:anicteric  HEENT:grossly intact  Neck:supple, symmetric, trachea midline and no masses   Pulmonary:even and unlabored  Cardiovascular:No edema or pitting edema present  Skin:Normal without rashes or lesions and well hydrated  Psychiatric:Mood and affect appropriate  Neurologic: Motor function is grossly intact with no focal neurologic deficits   Musculoskeletal: nonantalgic gait. Tenderness in the bilateral lumbar paraspinal muscles    Imaging    "

## 2024-09-26 ENCOUNTER — RA CDI HCC (OUTPATIENT)
Dept: OTHER | Facility: HOSPITAL | Age: 24
End: 2024-09-26

## 2024-09-26 ENCOUNTER — OFFICE VISIT (OUTPATIENT)
Dept: FAMILY MEDICINE CLINIC | Facility: CLINIC | Age: 24
End: 2024-09-26

## 2024-09-26 VITALS
RESPIRATION RATE: 16 BRPM | OXYGEN SATURATION: 95 % | SYSTOLIC BLOOD PRESSURE: 118 MMHG | TEMPERATURE: 98 F | DIASTOLIC BLOOD PRESSURE: 60 MMHG | BODY MASS INDEX: 35.61 KG/M2 | WEIGHT: 201 LBS | HEART RATE: 87 BPM | HEIGHT: 63 IN

## 2024-09-26 DIAGNOSIS — M79.604 RIGHT LEG PAIN: ICD-10-CM

## 2024-09-26 DIAGNOSIS — G43.A0 CYCLICAL VOMITING ASSOCIATED WITH NONINTRACTABLE MIGRAINE: ICD-10-CM

## 2024-09-26 DIAGNOSIS — T78.2XXA ANAPHYLACTIC REACTION TO BEE STING, ACCIDENTAL OR UNINTENTIONAL, INITIAL ENCOUNTER: ICD-10-CM

## 2024-09-26 DIAGNOSIS — F25.1 SCHIZOAFFECTIVE DISORDER, DEPRESSIVE TYPE (HCC): ICD-10-CM

## 2024-09-26 DIAGNOSIS — Z23 ENCOUNTER FOR IMMUNIZATION: ICD-10-CM

## 2024-09-26 DIAGNOSIS — J45.40 MODERATE PERSISTENT ASTHMA, UNSPECIFIED WHETHER COMPLICATED: ICD-10-CM

## 2024-09-26 DIAGNOSIS — J02.9 PHARYNGITIS, UNSPECIFIED ETIOLOGY: ICD-10-CM

## 2024-09-26 DIAGNOSIS — T63.441A ANAPHYLACTIC REACTION TO BEE STING, ACCIDENTAL OR UNINTENTIONAL, INITIAL ENCOUNTER: ICD-10-CM

## 2024-09-26 DIAGNOSIS — R10.84 GENERALIZED ABDOMINAL PAIN: ICD-10-CM

## 2024-09-26 DIAGNOSIS — Z72.0 TOBACCO ABUSE: ICD-10-CM

## 2024-09-26 DIAGNOSIS — M54.16 LUMBAR RADICULOPATHY: Primary | ICD-10-CM

## 2024-09-26 DIAGNOSIS — G43.019 INTRACTABLE MIGRAINE WITHOUT AURA AND WITHOUT STATUS MIGRAINOSUS: ICD-10-CM

## 2024-09-26 DIAGNOSIS — F44.5 PSYCHOGENIC NONEPILEPTIC SEIZURE: Chronic | ICD-10-CM

## 2024-09-26 LAB
S PYO AG THROAT QL: NEGATIVE
SARS-COV-2 AG UPPER RESP QL IA: NEGATIVE
VALID CONTROL: NORMAL

## 2024-09-26 PROCEDURE — G0009 ADMIN PNEUMOCOCCAL VACCINE: HCPCS | Performed by: FAMILY MEDICINE

## 2024-09-26 PROCEDURE — G0438 PPPS, INITIAL VISIT: HCPCS | Performed by: FAMILY MEDICINE

## 2024-09-26 PROCEDURE — 87880 STREP A ASSAY W/OPTIC: CPT | Performed by: FAMILY MEDICINE

## 2024-09-26 PROCEDURE — 90673 RIV3 VACCINE NO PRESERV IM: CPT | Performed by: FAMILY MEDICINE

## 2024-09-26 PROCEDURE — 87811 SARS-COV-2 COVID19 W/OPTIC: CPT | Performed by: FAMILY MEDICINE

## 2024-09-26 PROCEDURE — G0008 ADMIN INFLUENZA VIRUS VAC: HCPCS | Performed by: FAMILY MEDICINE

## 2024-09-26 PROCEDURE — 90677 PCV20 VACCINE IM: CPT | Performed by: FAMILY MEDICINE

## 2024-09-26 PROCEDURE — 99214 OFFICE O/P EST MOD 30 MIN: CPT | Performed by: FAMILY MEDICINE

## 2024-09-26 RX ORDER — ALBUTEROL SULFATE 0.83 MG/ML
2.5 SOLUTION RESPIRATORY (INHALATION) EVERY 6 HOURS PRN
Qty: 180 ML | Refills: 5 | Status: SHIPPED | OUTPATIENT
Start: 2024-09-26

## 2024-09-26 RX ORDER — EPINEPHRINE 0.3 MG/.3ML
0.3 INJECTION SUBCUTANEOUS ONCE
Qty: 0.6 ML | Refills: 1 | Status: SHIPPED | OUTPATIENT
Start: 2024-09-26 | End: 2024-09-27

## 2024-09-26 RX ORDER — METOCLOPRAMIDE 10 MG/1
10 TABLET ORAL EVERY 6 HOURS PRN
Qty: 30 TABLET | Refills: 2 | Status: SHIPPED | OUTPATIENT
Start: 2024-09-26

## 2024-09-26 RX ORDER — DICYCLOMINE HCL 20 MG
20 TABLET ORAL EVERY 6 HOURS PRN
Qty: 30 TABLET | Refills: 1 | Status: SHIPPED | OUTPATIENT
Start: 2024-09-26

## 2024-09-26 RX ORDER — FLUTICASONE PROPIONATE AND SALMETEROL XINAFOATE 115; 21 UG/1; UG/1
2 AEROSOL, METERED RESPIRATORY (INHALATION) 2 TIMES DAILY
Qty: 36 G | Refills: 5 | Status: SHIPPED | OUTPATIENT
Start: 2024-09-26

## 2024-09-26 RX ORDER — ALBUTEROL SULFATE 90 UG/1
2 INHALANT RESPIRATORY (INHALATION) EVERY 4 HOURS PRN
Qty: 18 G | Refills: 3 | Status: SHIPPED | OUTPATIENT
Start: 2024-09-26

## 2024-09-26 NOTE — ASSESSMENT & PLAN NOTE
A few day onset of of viral upper respiratory symptoms  Rapid COVID and strep negative  Recommend supportive care  Orders:    POCT Rapid Covid Ag    POCT rapid ANTIGEN strepA

## 2024-09-26 NOTE — Clinical Note
Patient has established care with GI. She has not been seen in 2 years. She has been struggling to get appointment with GI. Please help patient with appointment.

## 2024-09-26 NOTE — PROGRESS NOTES
Ambulatory Visit  Name: Apurva Dumont      : 2000      MRN: [id number]  Encounter Provider: Tejas Lima MD  Encounter Date: 2024   Encounter department: [facility]    Assessment & Plan  Lumbar radiculopathy  Stable  Continue Zanaflex as needed  Follow-up with chiropractor and pain management         Psychogenic nonepileptic seizure  Patient experiencing almost daily epileptic event  Last seizure episode was yesterday  Patient does not drive  Seizure precautions discussed with patient  Follow-up with neurology tomorrow           Schizoaffective disorder, depressive type (HCC)  She was previously on Abilify.  She is currently not on any psychotropic medication  She is on a waiting list for psychiatry.  Recommend outpatient psychotherapy    Depression Screening Follow-up Plan: Patient's depression screening was positive with a PHQ-2 score of 4. Their PHQ-9 score was 17. Patient assessed for underlying major depression. They have no active suicidal ideations. Brief counseling provided and recommend additional follow-up/re-evaluation next office visit.  On waiting list for mental health therapy         Tobacco abuse  Smokes cigarettes  Smoke cessation counseling provided       Cyclical vomiting associated with nonintractable migraine    Orders:    metoclopramide (Reglan) 10 mg tablet; Take 1 tablet (10 mg total) by mouth every 6 (six) hours as needed (vomiting)    Comprehensive metabolic panel; Future    Intractable migraine without aura and without status migrainosus    Orders:    metoclopramide (Reglan) 10 mg tablet; Take 1 tablet (10 mg total) by mouth every 6 (six) hours as needed (vomiting)    Moderate persistent asthma, unspecified whether complicated  Stable  Not in acute exacerbation  Up-to-date with influenza and pneumococcal vaccination  Continue albuterol as needed and Advair  Orders:    EPINEPHrine (EPIPEN) 0.3 mg/0.3 mL SOAJ; Inject 0.3 mL (0.3  mg total) into a muscle once for 1 dose    albuterol (Ventolin HFA) 90 mcg/act inhaler; Inhale 2 puffs every 4 (four) hours as needed for wheezing    fluticasone-salmeterol (Advair HFA) 115-21 MCG/ACT inhaler; Inhale 2 puffs 2 (two) times a day Rinse mouth after use.    albuterol (2.5 mg/3 mL) 0.083 % nebulizer solution; Take 3 mL (2.5 mg total) by nebulization every 6 (six) hours as needed for wheezing or shortness of breath    Anaphylactic reaction to bee sting, accidental or unintentional, initial encounter  History of anaphylaxis reaction to bee stings and penicillin  Prescription sent for EpiPen  Orders:    EPINEPHrine (EPIPEN) 0.3 mg/0.3 mL SOAJ; Inject 0.3 mL (0.3 mg total) into a muscle once for 1 dose    Generalized abdominal pain  Chronic generalized abdominal pain associated with nausea and vomiting  Last EGD in 2022 was unremarkable other than esophagitis  Right upper quadrant ultrasound negative for gallstone  Differentials include gastritis, IBS, gastroparesis, cyclic vomiting syndrome and functional dyspepsia  Recommend outpatient follow-up with GI(message sent to the referral specialist to help patient with GI appointment)    Orders:    dicyclomine (BENTYL) 20 mg tablet; Take 1 tablet (20 mg total) by mouth every 6 (six) hours as needed (stomach cramps)    CBC and differential; Future    Comprehensive metabolic panel; Future    Right leg pain  Chronic intermittent right leg pain  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  She declined surgical intervention many years ago  Orders:    XR tibia fibula 2 vw right; Future    Encounter for immunization    Orders:    Pneumococcal Conjugate Vaccine 20-valent (Pcv20)    influenza vaccine, recombinant, PF, 0.5 mL IM (Flublok)    Pharyngitis, unspecified etiology  A few day onset of of viral upper respiratory symptoms  Rapid COVID and strep negative  Recommend supportive care  Orders:    POCT Rapid Covid Ag    POCT rapid ANTIGEN strepA       Preventive  health issues were discussed with patient, and age appropriate screening tests were ordered as noted in patient's After Visit Summary. Personalized health advice and appropriate referrals for health education or preventive services given if needed, as noted in patient's After Visit Summary.    History of Present Illness     24-year-old female with a history of psychogenic nonepileptic seizure, asthma, and tobacco abuse who presents today for annual wellness visit.  Patient reports a few day onset of sore throat.  She denies any cough.  She had fever a couple days ago that has not resolved.  She has pain when she swallows food.  Patient reports history of recurrent seizure.  Her last seizure event was yesterday.  She experiences seizures quite frequently.  She is not on antiseizure medication.  She is on a waiting list to see a psychiatrist.  She has appointment to see neurology soon.  She has a history of chronic intermittent abdominal discomfort.  Her symptoms are associated with intermittent episodes of nonbilious and nonbloody emesis.  She is lost to follow-up with GI.  She has been struggling to get an appointment to see her GI doctor.       Patient Care Team:  Stacey Alatorre MD as PCP - General (Family Medicine)  Blanca Kaiser MD as PCP - PCP-[facility] ([facility])  Blanca Kaiser MD as PCP - PCP-[employer] ([facility])  Jean Carlos Hogan MD as Endoscopist    Review of Systems   Constitutional:  Negative for appetite change, chills, diaphoresis, fatigue and fever.   HENT:  Positive for sore throat.    Eyes:  Negative for visual disturbance.   Respiratory:  Negative for cough, shortness of breath and wheezing.    Cardiovascular:  Negative for chest pain, palpitations and leg swelling.   Gastrointestinal:  Positive for abdominal pain (Chronic), nausea (Chronic and intermittent) and vomiting (Chronic intermittent). Negative for blood in stool, constipation and diarrhea.   Genitourinary:  Negative for  dysuria, hematuria and urgency.   Musculoskeletal:  Positive for arthralgias and back pain.   Skin:  Negative for rash.   Neurological:  Positive for seizures. Negative for syncope, weakness, numbness and headaches.   Hematological:  Negative for adenopathy.   Psychiatric/Behavioral:  Positive for dysphoric mood and sleep disturbance. Negative for agitation, behavioral problems, hallucinations, self-injury and suicidal ideas.      Medical History Reviewed by provider this encounter:  Tobacco  Allergies  Meds  Problems  Med Hx  Surg Hx  Fam Hx       Annual Wellness Visit Questionnaire   Apurva is here for her Subsequent Wellness visit.     Health Risk Assessment:   Patient rates overall health as poor. Patient feels that their physical health rating is same. Patient is very satisfied with their life. Eyesight was rated as slightly worse. Hearing was rated as slightly worse. Patient feels that their emotional and mental health rating is slightly worse. Patients states they are never, rarely angry. Patient states they are always unusually tired/fatigued. Pain experienced in the last 7 days has been some. Patient's pain rating has been 5/10. Patient states that she has experienced weight loss or gain in last 6 months.     Depression Screening:   PHQ-2 Score: 4  PHQ-9 Score: 17      Fall Risk Screening:   In the past year, patient has experienced: history of falling in past year    Number of falls: 2 or more  Injured during fall?: Yes    Feels unsteady when standing or walking?: No    Worried about falling?: Yes      Urinary Incontinence Screening:   Patient has not leaked urine accidently in the last six months.     Home Safety:  Patient has trouble with stairs inside or outside of their home. Patient has working smoke alarms and has working carbon monoxide detector. Home safety hazards include: none.     Nutrition:   Current diet is Unhealthy.     Medications:   Patient is not currently taking any  over-the-counter supplements. Patient is able to manage medications.     Activities of Daily Living (ADLs)/Instrumental Activities of Daily Living (IADLs):   Walk and transfer into and out of bed and chair?: No  Dress and groom yourself?: No    Bathe or shower yourself?: No    Feed yourself? No  Do your laundry/housekeeping?: No  Manage your money, pay your bills and track your expenses?: No  Make your own meals?: No    Do your own shopping?: No    ADL comments: Pt stated she has seizures often so her mom helps her and when mom has appts her gf helps     Previous Hospitalizations:   Any hospitalizations or ED visits within the last 12 months?: No      Advance Care Planning:   Living will: No    Durable POA for healthcare: No    Advanced directive: No    Advanced directive counseling given: Yes      Cognitive Screening:   Provider or family/friend/caregiver concerned regarding cognition?: No    PREVENTIVE SCREENINGS      Cardiovascular Screening:    General: Screening Not Indicated and History Lipid Disorder      Diabetes Screening:     General: Screening Current      Colorectal Cancer Screening:     General: Screening Not Indicated      Breast Cancer Screening:     General: Screening Not Indicated      Cervical Cancer Screening:    General: Screening Current      Osteoporosis Screening:    General: Screening Not Indicated      Abdominal Aortic Aneurysm (AAA) Screening:        General: Screening Not Indicated      Lung Cancer Screening:     General: Screening Not Indicated      Hepatitis C Screening:    General: Screening Current    Screening, Brief Intervention, and Referral to Treatment (SBIRT)    Screening    Typical number of drinks in a week: 1    SDOH Risk Assessment  Social determinants of health (SDOH) risk assesment tool was completed. The tool at a minimum covered housing stability, food insecurity, transportation needs, and utility difficulty. Patient had at risk responses for the following SDOH domains:  "financial resource strain, food insecurity and housing stability.     Other Counseling Topics:   Regular weightbearing exercise and calcium and vitamin D intake.     Social Determinants of Health     Financial Resource Strain: Medium Risk (9/26/2024)    Overall Financial Resource Strain (CARDIA)     Difficulty of Paying Living Expenses: Somewhat hard   Food Insecurity: Food Insecurity Present (9/26/2024)    Hunger Vital Sign     Worried About Running Out of Food in the Last Year: Sometimes true     Ran Out of Food in the Last Year: Sometimes true   Transportation Needs: No Transportation Needs (9/26/2024)    PRAPARE - Transportation     Lack of Transportation (Medical): No     Lack of Transportation (Non-Medical): No   Housing Stability: High Risk (9/26/2024)    Housing Stability Vital Sign     Unable to Pay for Housing in the Last Year: No     Number of Times Moved in the Last Year: 3     Homeless in the Last Year: No   Utilities: Not At Risk (9/26/2024)    [facility] Utilities     Threatened with loss of utilities: No     No results found.    Objective     /60 (BP Location: Left arm, Patient Position: Sitting, Cuff Size: Standard)   Pulse 87   Temp 98 °F (36.7 °C) (Temporal)   Resp 16   Ht 5' 3\" (1.6 m)   Wt 91.2 kg (201 lb)   LMP 09/18/2024   SpO2 95%   BMI 35.61 kg/m²     Physical Exam  Vitals and nursing note reviewed.   Constitutional:       General: She is not in acute distress.     Appearance: Normal appearance. She is well-developed. She is not ill-appearing, toxic-appearing or diaphoretic.   HENT:      Head: Normocephalic and atraumatic.      Right Ear: External ear normal.      Left Ear: External ear normal.      Nose: Congestion present.      Mouth/Throat:      Pharynx: Posterior oropharyngeal erythema present. No oropharyngeal exudate.   Eyes:      General: No scleral icterus.        Right eye: No discharge.         Left eye: No discharge.      Extraocular Movements: Extraocular movements intact. "      Conjunctiva/sclera: Conjunctivae normal.   Cardiovascular:      Rate and Rhythm: Normal rate and regular rhythm.      Heart sounds: No murmur heard.  Pulmonary:      Effort: Pulmonary effort is normal. No respiratory distress.      Breath sounds: Normal breath sounds. No stridor. No wheezing or rhonchi.   Abdominal:      General: There is no distension.      Palpations: Abdomen is soft. There is no mass.      Tenderness: There is no abdominal tenderness.      Hernia: No hernia is present.   Musculoskeletal:         General: No swelling, tenderness or deformity.      Cervical back: Normal range of motion.      Right lower leg: No edema.      Left lower leg: No edema.   Lymphadenopathy:      Cervical: No cervical adenopathy.   Skin:     General: Skin is warm.      Capillary Refill: Capillary refill takes less than 2 seconds.      Coloration: Skin is not jaundiced.   Neurological:      General: No focal deficit present.      Mental Status: She is alert.      Cranial Nerves: No cranial nerve deficit.      Motor: No weakness.      Gait: Gait normal.   Psychiatric:         Mood and Affect: Mood normal.

## 2024-09-26 NOTE — ASSESSMENT & PLAN NOTE
She was previously on Abilify.  She is currently not on any psychotropic medication  She is on a waiting list for psychiatry.  Recommend outpatient psychotherapy    Depression Screening Follow-up Plan: Patient's depression screening was positive with a PHQ-2 score of 4. Their PHQ-9 score was 17. Patient assessed for underlying major depression. They have no active suicidal ideations. Brief counseling provided and recommend additional follow-up/re-evaluation next office visit.  On waiting list for mental health therapy

## 2024-09-26 NOTE — ASSESSMENT & PLAN NOTE
Stable  Not in acute exacerbation  Up-to-date with influenza and pneumococcal vaccination  Continue albuterol as needed and Advair  Orders:    EPINEPHrine (EPIPEN) 0.3 mg/0.3 mL SOAJ; Inject 0.3 mL (0.3 mg total) into a muscle once for 1 dose    albuterol (Ventolin HFA) 90 mcg/act inhaler; Inhale 2 puffs every 4 (four) hours as needed for wheezing    fluticasone-salmeterol (Advair HFA) 115-21 MCG/ACT inhaler; Inhale 2 puffs 2 (two) times a day Rinse mouth after use.    albuterol (2.5 mg/3 mL) 0.083 % nebulizer solution; Take 3 mL (2.5 mg total) by nebulization every 6 (six) hours as needed for wheezing or shortness of breath

## 2024-09-26 NOTE — PATIENT INSTRUCTIONS
Medicare Preventive Visit Patient Instructions  Thank you for completing your Welcome to Medicare Visit or Medicare Annual Wellness Visit today. Your next wellness visit will be due in one year (9/27/2025).  The screening/preventive services that you may require over the next 5-10 years are detailed below. Some tests may not apply to you based off risk factors and/or age. Screening tests ordered at today's visit but not completed yet may show as past due. Also, please note that scanned in results may not display below.  Preventive Screenings:  Service Recommendations Previous Testing/Comments   Colorectal Cancer Screening  * Colonoscopy    * Fecal Occult Blood Test (FOBT)/Fecal Immunochemical Test (FIT)  * Fecal DNA/Cologuard Test  * Flexible Sigmoidoscopy Age: 45-75 years old   Colonoscopy: every 10 years (may be performed more frequently if at higher risk)  OR  FOBT/FIT: every 1 year  OR  Cologuard: every 3 years  OR  Sigmoidoscopy: every 5 years  Screening may be recommended earlier than age 45 if at higher risk for colorectal cancer. Also, an individualized decision between you and your healthcare provider will decide whether screening between the ages of 76-85 would be appropriate. Colonoscopy: Not on file  FOBT/FIT: Not on file  Cologuard: Not on file  Sigmoidoscopy: Not on file          Breast Cancer Screening Age: 40+ years old  Frequency: every 1-2 years  Not required if history of left and right mastectomy Mammogram: Not on file    Screening Not Indicated   Cervical Cancer Screening Between the ages of 21-29, pap smear recommended once every 3 years.   Between the ages of 30-65, can perform pap smear with HPV co-testing every 5 years.   Recommendations may differ for women with a history of total hysterectomy, cervical cancer, or abnormal pap smears in past. Pap Smear: 04/18/2023    Screening Current   Hepatitis C Screening Once for adults born between 1945 and 1965  More frequently in patients at high  risk for Hepatitis C Hep C Antibody: 06/05/2019    Screening Current   Diabetes Screening 1-2 times per year if you're at risk for diabetes or have pre-diabetes Fasting glucose: 92 mg/dL (11/16/2022)  A1C: No results in last 5 years (No results in last 5 years)  Screening Current   Cholesterol Screening Once every 5 years if you don't have a lipid disorder. May order more often based on risk factors. Lipid panel: Not on file    Screening Not Indicated  History Lipid Disorder     Other Preventive Screenings Covered by Medicare:  Abdominal Aortic Aneurysm (AAA) Screening: covered once if your at risk. You're considered to be at risk if you have a family history of AAA.  Lung Cancer Screening: covers low dose CT scan once per year if you meet all of the following conditions: (1) Age 55-77; (2) No signs or symptoms of lung cancer; (3) Current smoker or have quit smoking within the last 15 years; (4) You have a tobacco smoking history of at least 20 pack years (packs per day multiplied by number of years you smoked); (5) You get a written order from a healthcare provider.  Glaucoma Screening: covered annually if you're considered high risk: (1) You have diabetes OR (2) Family history of glaucoma OR (3)  aged 50 and older OR (4)  American aged 65 and older  Osteoporosis Screening: covered every 2 years if you meet one of the following conditions: (1) You're estrogen deficient and at risk for osteoporosis based off medical history and other findings; (2) Have a vertebral abnormality; (3) On glucocorticoid therapy for more than 3 months; (4) Have primary hyperparathyroidism; (5) On osteoporosis medications and need to assess response to drug therapy.   Last bone density test (DXA Scan): Not on file.  HIV Screening: covered annually if you're between the age of 15-65. Also covered annually if you are younger than 15 and older than 65 with risk factors for HIV infection. For pregnant patients, it is  covered up to 3 times per pregnancy.    Immunizations:  Immunization Recommendations   Influenza Vaccine Annual influenza vaccination during flu season is recommended for all persons aged >= 6 months who do not have contraindications   Pneumococcal Vaccine   * Pneumococcal conjugate vaccine = PCV13 (Prevnar 13), PCV15 (Vaxneuvance), PCV20 (Prevnar 20)  * Pneumococcal polysaccharide vaccine = PPSV23 (Pneumovax) Adults 19-65 yo with certain risk factors or if 65+ yo  If never received any pneumonia vaccine: recommend Prevnar 20 (PCV20)  Give PCV20 if previously received 1 dose of PCV13 or PPSV23   Hepatitis B Vaccine 3 dose series if at intermediate or high risk (ex: diabetes, end stage renal disease, liver disease)   Respiratory syncytial virus (RSV) Vaccine - COVERED BY MEDICARE PART D  * RSVPreF3 (Arexvy) CDC recommends that adults 60 years of age and older may receive a single dose of RSV vaccine using shared clinical decision-making (SCDM)   Tetanus (Td) Vaccine - COST NOT COVERED BY MEDICARE PART B Following completion of primary series, a booster dose should be given every 10 years to maintain immunity against tetanus. Td may also be given as tetanus wound prophylaxis.   Tdap Vaccine - COST NOT COVERED BY MEDICARE PART B Recommended at least once for all adults. For pregnant patients, recommended with each pregnancy.   Shingles Vaccine (Shingrix) - COST NOT COVERED BY MEDICARE PART B  2 shot series recommended in those 19 years and older who have or will have weakened immune systems or those 50 years and older     Health Maintenance Due:      Topic Date Due   • Cervical Cancer Screening  04/18/2026   • HIV Screening  Completed   • Hepatitis C Screening  Completed     Immunizations Due:      Topic Date Due   • Pneumococcal Vaccine: Pediatrics (0 to 5 Years) and At-Risk Patients (6 to 64 Years) (1 of 2 - PCV) Never done   • HPV Vaccine (1 - 3-dose series) Never done   • Hepatitis A Vaccine (1 of 2 - Risk 2-dose  series) Never done   • COVID-19 Vaccine (1 - 2023-24 season) Never done   • Influenza Vaccine (1) 09/01/2024     Advance Directives   What are advance directives?  Advance directives are legal documents that state your wishes and plans for medical care. These plans are made ahead of time in case you lose your ability to make decisions for yourself. Advance directives can apply to any medical decision, such as the treatments you want, and if you want to donate organs.   What are the types of advance directives?  There are many types of advance directives, and each state has rules about how to use them. You may choose a combination of any of the following:  Living will:  This is a written record of the treatment you want. You can also choose which treatments you do not want, which to limit, and which to stop at a certain time. This includes surgery, medicine, IV fluid, and tube feedings.   Durable power of  for healthcare (DPAHC):  This is a written record that states who you want to make healthcare choices for you when you are unable to make them for yourself. This person, called a proxy, is usually a family member or a friend. You may choose more than 1 proxy.  Do not resuscitate (DNR) order:  A DNR order is used in case your heart stops beating or you stop breathing. It is a request not to have certain forms of treatment, such as CPR. A DNR order may be included in other types of advance directives.  Medical directive:  This covers the care that you want if you are in a coma, near death, or unable to make decisions for yourself. You can list the treatments you want for each condition. Treatment may include pain medicine, surgery, blood transfusions, dialysis, IV or tube feedings, and a ventilator (breathing machine).  Values history:  This document has questions about your views, beliefs, and how you feel and think about life. This information can help others choose the care that you would choose.  Why are  advance directives important?  An advance directive helps you control your care. Although spoken wishes may be used, it is better to have your wishes written down. Spoken wishes can be misunderstood, or not followed. Treatments may be given even if you do not want them. An advance directive may make it easier for your family to make difficult choices about your care.   Cigarette Smoking and Your Health   Risks to your health if you smoke:  Nicotine and other chemicals found in tobacco damage every cell in your body. Even if you are a light smoker, you have an increased risk for cancer, heart disease, and lung disease. If you are pregnant or have diabetes, smoking increases your risk for complications.   Benefits to your health if you stop smoking:   You decrease respiratory symptoms such as coughing, wheezing, and shortness of breath.   You reduce your risk for cancers of the lung, mouth, throat, kidney, bladder, pancreas, stomach, and cervix. If you already have cancer, you increase the benefits of chemotherapy. You also reduce your risk for cancer returning or a second cancer from developing.   You reduce your risk for heart disease, blood clots, heart attack, and stroke.   You reduce your risk for lung infections, and diseases such as pneumonia, asthma, chronic bronchitis, and emphysema.  Your circulation improves. More oxygen can be delivered to your body. If you have diabetes, you lower your risk for complications, such as kidney, artery, and eye diseases. You also lower your risk for nerve damage. Nerve damage can lead to amputations, poor vision, and blindness.  You improve your body's ability to heal and to fight infections.  For more information and support to stop smoking:   [employer]  Phone: 0- 866 - [phone]  Web Address: www.[employer].gov  Weight Management   Why it is important to manage your weight:  Being overweight increases your risk of health conditions such as heart disease, high blood  pressure, type 2 diabetes, and certain types of cancer. It can also increase your risk for osteoarthritis, sleep apnea, and other respiratory problems. Aim for a slow, steady weight loss. Even a small amount of weight loss can lower your risk of health problems.  How to lose weight safely:  A safe and healthy way to lose weight is to eat fewer calories and get regular exercise. You can lose up about 1 pound a week by decreasing the number of calories you eat by 500 calories each day.   Healthy meal plan for weight management:  A healthy meal plan includes a variety of foods, contains fewer calories, and helps you stay healthy. A healthy meal plan includes the following:  Eat whole-grain foods more often.  A healthy meal plan should contain fiber. Fiber is the part of grains, fruits, and vegetables that is not broken down by your body. Whole-grain foods are healthy and provide extra fiber in your diet. Some examples of whole-grain foods are whole-wheat breads and pastas, oatmeal, brown rice, and bulgur.  Eat a variety of vegetables every day.  Include dark, leafy greens such as spinach, kale, renny greens, and mustard greens. Eat yellow and orange vegetables such as carrots, sweet potatoes, and winter squash.   Eat a variety of fruits every day.  Choose fresh or canned fruit (canned in its own juice or light syrup) instead of juice. Fruit juice has very little or no fiber.  Eat low-fat dairy foods.  Drink fat-free (skim) milk or 1% milk. Eat fat-free yogurt and low-fat cottage cheese. Try low-fat cheeses such as mozzarella and other reduced-fat cheeses.  Choose meat and other protein foods that are low in fat.  Choose beans or other legumes such as split peas or lentils. Choose fish, skinless poultry (chicken or turkey), or lean cuts of red meat (beef or pork). Before you cook meat or poultry, cut off any visible fat.   Use less fat and oil.  Try baking foods instead of frying them. Add less fat, such as margarine,  sour cream, regular salad dressing and mayonnaise to foods. Eat fewer high-fat foods. Some examples of high-fat foods include french fries, doughnuts, ice cream, and cakes.  Eat fewer sweets.  Limit foods and drinks that are high in sugar. This includes candy, cookies, regular soda, and sweetened drinks.  Exercise:  Exercise at least 30 minutes per day on most days of the week. Some examples of exercise include walking, biking, dancing, and swimming. You can also fit in more physical activity by taking the stairs instead of the elevator or parking farther away from stores. Ask your healthcare provider about the best exercise plan for you.      © Copyright [employer] 2018 Information is for End User's use only and may not be sold, redistributed or otherwise used for commercial purposes. All illustrations and images included in CareNotes® are the copyrighted property of [employer] Inc[employer] or [employer]

## 2024-09-26 NOTE — ASSESSMENT & PLAN NOTE
Patient experiencing almost daily epileptic event  Last seizure episode was yesterday  Patient does not drive  Seizure precautions discussed with patient  Follow-up with neurology tomorrow

## 2024-09-26 NOTE — ASSESSMENT & PLAN NOTE
Orders:    metoclopramide (Reglan) 10 mg tablet; Take 1 tablet (10 mg total) by mouth every 6 (six) hours as needed (vomiting)

## 2024-09-27 ENCOUNTER — OFFICE VISIT (OUTPATIENT)
Dept: NEUROLOGY | Facility: CLINIC | Age: 24
End: 2024-09-27
Payer: MEDICARE

## 2024-09-27 VITALS
BODY MASS INDEX: 35.81 KG/M2 | OXYGEN SATURATION: 99 % | HEART RATE: 94 BPM | SYSTOLIC BLOOD PRESSURE: 100 MMHG | TEMPERATURE: 98.1 F | DIASTOLIC BLOOD PRESSURE: 70 MMHG | HEIGHT: 63 IN | WEIGHT: 202.1 LBS

## 2024-09-27 DIAGNOSIS — G43.019 INTRACTABLE MIGRAINE WITHOUT AURA AND WITHOUT STATUS MIGRAINOSUS: ICD-10-CM

## 2024-09-27 DIAGNOSIS — F44.5 PSYCHOGENIC NONEPILEPTIC SEIZURE: Primary | Chronic | ICD-10-CM

## 2024-09-27 DIAGNOSIS — G43.109 MIGRAINE WITH AURA AND WITHOUT STATUS MIGRAINOSUS, NOT INTRACTABLE: ICD-10-CM

## 2024-09-27 PROCEDURE — 99214 OFFICE O/P EST MOD 30 MIN: CPT | Performed by: NURSE PRACTITIONER

## 2024-09-27 RX ORDER — TOPIRAMATE 50 MG/1
50 TABLET, FILM COATED ORAL 2 TIMES DAILY
Qty: 180 TABLET | Refills: 3 | Status: SHIPPED | OUTPATIENT
Start: 2024-09-27

## 2024-09-27 NOTE — PATIENT INSTRUCTIONS
Restart Topamax at previous dose of 50mg twice a day for headaches.    Get routine EEG    Schedule EMU admission    Seizure safety was reviewed including:  Avoid working at heights.  Avoid operating heavy machinery.  Avoid working around open bodies of water.   Do not not swim alone.   Showers are safer than baths due to risk of drowning in bath tub  Per [place] State Law, you cannot drive for 6 months from the date of your last seizure.

## 2024-09-27 NOTE — PROGRESS NOTES
Neurology Ambulatory Visit  Name: Apurva Dumont       : 2000       MRN: [id number]   Encounter Provider: KAHLIL Moreno   Encounter Date: 2024  [facility]      Assessment & Plan  Psychogenic nonepileptic seizure  24 y.o. 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     Diagnosis: Psychogenic nonepileptic events, patient's events have changed in character and have become more frequent, now occurring multiple times a day.  She is also began to experience an aura of a wet metal smell and taste of blood.  She will undergo baseline routine EEG to evaluate for epileptic seizures.  We also discussed admission to the epilepsy monitoring unit for differential diagnosis of her current events.    Plan: Routine EEG            Admit to EMU for differential diagnosis of current events            Seizure safety and first aid were reviewed            Patient was encouraged to find a therapist and psychiatrist for treatment of her various psychiatric diagnoses.    Follow-up: 3 months            Intractable migraine without aura and without status migrainosus  Restart topiramate 50 mg twice a day for headache prevention  Stay well-hydrated           History of Present Illness   Apurva Dumont is a 24 y.o. female, with nonepileptic psychogenic spells, confirmed during video EEG monitoring in , who is presenting to [facility] for follow-up of her seizures. She is accompanied by her girlfriend.    Patient was last seen by KAHLIL Scanlon on 2022.  She has a history of seizures beginning at the age of 3 months and stopping at age 3.  Events returned at the age of 17. Her nonepileptic events had been occurring weekly.  However, are now occurring daily to a couple times a day.  Being too hot or too cold is a  trigger as are lights.  Patient reports that she will become stiff, fall and starts seizing lasting about a minute.  She has bitten her tongue as well as vomited and been incontinent of urine with these events. These will cluster occurring 1-2 times in a row up to 3-4 times in a row if they are shorter events.  She has no memory of these events.  Patient has now began to experience an aura of a wet metal smell and taste of blood which began about a year and a half ago.  She had been seeing a therapist but this was discontinued due to no longer having insurance.  Patient reports that she is on a waiting list for a therapist as well as for psychiatry. She also experiences frequent staring spells throughout the day.    Patient was also previously seen by Dr. Prince for headaches, last office visit was 10/28/2022.  She reports that shooting pain from her forehead into the back of her neck.  Patient had been taking Topamax 50 mg twice daily for prevention.  However, is no longer taking this.    Current Antiepileptic Medications:  1. None  Other medications as per Epic      Event/Seizure Semiology:  1.  They will sometimes begin with a severe migraine, but often without any warning, she will just fall to the ground and start shaking all over. She may vomit or urinate on herself. Her mother describes that her body will bounce up and down. Sometimes her head will turn side to side. These typically last less than 30 seconds, but have lasted long, up to 36 minutes. She may go a few days without an event, but they happen about once a day on average.    2.Staring spells    Epilepsy Risk Factors:  Abnormal pregnancy:                         mother used drugs  Abnormal birth/:                    no  Abnormal Development:                     talked at age 4  Febrile seizures, simple:                     ?  Febrile seizures, complex:                  no  CNS infection:                                     no  Intellectual  disability:                           no  Cerebral palsy:                                    no  Head injury (moderate/severe):           knocked out frequently as child  CNS neoplasm:                                   no  CNS malformation:                             no  Neurosurgical procedure:                   no  Stroke:                                                 no  Alcohol abuse:                                    no  Drug abuse:                                        marijuana street   Family history Sz/epilepsy:                 maternal uncle    Psychiatric History:  Depression, anxiety  Schizoaffective disorder  Bipolar 2 disorder       Prior AEDs:  Valproate (for headaches, weight gain), Topiramate (for headaches, ineffective for HA) Gabapentin (side effects.), Levetiracetam         Prior Evaluation:  Imaging:     - MRI brain 22: Small scattered hyperintensities on T2/FLAIR imaging are noted in the periventricular and subcortical white matter demonstrating an appearance that is statistically most likely to represent mild microangiopathic change. These mild changes have progressed when compared with the prior examination. Stable small cyst along the left hippocampal formation within the mesial temporal lobe may represent a choroidal fissure cyst or dilated perivascular space.   IMPRESSION:Stable examination.    - 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    EEG:  Routine EEG 22: normal  - Routine EE2019: normal  - Ambulatory EEG: yes in [place], records not available today  - Video EE2019 - 2019:  2 days of continuous video EEG monitoring, during which time 2 spells of unresponsiveness with whole body shaking, pelvic thrusting and side to side movements, and one spell of staring, all without EEG changes. Clinical features of these events with persistent alpha rhythm suggests these events are not seizures. Normal activities of wakefulness and sleep were  "seen.    Social History:  Driving: no   Working: no SSI  Lives with: mother    I reviewed Allergies, Medical History, Surgical History,  Family History and problem list as documented in Epic/Care Everywhere.     Review of Systems:  Constitutional:  Negative for appetite change, fatigue and fever.   HENT: Negative for hearing loss, tinnitus, trouble swallowing and voice change.    Eyes: Negative for photophobia, pain and visual disturbance.   Respiratory: Negative for shortness of breath.    Cardiovascular: Negative for palpitations.   Gastrointestinal: Negative for nausea and vomiting.   Endocrine: Negative for cold intolerance.   Genitourinary: Negative for dysuria, frequency and urgency.   Musculoskeletal:  Negative for back pain, gait problem, myalgias, neck pain and neck stiffness.   Skin: Negative for rash.   Allergic/Immunologic: Negative for hives.  Neurological: Negative for dizziness, tremors, syncope, speech difficulty, weakness, light-headedness, numbness and headaches.   Hematological: Negative for easy bruising and bleeding  Psychiatric/Behavioral: Negative for confusion, hallucinations and sleep disturbance.         Objective   /70 (BP Location: Right arm, Patient Position: Sitting, Cuff Size: Standard)   Pulse 94   Temp 98.1 °F (36.7 °C) (Temporal)   Ht 5' 3\" (1.6 m)   Wt 91.7 kg (202 lb 1.6 oz)   LMP 09/18/2024   SpO2 99%   BMI 35.80 kg/m²      General Exam  In general, patient is well appearing and in no distress.    Neurologic Exam  Mental Status: Alert and oriented x 3  Language: normal fluency and comprehension  Cranial Nerves:  PERRL, EOMI, no nystagmus, Face symmetric, Tongue/palate midline, No dysarthria   Motor: No pronator drift. Normal bulk and tone. Strength 5/5 throughout  DTRs: Normal and symmetric  Coordination: Finger to nose intact  Gait: normal casual gait, able to tandem walk without difficulty  Romberg negative      "

## 2024-10-01 ENCOUNTER — TELEPHONE (OUTPATIENT)
Dept: NEUROLOGY | Facility: CLINIC | Age: 24
End: 2024-10-01

## 2024-10-01 DIAGNOSIS — F44.5 PSYCHOGENIC NONEPILEPTIC SEIZURE: ICD-10-CM

## 2024-10-01 DIAGNOSIS — G43.019 INTRACTABLE MIGRAINE WITHOUT AURA AND WITHOUT STATUS MIGRAINOSUS: Primary | ICD-10-CM

## 2024-10-03 NOTE — TELEPHONE ENCOUNTER
EEG scheduled for 10/28/2024.     Message left for patient to return call to office regarding EMU admission.

## 2024-10-07 ENCOUNTER — PROCEDURE VISIT (OUTPATIENT)
Age: 24
End: 2024-10-07
Payer: MEDICARE

## 2024-10-07 VITALS
OXYGEN SATURATION: 99 % | DIASTOLIC BLOOD PRESSURE: 58 MMHG | BODY MASS INDEX: 35.79 KG/M2 | WEIGHT: 202 LBS | HEART RATE: 83 BPM | SYSTOLIC BLOOD PRESSURE: 102 MMHG | HEIGHT: 63 IN

## 2024-10-07 DIAGNOSIS — M54.16 LUMBAR RADICULOPATHY: ICD-10-CM

## 2024-10-07 DIAGNOSIS — M99.03 LUMBAR REGION SOMATIC DYSFUNCTION: Primary | ICD-10-CM

## 2024-10-07 DIAGNOSIS — M99.02 SEGMENTAL DYSFUNCTION OF THORACIC REGION: ICD-10-CM

## 2024-10-07 DIAGNOSIS — M99.03 SEGMENTAL DYSFUNCTION OF LUMBAR REGION: ICD-10-CM

## 2024-10-07 PROCEDURE — 98941 CHIROPRACT MANJ 3-4 REGIONS: CPT | Performed by: CHIROPRACTOR

## 2024-10-07 NOTE — PROGRESS NOTES
Initial date of service: 8/26/24    Diagnoses and all orders for this visit:    Lumbar region somatic dysfunction    Lumbar radiculopathy    Segmental dysfunction of lumbar region    Segmental dysfunction of thoracic region       ASSESSMENT:   Pt's symptoms and exam findings consistent with mechanical lbp secondary to repetitive st/sp injury, exacerbated by postural/ergonomic stressors. Pt responded well to flexion biased stretches and manual mobilization of the affected spinal and myofascial tissues with increased ROM; trial of conservative tx recommended consisting of stretching, graded mobilization/manipulation of the affected spinal and myofascial jt dysfunction, postural/ergonomic education and take home stretches/exercises. If symptoms fail to improve with short trial of conservative care, appropriate imaging and referral will be coordinated.  - The patient tolerated treatment well, the pt has decrease pain and mm spasm    PROCEDURE CODES: 41386-HB    TREATMENT:  Fear avoidance behavior discussion; encouraged and reassured pt that natural course of condition is to improve over time with adherence to tx plan and home care strategies. Home care recommendations: avoid bed rest, walk (but avoid trails and uneven surfaces), gradual return to activity to tolerance (avoid anything that peripheralizes symptoms), call if symptoms peripheralize, worsen, or neurologic deficit progresses. Ther-ex: IASTM; discussed post procedure soreness and/or ecchymosis for up to 36 hrs, applied to affected mm hypertonicities; supine hamstring stretch, supine gluteal stretch, side laying QL stretch, single knee to chest stretch, hip flexor pin-and-stretch, alternating prone hip extension, glute bridge, transitional mvmt education, abdominal bracing; greater than 15 min spent performing above mentioned ther-ex to improve ROM/flexibility. Thoracic mobilization/manipulation: prone P-A mob; Lumbar mobilization/manipulation: diversified side  laying graded HVLA, flexion-traction; SIJ Manipulation/Mobilization: R/L SIJ HVLA - long axis distraction, miller drop table maneuver to affected SIJ    HPI:  Apurva Dumont is a 24 y.o. female  Chief Complaint   Patient presents with   • Back Pain     Middle back pain-4  Lower back pain-4   • Sciatica     The patient presents to the office with lower back pain that has been bothering her for years.  No specific trauma but mention she has seizures regularly of varying severity. At times absent seizures and at times convulsive since she was 17 years old. The patient reports pain whether standing and lying down. Stretches. Used to exercise but the stress of this increased frequency of seizures. Working- not working bc of seizures. 11/10/23- Lumbar MRI- reveals mild disc bulging in lower lumbar spine levels without stenosis. The patient mention she recently has treatment with Dr. Coehn with Pain management with some improvements. Dr. Escobar Cohen referred pt to our office for consult and co-management.   9/13- The patient is feeling a little better for a few days but then tightened up again.  7/10 today  10/7- The patient reports she felt better after her last visit 4/10.     Back Pain    Past Medical History:   Diagnosis Date   • Anaphylaxis     apricot   • Anxiety    • Asthma    • Cluster headache    • Eczema    • Headache, tension-type    • Lymphadenitis     Last assessed 12/30/14   • Lymphadenopathy     Last assessed 10/16/13   • 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HCC)    • Migraine    • 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• Psychiatric disorder    • Psychiatric illness    • Psychosis (HCC)    • Seizures (HCC)       Past Surgical History:   Procedure Laterality Date   • COLONOSCOPY N/A 05/03/2019    Procedure: COLONOSCOPY;  Surgeon: Jean Carlos Hogan MD;  Location: [facility];  Service: Gastroenterology   • CYSTOSCOPY  04/04/2023    Dr. Bueno   • ESOPHAGOGASTRODUODENOSCOPY N/A 05/03/2019    Procedure: ESOPHAGOGASTRODUODENOSCOPY (EGD);  Surgeon:  Jean Carlos Hogan MD;  Location:  [facility];  Service: Gastroenterology     The following portions of the patient's history were reviewed and updated as appropriate: allergies, past family history, past medical history, past social history, past surgical history, and problem list.  Review of Systems   Musculoskeletal:  Positive for back pain.     Physical Exam  Constitutional:       Appearance: Normal appearance.   HENT:      Head: Normocephalic.      Mouth/Throat:      Mouth: Mucous membranes are moist.   Eyes:      Extraocular Movements: Extraocular movements intact.      Conjunctiva/sclera: Conjunctivae normal.      Pupils: Pupils are equal, round, and reactive to light.   Musculoskeletal:         General: Tenderness present. Normal range of motion.        Arms:       Cervical back: Normal range of motion.        Legs:    Skin:     Findings: No bruising.   Neurological:      Mental Status: She is alert and oriented to person, place, and time.      Gait: Gait is intact.      Deep Tendon Reflexes: Reflexes are normal and symmetric.   Psychiatric:         Attention and Perception: Attention normal.         Mood and Affect: Affect normal.         Speech: Speech normal.         Behavior: Behavior is cooperative.         Cognition and Memory: Cognition normal.     SOFT TISSUE ASSESSMENT Hypertonicity and tenderness palpated B T10-S1 erector spinae, hip flexor, glute med/min, QL, hamstring JOINT RESTRICTIONS: T10-S1 and R/L SIJ ORTHO: SI jt point tenderness: +; Myah unremarkable for centralization/peripheralization; hai's, iliac compression, thigh thrust elicit lbp in R/L SIJ; prone femoral nerve stretch neg for upper lumbar neural tension, elicits R/L SIJ stiffness; sitting root elicits no lbp on R/L; slump test elicits no neural tension R/L    Return in about 1 week (around 10/14/2024) for Recheck.

## 2024-10-11 NOTE — TELEPHONE ENCOUNTER
Spoke to patient.    EMU scheduled for 11/18/2024 8am.    Added to EMU calendar. ADT21 entered. Notified precert via teams and spreadsheet. EMU education sent to patient via [facility].    Precert- EMU admission scheduled as above.

## 2024-10-20 ENCOUNTER — HOSPITAL ENCOUNTER (EMERGENCY)
Facility: HOSPITAL | Age: 24
Discharge: HOME | End: 2024-10-20
Payer: MEDICARE

## 2024-10-20 VITALS
RESPIRATION RATE: 23 BRPM | OXYGEN SATURATION: 99 % | DIASTOLIC BLOOD PRESSURE: 71 MMHG | SYSTOLIC BLOOD PRESSURE: 132 MMHG | HEART RATE: 81 BPM | TEMPERATURE: 97.7 F

## 2024-10-20 DIAGNOSIS — R11.2 NAUSEA AND VOMITING: Primary | ICD-10-CM

## 2024-10-20 LAB
ALBUMIN SERPL BCG-MCNC: 4.7 G/DL (ref 3.5–5)
ALP SERPL-CCNC: 102 U/L (ref 34–104)
ALT SERPL W P-5'-P-CCNC: 17 U/L (ref 7–52)
ANION GAP SERPL CALCULATED.3IONS-SCNC: 13 MMOL/L (ref 4–13)
AST SERPL W P-5'-P-CCNC: 19 U/L (ref 13–39)
B-HCG SERPL-ACNC: <0.6 MIU/ML (ref 0–5)
BASOPHILS # BLD AUTO: 0.06 THOUSANDS/ΜL (ref 0–0.1)
BASOPHILS NFR BLD AUTO: 0 % (ref 0–1)
BILIRUB SERPL-MCNC: 0.62 MG/DL (ref 0.2–1)
BUN SERPL-MCNC: 11 MG/DL (ref 5–25)
CALCIUM SERPL-MCNC: 9.8 MG/DL (ref 8.4–10.2)
CHLORIDE SERPL-SCNC: 108 MMOL/L (ref 96–108)
CO2 SERPL-SCNC: 17 MMOL/L (ref 21–32)
CREAT SERPL-MCNC: 0.74 MG/DL (ref 0.6–1.3)
EOSINOPHIL # BLD AUTO: 0.01 THOUSAND/ΜL (ref 0–0.61)
EOSINOPHIL NFR BLD AUTO: 0 % (ref 0–6)
ERYTHROCYTE [DISTWIDTH] IN BLOOD BY AUTOMATED COUNT: 13.2 % (ref 11.6–15.1)
GFR SERPL CREATININE-BSD FRML MDRD: 112 ML/MIN/1.73SQ M
GLUCOSE SERPL-MCNC: 155 MG/DL (ref 65–140)
HCT VFR BLD AUTO: 40.8 % (ref 34.8–46.1)
HGB BLD-MCNC: 14.3 G/DL (ref 11.5–15.4)
IMM GRANULOCYTES # BLD AUTO: 0.08 THOUSAND/UL (ref 0–0.2)
IMM GRANULOCYTES NFR BLD AUTO: 1 % (ref 0–2)
LIPASE SERPL-CCNC: 8 U/L (ref 11–82)
LYMPHOCYTES # BLD AUTO: 1.31 THOUSANDS/ΜL (ref 0.6–4.47)
LYMPHOCYTES NFR BLD AUTO: 7 % (ref 14–44)
MCH RBC QN AUTO: 29.4 PG (ref 26.8–34.3)
MCHC RBC AUTO-ENTMCNC: 35 G/DL (ref 31.4–37.4)
MCV RBC AUTO: 84 FL (ref 82–98)
MONOCYTES # BLD AUTO: 0.51 THOUSAND/ΜL (ref 0.17–1.22)
MONOCYTES NFR BLD AUTO: 3 % (ref 4–12)
NEUTROPHILS # BLD AUTO: 15.72 THOUSANDS/ΜL (ref 1.85–7.62)
NEUTS SEG NFR BLD AUTO: 89 % (ref 43–75)
NRBC BLD AUTO-RTO: 0 /100 WBCS
PLATELET # BLD AUTO: 397 THOUSANDS/UL (ref 149–390)
PMV BLD AUTO: 9.2 FL (ref 8.9–12.7)
POTASSIUM SERPL-SCNC: 3.4 MMOL/L (ref 3.5–5.3)
PROT SERPL-MCNC: 7.9 G/DL (ref 6.4–8.4)
RBC # BLD AUTO: 4.87 MILLION/UL (ref 3.81–5.12)
SODIUM SERPL-SCNC: 138 MMOL/L (ref 135–147)
WBC # BLD AUTO: 17.69 THOUSAND/UL (ref 4.31–10.16)

## 2024-10-20 PROCEDURE — 96374 THER/PROPH/DIAG INJ IV PUSH: CPT

## 2024-10-20 PROCEDURE — 93005 ELECTROCARDIOGRAM TRACING: CPT

## 2024-10-20 PROCEDURE — 36415 COLL VENOUS BLD VENIPUNCTURE: CPT

## 2024-10-20 PROCEDURE — 96375 TX/PRO/DX INJ NEW DRUG ADDON: CPT

## 2024-10-20 PROCEDURE — 96361 HYDRATE IV INFUSION ADD-ON: CPT

## 2024-10-20 PROCEDURE — 80053 COMPREHEN METABOLIC PANEL: CPT

## 2024-10-20 PROCEDURE — 99284 EMERGENCY DEPT VISIT MOD MDM: CPT

## 2024-10-20 PROCEDURE — 84702 CHORIONIC GONADOTROPIN TEST: CPT

## 2024-10-20 PROCEDURE — 83690 ASSAY OF LIPASE: CPT

## 2024-10-20 PROCEDURE — 85025 COMPLETE CBC W/AUTO DIFF WBC: CPT

## 2024-10-20 RX ORDER — DROPERIDOL 2.5 MG/ML
0.62 INJECTION, SOLUTION INTRAMUSCULAR; INTRAVENOUS ONCE
Status: COMPLETED | OUTPATIENT
Start: 2024-10-20 | End: 2024-10-20

## 2024-10-20 RX ORDER — OLANZAPINE 5 MG/1
5 TABLET, ORALLY DISINTEGRATING ORAL DAILY PRN
Qty: 7 TABLET | Refills: 0 | Status: SHIPPED | OUTPATIENT
Start: 2024-10-20 | End: 2024-10-27

## 2024-10-20 RX ORDER — CAPSAICIN 0.025 %
1 CREAM (GRAM) TOPICAL 2 TIMES DAILY
Qty: 60 G | Refills: 0 | Status: SHIPPED | OUTPATIENT
Start: 2024-10-20 | End: 2024-11-19

## 2024-10-20 RX ORDER — LORAZEPAM 2 MG/ML
0.5 INJECTION INTRAMUSCULAR ONCE
Status: COMPLETED | OUTPATIENT
Start: 2024-10-20 | End: 2024-10-20

## 2024-10-20 RX ORDER — FAMOTIDINE 20 MG/1
20 TABLET, FILM COATED ORAL ONCE
Status: DISCONTINUED | OUTPATIENT
Start: 2024-10-20 | End: 2024-10-21 | Stop reason: HOSPADM

## 2024-10-20 RX ORDER — ONDANSETRON 4 MG/1
4 TABLET, ORALLY DISINTEGRATING ORAL ONCE
Status: COMPLETED | OUTPATIENT
Start: 2024-10-20 | End: 2024-10-20

## 2024-10-20 RX ADMIN — LORAZEPAM 0.5 MG: 2 INJECTION INTRAMUSCULAR; INTRAVENOUS at 21:41

## 2024-10-20 RX ADMIN — SODIUM CHLORIDE 1000 ML: 0.9 INJECTION, SOLUTION INTRAVENOUS at 21:02

## 2024-10-20 RX ADMIN — ONDANSETRON 4 MG: 4 TABLET, ORALLY DISINTEGRATING ORAL at 20:18

## 2024-10-20 RX ADMIN — DROPERIDOL 0.62 MG: 2.5 INJECTION, SOLUTION INTRAMUSCULAR; INTRAVENOUS at 21:03

## 2024-10-21 ENCOUNTER — TELEPHONE (OUTPATIENT)
Age: 24
End: 2024-10-21

## 2024-10-21 LAB
ATRIAL RATE: 50 BPM
P AXIS: 67 DEGREES
PR INTERVAL: 118 MS
QRS AXIS: 87 DEGREES
QRSD INTERVAL: 104 MS
QT INTERVAL: 476 MS
QTC INTERVAL: 433 MS
T WAVE AXIS: 75 DEGREES
VENTRICULAR RATE: 50 BPM

## 2024-10-21 PROCEDURE — 93010 ELECTROCARDIOGRAM REPORT: CPT | Performed by: INTERNAL MEDICINE

## 2024-10-21 NOTE — DISCHARGE INSTRUCTIONS
You have been evaluated in the Emergency Department today for your nausea and vomiting. Your evaluation suggests that your symptoms will improve on its own with rest and fluids.    I wrote you a prescription for zyprexa and capsaicin cream. This is at your pharmacy. Please take as prescribed.    Please follow up with your primary care physician within two days.    Return to the Emergency Department if you experience worsening or uncontrolled pain, inability to tolerate fluids by mouth, difficulty breathing, recurrent uncontrolled vomiting, or any other concerning symptoms.    Thank you for choosing for your care.

## 2024-10-21 NOTE — TELEPHONE ENCOUNTER
LMOM to notify patient that Dr. Burk is out sick today and appt will have to be rescheduled. Left call back number to reschedule appt.

## 2024-10-21 NOTE — ED PROVIDER NOTES
Time reflects when diagnosis was documented in both MDM as applicable and the Disposition within this note       Time User Action Codes Description Comment    10/20/2024 10:20 PM Juan Ryan Add [R11.2] Nausea and vomiting           ED Disposition       ED Disposition   Discharge    Condition   Stable    Date/Time   Sun Oct 20, 2024 10:20 PM    Comment   Apurva Dumont discharge to home/self care.                   Assessment & Plan       Medical Decision Making  Patient with history as below presented with vomiting. History obtained from patient.    Differential diagnosis includes: Cyclic vomiting, gastroparesis, gastritis, electrolyte disturbance, anemia, pregnancy, arrhythmia    Plan: CBC, CMP, lipase, quantitative hCG, GI cocktail, ECG    ECG independently interpreted by myself as below. Labs reviewed and remarkable for a leukocytosis, likely reactionary in the setting of vomiting. Patient was treated with below with improvement in symptoms. Reassessed the patient and they continue to be well appearing and tolerating p.o. intake. Presentation most consistent with a nonemergent cause of her symptoms, likely cyclic vomiting versus gastroparesis.  Given prescriptions for Zyprexa as well as capsaicin for discharge. Stable for outpatient management.    Disposition: Discharged with instructions to obtain outpatient follow up of patient's symptoms and findings, with strict return precautions if patient develops new or worsening symptoms. Patient understands this plan and is agreeable. All questions answered. Patient discharged home with return precautions.    Amount and/or Complexity of Data Reviewed  Labs: ordered. Decision-making details documented in ED Course.    Risk  OTC drugs.  Prescription drug management.        ED Course as of 10/21/24 0841   Sun Oct 20, 2024   2054 Procedure Note: EKG  Date/Time: 10/20/24 8:54 PM   Interpreted by: Juan Ryan   Indications / Diagnosis: syncope  ECG reviewed by me,  "the ED Provider: yes   The EKG demonstrates:  Rhythm: sinus bradycardia with sinus arrhythmia  Intervals: normal intervals  Axis: normal axis  QRS/Blocks: normal QRS  ST Changes: No acute ST Changes, no STD/YOAV.   2055 WBC(!): 17.69  Likely due to vomiting   2220 Patient tolerating p.o. intake.  Feeling improved.  Will discharge home.       Medications   ondansetron (ZOFRAN-ODT) dispersible tablet 4 mg (4 mg Oral Given 10/20/24 2018)   sodium chloride 0.9 % bolus 1,000 mL (0 mL Intravenous Stopped 10/20/24 2232)   droperidol (INAPSINE) injection 0.625 mg (0.625 mg Intravenous Given 10/20/24 2103)   LORazepam (ATIVAN) injection 0.5 mg (0.5 mg Intravenous Given 10/20/24 2141)       ED Risk Strat Scores                           SBIRT 20yo+      Flowsheet Row Most Recent Value   Initial Alcohol Screen: US AUDIT-C     1. How often do you have a drink containing alcohol? 0 Filed at: 10/20/2024 2011   2. How many drinks containing alcohol do you have on a typical day you are drinking?  0 Filed at: 10/20/2024 2011   3b. FEMALE Any Age, or MALE 65+: How often do you have 4 or more drinks on one occassion? 0 Filed at: 10/20/2024 2011   Audit-C Score 0 Filed at: 10/20/2024 2011   DASHAWN: How many times in the past year have you...    Used an illegal drug or used a prescription medication for non-medical reasons? Never Filed at: 10/20/2024 2011                            History of Present Illness       Chief Complaint   Patient presents with    Vomiting     Patient reports vomiting nonstop since menstrual cycle started yesterday. Reports \"she gets like this everytime.\" Also reports weakness and fainting.        Past Medical History:   Diagnosis Date    Anaphylaxis     apricot    Anxiety     Asthma     Cluster headache     Eczema     Headache, tension-type     Lymphadenitis     Last assessed 12/30/14    Lymphadenopathy     Last assessed 10/16/13    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HCC)     Migraine     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Psychiatric disorder  "    Psychiatric illness     Psychosis (HCC)     Seizures (HCC)       Past Surgical History:   Procedure Laterality Date    COLONOSCOPY N/A 05/03/2019    Procedure: COLONOSCOPY;  Surgeon: Jean Carlos Hogan MD;  Location: [facility];  Service: Gastroenterology    CYSTOSCOPY  04/04/2023    Dr. Bueno    ESOPHAGOGASTRODUODENOSCOPY N/A 05/03/2019    Procedure: ESOPHAGOGASTRODUODENOSCOPY (EGD);  Surgeon: Jean Carlos Hogan MD;  Location: [facility];  Service: Gastroenterology      Family History   Problem Relation Age of Onset    Alcohol abuse Mother     Migraines Mother     Other Mother         Neck pain    Depression Mother     Drug abuse Mother     Cervical cancer Mother     Drug abuse Father     Schizophrenia Father     No Known Problems Sister     No Known Problems Brother     Ovarian cancer Paternal Aunt     Febrile seizures Maternal Uncle     Stroke Maternal Uncle     Diabetes Maternal Grandfather     Thyroid disease Maternal Grandmother     No Known Problems Paternal Grandfather     No Known Problems Paternal Grandmother     Completed Suicide  Neg Hx       Social History     Tobacco Use    Smoking status: Every Day     Current packs/day: 0.50     Types: Cigarettes    Smokeless tobacco: Never    Tobacco comments:     I stopped smoking cigarettes but started vaping   Vaping Use    Vaping status: Former    Substances: Nicotine, Flavoring   Substance Use Topics    Alcohol use: Yes     Alcohol/week: 2.0 standard drinks of alcohol     Types: 2 Shots of liquor per week     Comment: Occasionally    Drug use: Yes     Frequency: 3.0 times per week     Types: Marijuana     Comment: socially      E-Cigarette/Vaping    E-Cigarette Use Former User       E-Cigarette/Vaping Substances    Nicotine Yes     THC No     CBD No     Flavoring Yes     Other No     Unknown No       I have reviewed and agree with the history as documented.     Patient is a 24-year-old female with a significant past medical history of anxiety,  nonepileptic seizures, presenting for evaluation of vomiting.  Patient reports that she has been having numerous episodes of nonbloody, nonbilious vomiting today.  She has had similar episodes of cyclic vomiting in the past that have self resolved.  She describes some mild epigastric discomfort associated with her vomiting.  She denies any chest pain or difficulty breathing.  She denies any change in bowel movements.  She says the only thing that makes her vomiting feel better is hot showers so she has been taking frequent showers to help her symptoms.  She does occasionally smoke marijuana.  She is otherwise without complaint.        Review of Systems   Constitutional:  Negative for fever.   Respiratory:  Negative for shortness of breath.    Cardiovascular:  Negative for chest pain.   Gastrointestinal:  Positive for abdominal pain, nausea and vomiting.   Neurological:  Positive for light-headedness.           Objective       ED Triage Vitals   Temperature Pulse Blood Pressure Respirations SpO2 Patient Position - Orthostatic VS   10/20/24 2049 10/20/24 2011 10/20/24 2011 10/20/24 2011 10/20/24 2011 10/20/24 2011   97.7 °F (36.5 °C) 60 151/76 18 100 % Lying      Temp Source Heart Rate Source BP Location FiO2 (%) Pain Score    10/20/24 2049 10/20/24 2011 10/20/24 2011 -- --    Oral Monitor Right arm        Vitals      Date and Time Temp Pulse SpO2 Resp BP Pain Score FACES Pain Rating User   10/20/24 2200 -- 81 99 % 23 132/71 -- -- VF   10/20/24 2049 97.7 °F (36.5 °C) -- -- -- -- -- -- VF   10/20/24 2011 -- 60 100 % 18 151/76 -- -- DS            Physical Exam  Vitals and nursing note reviewed.   Constitutional:       General: She is not in acute distress.     Appearance: Normal appearance. She is not ill-appearing or toxic-appearing.   HENT:      Head: Normocephalic and atraumatic.      Right Ear: External ear normal.      Left Ear: External ear normal.      Nose: Nose normal.   Eyes:      General: No scleral icterus.         Right eye: No discharge.         Left eye: No discharge.      Extraocular Movements: Extraocular movements intact.      Conjunctiva/sclera: Conjunctivae normal.   Cardiovascular:      Rate and Rhythm: Normal rate.      Heart sounds: Normal heart sounds. No murmur heard.     No friction rub. No gallop.   Pulmonary:      Effort: Pulmonary effort is normal. No respiratory distress.      Breath sounds: Normal breath sounds.   Abdominal:      General: Abdomen is flat. There is no distension.      Palpations: Abdomen is soft. There is no mass.      Tenderness: There is abdominal tenderness in the epigastric area.   Genitourinary:     Comments: Deferred  Skin:     General: Skin is warm and dry.   Neurological:      General: No focal deficit present.      Mental Status: She is alert.         Results Reviewed       Procedure Component Value Units Date/Time    hCG, quantitative [[id number]]  (Normal) Collected: 10/20/24 2021    Lab Status: Final result Specimen: Blood from Arm, Left Updated: 10/20/24 2124     HCG, Quant <0.6 mIU/mL     Narrative:       Expected Ranges:    HCG results between 5.0 and 25.0 mIU/mL may be indicative of early pregnancy but should be interpreted in light of the total clinical presentation.    HCG can rise to detectable levels in mirza and post menopausal women (0-11.6 mIU/mL).     Approximate               Approximate HCG  Gestation age          Concentration ( mIU/mL)  _____________          ______________________   Weeks                      HCG values  0.2-1                       5-50  1-2                           2-3                         100-5000  3-4                         500-26746  4-5                         1000-84222  5-6                         44203-149677  6-8                         [id number]  8-12                        [id number]      Comprehensive metabolic panel [[id number]]  (Abnormal) Collected: 10/20/24 2021    Lab Status: Final result Specimen: Blood from  Arm, Left Updated: 10/20/24 2041     Sodium 138 mmol/L      Potassium 3.4 mmol/L      Chloride 108 mmol/L      CO2 17 mmol/L      ANION GAP 13 mmol/L      BUN 11 mg/dL      Creatinine 0.74 mg/dL      Glucose 155 mg/dL      Calcium 9.8 mg/dL      AST 19 U/L      ALT 17 U/L      Alkaline Phosphatase 102 U/L      Total Protein 7.9 g/dL      Albumin 4.7 g/dL      Total Bilirubin 0.62 mg/dL      eGFR 112 ml/min/1.73sq m     Narrative:      National Kidney Disease Foundation guidelines for Chronic Kidney Disease (CKD):     Stage 1 with normal or high GFR (GFR > 90 mL/min/1.73 square meters)    Stage 2 Mild CKD (GFR = 60-89 mL/min/1.73 square meters)    Stage 3A Moderate CKD (GFR = 45-59 mL/min/1.73 square meters)    Stage 3B Moderate CKD (GFR = 30-44 mL/min/1.73 square meters)    Stage 4 Severe CKD (GFR = 15-29 mL/min/1.73 square meters)    Stage 5 End Stage CKD (GFR <15 mL/min/1.73 square meters)  Note: GFR calculation is accurate only with a steady state creatinine    Lipase [[id number]]  (Abnormal) Collected: 10/20/24 2021    Lab Status: Final result Specimen: Blood from Arm, Left Updated: 10/20/24 2041     Lipase 8 u/L     CBC and differential [[id number]]  (Abnormal) Collected: 10/20/24 2021    Lab Status: Final result Specimen: Blood from Arm, Left Updated: 10/20/24 2026     WBC 17.69 Thousand/uL      RBC 4.87 Million/uL      Hemoglobin 14.3 g/dL      Hematocrit 40.8 %      MCV 84 fL      MCH 29.4 pg      MCHC 35.0 g/dL      RDW 13.2 %      MPV 9.2 fL      Platelets 397 Thousands/uL      nRBC 0 /100 WBCs      Segmented % 89 %      Immature Grans % 1 %      Lymphocytes % 7 %      Monocytes % 3 %      Eosinophils Relative 0 %      Basophils Relative 0 %      Absolute Neutrophils 15.72 Thousands/µL      Absolute Immature Grans 0.08 Thousand/uL      Absolute Lymphocytes 1.31 Thousands/µL      Absolute Monocytes 0.51 Thousand/µL      Eosinophils Absolute 0.01 Thousand/µL      Basophils Absolute 0.06 Thousands/µL              No orders to display       Procedures    ED Medication and Procedure Management   Prior to Admission Medications   Prescriptions Last Dose Informant Patient Reported? Taking?   EPINEPHrine (EPIPEN) 0.3 mg/0.3 mL SOAJ  Self No No   Sig: Inject 0.3 mL (0.3 mg total) into a muscle once for 1 dose   Spacer/Aero-Holding Chambers (Vortex Valved Holding Chamber) FROYLAN  Self No No   Sig: Use 2 (two) times a day   albuterol (2.5 mg/3 mL) 0.083 % nebulizer solution  Self No No   Sig: Take 3 mL (2.5 mg total) by nebulization every 6 (six) hours as needed for wheezing or shortness of breath   albuterol (Ventolin HFA) 90 mcg/act inhaler  Self No No   Sig: Inhale 2 puffs every 4 (four) hours as needed for wheezing   cetirizine (ZyrTEC) 10 mg tablet  Self No No   Sig: Take 1 tablet (10 mg total) by mouth daily   dicyclomine (BENTYL) 20 mg tablet  Self No No   Sig: Take 1 tablet (20 mg total) by mouth every 6 (six) hours as needed (stomach cramps)   fluticasone-salmeterol (Advair HFA) 115-21 MCG/ACT inhaler  Self No No   Sig: Inhale 2 puffs 2 (two) times a day Rinse mouth after use.   lidocaine (Lidoderm) 5 %  Self No No   Sig: Apply 1 patch topically over 12 hours daily Remove & Discard patch within 12 hours or as directed by MD   Patient not taking: Reported on 9/27/2024   metoclopramide (Reglan) 10 mg tablet  Self No No   Sig: Take 1 tablet (10 mg total) by mouth every 6 (six) hours as needed (vomiting)   mometasone (ELOCON) 0.1 % cream  Self No No   Sig: Apply topically daily   tiZANidine (ZANAFLEX) 4 mg tablet  Self No No   Sig: Take 1 tablet (4 mg total) by mouth 2 (two) times a day as needed for muscle spasms   topiramate (TOPAMAX) 50 MG tablet   No No   Sig: Take 1 tablet (50 mg total) by mouth 2 (two) times a day      Facility-Administered Medications: None     Discharge Medication List as of 10/20/2024 10:23 PM        START taking these medications    Details   capsaicin (ZOSTRIX) 0.025 % cream Apply 1 Application  topically 2 (two) times a day, Starting Sun 10/20/2024, Until Tue 11/19/2024, Print      OLANZapine (ZyPREXA ZYDIS) 5 mg dispersible tablet Take 1 tablet (5 mg total) by mouth daily as needed (nausea and vomiting) for up to 7 days, Starting Sun 10/20/2024, Until Sun 10/27/2024 at 2359, Normal           CONTINUE these medications which have NOT CHANGED    Details   albuterol (2.5 mg/3 mL) 0.083 % nebulizer solution Take 3 mL (2.5 mg total) by nebulization every 6 (six) hours as needed for wheezing or shortness of breath, Starting Thu 9/26/2024, Normal      albuterol (Ventolin HFA) 90 mcg/act inhaler Inhale 2 puffs every 4 (four) hours as needed for wheezing, Starting Thu 9/26/2024, Normal      cetirizine (ZyrTEC) 10 mg tablet Take 1 tablet (10 mg total) by mouth daily, Starting Wed 1/11/2023, Normal      dicyclomine (BENTYL) 20 mg tablet Take 1 tablet (20 mg total) by mouth every 6 (six) hours as needed (stomach cramps), Starting Thu 9/26/2024, Normal      EPINEPHrine (EPIPEN) 0.3 mg/0.3 mL SOAJ Inject 0.3 mL (0.3 mg total) into a muscle once for 1 dose, Starting Thu 9/26/2024, Normal      fluticasone-salmeterol (Advair HFA) 115-21 MCG/ACT inhaler Inhale 2 puffs 2 (two) times a day Rinse mouth after use., Starting Thu 9/26/2024, Normal      lidocaine (Lidoderm) 5 % Apply 1 patch topically over 12 hours daily Remove & Discard patch within 12 hours or as directed by MD, Starting Tue 7/2/2024, Normal      metoclopramide (Reglan) 10 mg tablet Take 1 tablet (10 mg total) by mouth every 6 (six) hours as needed (vomiting), Starting Thu 9/26/2024, Normal      mometasone (ELOCON) 0.1 % cream Apply topically daily, Starting Wed 6/5/2024, Normal      Spacer/Aero-Holding Chambers (Vortex Valved Holding Chamber) FROYLAN Use 2 (two) times a day, Starting Wed 3/9/2022, Normal      tiZANidine (ZANAFLEX) 4 mg tablet Take 1 tablet (4 mg total) by mouth 2 (two) times a day as needed for muscle spasms, Starting Fri 9/13/2024, Normal       topiramate (TOPAMAX) 50 MG tablet Take 1 tablet (50 mg total) by mouth 2 (two) times a day, Starting Fri 9/27/2024, Normal           No discharge procedures on file.  ED SEPSIS DOCUMENTATION   Time reflects when diagnosis was documented in both MDM as applicable and the Disposition within this note       Time User Action Codes Description Comment    10/20/2024 10:20 PM Juan Ryan Add [R11.2] Nausea and vomiting                  Juan Ryan, DO  10/21/24 0841

## 2024-10-28 ENCOUNTER — HOSPITAL ENCOUNTER (OUTPATIENT)
Dept: NEUROLOGY | Facility: CLINIC | Age: 24
Discharge: HOME | End: 2024-10-28
Payer: MEDICARE

## 2024-10-28 DIAGNOSIS — F44.5 PSYCHOGENIC NONEPILEPTIC SEIZURE: Chronic | ICD-10-CM

## 2024-10-28 PROCEDURE — 95816 EEG AWAKE AND DROWSY: CPT | Performed by: STUDENT IN AN ORGANIZED HEALTH CARE EDUCATION/TRAINING PROGRAM

## 2024-10-28 PROCEDURE — 95816 EEG AWAKE AND DROWSY: CPT

## 2024-11-11 ENCOUNTER — TELEPHONE (OUTPATIENT)
Age: 24
End: 2024-11-11

## 2024-11-11 NOTE — TELEPHONE ENCOUNTER
Caller: Apurva    Doctor: Vicki    Reason for call: returning nurses phone call     Call back#: [phone]   hospitalist Hospitalist Dr. Darden hospitalist

## 2024-11-11 NOTE — TELEPHONE ENCOUNTER
Caller: pt    Doctor: xuan    Reason for call: pt would like to schedule another low back injection and also needs a refill of tizanidine.    Call back#: [phone]

## 2024-11-11 NOTE — TELEPHONE ENCOUNTER
S/W pt.  Pt requested repeat injection of L5-S1 LESI and pain has gotten worse since SOVS.    Last injection date:  8/8/24  Last office visit: 9/13/24  Where is pain located: b/l lower back and down both legs  Is the pain the same area as before: Yes  Current pain level: 7/10  How much % of relief did the last injection provide:  40-50%  Duration of relief from last injection: 1 1/2 months  When did pain return:  6 weeks ago and pain getting worse every week  Is the pain effecting your ADLs: yes    Blood thinners/NSAIDS/ASA? no  Diabetes? no    Okay to schedule?    Pt also asking for refill of tizanidine as listed in her chart.  It helps her.  She does get really sleepy from it so she usually takes it at bedtime.  Please advise.

## 2024-11-12 DIAGNOSIS — M79.18 MYOFASCIAL PAIN SYNDROME: ICD-10-CM

## 2024-11-12 NOTE — TELEPHONE ENCOUNTER
Caller: patient    Doctor: KOFFI    Reason for call: calling back to schedule procedure    Call back#:

## 2024-11-14 NOTE — TELEPHONE ENCOUNTER
Auth valid. [id number] confirmed. Spoke to patient, confirmed admission date time and location. No further questions.

## 2024-11-17 NOTE — ASSESSMENT & PLAN NOTE
24 y.o. 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     Diagnosis: Psychogenic nonepileptic events, patient's events have changed in character and have become more frequent, now occurring multiple times a day.  She is also began to experience an aura of a wet metal smell and taste of blood.  She will undergo baseline routine EEG to evaluate for epileptic seizures.  We also discussed admission to the epilepsy monitoring unit for differential diagnosis of her current events.    Plan: Routine EEG            Admit to EMU for differential diagnosis of current events            Seizure safety and first aid were reviewed            Patient was encouraged to find a therapist and psychiatrist for treatment of her various psychiatric diagnoses.    Follow-up: 3 months

## 2024-11-18 ENCOUNTER — TELEPHONE (OUTPATIENT)
Age: 24
End: 2024-11-18

## 2024-11-18 NOTE — TELEPHONE ENCOUNTER
Patient needing to reschedule EMU admission due to illness.    Spoke to patient preference is to reschedule for after the holidays. Rescheduled for 1/13/2025 9am. Notified precert via teams. Also notified PACS.

## 2024-11-18 NOTE — TELEPHONE ENCOUNTER
Patient called stated that she is really sick and will not be making it in for EMU. Tried calling clinical team line was busy.

## 2024-11-22 NOTE — TELEPHONE ENCOUNTER
Caller: Patient    Doctor/Office: Dr Cohen    Call regarding :  Patient returning call to schedule repeat injections     Call was transferred to: procedure

## 2024-11-22 NOTE — TELEPHONE ENCOUNTER
Patient is scheduled for 11/9    Reviewed instructions: , NPO 1 hour prior, loose-fitting/comfortable pants, if ill/fever/infx/abx to call and reschedule. No immunizations or vaccinations 2w prior/after steroid injections.      Patient stated verbal understanding.

## 2024-12-09 ENCOUNTER — HOSPITAL ENCOUNTER (OUTPATIENT)
Dept: RADIOLOGY | Facility: MEDICAL CENTER | Age: 24
Discharge: HOME | End: 2024-12-09
Payer: MEDICARE

## 2024-12-09 VITALS
DIASTOLIC BLOOD PRESSURE: 74 MMHG | TEMPERATURE: 98 F | RESPIRATION RATE: 18 BRPM | SYSTOLIC BLOOD PRESSURE: 107 MMHG | OXYGEN SATURATION: 100 % | HEART RATE: 77 BPM

## 2024-12-09 DIAGNOSIS — M54.16 LUMBAR RADICULOPATHY: ICD-10-CM

## 2024-12-09 PROCEDURE — 62323 NJX INTERLAMINAR LMBR/SAC: CPT | Performed by: ANESTHESIOLOGY

## 2024-12-09 RX ORDER — METHYLPREDNISOLONE ACETATE 80 MG/ML
80 INJECTION, SUSPENSION INTRA-ARTICULAR; INTRALESIONAL; INTRAMUSCULAR; PARENTERAL; SOFT TISSUE ONCE
Status: COMPLETED | OUTPATIENT
Start: 2024-12-09 | End: 2024-12-09

## 2024-12-09 RX ORDER — BUPIVACAINE HCL/PF 2.5 MG/ML
1 VIAL (ML) INJECTION ONCE
Status: COMPLETED | OUTPATIENT
Start: 2024-12-09 | End: 2024-12-09

## 2024-12-09 RX ADMIN — METHYLPREDNISOLONE ACETATE 80 MG: 80 INJECTION, SUSPENSION INTRA-ARTICULAR; INTRALESIONAL; INTRAMUSCULAR; SOFT TISSUE at 11:50

## 2024-12-09 RX ADMIN — BUPIVACAINE HYDROCHLORIDE 1 ML: 2.5 INJECTION, SOLUTION EPIDURAL; INFILTRATION; INTRACAUDAL at 11:50

## 2024-12-09 RX ADMIN — IOHEXOL 1 ML: 300 INJECTION, SOLUTION INTRAVENOUS at 11:50

## 2024-12-09 NOTE — H&P
History of Present Illness: The patient is a 24 y.o. female who presents with complaints of back and leg pain    Past Medical History:   Diagnosis Date    Anaphylaxis     apricot    Anxiety     Asthma     Cluster headache     Eczema     Headache, tension-type     Lymphadenitis     Last assessed 12/30/14    Lymphadenopathy     Last assessed 10/16/13    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HCC)     Migraine     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Psychiatric disorder     Psychiatric illness     Psychosis (HCC)     Seizures (HCC)        Past Surgical History:   Procedure Laterality Date    COLONOSCOPY N/A 05/03/2019    Procedure: COLONOSCOPY;  Surgeon: Jean Carlos Hogan MD;  Location:  GI LAB;  Service: Gastroenterology    CYSTOSCOPY  04/04/2023    Dr. Bueno    ESOPHAGOGASTRODUODENOSCOPY N/A 05/03/2019    Procedure: ESOPHAGOGASTRODUODENOSCOPY (EGD);  Surgeon: Jean Carlos Hogan MD;  Location:  GI LAB;  Service: Gastroenterology         Current Outpatient Medications:     albuterol (2.5 mg/3 mL) 0.083 % nebulizer solution, Take 3 mL (2.5 mg total) by nebulization every 6 (six) hours as needed for wheezing or shortness of breath, Disp: 180 mL, Rfl: 5    albuterol (Ventolin HFA) 90 mcg/act inhaler, Inhale 2 puffs every 4 (four) hours as needed for wheezing, Disp: 18 g, Rfl: 3    capsaicin (ZOSTRIX) 0.025 % cream, Apply 1 Application topically 2 (two) times a day, Disp: 60 g, Rfl: 0    cetirizine (ZyrTEC) 10 mg tablet, Take 1 tablet (10 mg total) by mouth daily, Disp: 90 tablet, Rfl: 2    dicyclomine (BENTYL) 20 mg tablet, Take 1 tablet (20 mg total) by mouth every 6 (six) hours as needed (stomach cramps), Disp: 30 tablet, Rfl: 1    EPINEPHrine (EPIPEN) 0.3 mg/0.3 mL SOAJ, Inject 0.3 mL (0.3 mg total) into a muscle once for 1 dose, Disp: 0.6 mL, Rfl: 1    fluticasone-salmeterol (Advair HFA) 115-21 MCG/ACT inhaler, Inhale 2 puffs 2 (two) times a day Rinse mouth after use., Disp: 36 g, Rfl: 5    lidocaine (Lidoderm) 5 %, Apply 1 patch topically  over 12 hours daily Remove & Discard patch within 12 hours or as directed by MD (Patient not taking: Reported on 9/27/2024), Disp: 10 patch, Rfl: 0    metoclopramide (Reglan) 10 mg tablet, Take 1 tablet (10 mg total) by mouth every 6 (six) hours as needed (vomiting), Disp: 30 tablet, Rfl: 2    mometasone (ELOCON) 0.1 % cream, Apply topically daily, Disp: 45 g, Rfl: 1    OLANZapine (ZyPREXA ZYDIS) 5 mg dispersible tablet, Take 1 tablet (5 mg total) by mouth daily as needed (nausea and vomiting) for up to 7 days, Disp: 7 tablet, Rfl: 0    Spacer/Aero-Holding Chambers (Vortex Valved Holding Chamber) FROYLAN, Use 2 (two) times a day, Disp: 1 each, Rfl: 0    tiZANidine (ZANAFLEX) 4 mg tablet, Take 1 tablet (4 mg total) by mouth 2 (two) times a day as needed for muscle spasms, Disp: 60 tablet, Rfl: 2    topiramate (TOPAMAX) 50 MG tablet, Take 1 tablet (50 mg total) by mouth 2 (two) times a day, Disp: 180 tablet, Rfl: 3    Current Facility-Administered Medications:     bupivacaine (PF) (MARCAINE) 0.25 % injection 1 mL, 1 mL, Epidural, Once, Will Vishal Cohen MD    iohexol (OMNIPAQUE) 300 mg/mL injection 1 mL, 1 mL, Epidural, Once, Will Vishal Cohen MD    methylPREDNISolone acetate (DEPO-MEDROL) injection 80 mg, 80 mg, Epidural, Once, Will Vishal Cohen MD    Allergies   Allergen Reactions    Bee Venom Swelling    Penicillins Hives     anaphylaxis    Blueberry Flavor - Food Allergy Rash    Pork-Derived Products - Food Allergy Rash    Shiitake Mushroom - Food Allergy Rash       Physical Exam:   Vitals:    12/09/24 1140   BP: 104/70   Pulse: 78   Resp: 16   Temp: 98 °F (36.7 °C)   SpO2: 98%       General: Awake, Alert, Oriented x 3, Mood and affect appropriate  Respiratory: Respirations even and unlabored  Cardiovascular: Peripheral pulses intact; no edema  Musculoskeletal Exam: back and leg pain    ASA Score: 3    Patient/Chart Verification  Patient ID Verified: Verbal  Consents Confirmed: Procedural, To be obtained in the  Pre-Procedure area  H&P( within 30 days) Verified: To be obtained in the Procedural area  Allergies Reviewed: Yes  Anticoag/NSAID held?: NA  Currently on antibiotics?: No  Pregnancy denied?: Yes    Assessment:   1. Lumbar radiculopathy        Plan: L5-S1 LESI

## 2024-12-09 NOTE — DISCHARGE INSTRUCTIONS
Epidural Steroid Injection   WHAT YOU NEED TO KNOW:   An epidural steroid injection (SHAHBAZ) is a procedure to inject steroid medicine into the epidural space. The epidural space is between your spinal cord and vertebrae. Steroids reduce inflammation and fluid buildup in your spine that may be causing pain. You may be given pain medicine along with the steroids.          ACTIVITY  Do not drive or operate machinery today.  No strenuous activity today - bending, lifting, etc.  You may resume normal activites starting tomorrow - start slowly and as tolerated.  You may shower today, but no tub baths or hot tubs.  You may have numbness for several hours from the local anesthetic. Please use caution and common sense, especially with weight-bearing activities.    CARE OF THE INJECTION SITE  If you have soreness or pain, apply ice to the area today (20 minutes on/20 minutes off).  Starting tomorrow, you may use warm, moist heat or ice if needed.  You may have an increase or change in your discomfort for 36-48 hours after your treatment.  Apply ice and continue with any pain medication you have been prescribed.  Notify the [facility] if you have any of the following: redness, drainage, swelling, headache, stiff neck or fever above 100°F.    SPECIAL INSTRUCTIONS  Our office will contact you in approximately 14 days for a progress report.    MEDICATIONS  Continue to take all routine medications.  Our office may have instructed you to hold some medications.    As no general anesthesia was used in today's procedure, you should not experience any side effects related to anesthesia.     If you are diabetic, the steroids used in today's injection may temporarily increase your blood sugar levels after the first few days after your injection. Please keep a close eye on your sugars and alert the doctor who manages your diabetes if your sugars are significantly high from your baseline or you are symptomatic.     If you have a  problem specifically related to your procedure, please call our office at [phone].  Problems not related to your procedure should be directed to your primary care physician.

## 2024-12-23 ENCOUNTER — TELEPHONE (OUTPATIENT)
Dept: PAIN MEDICINE | Facility: CLINIC | Age: 24
End: 2024-12-23

## 2024-12-30 NOTE — TELEPHONE ENCOUNTER
1st attempt  Lm to cb with % improvement and pain level.     
2nd attempt  Lm to cb with % improvement and pain level.     
CNR letter sent  
Removal of nasal Papillomas

## 2025-01-06 ENCOUNTER — OFFICE VISIT (OUTPATIENT)
Dept: FAMILY MEDICINE CLINIC | Facility: CLINIC | Age: 25
End: 2025-01-06

## 2025-01-06 VITALS
TEMPERATURE: 98 F | HEART RATE: 89 BPM | SYSTOLIC BLOOD PRESSURE: 110 MMHG | OXYGEN SATURATION: 96 % | BODY MASS INDEX: 34.55 KG/M2 | HEIGHT: 63 IN | DIASTOLIC BLOOD PRESSURE: 70 MMHG | WEIGHT: 195 LBS | RESPIRATION RATE: 16 BRPM

## 2025-01-06 DIAGNOSIS — A08.4 VIRAL GASTROENTERITIS: Primary | ICD-10-CM

## 2025-01-06 DIAGNOSIS — R30.0 DYSURIA: ICD-10-CM

## 2025-01-06 PROCEDURE — 99213 OFFICE O/P EST LOW 20 MIN: CPT | Performed by: FAMILY MEDICINE

## 2025-01-06 RX ORDER — CEPHALEXIN 500 MG/1
500 CAPSULE ORAL 2 TIMES DAILY
Qty: 10 CAPSULE | Refills: 0 | Status: SHIPPED | OUTPATIENT
Start: 2025-01-06 | End: 2025-01-11

## 2025-01-06 NOTE — PROGRESS NOTES
Name: Apurva Dumont      : 2000      MRN: [id number]  Encounter Provider: Dina Cortes MD  Encounter Date: 2025   Encounter department: [facility]  :  Assessment & Plan  Viral gastroenteritis    PE reveals the patient appears well.   Hydration status: mildly dehydrated.   Abdomen: abdomen is soft without significant tenderness, masses, organomegaly or guarding. No rebound tenderness.  Moderate tenderness in the epigastric and periumbilical area..       Plan:  - I have recommended small amounts clear fluids frequently, soups, juices, water, and advance diet as tolerated. Return office visit if symptoms persist or worsen;   - Alerted the patient to call if high fever, dehydration, marked weakness, fainting, increased abdominal pain, blood in stool or vomit.  -  RTC in 5-7 days for follow up  Orders:    Lipase; Future    Dysuria  C/o burning with urination without frequency or urgency x 2-3 days  Likely 2/2 to poor hygiene in the setting of diarrheal stools    Orders:    UA w Reflex to Microscopic w Reflex to Culture; Future    cephalexin (KEFLEX) 500 mg capsule; Take 1 capsule (500 mg total) by mouth 2 (two) times a day for 5 days           History of Present Illness       Apurva Dumont is a 24 y.o. female presenting today for complaints of lightheadedness, abdominal pain, nausea, vomiting and watery diarrhea with mucous for 3 days. No blood in stool. She describes emesis as yellowish-greenish on average of 4-6 times daily. She has not been able to keep any food down.  She has also had watery stools multiple times daily.  She reports that her 7-year-old sister who lives in the same household was just diagnosed this morning with E. coli.  The last time that she was able to keep a meal down was about 3 days ago right when the symptoms began.  Only able to keep down water. Of note, patient currently has cough, and congestion for 2 weeks.      Review of  "Systems   Constitutional:  Positive for chills and fatigue. Negative for fever.   HENT:  Positive for congestion and rhinorrhea.    Respiratory:  Positive for cough. Negative for chest tightness and wheezing.    Cardiovascular:  Negative for chest pain and palpitations.   Gastrointestinal:  Positive for abdominal pain, diarrhea, nausea and vomiting. Negative for blood in stool and constipation.        Mid abdominal pain   Genitourinary:  Positive for dysuria.   Neurological:  Positive for light-headedness. Negative for dizziness and facial asymmetry.       Objective   /70 (BP Location: Right arm, Patient Position: Sitting, Cuff Size: Large)   Pulse 89   Temp 98 °F (36.7 °C) (Temporal)   Resp 16   Ht 5' 3\" (1.6 m)   Wt 88.5 kg (195 lb)   LMP 01/06/2025   SpO2 96%   BMI 34.54 kg/m²      Physical Exam  Vitals reviewed.   Constitutional:       General: She is not in acute distress.     Appearance: Normal appearance. She is obese. She is not ill-appearing or toxic-appearing.   HENT:      Head: Normocephalic.      Right Ear: Tympanic membrane, ear canal and external ear normal.      Left Ear: Tympanic membrane, ear canal and external ear normal.      Nose: Congestion and rhinorrhea present.      Mouth/Throat:      Mouth: Mucous membranes are moist.      Pharynx: No oropharyngeal exudate or posterior oropharyngeal erythema.   Eyes:      General: No scleral icterus.     Extraocular Movements: Extraocular movements intact.   Cardiovascular:      Rate and Rhythm: Normal rate and regular rhythm.      Pulses: Normal pulses.      Heart sounds: Normal heart sounds. No murmur heard.  Pulmonary:      Effort: Pulmonary effort is normal. No respiratory distress.      Breath sounds: Normal breath sounds.   Chest:      Chest wall: No tenderness.   Abdominal:      General: There is no distension.      Tenderness: There is abdominal tenderness. There is no right CVA tenderness or left CVA tenderness.      Comments: " Tenderness in the epigastric and periumbilical areas   Musculoskeletal:      Cervical back: Neck supple.   Lymphadenopathy:      Cervical: No cervical adenopathy.   Neurological:      Mental Status: She is alert.

## 2025-01-08 ENCOUNTER — RA CDI HCC (OUTPATIENT)
Dept: OTHER | Facility: HOSPITAL | Age: 25
End: 2025-01-08

## 2025-01-13 ENCOUNTER — TELEPHONE (OUTPATIENT)
Age: 25
End: 2025-01-13
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
## 2025-01-13 NOTE — TELEPHONE ENCOUNTER
Spoke to patient, agreeable to EMU admission for 1/14/2025. Notified PACS of admission as well as .

## 2025-01-13 NOTE — TELEPHONE ENCOUNTER
Message left for patient to return call to office. Offered EMU admission for 1/14/2025 9am. Awaiting call back.
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
## 2025-01-14 NOTE — TELEPHONE ENCOUNTER
Spoke w/Emeka CHAUDHARY. Pt called to cancel EMU admission today, 1/14/25. Per Teams communication w/Jyothi RN-Navigator, she has canceled admission and they will discuss at 1/28/25 NP appt. Unfortunately, pt canceled 1/28/25 appt since she thought it was to discuss EMU results. Emeka unable to reschedule appt as it is a NP slot for 30 minutes.     Jyothi - Scheduling unable to reschedule pt. It will need an override. Are you able to do that?

## 2025-01-14 NOTE — TELEPHONE ENCOUNTER
FYI - patient again cancelled EMU. You are scheduled to see her later this month. Perhaps you can discuss EMU at that time.     Notified PACS bed hold can be released.

## 2025-01-17 ENCOUNTER — HOSPITAL ENCOUNTER (EMERGENCY)
Facility: HOSPITAL | Age: 25
Discharge: HOME | End: 2025-01-17
Attending: EMERGENCY MEDICINE
Payer: MEDICARE

## 2025-01-17 ENCOUNTER — APPOINTMENT (EMERGENCY)
Dept: RADIOLOGY | Facility: HOSPITAL | Age: 25
End: 2025-01-17
Payer: MEDICARE

## 2025-01-17 VITALS
RESPIRATION RATE: 18 BRPM | WEIGHT: 202.82 LBS | BODY MASS INDEX: 35.93 KG/M2 | SYSTOLIC BLOOD PRESSURE: 126 MMHG | HEART RATE: 81 BPM | OXYGEN SATURATION: 98 % | TEMPERATURE: 98.2 F | DIASTOLIC BLOOD PRESSURE: 72 MMHG

## 2025-01-17 DIAGNOSIS — S49.91XA INJURY OF RIGHT SHOULDER, INITIAL ENCOUNTER: Primary | ICD-10-CM

## 2025-01-17 PROCEDURE — 73000 X-RAY EXAM OF COLLAR BONE: CPT

## 2025-01-17 PROCEDURE — 73030 X-RAY EXAM OF SHOULDER: CPT

## 2025-01-17 PROCEDURE — 99284 EMERGENCY DEPT VISIT MOD MDM: CPT | Performed by: EMERGENCY MEDICINE

## 2025-01-17 PROCEDURE — 99283 EMERGENCY DEPT VISIT LOW MDM: CPT

## 2025-01-17 RX ORDER — IBUPROFEN 600 MG/1
600 TABLET, FILM COATED ORAL ONCE
Status: COMPLETED | OUTPATIENT
Start: 2025-01-17 | End: 2025-01-17

## 2025-01-17 RX ORDER — NAPROXEN 500 MG/1
500 TABLET ORAL 2 TIMES DAILY WITH MEALS
Qty: 20 TABLET | Refills: 0 | Status: SHIPPED | OUTPATIENT
Start: 2025-01-17 | End: 2025-01-27

## 2025-01-17 RX ADMIN — IBUPROFEN 600 MG: 600 TABLET, FILM COATED ORAL at 14:56

## 2025-01-17 NOTE — DISCHARGE INSTRUCTIONS
Take the Naprosyn twice daily for the next 5-10 days.    Use the sling for comfort over the weekend.     If your pain has not improved by Monday, call the orthopedist for further evaluation and management.

## 2025-01-17 NOTE — ED PROVIDER NOTES
Time reflects when diagnosis was documented in both MDM as applicable and the Disposition within this note       Time User Action Codes Description Comment    1/17/2025  3:27 PM Shorty Garcia Add [S49.91XA] Injury of right shoulder, initial encounter           ED Disposition       ED Disposition   Discharge    Condition   Stable    Date/Time   Fri Jan 17, 2025  3:27 PM    Comment   Apurva Dumont discharge to home/self care.                   Assessment & Plan       Medical Decision Making  1. Shoulder injury - Possibly during functional seizure, patient currently mentating at baseline. Will x-ray the shoulder and the clavicle.     Problems Addressed:  Injury of right shoulder, initial encounter: acute illness or injury    Amount and/or Complexity of Data Reviewed  Radiology: ordered and independent interpretation performed. Decision-making details documented in ED Course.    Risk  Prescription drug management.        ED Course as of 01/17/25 2222   Fri Jan 17, 2025   1522 X-ray(s) personally evaluated, interpretation: No fracture or dislocation demonstrated on plain films of the Right shoulder and Clavicle.       Medications   ibuprofen (MOTRIN) tablet 600 mg (600 mg Oral Given 1/17/25 1456)       ED Risk Strat Scores                                              History of Present Illness       Chief Complaint   Patient presents with    Shoulder Injury     Pt reports she had a seizure and hit right shoulder on dresser shortly pta.        Past Medical History:   Diagnosis Date    Anaphylaxis     apricot    Anxiety     Asthma     Cluster headache     Eczema     Headache, tension-type     Lymphadenitis     Last assessed 12/30/14    Lymphadenopathy     Last assessed 10/16/13    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HCC)     Migraine     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Psychiatric disorder     Psychiatric illness     Psychosis (HCC)     Seizures (HCC)       Past Surgical History:   Procedure Laterality Date    COLONOSCOPY N/A 05/03/2019     Procedure: COLONOSCOPY;  Surgeon: Jean Carlos Hogan MD;  Location: [facility];  Service: Gastroenterology    CYSTOSCOPY  04/04/2023    Dr. Bueno    ESOPHAGOGASTRODUODENOSCOPY N/A 05/03/2019    Procedure: ESOPHAGOGASTRODUODENOSCOPY (EGD);  Surgeon: Jean Carlos Hogan MD;  Location: [facility];  Service: Gastroenterology      Family History   Problem Relation Age of Onset    Alcohol abuse Mother     Migraines Mother     Other Mother         Neck pain    Depression Mother     Drug abuse Mother     Cervical cancer Mother     Drug abuse Father     Schizophrenia Father     No Known Problems Sister     No Known Problems Brother     Ovarian cancer Paternal Aunt     Febrile seizures Maternal Uncle     Stroke Maternal Uncle     Diabetes Maternal Grandfather     Thyroid disease Maternal Grandmother     No Known Problems Paternal Grandfather     No Known Problems Paternal Grandmother     Completed Suicide  Neg Hx       Social History     Tobacco Use    Smoking status: Every Day     Current packs/day: 0.50     Types: Cigarettes    Smokeless tobacco: Never    Tobacco comments:     I stopped smoking cigarettes but started vaping   Vaping Use    Vaping status: Former    Substances: Nicotine, Flavoring   Substance Use Topics    Alcohol use: Yes     Alcohol/week: 2.0 standard drinks of alcohol     Types: 2 Shots of liquor per week     Comment: Occ    Drug use: Yes     Frequency: 3.0 times per week     Types: Marijuana     Comment: Oc      E-Cigarette/Vaping    E-Cigarette Use Former User       E-Cigarette/Vaping Substances    Nicotine Yes     THC No     CBD No     Flavoring Yes     Other No     Unknown No       I have reviewed and agree with the history as documented.     44 YO female presents after a shoulder injury. Patient has a history of psychogenic nonepileptic seizures, she is currently being evaluated by neurology for a seizure disorder. She states she had an event in her home today, she did fall and struck a dresser  in the axilla. She has pain to the posterior and superior shoulder after the event, this is worse with ranging. She denies other injuries. Pt denies CP/SOB/F/C/N/V/D/C, no dysuria, burning on urination or blood in urine.       History provided by:  Patient   used: No        Review of Systems   Constitutional:  Negative for chills, fatigue and fever.   HENT:  Negative for dental problem.    Eyes:  Negative for visual disturbance.   Respiratory:  Negative for shortness of breath.    Cardiovascular:  Negative for chest pain.   Gastrointestinal:  Negative for abdominal pain, diarrhea and vomiting.   Genitourinary:  Negative for dysuria and frequency.   Musculoskeletal:  Positive for arthralgias.   Skin:  Negative for rash.   Neurological:  Negative for dizziness, weakness and light-headedness.   Psychiatric/Behavioral:  Negative for agitation, behavioral problems and confusion.    All other systems reviewed and are negative.          Objective       ED Triage Vitals   Temperature Pulse Blood Pressure Respirations SpO2 Patient Position - Orthostatic VS   01/17/25 1444 01/17/25 1444 01/17/25 1444 01/17/25 1444 01/17/25 1444 --   98.2 °F (36.8 °C) 81 126/72 18 98 %       Temp src Heart Rate Source BP Location FiO2 (%) Pain Score    -- 01/17/25 1444 01/17/25 1444 -- 01/17/25 1456     Monitor Right arm  8      Vitals      Date and Time Temp Pulse SpO2 Resp BP Pain Score FACES Pain Rating User   01/17/25 1456 -- -- -- -- -- 8 -- DS   01/17/25 1444 98.2 °F (36.8 °C) 81 98 % 18 126/72 -- -- HMR            Physical Exam  Vitals and nursing note reviewed.   Constitutional:       Appearance: Normal appearance.   HENT:      Head: Normocephalic and atraumatic.   Eyes:      Extraocular Movements: Extraocular movements intact.      Conjunctiva/sclera: Conjunctivae normal.   Cardiovascular:      Rate and Rhythm: Normal rate.   Pulmonary:      Effort: Pulmonary effort is normal.   Abdominal:      General: There is no  distension.   Musculoskeletal:         General: Normal range of motion.      Cervical back: Normal range of motion.      Comments: Right shoulder demonstrates no obvious deformity, tender to palpation to the posterior and superior aspect of the shoulder, pain with ranging shoulder, extremity is neurovascularly intact.    Skin:     Findings: No rash.   Neurological:      General: No focal deficit present.      Mental Status: She is alert.      Cranial Nerves: No cranial nerve deficit.   Psychiatric:         Mood and Affect: Mood normal.         Results Reviewed       None            XR shoulder 2+ views RIGHT   ED Interpretation by Shorty Garcia MD (01/17 1524)   No fracture or dislocation      Final Interpretation by Maximilian Grove DO (01/17 1644)      No acute osseous abnormality.         Computerized Assisted Algorithm (CAA) may have been used to analyze all applicable images.         Resident: Adama Knapp I, the attending radiologist, have reviewed the images and agree with the final report above.      Workstation performed: [id number]         XR clavicle RIGHT   ED Interpretation by Shorty Garcia MD (01/17 1525)   No fracture or dislocation       Final Interpretation by Maximilian Grove DO (01/17 1644)      No acute osseous abnormality.         Computerized Assisted Algorithm (CAA) may have been used to analyze all applicable images.         Resident: Adama Knapp I, the attending radiologist, have reviewed the images and agree with the final report above.      Workstation performed: [id number]             Procedures    ED Medication and Procedure Management   Prior to Admission Medications   Prescriptions Last Dose Informant Patient Reported? Taking?   EPINEPHrine (EPIPEN) 0.3 mg/0.3 mL SOAJ  Self No No   Sig: Inject 0.3 mL (0.3 mg total) into a muscle once for 1 dose   OLANZapine (ZyPREXA ZYDIS) 5 mg dispersible tablet   No No   Sig: Take 1 tablet (5 mg total) by mouth  daily as needed (nausea and vomiting) for up to 7 days   Spacer/Aero-Holding Chambers (Vortex Valved Holding Chamber) FROYLAN  Self No No   Sig: Use 2 (two) times a day   albuterol (2.5 mg/3 mL) 0.083 % nebulizer solution  Self No No   Sig: Take 3 mL (2.5 mg total) by nebulization every 6 (six) hours as needed for wheezing or shortness of breath   albuterol (Ventolin HFA) 90 mcg/act inhaler  Self No No   Sig: Inhale 2 puffs every 4 (four) hours as needed for wheezing   capsaicin (ZOSTRIX) 0.025 % cream   No No   Sig: Apply 1 Application topically 2 (two) times a day   cetirizine (ZyrTEC) 10 mg tablet  Self No No   Sig: Take 1 tablet (10 mg total) by mouth daily   dicyclomine (BENTYL) 20 mg tablet  Self No No   Sig: Take 1 tablet (20 mg total) by mouth every 6 (six) hours as needed (stomach cramps)   fluticasone-salmeterol (Advair HFA) 115-21 MCG/ACT inhaler  Self No No   Sig: Inhale 2 puffs 2 (two) times a day Rinse mouth after use.   lidocaine (Lidoderm) 5 %  Self No No   Sig: Apply 1 patch topically over 12 hours daily Remove & Discard patch within 12 hours or as directed by MD   Patient not taking: Reported on 9/27/2024   metoclopramide (Reglan) 10 mg tablet  Self No No   Sig: Take 1 tablet (10 mg total) by mouth every 6 (six) hours as needed (vomiting)   mometasone (ELOCON) 0.1 % cream  Self No No   Sig: Apply topically daily   tiZANidine (ZANAFLEX) 4 mg tablet   No No   Sig: Take 1 tablet (4 mg total) by mouth 2 (two) times a day as needed for muscle spasms   topiramate (TOPAMAX) 50 MG tablet   No No   Sig: Take 1 tablet (50 mg total) by mouth 2 (two) times a day      Facility-Administered Medications: None     Discharge Medication List as of 1/17/2025  3:28 PM        START taking these medications    Details   naproxen (NAPROSYN) 500 mg tablet Take 1 tablet (500 mg total) by mouth 2 (two) times a day with meals for 10 days, Starting Fri 1/17/2025, Until Mon 1/27/2025, Normal           CONTINUE these medications  which have NOT CHANGED    Details   albuterol (2.5 mg/3 mL) 0.083 % nebulizer solution Take 3 mL (2.5 mg total) by nebulization every 6 (six) hours as needed for wheezing or shortness of breath, Starting Thu 9/26/2024, Normal      albuterol (Ventolin HFA) 90 mcg/act inhaler Inhale 2 puffs every 4 (four) hours as needed for wheezing, Starting Thu 9/26/2024, Normal      capsaicin (ZOSTRIX) 0.025 % cream Apply 1 Application topically 2 (two) times a day, Starting Sun 10/20/2024, Until Tue 11/19/2024, Print      cetirizine (ZyrTEC) 10 mg tablet Take 1 tablet (10 mg total) by mouth daily, Starting Wed 1/11/2023, Normal      dicyclomine (BENTYL) 20 mg tablet Take 1 tablet (20 mg total) by mouth every 6 (six) hours as needed (stomach cramps), Starting Thu 9/26/2024, Normal      EPINEPHrine (EPIPEN) 0.3 mg/0.3 mL SOAJ Inject 0.3 mL (0.3 mg total) into a muscle once for 1 dose, Starting Thu 9/26/2024, Normal      fluticasone-salmeterol (Advair HFA) 115-21 MCG/ACT inhaler Inhale 2 puffs 2 (two) times a day Rinse mouth after use., Starting Thu 9/26/2024, Normal      lidocaine (Lidoderm) 5 % Apply 1 patch topically over 12 hours daily Remove & Discard patch within 12 hours or as directed by MD, Starting Tue 7/2/2024, Normal      metoclopramide (Reglan) 10 mg tablet Take 1 tablet (10 mg total) by mouth every 6 (six) hours as needed (vomiting), Starting Thu 9/26/2024, Normal      mometasone (ELOCON) 0.1 % cream Apply topically daily, Starting Wed 6/5/2024, Normal      OLANZapine (ZyPREXA ZYDIS) 5 mg dispersible tablet Take 1 tablet (5 mg total) by mouth daily as needed (nausea and vomiting) for up to 7 days, Starting Sun 10/20/2024, Until Sun 10/27/2024 at 2359, Normal      Spacer/Aero-Holding Chambers (Vortex Valved Holding Chamber) FROYLAN Use 2 (two) times a day, Starting Wed 3/9/2022, Normal      tiZANidine (ZANAFLEX) 4 mg tablet Take 1 tablet (4 mg total) by mouth 2 (two) times a day as needed for muscle spasms, Starting Tue  11/12/2024, Normal      topiramate (TOPAMAX) 50 MG tablet Take 1 tablet (50 mg total) by mouth 2 (two) times a day, Starting Fri 9/27/2024, Normal           No discharge procedures on file.  ED SEPSIS DOCUMENTATION   Time reflects when diagnosis was documented in both MDM as applicable and the Disposition within this note       Time User Action Codes Description Comment    1/17/2025  3:27 PM Shorty Garcia Add [S49.91XA] Injury of right shoulder, initial encounter                  Shorty Garcia MD  01/17/25 3563

## 2025-01-27 ENCOUNTER — HOSPITAL ENCOUNTER (OUTPATIENT)
Dept: RADIOLOGY | Facility: HOSPITAL | Age: 25
Discharge: HOME | End: 2025-01-27
Payer: MEDICARE

## 2025-01-27 DIAGNOSIS — M79.604 RIGHT LEG PAIN: ICD-10-CM

## 2025-01-27 PROCEDURE — 73590 X-RAY EXAM OF LOWER LEG: CPT

## 2025-01-29 ENCOUNTER — OFFICE VISIT (OUTPATIENT)
Dept: DENTISTRY | Facility: CLINIC | Age: 25
End: 2025-01-29

## 2025-01-29 VITALS — TEMPERATURE: 99.5 F | HEART RATE: 120 BPM | SYSTOLIC BLOOD PRESSURE: 102 MMHG | DIASTOLIC BLOOD PRESSURE: 68 MMHG

## 2025-01-29 DIAGNOSIS — Z91.843 RISK FOR DENTAL CARIES, HIGH: ICD-10-CM

## 2025-01-29 DIAGNOSIS — K08.89 NONRESTORABLE TOOTH: Primary | ICD-10-CM

## 2025-01-29 PROCEDURE — D0603 CARIES RISK ASSESSMENT AND DOCUMENTATION, WITH A FINDING OF HIGH RISK: HCPCS

## 2025-01-29 PROCEDURE — D0210 INTRAORAL - COMPLETE SERIES OF RADIOGRAPHIC IMAGES: HCPCS

## 2025-01-29 PROCEDURE — D0150 COMPREHENSIVE ORAL EVALUATION - NEW OR ESTABLISHED PATIENT: HCPCS

## 2025-01-29 NOTE — PROGRESS NOTES
".Comprehensive exam    Apurva Dumont 25 y.o. female presents with self to [facility] for periodic exam.  PMH reviewed, no changes, ASA III. Significant medical history: tobacco abuse, schizoaffective disorder, convulsions, vertigo, morbid obesity, marijuana abuse. Bipolar disorder. Significant allergies: Penicillin. Significant medications: Albuterol.  Pain level 5/10.    Chief complaint:  \"All of my back teeth hurt\"  Patient mainly points to tooth #30 and says that it had a root canal and the crown kept coming off over the past two years    Consent:  Reviewed procedures involved with comprehensive exam including radiographs, oral exam, and periodontal probing.   Patient understands and consent was given by self via verbal consent.    Radiographs: FMX.  Findings: #2- nonrestorable caries  #13- DO caries close to the nerve- suspect RCT, post and core and crown  #15- DO caries  #30- previously RCT- non-restorable  #31- buccal caries    Oral cancer screening: normal.  Extraoral exam: no remarkable findings.  Intraoral exam: significantly sized caries, gingival inflammation, significant calculus.    Periodontal exam:  Hygiene - Poor.  Plaque - Severe.  Horizontal bone loss -  UR: Mild (<15%).  UL: Mild (<15%).  LL: Mild (<15%).  LR: Mild (<15%).  Vertical bone loss - None.  Subgingival calculus - Generalized.    Need to periodontal probe at next visit- ran out of time  Smoker - Yes.  Diabetic - No.      Caries exam:   Caries detected: #2-all surfaces, #13-DO, #15-DO, #30-MODL, #31-buccal  Teeth with elevated chance of needing RCT: #13  Likely nonrestorable teeth: #2, 30    Tx plan:  Extract #2 and #30 with OMFS  Finish comprehensive exam- periodontal probe  Caries control  Discuss treatment plans to replace missing teeth    Referral(s): [facility]MS for extraction #2 and #30 .  Rx: None.  Recommended recall schedule: unknown until periodontal probing complete    Patient dismissed ambulatory and alert.    NV: Finish comprehensive " exam- periodontal probe  Endo test #13.    Attending: Dr. Valdovinos checked radiographs .

## 2025-01-30 ENCOUNTER — TELEPHONE (OUTPATIENT)
Dept: DENTISTRY | Facility: CLINIC | Age: 25
End: 2025-01-30

## 2025-01-31 ENCOUNTER — TELEPHONE (OUTPATIENT)
Age: 25
End: 2025-01-31

## 2025-01-31 NOTE — TELEPHONE ENCOUNTER
Patient has been added to the Medication Management and Talk Therapy wait list without a referral.    Insurance: [employer]  Insurance Type:    Commercial []   Medicaid []   [facility] (if applicable)   Medicare [x]  Location Preference: Any  Provider Preference: Any  Virtual: Yes [] No [x]  Were outside resources sent: Yes [x] No []

## 2025-02-04 ENCOUNTER — HOSPITAL ENCOUNTER (EMERGENCY)
Facility: HOSPITAL | Age: 25
Discharge: HOME | End: 2025-02-04
Attending: EMERGENCY MEDICINE | Admitting: EMERGENCY MEDICINE
Payer: MEDICARE

## 2025-02-04 ENCOUNTER — APPOINTMENT (EMERGENCY)
Dept: CT IMAGING | Facility: HOSPITAL | Age: 25
End: 2025-02-04
Payer: MEDICARE

## 2025-02-04 VITALS
TEMPERATURE: 98 F | DIASTOLIC BLOOD PRESSURE: 76 MMHG | SYSTOLIC BLOOD PRESSURE: 110 MMHG | HEART RATE: 95 BPM | BODY MASS INDEX: 33.62 KG/M2 | WEIGHT: 189.82 LBS | OXYGEN SATURATION: 95 % | RESPIRATION RATE: 16 BRPM

## 2025-02-04 DIAGNOSIS — R93.89 ABNORMAL CT SCAN: ICD-10-CM

## 2025-02-04 DIAGNOSIS — R11.2 NAUSEA AND VOMITING: ICD-10-CM

## 2025-02-04 DIAGNOSIS — R10.9 ABDOMINAL PAIN: Primary | ICD-10-CM

## 2025-02-04 LAB
ALBUMIN SERPL BCG-MCNC: 4.8 G/DL (ref 3.5–5)
ALP SERPL-CCNC: 110 U/L (ref 34–104)
ALT SERPL W P-5'-P-CCNC: 24 U/L (ref 7–52)
ANION GAP SERPL CALCULATED.3IONS-SCNC: 12 MMOL/L (ref 4–13)
AST SERPL W P-5'-P-CCNC: 20 U/L (ref 13–39)
BACTERIA UR QL AUTO: ABNORMAL /HPF
BASOPHILS # BLD AUTO: 0.05 THOUSANDS/ΜL (ref 0–0.1)
BASOPHILS NFR BLD AUTO: 0 % (ref 0–1)
BILIRUB SERPL-MCNC: 0.62 MG/DL (ref 0.2–1)
BILIRUB UR QL STRIP: ABNORMAL
BUN SERPL-MCNC: 11 MG/DL (ref 5–25)
CALCIUM SERPL-MCNC: 9.8 MG/DL (ref 8.4–10.2)
CHLORIDE SERPL-SCNC: 108 MMOL/L (ref 96–108)
CLARITY UR: CLEAR
CO2 SERPL-SCNC: 17 MMOL/L (ref 21–32)
COLOR UR: YELLOW
CREAT SERPL-MCNC: 0.68 MG/DL (ref 0.6–1.3)
EOSINOPHIL # BLD AUTO: 0.01 THOUSAND/ΜL (ref 0–0.61)
EOSINOPHIL NFR BLD AUTO: 0 % (ref 0–6)
ERYTHROCYTE [DISTWIDTH] IN BLOOD BY AUTOMATED COUNT: 13.8 % (ref 11.6–15.1)
EXT PREGNANCY TEST URINE: NEGATIVE
EXT. CONTROL: NORMAL
GFR SERPL CREATININE-BSD FRML MDRD: 121 ML/MIN/1.73SQ M
GLUCOSE SERPL-MCNC: 147 MG/DL (ref 65–140)
GLUCOSE UR STRIP-MCNC: NEGATIVE MG/DL
HCT VFR BLD AUTO: 41.6 % (ref 34.8–46.1)
HGB BLD-MCNC: 14.5 G/DL (ref 11.5–15.4)
HGB UR QL STRIP.AUTO: ABNORMAL
IMM GRANULOCYTES # BLD AUTO: 0.08 THOUSAND/UL (ref 0–0.2)
IMM GRANULOCYTES NFR BLD AUTO: 1 % (ref 0–2)
KETONES UR STRIP-MCNC: ABNORMAL MG/DL
LEUKOCYTE ESTERASE UR QL STRIP: ABNORMAL
LIPASE SERPL-CCNC: 15 U/L (ref 11–82)
LYMPHOCYTES # BLD AUTO: 1.29 THOUSANDS/ΜL (ref 0.6–4.47)
LYMPHOCYTES NFR BLD AUTO: 7 % (ref 14–44)
MCH RBC QN AUTO: 29.2 PG (ref 26.8–34.3)
MCHC RBC AUTO-ENTMCNC: 34.9 G/DL (ref 31.4–37.4)
MCV RBC AUTO: 84 FL (ref 82–98)
MONOCYTES # BLD AUTO: 0.56 THOUSAND/ΜL (ref 0.17–1.22)
MONOCYTES NFR BLD AUTO: 3 % (ref 4–12)
MUCOUS THREADS UR QL AUTO: ABNORMAL
NEUTROPHILS # BLD AUTO: 15.66 THOUSANDS/ΜL (ref 1.85–7.62)
NEUTS SEG NFR BLD AUTO: 89 % (ref 43–75)
NITRITE UR QL STRIP: NEGATIVE
NON-SQ EPI CELLS URNS QL MICRO: ABNORMAL /HPF
NRBC BLD AUTO-RTO: 0 /100 WBCS
PH UR STRIP.AUTO: >=9 [PH] (ref 4.5–8)
PLATELET # BLD AUTO: 428 THOUSANDS/UL (ref 149–390)
PMV BLD AUTO: 9.3 FL (ref 8.9–12.7)
POTASSIUM SERPL-SCNC: 3.7 MMOL/L (ref 3.5–5.3)
PROT SERPL-MCNC: 7.9 G/DL (ref 6.4–8.4)
PROT UR STRIP-MCNC: >=300 MG/DL
RBC # BLD AUTO: 4.97 MILLION/UL (ref 3.81–5.12)
RBC #/AREA URNS AUTO: ABNORMAL /HPF
SODIUM SERPL-SCNC: 137 MMOL/L (ref 135–147)
SP GR UR STRIP.AUTO: 1.01 (ref 1–1.03)
UROBILINOGEN UR QL STRIP.AUTO: 0.2 E.U./DL
WBC # BLD AUTO: 17.65 THOUSAND/UL (ref 4.31–10.16)
WBC #/AREA URNS AUTO: ABNORMAL /HPF

## 2025-02-04 PROCEDURE — 85025 COMPLETE CBC W/AUTO DIFF WBC: CPT

## 2025-02-04 PROCEDURE — 83690 ASSAY OF LIPASE: CPT

## 2025-02-04 PROCEDURE — 74177 CT ABD & PELVIS W/CONTRAST: CPT

## 2025-02-04 PROCEDURE — 96376 TX/PRO/DX INJ SAME DRUG ADON: CPT

## 2025-02-04 PROCEDURE — 96365 THER/PROPH/DIAG IV INF INIT: CPT

## 2025-02-04 PROCEDURE — 81001 URINALYSIS AUTO W/SCOPE: CPT

## 2025-02-04 PROCEDURE — 87086 URINE CULTURE/COLONY COUNT: CPT

## 2025-02-04 PROCEDURE — 99284 EMERGENCY DEPT VISIT MOD MDM: CPT

## 2025-02-04 PROCEDURE — 96366 THER/PROPH/DIAG IV INF ADDON: CPT

## 2025-02-04 PROCEDURE — 96375 TX/PRO/DX INJ NEW DRUG ADDON: CPT

## 2025-02-04 PROCEDURE — 80053 COMPREHEN METABOLIC PANEL: CPT

## 2025-02-04 PROCEDURE — 36415 COLL VENOUS BLD VENIPUNCTURE: CPT

## 2025-02-04 PROCEDURE — 81025 URINE PREGNANCY TEST: CPT

## 2025-02-04 RX ORDER — ONDANSETRON 2 MG/ML
4 INJECTION INTRAMUSCULAR; INTRAVENOUS ONCE
Status: COMPLETED | OUTPATIENT
Start: 2025-02-04 | End: 2025-02-04

## 2025-02-04 RX ORDER — KETOROLAC TROMETHAMINE 30 MG/ML
15 INJECTION, SOLUTION INTRAMUSCULAR; INTRAVENOUS ONCE
Status: COMPLETED | OUTPATIENT
Start: 2025-02-04 | End: 2025-02-04

## 2025-02-04 RX ORDER — HALOPERIDOL 5 MG/ML
2 INJECTION INTRAMUSCULAR ONCE
Status: COMPLETED | OUTPATIENT
Start: 2025-02-04 | End: 2025-02-04

## 2025-02-04 RX ORDER — ONDANSETRON 4 MG/1
4 TABLET, ORALLY DISINTEGRATING ORAL EVERY 6 HOURS PRN
Qty: 20 TABLET | Refills: 0 | Status: SHIPPED | OUTPATIENT
Start: 2025-02-04

## 2025-02-04 RX ADMIN — SODIUM CHLORIDE, SODIUM LACTATE, POTASSIUM CHLORIDE, AND CALCIUM CHLORIDE 1000 ML: .6; .31; .03; .02 INJECTION, SOLUTION INTRAVENOUS at 12:07

## 2025-02-04 RX ADMIN — ONDANSETRON 4 MG: 2 INJECTION INTRAMUSCULAR; INTRAVENOUS at 11:22

## 2025-02-04 RX ADMIN — ONDANSETRON 4 MG: 2 INJECTION INTRAMUSCULAR; INTRAVENOUS at 14:08

## 2025-02-04 RX ADMIN — IOHEXOL 100 ML: 350 INJECTION, SOLUTION INTRAVENOUS at 12:35

## 2025-02-04 RX ADMIN — KETOROLAC TROMETHAMINE 15 MG: 30 INJECTION, SOLUTION INTRAMUSCULAR; INTRAVENOUS at 14:07

## 2025-02-04 RX ADMIN — SODIUM CHLORIDE, SODIUM LACTATE, POTASSIUM CHLORIDE, AND CALCIUM CHLORIDE 1000 ML: .6; .31; .03; .02 INJECTION, SOLUTION INTRAVENOUS at 11:29

## 2025-02-04 RX ADMIN — HALOPERIDOL LACTATE 2 MG: 5 INJECTION, SOLUTION INTRAMUSCULAR at 12:04

## 2025-02-04 NOTE — ED NOTES
Pt given crackers and water for PO challenge     Arleen Ospina, RN  02/04/25 9901     Surgery H+P: General/Vascular/Colorectal Surgery    Date of service:  1/11/2021    CHIEF COMPLAINT: abdominal pain    HISTORY OF PRESENT ILLNESS: Paddy Umanzor is a 60 year old male who presents with abdominal pain. It has been present for 3 days and is present in the RLQ. He reports having BM but has lack of appetite. Colonoscopy was normal several years ago. He denies prior episodes of pain. This has become more severe over the weekend so he arrived to the ED today. He reports a fever this weekend as well.       Past Medical History:   Diagnosis Date   • Bronchitis    • High cholesterol         Past Surgical History:   Procedure Laterality Date   • Cyst removal         CURRENT MEDICATIONS:   No current facility-administered medications for this encounter.      Current Outpatient Medications   Medication   • SIMVASTATIN PO        ALLERGIES:  ALLERGIES:  No Known Allergies     SOCIAL HISTORY:   Social History     Tobacco Use   • Smoking status: Former Smoker   • Smokeless tobacco: Never Used   • Tobacco comment: quit 20 years ago   Substance Use Topics   • Alcohol use: Not Currently       FAMILY HISTORY: No family history on file.    REVIEW OF SYSTEMS:     CONSTITUTIONAL: is negative for unexplained weight loss, night sweats and <<is POSITIVE for fever>>  CARDIOVASCULAR: is negative for chest pain, and shortness of breath,  RESPIRATORY: is negative for bronchitis, sleep apnea, shortness of breath and productive cough  GI: is negative for <<is POSITIVE for abdominal pain>>  : is negative for pain with urination, blood in urine and flank pain  HEME: is negative for history of DVT/PE, easy bruising and recent bleeding  MS: is negative for bone pain, limited range of motion, joint pain  NEURO: is negative for recent falls, dizziness and history of TIA  PSYCH: is negative for suicidal ideation, significant depression, anxiety  LYMPH: no axillary or inguinal adenopathy    All other systems were reviewed and are  negative    PHYSICAL EXAM:  Visit Vitals  /56   Pulse 78   Temp 98.2 °F (36.8 °C) (Temporal)   Resp 20   Ht 6' (1.829 m)   Wt 120.9 kg   SpO2 96%   BMI 36.15 kg/m²       Body mass index is 36.15 kg/m².    Constitutional: alert and oriented x 3, no acute distress, healthy appearing  Respiratory: clear to auscultation bilaterally, no rales or rhonchi  Cardiovascular: heart is regular rate and rhythm, no murmurs or rub   Pulses: radial and posterior tibial pulses intact, no extremity edema  Chest: normal chest wall, non tender at sternum and ribs  GI: abdomen is soft, non distended, tender in RLQ  Musculoskeletal: normal range of motion, extremities normal in appearance  Neuro: normal sensation   Psych: normal affect, appropriate decision making  Skin: intact without jaundice or discoloration, no erythema, no surgical scars    IMAGING: CT scan reviewed showing retrocecal appendix with microperforation, no abscess, with appendicolith    LABS: reviewed, pertinent labs include: elevated WBC, bilirubin    IMPRESSION:  60 year old male with perforated appendicitis    PLAN:   Risks, benefits, and non-operative alternatives to laparoscopic and open appendectomy were discussed. Risks include but are not limited to bleeding, infection, conversion to open, and bowel injury. The patient also understands that if the appendix appears normal during surgery, it will still be removed. The patient understands these risks and is interested in proceeding. We also discussed possible bowel resection if the perforation is severe and normal anatomy cannot be recognized.         A total of 19 minutes was spent in total floor time, including face to face time with the patient

## 2025-02-04 NOTE — ED NOTES
Pt discharged by ED provider Joselin. This RN not at the bedside.      Arleen Ospina, RN  02/04/25 2427

## 2025-02-04 NOTE — DISCHARGE INSTRUCTIONS
-your CT scan showed right ovarian cystic lesion. Please have ultrasound in 8-12 weeks.     Take zofran as needed for nausea and vomiting

## 2025-02-04 NOTE — ED PROVIDER NOTES
Time reflects when diagnosis was documented in both MDM as applicable and the Disposition within this note       Time User Action Codes Description Comment    2/4/2025  1:21 PM Dorothy Olivera Add [R93.89] Abnormal CT scan     2/4/2025  1:39 PM Dorothy Olivera Add [R10.9] Abdominal pain     2/4/2025  1:39 PM JoselinDorothy schaefer Add [R11.2] Nausea and vomiting     2/4/2025  1:39 PM Dorothy Olivera Modify [R93.89] Abnormal CT scan     2/4/2025  1:39 PM Dorothy Olivera Modify [R10.9] Abdominal pain           ED Disposition       ED Disposition   Discharge    Condition   Stable    Date/Time   Tue Feb 4, 2025  3:28 PM    Comment   Apurva Dumont discharge to home/self care.                   Assessment & Plan       Medical Decision Making  Discussed CT results with the pt and the recommendation for US in 8-12 weeks. Pt expresses understanding  Pt was able to tolerate PO after medication here..    I have discussed the plan to discharge pt from ED. The patient was discharged in stable condition.  Patient ambulated off the department.  Extensive return to emergency department precautions were discussed.  Follow up with appropriate providers including primary care physician was discussed.  Patient and/or their  primary decision maker expressed understanding.  Patient remained stable during entire emergency department stay.      Amount and/or Complexity of Data Reviewed  Labs: ordered.  Radiology: ordered. Decision-making details documented in ED Course.    Risk  Prescription drug management.        ED Course as of 02/04/25 1542   Tue Feb 04, 2025   1211 Walked into room to examine patient and she is laying on the floor, refusing to get into bed for me to exam her   1318 CT abdomen pelvis w contrast  No acute CT findings to account for the patient's symptoms.     Right ovarian cystic lesion, 3.0 cm, not simple fluid in density. Most likely diagnostic considerations include collapsing functional cyst or less likely endometrioma.  Recommendation is for nonemergent follow-up pelvic ultrasound in 8 to 12 weeks.     1358 Per RN pt is on ground vomiting again    1502 Will attempt PO challenge again    1527 Was able to tolerate PO. Pt requesting d/c       Medications   ondansetron (ZOFRAN) injection 4 mg (4 mg Intravenous Given 2/4/25 1122)   lactated ringers bolus 1,000 mL (0 mL Intravenous Stopped 2/4/25 1516)   haloperidol lactate (HALDOL) injection 2 mg (2 mg Intravenous Given 2/4/25 1204)   lactated ringers bolus 1,000 mL (0 mL Intravenous Stopped 2/4/25 1516)   iohexol (OMNIPAQUE) 350 MG/ML injection (SINGLE-DOSE) 100 mL (100 mL Intravenous Given 2/4/25 1235)   ketorolac (TORADOL) injection 15 mg (15 mg Intravenous Given 2/4/25 1407)   ondansetron (ZOFRAN) injection 4 mg (4 mg Intravenous Given 2/4/25 1408)       ED Risk Strat Scores                                              History of Present Illness       Chief Complaint   Patient presents with    Abdominal Pain     Pt c/o abdominal with vomiting and diarrhea starting last night         Past Medical History:   Diagnosis Date    Anaphylaxis     apricot    Anxiety     Asthma     Cluster headache     Eczema     Headache, tension-type     Lymphadenitis     Last assessed 12/30/14    Lymphadenopathy     Last assessed 10/16/13    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HCC)     Migraine     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Psychiatric disorder     Psychiatric illness     Psychosis (HCC)     Seizures (HCC)       Past Surgical History:   Procedure Laterality Date    COLONOSCOPY N/A 05/03/2019    Procedure: COLONOSCOPY;  Surgeon: Jean Carlos Hogan MD;  Location: [facility];  Service: Gastroenterology    CYSTOSCOPY  04/04/2023    Dr. Bueno    ESOPHAGOGASTRODUODENOSCOPY N/A 05/03/2019    Procedure: ESOPHAGOGASTRODUODENOSCOPY (EGD);  Surgeon: Jean Carlos Hogan MD;  Location: [facility];  Service: Gastroenterology      Family History   Problem Relation Age of Onset    Alcohol abuse Mother     Migraines Mother     Other Mother          Neck pain    Depression Mother     Drug abuse Mother     Cervical cancer Mother     Drug abuse Father     Schizophrenia Father     No Known Problems Sister     No Known Problems Brother     Ovarian cancer Paternal Aunt     Febrile seizures Maternal Uncle     Stroke Maternal Uncle     Diabetes Maternal Grandfather     Thyroid disease Maternal Grandmother     No Known Problems Paternal Grandfather     No Known Problems Paternal Grandmother     Completed Suicide  Neg Hx       Social History     Tobacco Use    Smoking status: Every Day     Current packs/day: 0.50     Types: Cigarettes    Smokeless tobacco: Never    Tobacco comments:     I stopped smoking cigarettes but started vaping   Vaping Use    Vaping status: Former    Substances: Nicotine, Flavoring   Substance Use Topics    Alcohol use: Yes     Alcohol/week: 2.0 standard drinks of alcohol     Types: 2 Shots of liquor per week     Comment: Occ    Drug use: Yes     Frequency: 3.0 times per week     Types: Marijuana     Comment: Oc      E-Cigarette/Vaping    E-Cigarette Use Former User       E-Cigarette/Vaping Substances    Nicotine Yes     THC No     CBD No     Flavoring Yes     Other No     Unknown No       I have reviewed and agree with the history as documented.     25-year-old female presents today with complaint of abdominal pain, nausea, vomiting and diarrhea that started last night. Pt reports unable to tolerate anything by mouth. Pt also reports her period started yesterday, seems heavier than usual.         Review of Systems   Gastrointestinal:  Positive for abdominal pain, diarrhea, nausea and vomiting.   Genitourinary:  Positive for vaginal bleeding.           Objective       ED Triage Vitals [02/04/25 1101]   Temperature Pulse Blood Pressure Respirations SpO2 Patient Position - Orthostatic VS   98 °F (36.7 °C) 74 114/80 20 100 % Sitting      Temp src Heart Rate Source BP Location FiO2 (%) Pain Score    -- -- Left arm -- --      Vitals      Date  and Time Temp Pulse SpO2 Resp BP Pain Score FACES Pain Rating User   02/04/25 1421 -- 95 95 % 16 110/76 -- -- MH   02/04/25 1101 98 °F (36.7 °C) 74 100 % 20 114/80 -- -- BA            Physical Exam  Vitals and nursing note reviewed.   Constitutional:       General: She is not in acute distress.     Appearance: She is well-developed. She is ill-appearing.   HENT:      Head: Normocephalic and atraumatic.   Eyes:      Conjunctiva/sclera: Conjunctivae normal.   Cardiovascular:      Rate and Rhythm: Normal rate and regular rhythm.      Heart sounds: No murmur heard.  Pulmonary:      Effort: Pulmonary effort is normal. No respiratory distress.      Breath sounds: Normal breath sounds.   Abdominal:      Palpations: Abdomen is soft.      Tenderness: There is abdominal tenderness in the right lower quadrant.   Musculoskeletal:         General: No swelling.      Cervical back: Neck supple.   Skin:     General: Skin is warm and dry.      Capillary Refill: Capillary refill takes less than 2 seconds.   Neurological:      Mental Status: She is alert.   Psychiatric:         Mood and Affect: Mood normal.         Results Reviewed       Procedure Component Value Units Date/Time    Urine Microscopic [[id number]]  (Abnormal) Collected: 02/04/25 1223    Lab Status: Final result Specimen: Urine, Clean Catch Updated: 02/04/25 1450     RBC, UA Innumerable /hpf      WBC, UA 10-20 /hpf      Epithelial Cells Occasional /hpf      Bacteria, UA Occasional /hpf      MUCUS THREADS Occasional    Urine culture [[id number]] Collected: 02/04/25 1223    Lab Status: In process Specimen: Urine, Clean Catch Updated: 02/04/25 1450    POCT pregnancy, urine [[id number]]  (Normal) Collected: 02/04/25 1225    Lab Status: Final result Updated: 02/04/25 1225     EXT Preg Test, Ur Negative     Control Valid    Urine Macroscopic, POC [[id number]]  (Abnormal) Collected: 02/04/25 1223    Lab Status: Final result Specimen: Urine Updated: 02/04/25 1224     Color, UA  Yellow     Clarity, UA Clear     pH, UA >=9.0     Leukocytes, UA Small     Nitrite, UA Negative     Protein, UA >=300 mg/dl      Glucose, UA Negative mg/dl      Ketones, UA >=160 (4+) mg/dl      Urobilinogen, UA 0.2 E.U./dl      Bilirubin, UA Small     Occult Blood, UA Large     Specific Gravity, UA 1.015    Narrative:      CLINITEK RESULT    Comprehensive metabolic panel [[id number]]  (Abnormal) Collected: 02/04/25 1129    Lab Status: Final result Specimen: Blood from Arm, Left Updated: 02/04/25 1150     Sodium 137 mmol/L      Potassium 3.7 mmol/L      Chloride 108 mmol/L      CO2 17 mmol/L      ANION GAP 12 mmol/L      BUN 11 mg/dL      Creatinine 0.68 mg/dL      Glucose 147 mg/dL      Calcium 9.8 mg/dL      AST 20 U/L      ALT 24 U/L      Alkaline Phosphatase 110 U/L      Total Protein 7.9 g/dL      Albumin 4.8 g/dL      Total Bilirubin 0.62 mg/dL      eGFR 121 ml/min/1.73sq m     Narrative:      National Kid[employer] Disease Foundation guidelines for Chronic Kidney Disease (CKD):     Stage 1 with normal or high GFR (GFR > 90 mL/min/1.73 square meters)    Stage 2 Mild CKD (GFR = 60-89 mL/min/1.73 square meters)    Stage 3A Moderate CKD (GFR = 45-59 mL/min/1.73 square meters)    Stage 3B Moderate CKD (GFR = 30-44 mL/min/1.73 square meters)    Stage 4 Severe CKD (GFR = 15-29 mL/min/1.73 square meters)    Stage 5 End Stage CKD (GFR <15 mL/min/1.73 square meters)  Note: GFR calculation is accurate only with a steady state creatinine    Lipase [[id number]]  (Normal) Collected: 02/04/25 1129    Lab Status: Final result Specimen: Blood from Arm, Left Updated: 02/04/25 1150     Lipase 15 u/L     CBC and differential [[id number]]  (Abnormal) Collected: 02/04/25 1129    Lab Status: Final result Specimen: Blood from Arm, Left Updated: 02/04/25 1134     WBC 17.65 Thousand/uL      RBC 4.97 Million/uL      Hemoglobin 14.5 g/dL      Hematocrit 41.6 %      MCV 84 fL      MCH 29.2 pg      MCHC 34.9 g/dL      RDW 13.8 %      MPV 9.3 fL      " Platelets 428 Thousands/uL      nRBC 0 /100 WBCs      Segmented % 89 %      Immature Grans % 1 %      Lymphocytes % 7 %      Monocytes % 3 %      Eosinophils Relative 0 %      Basophils Relative 0 %      Absolute Neutrophils 15.66 Thousands/µL      Absolute Immature Grans 0.08 Thousand/uL      Absolute Lymphocytes 1.29 Thousands/µL      Absolute Monocytes 0.56 Thousand/µL      Eosinophils Absolute 0.01 Thousand/µL      Basophils Absolute 0.05 Thousands/µL             CT abdomen pelvis w contrast   Final Interpretation by King Rush MD (02/04 1315)      No acute CT findings to account for the patient's symptoms.      Right ovarian cystic lesion, 3.0 cm, not simple fluid in density. Most likely diagnostic considerations include collapsing functional cyst or less likely endometrioma. Recommendation is for nonemergent follow-up pelvic ultrasound in 8 to 12 weeks.         This examination was marked \"immediate notification\" in Epic in order to begin the standard process by which the radiology reading room liaison alerts the referring practitioner.         Workstation performed: [id number]             Procedures    ED Medication and Procedure Management   Prior to Admission Medications   Prescriptions Last Dose Informant Patient Reported? Taking?   EPINEPHrine (EPIPEN) 0.3 mg/0.3 mL SOAJ  Self No No   Sig: Inject 0.3 mL (0.3 mg total) into a muscle once for 1 dose   OLANZapine (ZyPREXA ZYDIS) 5 mg dispersible tablet   No No   Sig: Take 1 tablet (5 mg total) by mouth daily as needed (nausea and vomiting) for up to 7 days   Spacer/Aero-Holding Chambers (Vortex Valved Holding Chamber) FROYLAN  Self No No   Sig: Use 2 (two) times a day   albuterol (2.5 mg/3 mL) 0.083 % nebulizer solution  Self No No   Sig: Take 3 mL (2.5 mg total) by nebulization every 6 (six) hours as needed for wheezing or shortness of breath   albuterol (Ventolin HFA) 90 mcg/act inhaler  Self No No   Sig: Inhale 2 puffs every 4 (four) hours as " needed for wheezing   capsaicin (ZOSTRIX) 0.025 % cream   No No   Sig: Apply 1 Application topically 2 (two) times a day   cetirizine (ZyrTEC) 10 mg tablet  Self No No   Sig: Take 1 tablet (10 mg total) by mouth daily   dicyclomine (BENTYL) 20 mg tablet  Self No No   Sig: Take 1 tablet (20 mg total) by mouth every 6 (six) hours as needed (stomach cramps)   fluticasone-salmeterol (Advair HFA) 115-21 MCG/ACT inhaler  Self No No   Sig: Inhale 2 puffs 2 (two) times a day Rinse mouth after use.   lidocaine (Lidoderm) 5 %  Self No No   Sig: Apply 1 patch topically over 12 hours daily Remove & Discard patch within 12 hours or as directed by MD   Patient not taking: Reported on 9/27/2024   metoclopramide (Reglan) 10 mg tablet  Self No No   Sig: Take 1 tablet (10 mg total) by mouth every 6 (six) hours as needed (vomiting)   mometasone (ELOCON) 0.1 % cream  Self No No   Sig: Apply topically daily   naproxen (NAPROSYN) 500 mg tablet   No No   Sig: Take 1 tablet (500 mg total) by mouth 2 (two) times a day with meals for 10 days   tiZANidine (ZANAFLEX) 4 mg tablet   No No   Sig: Take 1 tablet (4 mg total) by mouth 2 (two) times a day as needed for muscle spasms   topiramate (TOPAMAX) 50 MG tablet   No No   Sig: Take 1 tablet (50 mg total) by mouth 2 (two) times a day      Facility-Administered Medications: None     Patient's Medications   Discharge Prescriptions    ONDANSETRON (ZOFRAN-ODT) 4 MG DISINTEGRATING TABLET    Take 1 tablet (4 mg total) by mouth every 6 (six) hours as needed for vomiting or nausea       Start Date: 2/4/2025  End Date: --       Order Dose: 4 mg       Quantity: 20 tablet    Refills: 0     No discharge procedures on file.  ED SEPSIS DOCUMENTATION   Time reflects when diagnosis was documented in both MDM as applicable and the Disposition within this note       Time User Action Codes Description Comment    2/4/2025  1:21 PM Dorothy Olivera Add [R93.89] Abnormal CT scan     2/4/2025  1:39 PM Dorothy Olivera Add  [R10.9] Abdominal pain     2/4/2025  1:39 PM Dorothy Olivera Add [R11.2] Nausea and vomiting     2/4/2025  1:39 PM Dorothy Olivera Modify [R93.89] Abnormal CT scan     2/4/2025  1:39 PM Dorothy Olivera Modify [R10.9] Abdominal pain                  Doorthy Olivera PA-C  02/04/25 0476

## 2025-02-04 NOTE — ED NOTES
"Pt reports vomits every menstrual cycle.  This RN asked if has medication at home.  Pt reports, \"I just come here.\" Pt states that zofran does not work for vomiting.      Arleen Ospina RN  02/04/25 1113    "

## 2025-02-05 LAB — BACTERIA UR CULT: NORMAL

## 2025-02-18 DIAGNOSIS — G43.019 INTRACTABLE MIGRAINE WITHOUT AURA AND WITHOUT STATUS MIGRAINOSUS: ICD-10-CM

## 2025-02-18 DIAGNOSIS — M79.18 MYOFASCIAL PAIN SYNDROME: ICD-10-CM

## 2025-02-18 DIAGNOSIS — G43.109 MIGRAINE WITH AURA AND WITHOUT STATUS MIGRAINOSUS, NOT INTRACTABLE: ICD-10-CM

## 2025-02-18 RX ORDER — TOPIRAMATE 50 MG/1
50 TABLET, FILM COATED ORAL 2 TIMES DAILY
Qty: 180 TABLET | Refills: 0 | OUTPATIENT
Start: 2025-02-18

## 2025-02-18 NOTE — TELEPHONE ENCOUNTER
topiramate (TOPAMAX) 50 MG, was sent to the [facility]  on 9/27/24 with a qty of 180 tablets with 3 refills

## 2025-02-24 ENCOUNTER — RA CDI HCC (OUTPATIENT)
Dept: OTHER | Facility: HOSPITAL | Age: 25
End: 2025-02-24

## 2025-02-24 NOTE — PROGRESS NOTES
HCC coding opportunities       Chart reviewed, no opportunity found: CHART REVIEWED, NO OPPORTUNITY FOUND        Patients Insurance     Medicare Insurance: [employer]          This is a reminder to assess ((resolve/update/assess)  all HCC (risk adjustment) codes for the year 2025 as patients LAUREN scores reset to zero with the New year.    Also, just a reminder to please review and assess all other chronic conditions for 2025.

## 2025-02-26 ENCOUNTER — OFFICE VISIT (OUTPATIENT)
Dept: FAMILY MEDICINE CLINIC | Facility: CLINIC | Age: 25
End: 2025-02-26

## 2025-02-26 VITALS
RESPIRATION RATE: 14 BRPM | SYSTOLIC BLOOD PRESSURE: 100 MMHG | HEART RATE: 100 BPM | WEIGHT: 196.2 LBS | BODY MASS INDEX: 34.76 KG/M2 | TEMPERATURE: 97.7 F | OXYGEN SATURATION: 96 % | DIASTOLIC BLOOD PRESSURE: 60 MMHG | HEIGHT: 63 IN

## 2025-02-26 DIAGNOSIS — G43.019 INTRACTABLE MIGRAINE WITHOUT AURA AND WITHOUT STATUS MIGRAINOSUS: ICD-10-CM

## 2025-02-26 DIAGNOSIS — G43.109 MIGRAINE WITH AURA AND WITHOUT STATUS MIGRAINOSUS, NOT INTRACTABLE: ICD-10-CM

## 2025-02-26 DIAGNOSIS — G47.00 INSOMNIA, UNSPECIFIED TYPE: Primary | ICD-10-CM

## 2025-02-26 DIAGNOSIS — J45.40 MODERATE PERSISTENT ASTHMA, UNSPECIFIED WHETHER COMPLICATED: ICD-10-CM

## 2025-02-26 PROCEDURE — 99214 OFFICE O/P EST MOD 30 MIN: CPT | Performed by: FAMILY MEDICINE

## 2025-02-26 PROCEDURE — G2211 COMPLEX E/M VISIT ADD ON: HCPCS | Performed by: FAMILY MEDICINE

## 2025-02-26 RX ORDER — TRAZODONE HYDROCHLORIDE 100 MG/1
100 TABLET ORAL
Qty: 30 TABLET | Refills: 5 | Status: SHIPPED | OUTPATIENT
Start: 2025-02-26

## 2025-02-26 RX ORDER — FLUTICASONE PROPIONATE AND SALMETEROL XINAFOATE 115; 21 UG/1; UG/1
2 AEROSOL, METERED RESPIRATORY (INHALATION) 2 TIMES DAILY
Qty: 36 G | Refills: 5 | Status: SHIPPED | OUTPATIENT
Start: 2025-02-26

## 2025-02-26 RX ORDER — TOPIRAMATE 50 MG/1
50 TABLET, FILM COATED ORAL 2 TIMES DAILY
Qty: 180 TABLET | Refills: 3 | Status: SHIPPED | OUTPATIENT
Start: 2025-02-26 | End: 2025-03-05

## 2025-02-26 RX ORDER — ALBUTEROL SULFATE 90 UG/1
2 INHALANT RESPIRATORY (INHALATION) EVERY 4 HOURS PRN
Qty: 18 G | Refills: 3 | Status: SHIPPED | OUTPATIENT
Start: 2025-02-26

## 2025-02-28 NOTE — ASSESSMENT & PLAN NOTE
Stable, well manage with advair and albuterol PRN, continue  Orders:    albuterol (Ventolin HFA) 90 mcg/act inhaler; Inhale 2 puffs every 4 (four) hours as needed for wheezing    fluticasone-salmeterol (Advair HFA) 115-21 MCG/ACT inhaler; Inhale 2 puffs 2 (two) times a day Rinse mouth after use.     Tarsorrhaphy Text: A tarsorrhaphy was performed using Frost sutures.

## 2025-02-28 NOTE — ASSESSMENT & PLAN NOTE
On maintenance topamax, will continue   Orders:    topiramate (TOPAMAX) 50 MG tablet; Take 1 tablet (50 mg total) by mouth 2 (two) times a day

## 2025-02-28 NOTE — PROGRESS NOTES
Name: Apurva Dumont      : 2000      MRN: [id number]  Encounter Provider: Stacey Alatorre MD  Encounter Date: 2025   Encounter department: [facility]  :  Assessment & Plan  Moderate persistent asthma, unspecified whether complicated  Stable, well manage with advair and albuterol PRN, continue  Orders:    albuterol (Ventolin HFA) 90 mcg/act inhaler; Inhale 2 puffs every 4 (four) hours as needed for wheezing    fluticasone-salmeterol (Advair HFA) 115-21 MCG/ACT inhaler; Inhale 2 puffs 2 (two) times a day Rinse mouth after use.    Insomnia, unspecified type  Reports difficulty with staying asleep and sleep onset  Report previous improvement on trazodone, will reorder   Orders:    traZODone (DESYREL) 100 mg tablet; Take 1 tablet (100 mg total) by mouth daily at bedtime    Intractable migraine without aura and without status migrainosus  On maintenance topamax, will continue   Orders:    topiramate (TOPAMAX) 50 MG tablet; Take 1 tablet (50 mg total) by mouth 2 (two) times a day    Migraine with aura and without status migrainosus, not intractable    Orders:    topiramate (TOPAMAX) 50 MG tablet; Take 1 tablet (50 mg total) by mouth 2 (two) times a day        BMI Counseling: Body mass index is 34.76 kg/m². The BMI is above normal. Nutrition recommendations include encouraging healthy choices of fruits and vegetables. Exercise recommendations include exercising 3-5 times per week. Rationale for BMI follow-up plan is due to patient being overweight or obese.       History of Present Illness   HPI  This is a very pleasant 25 y.o. female who presents to the clinic for management of their chronic medical conditions.  Patient's medical conditions are stable unless noted otherwise above.  Patient has not had any recent hospitalizations, or medical emergencies since last visit.  Patient has no further complaints other than what is mentioned in the ROS.    Review of Systems  "  Constitutional:  Negative for fatigue and fever.   Respiratory:  Negative for chest tightness and shortness of breath.    Neurological:  Positive for headaches. Negative for dizziness and weakness.       Objective   /60 (BP Location: Right arm, Patient Position: Sitting, Cuff Size: Standard)   Pulse 100   Temp 97.7 °F (36.5 °C) (Temporal)   Resp 14   Ht 5' 3\" (1.6 m)   Wt 89 kg (196 lb 3.2 oz)   LMP 02/03/2025   SpO2 96%   BMI 34.76 kg/m²      Physical Exam  Constitutional:       Appearance: Normal appearance.   Cardiovascular:      Rate and Rhythm: Normal rate.      Pulses: Normal pulses.   Pulmonary:      Effort: Pulmonary effort is normal.      Breath sounds: Normal breath sounds.   Neurological:      Mental Status: She is alert and oriented to person, place, and time.         "

## 2025-03-03 ENCOUNTER — ANNUAL EXAM (OUTPATIENT)
Dept: OBGYN CLINIC | Facility: CLINIC | Age: 25
End: 2025-03-03

## 2025-03-03 VITALS
HEIGHT: 63 IN | DIASTOLIC BLOOD PRESSURE: 58 MMHG | WEIGHT: 196.4 LBS | SYSTOLIC BLOOD PRESSURE: 96 MMHG | BODY MASS INDEX: 34.8 KG/M2

## 2025-03-03 DIAGNOSIS — Z59.41 FOOD INSECURITY: ICD-10-CM

## 2025-03-03 DIAGNOSIS — Z59.819 HOUSING INSTABILITY: ICD-10-CM

## 2025-03-03 DIAGNOSIS — Z59.9 FINANCIAL DIFFICULTIES: ICD-10-CM

## 2025-03-03 DIAGNOSIS — Z01.419 ENCOUNTER FOR ANNUAL ROUTINE GYNECOLOGICAL EXAMINATION: Primary | ICD-10-CM

## 2025-03-03 DIAGNOSIS — Z59.12 INADEQUATE HOUSING UTILITIES: ICD-10-CM

## 2025-03-03 PROCEDURE — G0101 CA SCREEN;PELVIC/BREAST EXAM: HCPCS | Performed by: NURSE PRACTITIONER

## 2025-03-03 SDOH — ECONOMIC STABILITY - FOOD INSECURITY: FOOD INSECURITY: Z59.41

## 2025-03-03 SDOH — ECONOMIC STABILITY - HOUSING INSECURITY: INADEQUATE HOUSING UTILITIES: Z59.12

## 2025-03-03 SDOH — ECONOMIC STABILITY - INCOME SECURITY: PROBLEM RELATED TO HOUSING AND ECONOMIC CIRCUMSTANCES, UNSPECIFIED: Z59.9

## 2025-03-03 SDOH — ECONOMIC STABILITY - HOUSING INSECURITY: HOUSING INSTABILITY UNSPECIFIED: Z59.819

## 2025-03-03 NOTE — PROGRESS NOTES
Annual Exam    Assessment   1. Financial difficulties  Ambulatory referral to social work care management program      2. Food insecurity  Ambulatory referral to social work care management program      3. Housing instability  Ambulatory referral to social work care management program      4. Inadequate housing utilities  Ambulatory referral to social work care management program        well woman       Plan       All questions answered.  Breast self exam technique reviewed and patient encouraged to perform self-exam monthly.  Contraception: Pt has a female poartner.  Discussed healthy lifestyle modifications.  Follow up in 1 year.     There are no Patient Instructions on file for this visit.    Cindy Dumont is a 25 y.o.  female who presents for annual well woman exam. Periods are regular every 28-30 days, lasting 4 days. No intermenstrual bleeding, spotting, or discharge.  Last WNL PAP was 2023  1 partner x 8 years, female partner denies domestic violence  Pt states she had 3 HPV vaccines at age 11    Current contraception:  Female partner, they are considering pregnancy , if desired will discuss with a fertility specialist and consider a sperm donor or IVF      History of abnormal Pap smear: no  Family history of uterine or ovarian cancer: no  Regular self breast exam: yes  History of abnormal mammogram: N/A  Family history of breast cancer: no  History of abnormal lipids: unknown  Menstrual History:  OB History          0    Para   0    Term   0       0    AB   0    Living   0         SAB   0    IAB   0    Ectopic   0    Multiple   0    Live Births   0           Obstetric Comments   Irregular menstrual cycle -Last assessed 13              Menarche age: 11  Patient's last menstrual period was 2025 (approximate).       The following portions of the patient's history were reviewed and updated as appropriate: allergies, current medications, past family  "history, past medical history, past social history, past surgical history and problem list.    Review of Systems  Pertinent items are noted in HPI.      Objective      BP 96/58 (BP Location: Left arm, Patient Position: Sitting, Cuff Size: Standard)   Ht 5' 3\" (1.6 m)   Wt 89.1 kg (196 lb 6.4 oz)   LMP 02/27/2025 (Approximate)   BMI 34.79 kg/m²     General: alert and oriented, in no acute distress, alert, appears stated age, and cooperative   Heart: NSR   Lungs: clear to auscultation bilaterally, WNL respiratory effort, negative cough or SOB   Thyroid: Negative masses palpable   Abdomen: soft, non-tender, without masses or organomegaly palpable   Vulva: normal   Vagina: normal mucosa   Cervix: no cervical motion tenderness and no lesions   Uterus: normal size, non-tender, normal shape and consistency   Adnexa: normal adnexa   Urethra: normal   Breasts: NT,negative masses palpable, negativedischarge, or dimpling             "

## 2025-03-05 ENCOUNTER — OFFICE VISIT (OUTPATIENT)
Dept: NEUROLOGY | Facility: CLINIC | Age: 25
End: 2025-03-05
Payer: MEDICARE

## 2025-03-05 VITALS
SYSTOLIC BLOOD PRESSURE: 97 MMHG | WEIGHT: 194.5 LBS | HEART RATE: 77 BPM | BODY MASS INDEX: 34.46 KG/M2 | DIASTOLIC BLOOD PRESSURE: 53 MMHG | HEIGHT: 63 IN

## 2025-03-05 DIAGNOSIS — R56.9 CONVULSIONS, UNSPECIFIED CONVULSION TYPE (HCC): ICD-10-CM

## 2025-03-05 DIAGNOSIS — G43.019 INTRACTABLE MIGRAINE WITHOUT AURA AND WITHOUT STATUS MIGRAINOSUS: Primary | ICD-10-CM

## 2025-03-05 PROCEDURE — 99213 OFFICE O/P EST LOW 20 MIN: CPT | Performed by: NURSE PRACTITIONER

## 2025-03-05 PROCEDURE — 96372 THER/PROPH/DIAG INJ SC/IM: CPT | Performed by: NURSE PRACTITIONER

## 2025-03-05 RX ORDER — KETOROLAC TROMETHAMINE 30 MG/ML
30 INJECTION, SOLUTION INTRAMUSCULAR; INTRAVENOUS ONCE
Status: COMPLETED | OUTPATIENT
Start: 2025-03-05 | End: 2025-03-05

## 2025-03-05 RX ORDER — TOPIRAMATE 100 MG/1
100 TABLET, FILM COATED ORAL 2 TIMES DAILY
Qty: 60 TABLET | Refills: 5 | Status: SHIPPED | OUTPATIENT
Start: 2025-03-05

## 2025-03-05 RX ORDER — RIZATRIPTAN BENZOATE 10 MG/1
10 TABLET, ORALLY DISINTEGRATING ORAL ONCE AS NEEDED
Qty: 18 TABLET | Refills: 2 | Status: SHIPPED | OUTPATIENT
Start: 2025-03-05

## 2025-03-05 RX ADMIN — KETOROLAC TROMETHAMINE 30 MG: 30 INJECTION, SOLUTION INTRAMUSCULAR; INTRAVENOUS at 16:00

## 2025-03-05 NOTE — PATIENT INSTRUCTIONS
Referral to EMALEX Roe will call you to schedule.     Headache/migraine treatment:    Prevention: Increase Topamax to 100mg bid     Abortive: For immediate treatment of a headache/migraine:  -Start  Maxalt 10mg at the earliest onset of a headache.  May repeat again in 2 hours if not completely headache free.  No more than 2 within 24 hours.    -It is ok to take ibuprofen, acetaminophen or naproxen (Advil, Tylenol,  Aleve, Excedrin) if they help your headaches you should limit these to No more than 3 times a week to avoid medication overuse/rebound headaches.     Lifestyle Recommendations:  -Remain well-hydrated drinking at least 48 to 64 ounces of noncaffeinated beverages per day in addition to anything caffeinated.    -It is important to eat meals throughout the day and not go long periods of time between eating.  -Getting adequate rest is also very important for migraine prevention (aim for 7-8 hours per night).     -Regular exercise is also beneficial for headache prevention.  I would encourage at the least 5 days of physical exercise weekly for at least 30 minutes.   - You can keep track of their migraines using an application on their phone or calendar as they see fit. Phone applications: Migraine Emmanuel or Migraine Diary.

## 2025-03-05 NOTE — PROGRESS NOTES
Neurology Ambulatory Visit  Name: Apurva Dumont       : 2000       MRN: [id number]   Encounter Provider: KAHLIL Moreno   Encounter Date: 3/5/2025  Encounter department: NEUROLOGY [facility]      Assessment & Plan  Intractable migraine without aura and without status migrainosus  Migraines are occurring more frequently.  Increase topiramate to 100 mg twice daily  Maxalt 10 mg at onset of headache, may repeat in 2 hours if headache continues maximum dose 2 tablets in 24 hours,   Lifestyle modifications were also discussed including staying well-hydrated, eating regular meals, getting adequate sleep and regular exercise.    Convulsions, unspecified convulsion type (HCC)   25 y.o. 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  Patient continues to experience almost daily seizure-like events.  Patient no showed or canceled multiple times when she was scheduled to be admitted to the epilepsy monitoring unit for differential diagnosis of recent events.  Again discussed admission to the EMU and patient is interested in rescheduling that admission.  New referral placed.  Increasing topiramate to 100 mg twice daily for headache prevention which also would provide antiepileptic coverage in the event her current events are epileptic.           History of Present Illness   Apurva Dumont is a 25 y.o. 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    Since her last office visit on 2024, patient continues to experience seizure-like events almost every day.  The semiology of her events has not changed.  Patient was scheduled for admission to the epilepsy monitoring unit  for differential diagnoses of these events, but has no showed or canceled multiple times.  She is currently on a wait list for psychiatry as well as for therapy.  There are increased stressors as her mother has been having car trouble, her grandmother recently passed away and her father is currently incarcerated.    Patient is also experiencing headaches, with a constant headache for the past 5 days.  This is associated with light and sound sensitivity.  She had previously taken rizatriptan with good efficacy.  She is not currently taking abortive treatment for headaches, but is maintained on topiramate 50 mg twice daily for headache prevention.      Current Antiepileptic Medications:  1.  Topamax 50mg bid    no side effects.   Other medications as per Epic      Event/Seizure Semiology:  1.   They will sometimes begin with a severe migraine, but often without any warning, she will just fall to the ground and start shaking all over. She may vomit or urinate on herself. Her mother describes that her body will bounce up and down. Sometimes her head will turn side to side. These typically last less than 30 seconds, but have lasted long, up to 36 minutes. She may go a few days without an event, but they happen about once a day on average.   up to 3 times a day.  Banged head or shoulder broken bones.   2.Staring spells   Worsening staring spells- more than 10 times  day about a month ago.      Epilepsy Risk Factors:  Abnormal pregnancy:                         mother used drugs  Abnormal birth/:                    no  Abnormal Development:                     talked at age 4  Febrile seizures, simple:                     ?  Febrile seizures, complex:                  no  CNS infection:                                     no  Intellectual disability:                           no  Cerebral palsy:                                    no  Head injury (moderate/severe):           knocked out frequently as child  CNS  neoplasm:                                   no  CNS malformation:                             no  Neurosurgical procedure:                   no  Stroke:                                                 no  Alcohol abuse:                                    no  Drug abuse:                                        marijuana street   Family history Sz/epilepsy:                 maternal uncle     Psychiatric History:  Depression, anxiety  Schizoaffective disorder  Bipolar 2 disorder        Prior AEDs:  Valproate (for headaches, weight gain), Topiramate (for headaches, ineffective for HA) Gabapentin (side effects.), Levetiracetam           Prior Evaluation:  Imaging:      - MRI brain 22: Small scattered hyperintensities on T2/FLAIR imaging are noted in the periventricular and subcortical white matter demonstrating an appearance that is statistically most likely to represent mild microangiopathic change. These mild changes have progressed when compared with the prior examination. Stable small cyst along the left hippocampal formation within the mesial temporal lobe may represent a choroidal fissure cyst or dilated perivascular space.   IMPRESSION:Stable examination.     - 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     EEG:  Routine EEG 22: normal  - Routine EE2019: normal  - Ambulatory EEG: yes in [place], records not available today  - Video EE2019 - 2019:  2 days of continuous video EEG monitoring, during which time 2 spells of unresponsiveness with whole body shaking, pelvic thrusting and side to side movements, and one spell of staring, all without EEG changes. Clinical features of these events with persistent alpha rhythm suggests these events are not seizures. Normal activities of wakefulness and sleep were seen.     Social History:  Driving: no   Working: no SSI  Lives with: mother      I reviewed Allergies, Medical History, Surgical History,  Family History and problem list as  "documented in Epic/Care Everywhere.         Objective   BP 97/53 (BP Location: Left arm, Patient Position: Sitting, Cuff Size: Large)   Pulse 77   Ht 5' 3\" (1.6 m)   Wt 88.2 kg (194 lb 8 oz)   LMP 02/27/2025 (Approximate)   BMI 34.45 kg/m²      General Exam  In general, patient is well appearing and in no distress.    Neurologic Exam  Mental Status: Alert and oriented x 3  Language: normal fluency and comprehension  Cranial Nerves:  PERRL, EOMI, no nystagmus, Face symmetric, Tongue/palate midline, No dysarthria   Motor: No pronator drift. Normal bulk and tone. Strength 5/5 throughout  Coordination: Finger to nose intact  Gait: normal casual gait, able to tandem walk without difficulty  Romberg negative      Administrative Statements     Voice recognition software was used in the generation of this note. There may be unintentional errors including grammatical errors, spelling errors, or pronoun errors.   "

## 2025-03-05 NOTE — ASSESSMENT & PLAN NOTE
Migraines are occurring more frequently.  Increase topiramate to 100 mg twice daily  Maxalt 10 mg at onset of headache, may repeat in 2 hours if headache continues maximum dose 2 tablets in 24 hours,   Lifestyle modifications were also discussed including staying well-hydrated, eating regular meals, getting adequate sleep and regular exercise.

## 2025-03-07 ENCOUNTER — APPOINTMENT (EMERGENCY)
Dept: RADIOLOGY | Facility: HOSPITAL | Age: 25
End: 2025-03-07
Payer: MEDICARE

## 2025-03-07 ENCOUNTER — TELEPHONE (OUTPATIENT)
Dept: NEUROLOGY | Facility: CLINIC | Age: 25
End: 2025-03-07

## 2025-03-07 ENCOUNTER — TELEPHONE (OUTPATIENT)
Dept: FAMILY MEDICINE CLINIC | Facility: CLINIC | Age: 25
End: 2025-03-07

## 2025-03-07 ENCOUNTER — HOSPITAL ENCOUNTER (EMERGENCY)
Facility: HOSPITAL | Age: 25
Discharge: HOME | End: 2025-03-07
Attending: EMERGENCY MEDICINE
Payer: MEDICARE

## 2025-03-07 VITALS
DIASTOLIC BLOOD PRESSURE: 66 MMHG | WEIGHT: 197.09 LBS | HEART RATE: 91 BPM | BODY MASS INDEX: 34.91 KG/M2 | SYSTOLIC BLOOD PRESSURE: 114 MMHG | TEMPERATURE: 98.2 F | RESPIRATION RATE: 20 BRPM | OXYGEN SATURATION: 100 %

## 2025-03-07 DIAGNOSIS — M79.671 ACUTE FOOT PAIN, RIGHT: ICD-10-CM

## 2025-03-07 DIAGNOSIS — R56.9 CONVULSIONS, UNSPECIFIED CONVULSION TYPE (HCC): ICD-10-CM

## 2025-03-07 DIAGNOSIS — F31.64 BIPOLAR I DISORDER, MOST RECENT EPISODE MIXED, SEVERE WITH PSYCHOTIC FEATURES (HCC): Chronic | ICD-10-CM

## 2025-03-07 DIAGNOSIS — F25.1 SCHIZOAFFECTIVE DISORDER, DEPRESSIVE TYPE (HCC): Primary | ICD-10-CM

## 2025-03-07 DIAGNOSIS — F44.5 PSYCHOGENIC NONEPILEPTIC SEIZURE: Primary | ICD-10-CM

## 2025-03-07 DIAGNOSIS — S93.401A RIGHT ANKLE SPRAIN: Primary | ICD-10-CM

## 2025-03-07 DIAGNOSIS — F20.89 OTHER SCHIZOPHRENIA (HCC): ICD-10-CM

## 2025-03-07 PROCEDURE — 99284 EMERGENCY DEPT VISIT MOD MDM: CPT

## 2025-03-07 PROCEDURE — 73610 X-RAY EXAM OF ANKLE: CPT

## 2025-03-07 PROCEDURE — 73630 X-RAY EXAM OF FOOT: CPT

## 2025-03-07 PROCEDURE — 99284 EMERGENCY DEPT VISIT MOD MDM: CPT | Performed by: EMERGENCY MEDICINE

## 2025-03-07 RX ORDER — ACETAMINOPHEN 325 MG/1
650 TABLET ORAL ONCE
Status: COMPLETED | OUTPATIENT
Start: 2025-03-07 | End: 2025-03-07

## 2025-03-07 RX ORDER — IBUPROFEN 600 MG/1
600 TABLET, FILM COATED ORAL ONCE
Status: COMPLETED | OUTPATIENT
Start: 2025-03-07 | End: 2025-03-07

## 2025-03-07 RX ORDER — ARIPIPRAZOLE 10 MG/1
10 TABLET ORAL DAILY
Qty: 30 TABLET | Refills: 1 | Status: SHIPPED | OUTPATIENT
Start: 2025-03-07

## 2025-03-07 RX ADMIN — ACETAMINOPHEN 650 MG: 325 TABLET, FILM COATED ORAL at 18:52

## 2025-03-07 RX ADMIN — IBUPROFEN 600 MG: 600 TABLET, FILM COATED ORAL at 18:52

## 2025-03-07 NOTE — TELEPHONE ENCOUNTER
Spoke to patient. Agreeable to EMU admission. Added to calendar for 5/6/2025 9am arrival time. ADT21 entered. Notified precert via teams and added to spreadsheet. EMU education sent previously to patient.     Precert - EMU for 5/6/2025

## 2025-03-07 NOTE — ED PROVIDER NOTES
Time reflects when diagnosis was documented in both MDM as applicable and the Disposition within this note       Time User Action Codes Description Comment    3/7/2025  7:33 PM Check, Ray Therese [S93.401A] Right ankle sprain     3/7/2025  7:33 PM Check, Ray Therese [M79.671] Acute foot pain, right           ED Disposition       ED Disposition   Discharge    Condition   Stable    Date/Time   Fri Mar 7, 2025  7:33 PM    Comment   Apurva Tim discharge to home/self care.                   Assessment & Plan       Medical Decision Making  25-year-old female presents with right foot and ankle pain.  On exam she was comfortably in bed in no acute distress.  Differential diagnosis includes but is not limited to fracture, dislocation, contusion, sprain.  Achilles tendon appears intact.  No proximal tib-fib tenderness.  No signs of infection.  Will get x-rays, treat with Tylenol and Motrin and reassess.    No acute osseous abnormality per my interpretation of x-rays.  Will place in cam walking boot for comfort, provide crutches, weightbearing as tolerated, and outpatient follow-up with orthopedics.  She was given strict return precautions and was in agreement the plan.    Problems Addressed:  Acute foot pain, right: acute illness or injury  Right ankle sprain: acute illness or injury    Amount and/or Complexity of Data Reviewed  External Data Reviewed: notes.  Radiology: ordered and independent interpretation performed.    Risk  OTC drugs.  Prescription drug management.             Medications   acetaminophen (TYLENOL) tablet 650 mg (650 mg Oral Given 3/7/25 1852)   ibuprofen (MOTRIN) tablet 600 mg (600 mg Oral Given 3/7/25 1852)       ED Risk Strat Scores                            SBIRT 22yo+      Flowsheet Row Most Recent Value   Initial Alcohol Screen: US AUDIT-C     1. How often do you have a drink containing alcohol? 1 Filed at: 03/07/2025 1971   2. How many drinks containing alcohol do you have on a typical day you  "are drinking?  1 Filed at: 03/07/2025 1843   3a. Male UNDER 65: How often do you have five or more drinks on one occasion? 0 Filed at: 03/07/2025 1843   3b. FEMALE Any Age, or MALE 65+: How often do you have 4 or more drinks on one occassion? 0 Filed at: 03/07/2025 1843   Audit-C Score 2 Filed at: 03/07/2025 1843   DASHAWN: How many times in the past year have you...    Used an illegal drug or used a prescription medication for non-medical reasons? Never Filed at: 03/07/2025 1843                            History of Present Illness       Chief Complaint   Patient presents with    Ankle Injury     Pt reports she has a history of \"non-epileptic seizures, and I was at the corner store yesterday and woke up on the floor, so then I went on home and my ankle (right) is all sore.\" Reports bruising and unable to bear weight on it. Took tylenol at 0800 today with no relief.       Past Medical History:   Diagnosis Date    Anaphylaxis     apricot    Anxiety     Asthma     Cluster headache     Eczema     Headache, tension-type     Lymphadenitis     Last assessed 12/30/14    Lymphadenopathy     Last assessed 10/16/13    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HCC)     Migraine     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Psychiatric disorder     Psychiatric illness     Psychosis (HCC)     Seizures (HCC)       Past Surgical History:   Procedure Laterality Date    COLONOSCOPY N/A 05/03/2019    Procedure: COLONOSCOPY;  Surgeon: Jean Carlos Hogan MD;  Location: [facility];  Service: Gastroenterology    CYSTOSCOPY  04/04/2023    Dr. Bueno    ESOPHAGOGASTRODUODENOSCOPY N/A 05/03/2019    Procedure: ESOPHAGOGASTRODUODENOSCOPY (EGD);  Surgeon: Jean Carlos Hogan MD;  Location: [facility];  Service: Gastroenterology      Family History   Problem Relation Age of Onset    Alcohol abuse Mother     Migraines Mother     Other Mother         Neck pain    Depression Mother     Drug abuse Mother     Cervical cancer Mother     Drug abuse Father     Schizophrenia Father     No Known " Problems Sister     No Known Problems Brother     Ovarian cancer Paternal Aunt     Febrile seizures Maternal Uncle     Stroke Maternal Uncle     Diabetes Maternal Grandfather     Thyroid disease Maternal Grandmother     No Known Problems Paternal Grandfather     No Known Problems Paternal Grandmother     Completed Suicide  Neg Hx       Social History     Tobacco Use    Smoking status: Every Day     Current packs/day: 0.50     Types: Cigarettes    Smokeless tobacco: Never    Tobacco comments:     I stopped smoking cigarettes but started vaping   Vaping Use    Vaping status: Former    Substances: Nicotine, Flavoring   Substance Use Topics    Alcohol use: Not Currently     Comment: rarely 1-2 drinks    Drug use: Yes     Frequency: 3.0 times per week     Types: Marijuana     Comment: Oc      E-Cigarette/Vaping    E-Cigarette Use Former User       E-Cigarette/Vaping Substances    Nicotine Yes     THC No     CBD No     Flavoring Yes     Other No     Unknown No       I have reviewed and agree with the history as documented.     25-year-old female presents to the emergency department for evaluation of right foot and ankle pain.  Patient states she has a history of epilepsy and had a seizure yesterday, since then has had some pain in the right ankle and foot as well as difficulty bearing weight secondary to pain.  No associated numbness or tingling.  No other injuries.        Review of Systems   Constitutional:  Negative for chills and fever.   HENT:  Negative for ear pain and sore throat.    Eyes:  Negative for pain and visual disturbance.   Respiratory:  Negative for cough and shortness of breath.    Cardiovascular:  Negative for chest pain and palpitations.   Gastrointestinal:  Negative for abdominal pain and vomiting.   Genitourinary:  Negative for dysuria and hematuria.   Musculoskeletal:  Positive for arthralgias and joint swelling. Negative for back pain.   Skin:  Positive for color change. Negative for rash.    Neurological:  Negative for seizures and syncope.   All other systems reviewed and are negative.          Objective       ED Triage Vitals   Temperature Pulse Blood Pressure Respirations SpO2 Patient Position - Orthostatic VS   03/07/25 1843 03/07/25 1843 03/07/25 1843 03/07/25 1843 03/07/25 1843 03/07/25 1843   98.2 °F (36.8 °C) 91 114/66 20 100 % Sitting      Temp Source Heart Rate Source BP Location FiO2 (%) Pain Score    03/07/25 1843 03/07/25 1843 03/07/25 1843 -- 03/07/25 1852    Oral Monitor Left arm  8      Vitals      Date and Time Temp Pulse SpO2 Resp BP Pain Score FACES Pain Rating User   03/07/25 1922 -- -- -- -- -- 5 -- [place]   03/07/25 1852 -- -- -- -- -- 8 -- [place]   03/07/25 1843 98.2 °F (36.8 °C) 91 100 % 20 114/66 -- -- ES            Physical Exam  Vitals and nursing note reviewed.   Constitutional:       General: She is not in acute distress.  HENT:      Head: Normocephalic and atraumatic.      Right Ear: External ear normal.      Left Ear: External ear normal.      Nose: Nose normal.      Mouth/Throat:      Mouth: Mucous membranes are moist.   Eyes:      Extraocular Movements: Extraocular movements intact.      Conjunctiva/sclera: Conjunctivae normal.      Pupils: Pupils are equal, round, and reactive to light.   Cardiovascular:      Rate and Rhythm: Normal rate and regular rhythm.      Pulses: Normal pulses.   Pulmonary:      Effort: Pulmonary effort is normal. No respiratory distress.      Breath sounds: No stridor.   Musculoskeletal:      Cervical back: Normal range of motion and neck supple.      Right ankle: Swelling and ecchymosis present. No deformity. Tenderness present over the lateral malleolus, medial malleolus and ATF ligament. No base of 5th metatarsal or proximal fibula tenderness. Decreased range of motion. Anterior drawer test negative. Normal pulse.      Right Achilles Tendon: Tenderness present. No defects. Acevedo's test negative.      Left ankle:      Left Achilles Tendon:  Normal.      Right foot: Normal capillary refill. Swelling and bony tenderness present. No deformity, laceration or crepitus. Normal pulse.   Skin:     General: Skin is warm and dry.      Capillary Refill: Capillary refill takes less than 2 seconds.   Neurological:      General: No focal deficit present.      Mental Status: She is alert and oriented to person, place, and time.      Sensory: No sensory deficit.   Psychiatric:         Mood and Affect: Mood normal.         Behavior: Behavior normal.         Results Reviewed       None            XR ankle 3+ views RIGHT   ED Interpretation by Ray Enciso MD (03/07 1932)   No acute osseous abnormality      XR foot 3+ views RIGHT   ED Interpretation by Ray Enciso MD (03/07 1933)   No acute osseous abnormality          Procedures    ED Medication and Procedure Management   Prior to Admission Medications   Prescriptions Last Dose Informant Patient Reported? Taking?   ARIPiprazole (ABILIFY) 10 mg tablet   No No   Sig: Take 1 tablet (10 mg total) by mouth daily   EPINEPHrine (EPIPEN) 0.3 mg/0.3 mL SOAJ  Self No No   Sig: Inject 0.3 mL (0.3 mg total) into a muscle once for 1 dose   OLANZapine (ZyPREXA ZYDIS) 5 mg dispersible tablet   No No   Sig: Take 1 tablet (5 mg total) by mouth daily as needed (nausea and vomiting) for up to 7 days   Patient not taking: Reported on 3/5/2025   Spacer/Aero-Holding Chambers (Vortex Valved Holding Chamber) FROYLAN  Self No No   Sig: Use 2 (two) times a day   albuterol (2.5 mg/3 mL) 0.083 % nebulizer solution  Self No No   Sig: Take 3 mL (2.5 mg total) by nebulization every 6 (six) hours as needed for wheezing or shortness of breath   albuterol (Ventolin HFA) 90 mcg/act inhaler   No No   Sig: Inhale 2 puffs every 4 (four) hours as needed for wheezing   cetirizine (ZyrTEC) 10 mg tablet  Self No No   Sig: Take 1 tablet (10 mg total) by mouth daily   dicyclomine (BENTYL) 20 mg tablet  Self No No   Sig: Take 1 tablet (20 mg total) by mouth  every 6 (six) hours as needed (stomach cramps)   fluticasone-salmeterol (Advair HFA) 115-21 MCG/ACT inhaler   No No   Sig: Inhale 2 puffs 2 (two) times a day Rinse mouth after use.   mometasone (ELOCON) 0.1 % cream  Self No No   Sig: Apply topically daily   naproxen (NAPROSYN) 500 mg tablet   No No   Sig: Take 1 tablet (500 mg total) by mouth 2 (two) times a day with meals for 10 days   Patient not taking: Reported on 3/5/2025   rizatriptan (MAXALT-MLT) 10 mg disintegrating tablet   No No   Sig: Take 1 tablet (10 mg total) by mouth once as needed for migraine May repeat in 2 hours if needed, max dose 2 tablets in 24 hours.   tiZANidine (ZANAFLEX) 4 mg tablet   No No   Sig: Take 1 tablet (4 mg total) by mouth 2 (two) times a day as needed for muscle spasms   topiramate (TOPAMAX) 100 mg tablet   No No   Sig: Take 1 tablet (100 mg total) by mouth 2 (two) times a day   traZODone (DESYREL) 100 mg tablet   No No   Sig: Take 1 tablet (100 mg total) by mouth daily at bedtime      Facility-Administered Medications: None     Patient's Medications   Discharge Prescriptions    No medications on file     No discharge procedures on file.  ED SEPSIS DOCUMENTATION   Time reflects when diagnosis was documented in both MDM as applicable and the Disposition within this note       Time User Action Codes Description Comment    3/7/2025  7:33 PM Ray Enciso [S93.401A] Right ankle sprain     3/7/2025  7:33 PM Ray Enciso [M79.671] Acute foot pain, right                  Ray Enciso MD  03/07/25 1942

## 2025-03-07 NOTE — PROGRESS NOTES
Patient requested to be started back on abilify. She explained that she was on abilify maintena monthly injection previously however she has not been on it for several months now due to not having a psychiatry. She is still on the waiting list. She has a previous diagnosis of bipolar and schizophrenia.   She has several previous episode of rupesh in the past per patient.   She is agreeable to starting again on oral abilify and titrate it up as needed and can potentially transition to monthly injection.   She has a follow up appointment on 4/7, we revisit it. She will let us know if she experience any side effect with it

## 2025-03-08 NOTE — DISCHARGE INSTRUCTIONS
"Recommend Tylenol 500 mg and ibuprofen 600 mg every 6 hours as needed for pain  You may also apply ice to the painful area  You may put weight on the right foot and ankle as tolerated  Follow-up with orthopedics or podiatry  Return for any worsening symptoms    Patient Education     Ankle Sprain ED   General Information   You came to the Emergency Department (ED) for an ankle sprain. This means you turned your ankle too far in one direction. Inside your ankle, you have tough bands of tissue called ligaments that hold the bones together. When you turned your ankle too far, one or more of these ligaments stretched or tore. Now your ankle is swollen and sore. You may have pain when you try to walk or move your foot.  You may be waiting on some test results. The staff will contact you if there are concerning results.  What care is needed at home?   Call your regular doctor to let them know you were in the ED. Make a follow-up appointment if you were told to.  Protect - To avoid making your injury worse, you can wrap it with an elastic bandage. Depending on how bad your sprain is, you might also get a brace or splint.  Rest - To rest the ankle, you can use crutches and stay off your feet. Avoid activities that cause pain.  Ice - Apply a cold gel pack, bag of ice, or bag of frozen vegetables on your ankle every 1 to 2 hours, for 15 minutes each time. Put a thin towel between the ice (or other cold object) and your skin. Use the ice (or other cold object) for at least 6 hours after your injury. Some people find it helpful to ice longer, even up to 2 days after their injury.  Compression - Compression basically means pressure. You want to have your ankle under slight pressure by having it wrapped in an elastic \"compression\" bandage. This helps reduce swelling and supports the ankle. Your doctor or nurse will show you how to wrap your ankle. It's important that you do not use too much pressure and cut off the blood flow to " "your foot.  Elevation - \"Elevation\" means you should keep your foot raised up above the level of your heart. To do this, you can put your foot on some pillows or blankets while you are lying down, or on a table or chair while you are sitting.  You can also take medicines to relieve pain, such as acetaminophen, ibuprofen, or naproxen.  In a few days, when you have less swelling and pain, you can start to gently stretch your ankle.  When do I need to call the doctor?   The pain or swelling is getting worse.  Your toes are blue or gray and numb.  Your ankle feels more unstable or wobbly.  You have new or worsening symptoms.  Last Reviewed Date   2023-08-14  Consumer Information Use and Disclaimer   This generalized information is a limited summary of diagnosis, treatment, and/or medication information. It is not meant to be comprehensive and should be used as a tool to help the user understand and/or assess potential diagnostic and treatment options. It does NOT include all information about conditions, treatments, medications, side effects, or risks that may apply to a specific patient. It is not intended to be medical advice or a substitute for the medical advice, diagnosis, or treatment of a health care provider based on the health care provider's examination and assessment of a patient’s specific and unique circumstances. Patients must speak with a health care provider for complete information about their health, medical questions, and treatment options, including any risks or benefits regarding use of medications. This information does not endorse any treatments or medications as safe, effective, or approved for treating a specific patient. [employer] and its affiliates disclaim any warranty or liability relating to this information or the use thereof. The use of this information is governed by the Terms of Use, available at https://www.[employer].com/en/know/clinical-effectiveness-terms   Copyright "   Copyright © 2024 [employer] and its affiliates and/or licensors. All rights reserved.

## 2025-03-11 ENCOUNTER — PATIENT OUTREACH (OUTPATIENT)
Dept: OBGYN CLINIC | Facility: CLINIC | Age: 25
End: 2025-03-11

## 2025-03-11 NOTE — PROGRESS NOTES
TAMARA PLASCENCIA received a [facility] referral for pt on 03/03, TAMARA PLASCENCIA f/u with pt by phone today. Pt reports that she desires to move out of her parents home because she feels like a burden on them. She has no concerns for homelessness or eviction. Pt has schizophrenia and bipolar dx, managed by her PCP. Pt has [employer]. Pt reports that she is on the wait list for [facility], TAMARA PLASCENCIA voiced understanding in frustrations for how long the housing list is to hear back from.     Pt expressed no immediate needs at this time.

## 2025-04-02 PROBLEM — Z01.419 ENCOUNTER FOR ANNUAL ROUTINE GYNECOLOGICAL EXAMINATION: Status: RESOLVED | Noted: 2025-03-03 | Resolved: 2025-04-02

## 2025-04-10 ENCOUNTER — OFFICE VISIT (OUTPATIENT)
Dept: DENTISTRY | Facility: CLINIC | Age: 25
End: 2025-04-10

## 2025-04-10 VITALS — TEMPERATURE: 98.3 F | HEART RATE: 96 BPM | SYSTOLIC BLOOD PRESSURE: 115 MMHG | DIASTOLIC BLOOD PRESSURE: 75 MMHG

## 2025-04-10 DIAGNOSIS — Z01.20 DENTAL EXAMINATION: Primary | ICD-10-CM

## 2025-04-10 PROCEDURE — D0191 ASSESSMENT OF A PATIENT: HCPCS

## 2025-04-10 NOTE — PROGRESS NOTES
Procedure Details   - ASSESSMENT OF A PATIENT  Comprehensive Exam Part 2    Apurva Dumont 25 y.o. female presents with self to [facility] for comprehensive exam.  PMH reviewed, no changes, ASA III.  Significant medical history: tobacco abuse, schizoaffective disorder, convulsions, vertigo, morbid obesity, marijuana abuse. Bipolar disorder. Significant allergies: Penicillin. Significant medications: Albuterol.   Pain level 7/10    Chief complaint:   My top left hurts    Radiographs: taken during previous appt.    Cold test:  #12: N to perc/palp. Ab to Cold for ~3s  #13: N to perc/palp. Ab to Cold for ~4-5s  #14: N to perc/palp. Ab to Cold for ~5s  #15: Ab to perc. N to palp. Ab to Cold for ~3s    Periodontal exam:  Hygiene - Poor.  Plaque - Moderate.  Horizontal bone loss -  UR: Mild (<15%).  UL: Mild (<15%).  LL: Mild (<15%).  LR: Mild (<15%).  Vertical bone loss - None.  Subgingival calculus - generalized.  BOP -  none .  Mobility - None.  Furcation involvements - None.  Occlusal trauma - None.  Smoker - Yes.  Diabetic - No.  Periodontal Stage: Mild gingivitis.  Periodontal Plan: Prophy.    Caries exam:   Caries detected: #13, 15, 31  Teeth with elevated chance of needing RCT: none  Likely nonrestorable teeth: #31    Tx plan:  Tx plan able to be fully determined at comp exam..  - Prophy  - Resins     Referral(s): None needed.  Rx: None.  Recommended recall schedule: 6 months.    Patient dismissed ambulatory and alert.    NV1: prophy  NV2: #15-DO resin (50 mins)    Attending: Dr. Valdovinos was present and examined radiographs.

## 2025-04-10 NOTE — DENTAL PROCEDURE DETAILS
Comprehensive Exam Part 2    Apurva Dumont 25 y.o. female presents with self to [facility] for comprehensive exam.  PMH reviewed, no changes, ASA III.  Significant medical history: tobacco abuse, schizoaffective disorder, convulsions, vertigo, morbid obesity, marijuana abuse. Bipolar disorder. Significant allergies: Penicillin. Significant medications: Albuterol.   Pain level 7/10    Chief complaint:   My top left hurts    Radiographs: taken during previous appt.    Cold test:  #12: N to perc/palp. Ab to Cold for ~3s  #13: N to perc/palp. Ab to Cold for ~4-5s  #14: N to perc/palp. Ab to Cold for ~5s  #15: Ab to perc. N to palp. Ab to Cold for ~3s    Periodontal exam:  Hygiene - Poor.  Plaque - Moderate.  Horizontal bone loss -  UR: Mild (<15%).  UL: Mild (<15%).  LL: Mild (<15%).  LR: Mild (<15%).  Vertical bone loss - None.  Subgingival calculus - generalized.  BOP - none.  Mobility - None.  Furcation involvements - None.  Occlusal trauma - None.  Smoker - Yes.  Diabetic - No.  Periodontal Stage: Mild gingivitis.  Periodontal Plan: Prophy.    Caries exam:   Caries detected: #13, 15, 31  Teeth with elevated chance of needing RCT: none  Likely nonrestorable teeth: #31    Tx plan:  Tx plan able to be fully determined at comp exam..  - Prophy  - Resins     Referral(s): None needed.  Rx: None.  Recommended recall schedule: 6 months.    Patient dismissed ambulatory and alert.    NV1: prophy  NV2: #15-DO resin (50 mins)    Attending: Dr. Valdovinos was present and examined radiographs.

## 2025-04-17 ENCOUNTER — TELEMEDICINE (OUTPATIENT)
Dept: FAMILY MEDICINE CLINIC | Facility: CLINIC | Age: 25
End: 2025-04-17

## 2025-04-17 DIAGNOSIS — R11.10 VOMITING AND DIARRHEA: ICD-10-CM

## 2025-04-17 DIAGNOSIS — J06.9 UPPER RESPIRATORY TRACT INFECTION, UNSPECIFIED TYPE: Primary | ICD-10-CM

## 2025-04-17 DIAGNOSIS — R19.7 VOMITING AND DIARRHEA: ICD-10-CM

## 2025-04-17 PROCEDURE — G2211 COMPLEX E/M VISIT ADD ON: HCPCS | Performed by: FAMILY MEDICINE

## 2025-04-17 PROCEDURE — 99213 OFFICE O/P EST LOW 20 MIN: CPT | Performed by: FAMILY MEDICINE

## 2025-04-17 RX ORDER — GUAIFENESIN, PSEUDOEPHEDRINE HYDROCHLORIDE 600; 60 MG/1; MG/1
1 TABLET, EXTENDED RELEASE ORAL EVERY 12 HOURS
Qty: 30 TABLET | Refills: 0 | Status: SHIPPED | OUTPATIENT
Start: 2025-04-17

## 2025-04-17 NOTE — ASSESSMENT & PLAN NOTE
Symptoms of nasal congestion, sore throat, cough, headache  Not associated with fever or chills   Symptoms likely viral in nature   Will treat symptomatically  Stay hydrated: warm soup, tea with lemon and honey   Ibuprofen/tylenol for pain   mucinex for cough and congestion       Orders:    pseudoephedrine-guaifenesin (MUCINEX D)  MG per tablet; Take 1 tablet by mouth every 12 (twelve) hours

## 2025-04-17 NOTE — ASSESSMENT & PLAN NOTE
Likely secondary to food poisoning vs viral gastroenteritis   Ate Crab at a restaurant yesterday  Had more than 6 vomitus and more than 20 diarrhea since 5 am   Able to drink some water but not able to eat much   Recommend that she should probably go to the ED for IV fluids as she risk being dehydrated given how much time she had diarrhea and not able to keep up with hydration  ED precaution given

## 2025-04-17 NOTE — PROGRESS NOTES
Virtual Regular VisitName: Apurva Dumont      : 2000      MRN: [id number]  Encounter Provider: Stacey Alatorre MD  Encounter Date: 2025   Encounter department: [facility]  :  Assessment & Plan  Upper respiratory tract infection, unspecified type  Symptoms of nasal congestion, sore throat, cough, headache  Not associated with fever or chills   Symptoms likely viral in nature   Will treat symptomatically  Stay hydrated: warm soup, tea with lemon and honey   Ibuprofen/tylenol for pain   mucinex for cough and congestion       Orders:    pseudoephedrine-guaifenesin (MUCINEX D)  MG per tablet; Take 1 tablet by mouth every 12 (twelve) hours    Vomiting and diarrhea  Likely secondary to food poisoning vs viral gastroenteritis   Ate Crab at a restaurant yesterday  Had more than 6 vomitus and more than 20 diarrhea since 5 am   Able to drink some water but not able to eat much   Recommend that she should probably go to the ED for IV fluids as she risk being dehydrated given how much time she had diarrhea and not able to keep up with hydration  ED precaution given              History of Present Illness     HPI  Apurva Dumont is a 25 y.o. female who requested a virtual to discuss her symptoms  Reports 3 days of URI, nasal congestion, cough, sore throat. No fever or chills  Also report diarrhea x20 and vomiting 6 since 5 am. Ate crab at a restaurant with her aunt. Aunt doesn't currently have symptoms   She is able to drink some but have not eaten since yesterday       Review of Systems   Constitutional:  Positive for appetite change. Negative for chills and fever.   HENT:  Positive for congestion, sore throat and voice change. Negative for drooling, facial swelling, hearing loss, tinnitus and trouble swallowing.    Respiratory:  Positive for cough. Negative for shortness of breath.    Gastrointestinal:  Positive for diarrhea, nausea and vomiting. Negative for blood in  stool.   Neurological:  Negative for dizziness, syncope and weakness.       Objective   There were no vitals taken for this visit.    Physical Exam  Pulmonary:      Effort: Pulmonary effort is normal.   Neurological:      Mental Status: She is alert and oriented to person, place, and time.         Administrative Statements   Encounter provider Stacey Alatorre MD    The Patient is located at Home and in the following state in which I hold an active license [place].    The patient was identified by name and date of birth. Apurva Dumont was informed that this is a telemedicine visit and that the visit is being conducted through the Epic Embedded platform. She agrees to proceed..  My office door was closed. The patient was notified the following individuals were present in the room medical student Sameera.  She acknowledged consent and understanding of privacy and security of the video platform. The patient has agreed to participate and understands they can discontinue the visit at any time.    I have spent a total time of 9 minutes in caring for this patient on the day of the visit/encounter including Prognosis, Risks and benefits of tx options, Instructions for management, Patient and family education, Risk factor reductions, Impressions, and Counseling / Coordination of care, not including the time spent for establishing the audio/video connection.

## 2025-05-03 DIAGNOSIS — T78.2XXA ANAPHYLACTIC REACTION TO BEE STING, ACCIDENTAL OR UNINTENTIONAL, INITIAL ENCOUNTER: ICD-10-CM

## 2025-05-03 DIAGNOSIS — J45.40 MODERATE PERSISTENT ASTHMA, UNSPECIFIED WHETHER COMPLICATED: ICD-10-CM

## 2025-05-03 DIAGNOSIS — T63.441A ANAPHYLACTIC REACTION TO BEE STING, ACCIDENTAL OR UNINTENTIONAL, INITIAL ENCOUNTER: ICD-10-CM

## 2025-05-05 RX ORDER — EPINEPHRINE 0.3 MG/.3ML
INJECTION SUBCUTANEOUS
Qty: 2 EACH | Refills: 0 | Status: SHIPPED | OUTPATIENT
Start: 2025-05-05

## 2025-05-09 ENCOUNTER — TELEPHONE (OUTPATIENT)
Dept: NEUROLOGY | Facility: CLINIC | Age: 25
End: 2025-05-09

## 2025-05-18 NOTE — ASSESSMENT & PLAN NOTE
25 y.o. 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  Patient continues to experience almost daily seizure-like events.  Patient no showed or canceled multiple times when she was scheduled to be admitted to the epilepsy monitoring unit for differential diagnosis of recent events.  Again discussed admission to the EMU and patient is interested in rescheduling that admission.  New referral placed.  Increasing topiramate to 100 mg twice daily for headache prevention which also would provide antiepileptic coverage in the event her current events are epileptic.

## 2025-06-18 ENCOUNTER — TELEPHONE (OUTPATIENT)
Age: 25
End: 2025-06-18

## 2025-06-18 NOTE — TELEPHONE ENCOUNTER
Patient called in and stated that due to family needs/ Emergency she is not able to come in today 6/18/25 at 11:00 am with Jacquie Elder.     Pt requested to r/s appt.  Pt is now r/s for 6/25/25 at 3:00 pm with Jacquie - [facility] office.     Pt said she tried calling in Yesterday but no on answered the call and was put in to .     SHANTI

## 2025-06-24 ENCOUNTER — APPOINTMENT (EMERGENCY)
Dept: RADIOLOGY | Facility: HOSPITAL | Age: 25
End: 2025-06-24
Payer: MEDICARE

## 2025-06-24 ENCOUNTER — HOSPITAL ENCOUNTER (EMERGENCY)
Facility: HOSPITAL | Age: 25
Discharge: HOME | End: 2025-06-24
Attending: EMERGENCY MEDICINE
Payer: MEDICARE

## 2025-06-24 ENCOUNTER — APPOINTMENT (EMERGENCY)
Dept: CT IMAGING | Facility: HOSPITAL | Age: 25
End: 2025-06-24
Payer: MEDICARE

## 2025-06-24 VITALS
OXYGEN SATURATION: 96 % | HEART RATE: 94 BPM | SYSTOLIC BLOOD PRESSURE: 143 MMHG | DIASTOLIC BLOOD PRESSURE: 84 MMHG | RESPIRATION RATE: 16 BRPM | WEIGHT: 189.6 LBS | BODY MASS INDEX: 33.59 KG/M2 | TEMPERATURE: 98.6 F

## 2025-06-24 DIAGNOSIS — S16.1XXA CERVICAL STRAIN, ACUTE, INITIAL ENCOUNTER: Primary | ICD-10-CM

## 2025-06-24 PROCEDURE — 73030 X-RAY EXAM OF SHOULDER: CPT

## 2025-06-24 PROCEDURE — 99284 EMERGENCY DEPT VISIT MOD MDM: CPT

## 2025-06-24 PROCEDURE — 96372 THER/PROPH/DIAG INJ SC/IM: CPT

## 2025-06-24 PROCEDURE — 72125 CT NECK SPINE W/O DYE: CPT

## 2025-06-24 RX ORDER — ACETAMINOPHEN 325 MG/1
975 TABLET ORAL ONCE
Status: COMPLETED | OUTPATIENT
Start: 2025-06-24 | End: 2025-06-24

## 2025-06-24 RX ORDER — LIDOCAINE 50 MG/G
1 PATCH TOPICAL ONCE
Status: DISCONTINUED | OUTPATIENT
Start: 2025-06-24 | End: 2025-06-24 | Stop reason: HOSPADM

## 2025-06-24 RX ORDER — METHOCARBAMOL 500 MG/1
1000 TABLET, FILM COATED ORAL ONCE
Status: COMPLETED | OUTPATIENT
Start: 2025-06-24 | End: 2025-06-24

## 2025-06-24 RX ORDER — IBUPROFEN 600 MG/1
TABLET, FILM COATED ORAL
Qty: 18 TABLET | Refills: 0 | Status: SHIPPED | OUTPATIENT
Start: 2025-06-24 | End: 2025-06-30

## 2025-06-24 RX ORDER — KETOROLAC TROMETHAMINE 30 MG/ML
30 INJECTION, SOLUTION INTRAMUSCULAR; INTRAVENOUS ONCE
Status: COMPLETED | OUTPATIENT
Start: 2025-06-24 | End: 2025-06-24

## 2025-06-24 RX ORDER — ACETAMINOPHEN 500 MG
1000 TABLET ORAL 3 TIMES DAILY PRN
Qty: 30 TABLET | Refills: 0 | Status: SHIPPED | OUTPATIENT
Start: 2025-06-24 | End: 2025-06-29

## 2025-06-24 RX ADMIN — ACETAMINOPHEN 975 MG: 325 TABLET ORAL at 14:12

## 2025-06-24 RX ADMIN — METHOCARBAMOL 1000 MG: 500 TABLET ORAL at 14:12

## 2025-06-24 RX ADMIN — KETOROLAC TROMETHAMINE 30 MG: 30 INJECTION, SOLUTION INTRAMUSCULAR; INTRAVENOUS at 14:16

## 2025-06-24 RX ADMIN — LIDOCAINE 5% 1 PATCH: 700 PATCH TOPICAL at 14:38

## 2025-06-24 NOTE — DISCHARGE INSTRUCTIONS
Rest, and alternate ice with heat to the left side of your neck.  Consider topical therapies including Aspercreme or Bengay cream or diclofenac gel.  Use 600 mg ibuprofen (Motrin) 3 times daily with food for the next 3 days to treat your acute cervical strain (neck strain).  Take 1000 mg of acetaminophen (Tylenol) every 8 hours as needed for ongoing pain.  Follow-up with the comforts of spine program for possible need for physical therapy.  Please also follow-up with your primary care doctor if pain persists for greater than 2 weeks.    Return to the ER if develop numbness or weakness, difficulty breathing, vomiting, confusion, or lethargy.

## 2025-06-24 NOTE — ED PROVIDER NOTES
Time reflects when diagnosis was documented in both MDM as applicable and the Disposition within this note       Time User Action Codes Description Comment    6/24/2025  3:17 PM Marilyn Melvin Add [S16.1XXA] Cervical strain, acute, initial encounter           ED Disposition       ED Disposition   Discharge    Condition   Stable    Date/Time   Tue Jun 24, 2025  3:17 PM    Comment   Apurvadarius Dumont discharge to home/self care.                   Assessment & Plan       Medical Decision Making  DDx including but not limited to: Strain, arthritis, rotator cuff injury, AC joint injury, fracture, considered less likely cervical spine fracture or cervical radiculopathy    The patient is a pleasant, well-appearing 25-year-old female presenting for left-sided neck pain and left shoulder pain after seizure-like activity and fall onto left side.  Will obtain CT cervical spine to further evaluate for fracture.  Will obtain left shoulder x-ray to further evaluate for bony abnormality.  Do suspect cervical strain.  Will medicate with IM Toradol, Tylenol, and Robaxin.    Problems Addressed:  Cervical strain, acute, initial encounter: acute illness or injury    Amount and/or Complexity of Data Reviewed  Radiology: ordered and independent interpretation performed. Decision-making details documented in ED Course.    Risk  OTC drugs.  Prescription drug management.        ED Course as of 06/24/25 1530   Tue Jun 24, 2025   1517 CT spine cervical without contrast  IMPRESSION:     No cervical spine fracture or traumatic malalignment         Medications   acetaminophen (TYLENOL) tablet 975 mg (975 mg Oral Given 6/24/25 1412)   ketorolac (TORADOL) injection 30 mg (30 mg Intramuscular Given 6/24/25 1416)   methocarbamol (ROBAXIN) tablet 1,000 mg (1,000 mg Oral Given 6/24/25 1412)       ED Risk Strat Scores                    No data recorded                            History of Present Illness       Chief Complaint   Patient presents with     Neck Injury     States that she had a seizure at 4pm yesterday which caused her to hit the left side of her neck/clavicle on a plastic bin. Has been having pain to the same and has been unable to move her left shoulder       Past Medical History[1]   Past Surgical History[2]   Family History[3]   Social History[4]   E-Cigarette/Vaping    E-Cigarette Use Former User       E-Cigarette/Vaping Substances    Nicotine Yes     THC No     CBD No     Flavoring Yes     Other No     Unknown No       I have reviewed and agree with the history as documented.     The patient is a 25-year-old female with PMH of anxiety/depression, asthma, pseudoseizures, seizures on Topamax, and asthma presenting for left-sided neck and shoulder pain.  The patient reports almost daily seizures.  Yesterday her girlfriend witnessed her have a seizure where she was standing and fell onto her left side striking the left side of her neck on a plastic tub.  She reports the seizure lasted less than 1 minute.  Since that time she has had left-sided neck and shoulder pain making it difficult for her to sleep last night.  She took 2 doses of 4 mg tizanidine without relief of her pain.  She presents today given the persistent discomfort.  She denies prior neck fracture or left shoulder injury.  She denies lightheadedness, dizziness, headache, numbness or weakness, chest pain, shortness of breath, and N/V.  She reports she sees her neurologist tomorrow for her seizure history.  She is compliant with her Topamax as prescribed.      History provided by:  Patient   used: No        Review of Systems   Musculoskeletal:  Positive for arthralgias (Left shoulder and clavicle pain) and neck pain (Generalized, worse on left side). Negative for back pain, gait problem, joint swelling and neck stiffness.   Skin:  Negative for rash and wound.   All other systems reviewed and are negative.          Objective       ED Triage Vitals   Temperature Pulse  Blood Pressure Respirations SpO2 Patient Position - Orthostatic VS   06/24/25 1330 06/24/25 1330 06/24/25 1330 06/24/25 1330 06/24/25 1330 06/24/25 1330   98.6 °F (37 °C) 94 143/84 16 96 % Sitting      Temp Source Heart Rate Source BP Location FiO2 (%) Pain Score    06/24/25 1330 06/24/25 1330 06/24/25 1330 -- 06/24/25 1412    Oral Monitor Left arm  10 - Worst Possible Pain      Vitals      Date and Time Temp Pulse SpO2 Resp BP Pain Score FACES Pain Rating User   06/24/25 1416 -- -- -- -- -- 10 - Worst Possible Pain -- EC   06/24/25 1412 -- -- -- -- -- 10 - Worst Possible Pain -- EC   06/24/25 1330 98.6 °F (37 °C) 94 96 % 16 143/84 -- -- MB            Physical Exam  Vitals and nursing note reviewed.   Constitutional:       General: She is not in acute distress.     Appearance: Normal appearance. She is well-developed. She is not ill-appearing, toxic-appearing or diaphoretic.   HENT:      Head: Normocephalic and atraumatic.      Nose: Nose normal.      Mouth/Throat:      Mouth: Mucous membranes are moist.      Pharynx: Oropharynx is clear.     Eyes:      Extraocular Movements: Extraocular movements intact.      Conjunctiva/sclera: Conjunctivae normal.     Neck:      Trachea: Trachea and phonation normal. No abnormal tracheal secretions.     Cardiovascular:      Rate and Rhythm: Normal rate.      Pulses:           Radial pulses are 2+ on the right side and 2+ on the left side.   Pulmonary:      Effort: Pulmonary effort is normal. No respiratory distress.     Musculoskeletal:      Cervical back: Neck supple. No edema, erythema, rigidity or crepitus. Pain with movement (Left lateral neck pain with lateral rotation extension and flexion of neck), spinous process tenderness and muscular tenderness (Left paraspinal muscle tenderness on exam without palpable hematoma or swelling or ecchymosis) present. Decreased range of motion.      Comments: WALTON, 5/5 strength throughout, sensation intact, no focal joint swelling,  ambulatory steady gait.     Skin:     General: Skin is warm and dry.      Capillary Refill: Capillary refill takes less than 2 seconds.      Findings: No abrasion, bruising, ecchymosis, rash or wound.     Neurological:      General: No focal deficit present.      Mental Status: She is alert and oriented to person, place, and time. Mental status is at baseline.         Results Reviewed       None            XR shoulder 2+ views LEFT   ED Interpretation by KAHLIL Farah (06/24 1500)   No acute fracture or bony abnormality identified by me.      CT spine cervical without contrast   Final Interpretation by Moo Ma MD (06/24 1515)      No cervical spine fracture or traumatic malalignment                  Workstation performed: [id number]             Procedures    ED Medication and Procedure Management   Prior to Admission Medications   Prescriptions Last Dose Informant Patient Reported? Taking?   ARIPiprazole (ABILIFY) 10 mg tablet   No No   Sig: Take 1 tablet (10 mg total) by mouth daily   EPINEPHrine (EPIPEN) 0.3 mg/0.3 mL SOAJ   No No   Sig: INJECT 0.3 ML( 0.3 MG TOTAL) IN THE MUSCLE ONCE FOR 1 DOSE   OLANZapine (ZyPREXA ZYDIS) 5 mg dispersible tablet   No No   Sig: Take 1 tablet (5 mg total) by mouth daily as needed (nausea and vomiting) for up to 7 days   Patient not taking: Reported on 3/5/2025   Spacer/Aero-Holding Chambers (Vortex Valved Holding Chamber) FROYLAN  Self No No   Sig: Use 2 (two) times a day   albuterol (2.5 mg/3 mL) 0.083 % nebulizer solution  Self No No   Sig: Take 3 mL (2.5 mg total) by nebulization every 6 (six) hours as needed for wheezing or shortness of breath   albuterol (Ventolin HFA) 90 mcg/act inhaler   No No   Sig: Inhale 2 puffs every 4 (four) hours as needed for wheezing   cetirizine (ZyrTEC) 10 mg tablet  Self No No   Sig: Take 1 tablet (10 mg total) by mouth daily   dicyclomine (BENTYL) 20 mg tablet  Self No No   Sig: Take 1 tablet (20 mg total) by mouth every 6 (six)  hours as needed (stomach cramps)   fluticasone-salmeterol (Advair HFA) 115-21 MCG/ACT inhaler   No No   Sig: Inhale 2 puffs 2 (two) times a day Rinse mouth after use.   mometasone (ELOCON) 0.1 % cream  Self No No   Sig: Apply topically daily   naproxen (NAPROSYN) 500 mg tablet   No No   Sig: Take 1 tablet (500 mg total) by mouth 2 (two) times a day with meals for 10 days   Patient not taking: Reported on 3/5/2025   pseudoephedrine-guaifenesin (MUCINEX D)  MG per tablet   No No   Sig: Take 1 tablet by mouth every 12 (twelve) hours   rizatriptan (MAXALT-MLT) 10 mg disintegrating tablet   No No   Sig: Take 1 tablet (10 mg total) by mouth once as needed for migraine May repeat in 2 hours if needed, max dose 2 tablets in 24 hours.   tiZANidine (ZANAFLEX) 4 mg tablet   No No   Sig: Take 1 tablet (4 mg total) by mouth 2 (two) times a day as needed for muscle spasms   topiramate (TOPAMAX) 100 mg tablet   No No   Sig: Take 1 tablet (100 mg total) by mouth 2 (two) times a day   traZODone (DESYREL) 100 mg tablet   No No   Sig: Take 1 tablet (100 mg total) by mouth daily at bedtime      Facility-Administered Medications: None     Patient's Medications   Discharge Prescriptions    ACETAMINOPHEN (TYLENOL) 500 MG TABLET    Take 2 tablets (1,000 mg total) by mouth 3 (three) times a day as needed for mild pain or moderate pain for up to 5 days       Start Date: 6/24/2025 End Date: 6/29/2025       Order Dose: 1,000 mg       Quantity: 30 tablet    Refills: 0    IBUPROFEN (MOTRIN) 600 MG TABLET    Take 1 tablet (600 mg total) by mouth 3 (three) times a day with meals for 3 days, THEN 1 tablet (600 mg total) 3 (three) times a day as needed for mild pain for up to 3 days.       Start Date: 6/24/2025 End Date: 6/30/2025       Order Dose: --       Quantity: 18 tablet    Refills: 0       ED SEPSIS DOCUMENTATION   Time reflects when diagnosis was documented in both MDM as applicable and the Disposition within this note       Time User  Action Codes Description Comment    6/24/2025  3:17 PM Marilyn Melvin Add [S16.1XXA] Cervical strain, acute, initial encounter                      [1]   Past Medical History:  Diagnosis Date    Anaphylaxis     apricot    Anxiety     Asthma     Cluster headache     Eczema     Headache, tension-type     Lymphadenitis     Last assessed 12/30/14    Lymphadenopathy     Last assessed 10/16/13    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HCC)     Migraine     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Psychiatric disorder     Psychiatric illness     Psychosis (HCC)     Seizures (HCC)    [2]   Past Surgical History:  Procedure Laterality Date    COLONOSCOPY N/A 05/03/2019    Procedure: COLONOSCOPY;  Surgeon: Jean Carlos Hogan MD;  Location: [facility];  Service: Gastroenterology    CYSTOSCOPY  04/04/2023    Dr. Bueno    ESOPHAGOGASTRODUODENOSCOPY N/A 05/03/2019    Procedure: ESOPHAGOGASTRODUODENOSCOPY (EGD);  Surgeon: Jean Carlos Hogan MD;  Location: [facility];  Service: Gastroenterology   [3]   Family History  Problem Relation Name Age of Onset    Alcohol abuse Mother Brandy     Migraines Mother Brandy     Other Mother Brandy         Neck pain    Depression Mother Brandy     Drug abuse Mother Brandy     Cervical cancer Mother Brandy     Drug abuse Father      Schizophrenia Father      No Known Problems Sister      No Known Problems Brother      Ovarian cancer Paternal Aunt      Febrile seizures Maternal Uncle      Stroke Maternal Uncle Toby     Diabetes Maternal Grandfather      Thyroid disease Maternal Grandmother      No Known Problems Paternal Grandfather      No Known Problems Paternal Grandmother      Completed Suicide  Neg Hx     [4]   Social History  Tobacco Use    Smoking status: Every Day     Current packs/day: 0.50     Types: Cigarettes    Smokeless tobacco: Never    Tobacco comments:     I stopped smoking cigarettes but started vaping   Vaping Use    Vaping status: Former    Substances: Nicotine, Flavoring   Substance Use Topics    Alcohol use:  Not Currently     Comment: rarely 1-2 drinks    Drug use: Yes     Frequency: 3.0 times per week     Types: Marijuana     Comment: KAHLIL Vega  06/24/25 [id number]

## 2025-06-25 ENCOUNTER — NURSE TRIAGE (OUTPATIENT)
Dept: PHYSICAL THERAPY | Facility: OTHER | Age: 25
End: 2025-06-25

## 2025-06-25 ENCOUNTER — OFFICE VISIT (OUTPATIENT)
Dept: NEUROLOGY | Facility: CLINIC | Age: 25
End: 2025-06-25
Payer: MEDICARE

## 2025-06-25 DIAGNOSIS — M54.2 NECK PAIN ON LEFT SIDE: Primary | ICD-10-CM

## 2025-06-25 DIAGNOSIS — M54.9 ACUTE UPPER BACK PAIN: ICD-10-CM

## 2025-06-25 NOTE — TELEPHONE ENCOUNTER
"Background - Initial Assessment  Clinical complaint: ED visit yesterday due to Left Sided Neck Pain/stiffness . Patient has a seizure on Monday 06/23 and hit her left side of her neck/ shoulder. Hx of neck \"pinching\" and back pain. Patient states this pain radiates into b/l upper back and left shoulder. No numbness or tingling. Been to spine specialist for her back pain. Not seeing a specialist for her neck pain. Patient states pain has been constant. Worse when laying for too long. She was not able to move her neck . Patient described pain as stiff, aching, sharp, stabbing, throbbing, shooting, dull. She is taking muscle relaxer but no improvement so far.  Date of onset: Monday 06/23/25 (new flare up)  Frequency of pain: constant  Quality of pain: aching, burning, dull, pinching, sharp, shooting, stabbing, and throbbing.    Protocols used: [facility] Protocol    "

## 2025-06-25 NOTE — TELEPHONE ENCOUNTER
This RN did review the [facility] in detail and the services offered for her Back or Neck pain.   Patient is agreeable to the triage completion and would like to participate in PT evaluation with [facility]rap[facility] (DPT).   Patient to discuss current & past complaints, relevant HX, possible treatment plan(s), and any necessary referrals or interventions with the DPT at the evaluation appointment.    Triaged and NO RF s/s Present.    Referral entered for the [facility] and contact information was provided to the patient as well. Patient is aware clerical staff will call to assist with scheduling. Nurse encouraged the patient to call the site if she does not hear from clerical beforehand. Patient agreed.    Patient did not voice any additional questions or concerns at this time.   All information regarding plan to be evaluated by the therapist/DPT was reviewed.  Patient agrees with plan.  Patient was very appreciative of f/u call, triage, and referral placement.    Nurse wished her well and the referral was closed per protocol.

## 2025-06-26 ENCOUNTER — TELEPHONE (OUTPATIENT)
Dept: NEUROLOGY | Facility: CLINIC | Age: 25
End: 2025-06-26

## 2025-06-26 DIAGNOSIS — F44.5 PSYCHOGENIC NONEPILEPTIC SEIZURE: Primary | ICD-10-CM

## 2025-06-26 NOTE — TELEPHONE ENCOUNTER
----- Message from KAHLIL Osuna sent at 6/26/2025  2:24 PM EDT -----  Regarding: Insurance issues for patient  Hi,    Patient is experiencing some insurance difficulties- reports insurance is not covering her talk therapy. Not sure if this is something you would be able to help with, but figured I would at least reach out to try and figure out what is going on. She is more than likely experiencing nonepileptic events and really needs to get into stable therapy sessions.     Thank you!

## 2025-06-26 NOTE — LETTER
Apurva Dumont  [address] [facility]  [facility] [address]      Apurva,    Please review below list of in network Psychologists with [facility]:    Behrens, Peter J Peter J. Behrens, Ph.D. [address], [address], [place], [address]   Phone: [phone]   Specialties: Psychology      Jass Bullard, PHD Jass Bullard, Ph.D. [facility] [facility] [place], [address]   Phone: [phone]   Specialties: Psychology      Albert GARCIA PSYD [facility], [facility] [address], [address], [place], [address]   Phone: [phone] Specialties: Psychology    King Art, ELÍAS Art, PsKeny 06/26/2025 11 Our network is subject to change. [address], [address], [place], [address]   Phone: [phone] Fax: [phone]   Specialties: Psychology    Funmilayo Lopez, CP [facility] [address], [place], [address]   Phone: [phone] Fax: [phone]   Specialties: Psychology          Sincerely,      Rashawn Templeton     [phone]  [facility]

## 2025-06-27 NOTE — TELEPHONE ENCOUNTER
I will place the order. However, the wait list at [facility] is excessively long. Recommend patient secure follow-up with an outside provider.

## 2025-07-01 NOTE — TELEPHONE ENCOUNTER
List of in network psychologists with [facility]     Behrens, Peter J Peter J. Behrens, Ph.D. [address], [address], [place], [address]   Phone: [phone]   Specialties: Psychology      Jass Bullard, PHD Jass Bullard, Ph.D. [address] [place], [address]   Phone: [phone]   Specialties: Psychology      Albert GARCIA PSYD [facility], [address], [address], [place], [address]   Phone: [phone] Specialties: Psychology    King Art, ELÍAS Art, PsyYUE 06/26/2025 11 Our network is subject to change. [address], [address], [place], [address]   Phone: [phone] Fax: [phone]   Specialties: Psychology    Funmilayo Lopez, PETRONA [facility] [address] [place], [place], [address]   Phone: [phone] Fax: [phone]   Specialties: Psychology

## 2025-07-01 NOTE — TELEPHONE ENCOUNTER
ESTHER phoned pt at [phone] [facility] this writer calling to provide list of in network psychologists. A letter will also be sent via mail.       Letter sent to clerical team

## 2025-07-02 NOTE — TELEPHONE ENCOUNTER
Attempt #2     LSW phoned pt at [phone]  and she is aware that list of in network psychologist was sent to her via mail. Pt is aware to contact this writer if she has any questions.     LSW remains available for future social needs.

## 2025-07-03 ENCOUNTER — TELEPHONE (OUTPATIENT)
Dept: NEUROLOGY | Facility: CLINIC | Age: 25
End: 2025-07-03

## 2025-07-03 DIAGNOSIS — F44.5 PSYCHOGENIC NONEPILEPTIC SEIZURE: Primary | ICD-10-CM

## 2025-07-03 NOTE — TELEPHONE ENCOUNTER
EMU referral in work queue.     Message left for patient to return call to office regarding EMU admission.

## 2025-07-09 NOTE — TELEPHONE ENCOUNTER
Patient returned call to office. Agreeable to EMU admission however would like to delay as she has a lot on her plate currently. Patient requesting August time frame. Agreeable to 8/12/2025 9am. Added to EMU calendar. ADT21 entered. Added patient information to EMU spreadsheet. Notified precert via [facility] regarding admission. EMU education sent to patient via [facility].    Precert - EMU admission for 8/12/2025 9am.

## 2025-07-15 ENCOUNTER — TELEPHONE (OUTPATIENT)
Dept: NEUROLOGY | Facility: CLINIC | Age: 25
End: 2025-07-15

## 2025-07-15 DIAGNOSIS — G43.019 INTRACTABLE MIGRAINE WITHOUT AURA AND WITHOUT STATUS MIGRAINOSUS: ICD-10-CM

## 2025-07-15 RX ORDER — RIZATRIPTAN BENZOATE 10 MG/1
10 TABLET, ORALLY DISINTEGRATING ORAL ONCE AS NEEDED
Qty: 18 TABLET | Refills: 1 | Status: SHIPPED | OUTPATIENT
Start: 2025-07-15

## 2025-07-16 NOTE — TELEPHONE ENCOUNTER
Received via [facility] Drug Change Request for Rizatriptan.  Request scanned into [employer].

## 2025-08-04 ENCOUNTER — TELEPHONE (OUTPATIENT)
Dept: NEUROLOGY | Facility: CLINIC | Age: 25
End: 2025-08-04

## 2025-08-12 ENCOUNTER — APPOINTMENT (INPATIENT)
Dept: NEUROLOGY | Facility: CLINIC | Age: 25
DRG: 101 | End: 2025-08-12
Attending: PSYCHIATRY & NEUROLOGY
Payer: MEDICARE

## 2025-08-12 ENCOUNTER — HOSPITAL ENCOUNTER (INPATIENT)
Facility: HOSPITAL | Age: 25
LOS: 1 days | Discharge: HOME | DRG: 101 | End: 2025-08-13
Attending: PSYCHIATRY & NEUROLOGY | Admitting: PSYCHIATRY & NEUROLOGY
Payer: MEDICARE

## 2025-08-12 DIAGNOSIS — F25.1 SCHIZOAFFECTIVE DISORDER, DEPRESSIVE TYPE (HCC): ICD-10-CM

## 2025-08-12 DIAGNOSIS — F44.5 PSYCHOGENIC NONEPILEPTIC SEIZURE: Primary | Chronic | ICD-10-CM

## 2025-08-12 DIAGNOSIS — F31.64 BIPOLAR I DISORDER, MOST RECENT EPISODE MIXED, SEVERE WITH PSYCHOTIC FEATURES (HCC): Chronic | ICD-10-CM

## 2025-08-12 DIAGNOSIS — Z13.9 ENCOUNTER FOR SCREENING INVOLVING SOCIAL DETERMINANTS OF HEALTH (SDOH): ICD-10-CM

## 2025-08-12 LAB
ALBUMIN SERPL BCG-MCNC: 4.1 G/DL (ref 3.5–5)
ALP SERPL-CCNC: 104 U/L (ref 34–104)
ALT SERPL W P-5'-P-CCNC: 15 U/L (ref 7–52)
ANION GAP SERPL CALCULATED.3IONS-SCNC: 5 MMOL/L (ref 4–13)
AST SERPL W P-5'-P-CCNC: 19 U/L (ref 13–39)
BASOPHILS # BLD AUTO: 0.1 THOUSANDS/ÂΜL (ref 0–0.1)
BASOPHILS NFR BLD AUTO: 1 % (ref 0–1)
BILIRUB SERPL-MCNC: 0.24 MG/DL (ref 0.2–1)
BUN SERPL-MCNC: 11 MG/DL (ref 5–25)
CALCIUM SERPL-MCNC: 8.9 MG/DL (ref 8.4–10.2)
CHLORIDE SERPL-SCNC: 107 MMOL/L (ref 96–108)
CO2 SERPL-SCNC: 24 MMOL/L (ref 21–32)
CREAT SERPL-MCNC: 0.58 MG/DL (ref 0.6–1.3)
EOSINOPHIL # BLD AUTO: 0.35 THOUSAND/ÂΜL (ref 0–0.61)
EOSINOPHIL NFR BLD AUTO: 3 % (ref 0–6)
ERYTHROCYTE [DISTWIDTH] IN BLOOD BY AUTOMATED COUNT: 13.5 % (ref 11.6–15.1)
GFR SERPL CREATININE-BSD FRML MDRD: 127 ML/MIN/1.73SQ M
GLUCOSE SERPL-MCNC: 91 MG/DL (ref 65–140)
HCG SERPL QL: NEGATIVE
HCT VFR BLD AUTO: 43.8 % (ref 34.8–46.1)
HGB BLD-MCNC: 14.6 G/DL (ref 11.5–15.4)
IMM GRANULOCYTES # BLD AUTO: 0.03 THOUSAND/UL (ref 0–0.2)
IMM GRANULOCYTES NFR BLD AUTO: 0 % (ref 0–2)
LYMPHOCYTES # BLD AUTO: 3.32 THOUSANDS/ÂΜL (ref 0.6–4.47)
LYMPHOCYTES NFR BLD AUTO: 28 % (ref 14–44)
MCH RBC QN AUTO: 29.7 PG (ref 26.8–34.3)
MCHC RBC AUTO-ENTMCNC: 33.3 G/DL (ref 31.4–37.4)
MCV RBC AUTO: 89 FL (ref 82–98)
MONOCYTES # BLD AUTO: 0.71 THOUSAND/ÂΜL (ref 0.17–1.22)
MONOCYTES NFR BLD AUTO: 6 % (ref 4–12)
NEUTROPHILS # BLD AUTO: 7.19 THOUSANDS/ÂΜL (ref 1.85–7.62)
NEUTS SEG NFR BLD AUTO: 62 % (ref 43–75)
NRBC BLD AUTO-RTO: 0 /100 WBCS
PLATELET # BLD AUTO: 358 THOUSANDS/UL (ref 149–390)
PMV BLD AUTO: 9.5 FL (ref 8.9–12.7)
POTASSIUM SERPL-SCNC: 4.4 MMOL/L (ref 3.5–5.3)
PROT SERPL-MCNC: 7 G/DL (ref 6.4–8.4)
RBC # BLD AUTO: 4.92 MILLION/UL (ref 3.81–5.12)
SODIUM SERPL-SCNC: 136 MMOL/L (ref 135–147)
WBC # BLD AUTO: 11.7 THOUSAND/UL (ref 4.31–10.16)

## 2025-08-12 PROCEDURE — 85025 COMPLETE CBC W/AUTO DIFF WBC: CPT | Performed by: PSYCHIATRY & NEUROLOGY

## 2025-08-12 PROCEDURE — HZ90ZZZ PHARMACOTHERAPY FOR SUBSTANCE ABUSE TREATMENT, NICOTINE REPLACEMENT: ICD-10-PCS | Performed by: PSYCHIATRY & NEUROLOGY

## 2025-08-12 PROCEDURE — 80201 ASSAY OF TOPIRAMATE: CPT | Performed by: PSYCHIATRY & NEUROLOGY

## 2025-08-12 PROCEDURE — 99223 1ST HOSP IP/OBS HIGH 75: CPT | Performed by: PSYCHIATRY & NEUROLOGY

## 2025-08-12 PROCEDURE — 84703 CHORIONIC GONADOTROPIN ASSAY: CPT | Performed by: PSYCHIATRY & NEUROLOGY

## 2025-08-12 PROCEDURE — 80053 COMPREHEN METABOLIC PANEL: CPT | Performed by: PSYCHIATRY & NEUROLOGY

## 2025-08-12 PROCEDURE — 95700 EEG CONT REC W/VID EEG TECH: CPT

## 2025-08-12 PROCEDURE — 95715 VEEG EA 12-26HR INTMT MNTR: CPT

## 2025-08-12 RX ORDER — NICOTINE 21 MG/24HR
14 PATCH, TRANSDERMAL 24 HOURS TRANSDERMAL DAILY
Status: DISCONTINUED | OUTPATIENT
Start: 2025-08-12 | End: 2025-08-13 | Stop reason: HOSPADM

## 2025-08-12 RX ORDER — ONDANSETRON 2 MG/ML
4 INJECTION INTRAMUSCULAR; INTRAVENOUS EVERY 6 HOURS PRN
Status: DISCONTINUED | OUTPATIENT
Start: 2025-08-12 | End: 2025-08-13 | Stop reason: HOSPADM

## 2025-08-12 RX ORDER — ALBUTEROL SULFATE 90 UG/1
2 INHALANT RESPIRATORY (INHALATION) EVERY 4 HOURS PRN
Status: DISCONTINUED | OUTPATIENT
Start: 2025-08-12 | End: 2025-08-13 | Stop reason: HOSPADM

## 2025-08-12 RX ORDER — SUMATRIPTAN 50 MG/1
100 TABLET, FILM COATED ORAL DAILY PRN
Status: DISCONTINUED | OUTPATIENT
Start: 2025-08-12 | End: 2025-08-13 | Stop reason: HOSPADM

## 2025-08-12 RX ORDER — TRAZODONE HYDROCHLORIDE 100 MG/1
100 TABLET ORAL
Status: DISCONTINUED | OUTPATIENT
Start: 2025-08-12 | End: 2025-08-13 | Stop reason: HOSPADM

## 2025-08-12 RX ORDER — ACETAMINOPHEN 325 MG/1
650 TABLET ORAL EVERY 6 HOURS PRN
Status: DISCONTINUED | OUTPATIENT
Start: 2025-08-12 | End: 2025-08-13 | Stop reason: HOSPADM

## 2025-08-12 RX ORDER — ALPRAZOLAM 0.25 MG
0.25 TABLET ORAL 2 TIMES DAILY PRN
Status: DISCONTINUED | OUTPATIENT
Start: 2025-08-12 | End: 2025-08-13 | Stop reason: HOSPADM

## 2025-08-12 RX ORDER — ENOXAPARIN SODIUM 100 MG/ML
40 INJECTION SUBCUTANEOUS DAILY
Status: DISCONTINUED | OUTPATIENT
Start: 2025-08-12 | End: 2025-08-13 | Stop reason: HOSPADM

## 2025-08-12 RX ORDER — RIZATRIPTAN BENZOATE 10 MG/1
10 TABLET, ORALLY DISINTEGRATING ORAL ONCE AS NEEDED
Status: DISCONTINUED | OUTPATIENT
Start: 2025-08-12 | End: 2025-08-12

## 2025-08-12 RX ORDER — TOPIRAMATE 100 MG/1
100 TABLET, FILM COATED ORAL 2 TIMES DAILY
Status: DISCONTINUED | OUTPATIENT
Start: 2025-08-12 | End: 2025-08-13 | Stop reason: HOSPADM

## 2025-08-12 RX ORDER — DIPHENHYDRAMINE HCL 25 MG
25 TABLET ORAL EVERY 6 HOURS PRN
Status: DISCONTINUED | OUTPATIENT
Start: 2025-08-12 | End: 2025-08-13 | Stop reason: HOSPADM

## 2025-08-12 RX ORDER — LORAZEPAM 2 MG/ML
2 INJECTION INTRAMUSCULAR EVERY 8 HOURS PRN
Status: DISCONTINUED | OUTPATIENT
Start: 2025-08-12 | End: 2025-08-13 | Stop reason: HOSPADM

## 2025-08-12 RX ORDER — FLUTICASONE FUROATE AND VILANTEROL 100; 25 UG/1; UG/1
1 POWDER RESPIRATORY (INHALATION)
Status: DISCONTINUED | OUTPATIENT
Start: 2025-08-12 | End: 2025-08-13 | Stop reason: HOSPADM

## 2025-08-12 RX ADMIN — ACETAMINOPHEN 650 MG: 325 TABLET ORAL at 21:16

## 2025-08-12 RX ADMIN — TOPIRAMATE 100 MG: 100 TABLET, FILM COATED ORAL at 12:20

## 2025-08-12 RX ADMIN — NICOTINE 14 MG: 14 PATCH, EXTENDED RELEASE TRANSDERMAL at 12:20

## 2025-08-12 RX ADMIN — ENOXAPARIN SODIUM 40 MG: 40 INJECTION SUBCUTANEOUS at 14:33

## 2025-08-12 RX ADMIN — TOPIRAMATE 100 MG: 100 TABLET, FILM COATED ORAL at 17:40

## 2025-08-12 RX ADMIN — FLUTICASONE FUROATE AND VILANTEROL TRIFENATATE 1 PUFF: 100; 25 POWDER RESPIRATORY (INHALATION) at 14:33

## 2025-08-13 ENCOUNTER — TRANSITIONAL CARE MANAGEMENT (OUTPATIENT)
Dept: FAMILY MEDICINE CLINIC | Facility: CLINIC | Age: 25
End: 2025-08-13

## 2025-08-13 VITALS
RESPIRATION RATE: 18 BRPM | OXYGEN SATURATION: 92 % | HEART RATE: 72 BPM | SYSTOLIC BLOOD PRESSURE: 98 MMHG | BODY MASS INDEX: 33.48 KG/M2 | DIASTOLIC BLOOD PRESSURE: 71 MMHG | TEMPERATURE: 97.2 F | HEIGHT: 63 IN

## 2025-08-13 LAB — TOPIRAMATE SERPL-MCNC: <1.5 UG/ML (ref 2–25)

## 2025-08-13 PROCEDURE — 99239 HOSP IP/OBS DSCHRG MGMT >30: CPT | Performed by: PSYCHIATRY & NEUROLOGY

## 2025-08-13 PROCEDURE — 4A10X4Z MONITORING OF CENTRAL NERVOUS ELECTRICAL ACTIVITY, EXTERNAL APPROACH: ICD-10-PCS | Performed by: PSYCHIATRY & NEUROLOGY

## 2025-08-13 RX ADMIN — ENOXAPARIN SODIUM 40 MG: 40 INJECTION SUBCUTANEOUS at 08:15

## 2025-08-13 RX ADMIN — FLUTICASONE FUROATE AND VILANTEROL TRIFENATATE 1 PUFF: 100; 25 POWDER RESPIRATORY (INHALATION) at 08:15

## 2025-08-13 RX ADMIN — NICOTINE 14 MG: 14 PATCH, EXTENDED RELEASE TRANSDERMAL at 08:16

## 2025-08-13 RX ADMIN — TOPIRAMATE 100 MG: 100 TABLET, FILM COATED ORAL at 08:15

## 2025-08-14 PROCEDURE — 95720 EEG PHY/QHP EA INCR W/VEEG: CPT | Performed by: PSYCHIATRY & NEUROLOGY

## 2025-08-15 ENCOUNTER — PATIENT OUTREACH (OUTPATIENT)
Dept: FAMILY MEDICINE CLINIC | Facility: CLINIC | Age: 25
End: 2025-08-15

## 2025-08-15 ENCOUNTER — TRANSITIONAL CARE MANAGEMENT (OUTPATIENT)
Dept: FAMILY MEDICINE CLINIC | Facility: CLINIC | Age: 25
End: 2025-08-15

## 2025-08-15 ENCOUNTER — OFFICE VISIT (OUTPATIENT)
Dept: FAMILY MEDICINE CLINIC | Facility: CLINIC | Age: 25
End: 2025-08-15

## 2025-08-22 ENCOUNTER — PATIENT OUTREACH (OUTPATIENT)
Dept: FAMILY MEDICINE CLINIC | Facility: CLINIC | Age: 25
End: 2025-08-22